# Patient Record
Sex: FEMALE | Race: WHITE | Employment: OTHER | URBAN - METROPOLITAN AREA
[De-identification: names, ages, dates, MRNs, and addresses within clinical notes are randomized per-mention and may not be internally consistent; named-entity substitution may affect disease eponyms.]

---

## 2017-02-07 ENCOUNTER — TELEPHONE (OUTPATIENT)
Dept: INTERNAL MEDICINE CLINIC | Facility: CLINIC | Age: 67
End: 2017-02-07

## 2017-02-08 RX ORDER — GLIMEPIRIDE 2 MG/1
TABLET ORAL
Qty: 180 TABLET | Refills: 0 | Status: SHIPPED | OUTPATIENT
Start: 2017-02-08 | End: 2017-07-25

## 2017-02-08 RX ORDER — DILTIAZEM HYDROCHLORIDE 240 MG/1
TABLET, EXTENDED RELEASE ORAL
Qty: 90 TABLET | Refills: 0 | Status: SHIPPED | OUTPATIENT
Start: 2017-02-08 | End: 2017-05-06

## 2017-02-08 NOTE — TELEPHONE ENCOUNTER
Pt following up on medication MATZIM  MG Oral Tablet 24 Hr and  Dapagliflozin Propanediol (FARXIGA) 10 MG Oral Tab. Pt state that pharmacy stated that medication was declined. .. please advise for pt is completely out

## 2017-02-08 NOTE — TELEPHONE ENCOUNTER
Refilled per protocol      Hypertensive Medications  Protocol Criteria:  · Appointment scheduled in the past 6 months or in the next 3 months  · BMP or CMP in the past 12 months  · Creatinine result < 2  Recent Visits       Provider Department Primary Dx Controlled type 2 diabetes mellitus without complication, without long-term current use of insulin ([facility] [address]    9 months ago Tyler Marcum MD [facility], [facility], [facility] Type 2 diabetes mellitus with complication [facility])        Future Appointmen

## 2017-02-09 RX ORDER — GLIMEPIRIDE 2 MG/1
2 TABLET ORAL 2 TIMES DAILY
Qty: 180 TABLET | Refills: 0 | Status: CANCELLED | OUTPATIENT
Start: 2017-02-09

## 2017-02-09 RX ORDER — DILTIAZEM HYDROCHLORIDE EXTENDED-RELEASE TABLETS 240 MG/1
TABLET, EXTENDED RELEASE ORAL
Qty: 90 TABLET | Refills: 0 | Status: CANCELLED | OUTPATIENT
Start: 2017-02-09

## 2017-02-15 DIAGNOSIS — E11.8 TYPE 2 DIABETES MELLITUS WITH COMPLICATION, UNSPECIFIED LONG TERM INSULIN USE STATUS: ICD-10-CM

## 2017-02-16 NOTE — TELEPHONE ENCOUNTER
From: Elisa Sullivan  To: Manpreet Walters MD  Sent: 2/15/2017 1:23 PM CST  Subject: Medication Renewal Request    Original authorizing provider: MD Elisa Alanis would like a refill of the following medications:  Exenatide ER (BY

## 2017-02-23 ENCOUNTER — LAB ENCOUNTER (OUTPATIENT)
Dept: LAB | Facility: HOSPITAL | Age: 67
End: 2017-02-23
Attending: INTERNAL MEDICINE
Payer: COMMERCIAL

## 2017-02-23 ENCOUNTER — TELEPHONE (OUTPATIENT)
Dept: INTERNAL MEDICINE CLINIC | Facility: CLINIC | Age: 67
End: 2017-02-23

## 2017-02-23 ENCOUNTER — OFFICE VISIT (OUTPATIENT)
Dept: INTERNAL MEDICINE CLINIC | Facility: CLINIC | Age: 67
End: 2017-02-23

## 2017-02-23 VITALS
SYSTOLIC BLOOD PRESSURE: 127 MMHG | HEART RATE: 82 BPM | HEIGHT: 60 IN | DIASTOLIC BLOOD PRESSURE: 77 MMHG | RESPIRATION RATE: 18 BRPM | BODY MASS INDEX: 27.68 KG/M2 | WEIGHT: 141 LBS

## 2017-02-23 DIAGNOSIS — E11.9 CONTROLLED TYPE 2 DIABETES MELLITUS WITHOUT COMPLICATION, WITHOUT LONG-TERM CURRENT USE OF INSULIN (HCC): Primary | ICD-10-CM

## 2017-02-23 DIAGNOSIS — Z00.00 ROUTINE HEALTH MAINTENANCE: ICD-10-CM

## 2017-02-23 DIAGNOSIS — R87.820 CERVICAL LOW RISK HUMAN PAPILLOMAVIRUS (HPV) DNA TEST POSITIVE: ICD-10-CM

## 2017-02-23 DIAGNOSIS — Z00.00 ROUTINE HEALTH MAINTENANCE: Primary | ICD-10-CM

## 2017-02-23 DIAGNOSIS — E11.9 CONTROLLED TYPE 2 DIABETES MELLITUS WITHOUT COMPLICATION, WITHOUT LONG-TERM CURRENT USE OF INSULIN (HCC): ICD-10-CM

## 2017-02-23 DIAGNOSIS — I10 ESSENTIAL HYPERTENSION WITH GOAL BLOOD PRESSURE LESS THAN 130/85: ICD-10-CM

## 2017-02-23 DIAGNOSIS — E53.8 VITAMIN B12 DEFICIENCY: ICD-10-CM

## 2017-02-23 DIAGNOSIS — E03.9 ACQUIRED HYPOTHYROIDISM: ICD-10-CM

## 2017-02-23 LAB
ALBUMIN SERPL BCP-MCNC: 4.5 G/DL (ref 3.5–4.8)
ALBUMIN/GLOB SERPL: 1.6 {RATIO} (ref 1–2)
ALP SERPL-CCNC: 44 U/L (ref 32–100)
ALT SERPL-CCNC: 30 U/L (ref 14–54)
ANION GAP SERPL CALC-SCNC: 9 MMOL/L (ref 0–18)
AST SERPL-CCNC: 32 U/L (ref 15–41)
BASOPHILS # BLD: 0.1 K/UL (ref 0–0.2)
BASOPHILS NFR BLD: 1 %
BILIRUB SERPL-MCNC: 0.5 MG/DL (ref 0.3–1.2)
BUN SERPL-MCNC: 12 MG/DL (ref 8–20)
BUN/CREAT SERPL: 13.2 (ref 10–20)
CALCIUM SERPL-MCNC: 10.1 MG/DL (ref 8.5–10.5)
CHLORIDE SERPL-SCNC: 105 MMOL/L (ref 95–110)
CHOLEST SERPL-MCNC: 161 MG/DL (ref 110–200)
CO2 SERPL-SCNC: 27 MMOL/L (ref 22–32)
CREAT SERPL-MCNC: 0.91 MG/DL (ref 0.5–1.5)
CREAT UR-MCNC: 25.4 MG/DL
EOSINOPHIL # BLD: 0.1 K/UL (ref 0–0.7)
EOSINOPHIL NFR BLD: 2 %
ERYTHROCYTE [DISTWIDTH] IN BLOOD BY AUTOMATED COUNT: 14.7 % (ref 11–15)
GLOBULIN PLAS-MCNC: 2.8 G/DL (ref 2.5–3.7)
GLUCOSE SERPL-MCNC: 70 MG/DL (ref 70–99)
HBA1C MFR BLD: 5.8 % (ref 4–6)
HCT VFR BLD AUTO: 39 % (ref 35–48)
HDLC SERPL-MCNC: 45 MG/DL
HGB BLD-MCNC: 12.9 G/DL (ref 12–16)
LDLC SERPL CALC-MCNC: 93 MG/DL (ref 0–99)
LYMPHOCYTES # BLD: 2.2 K/UL (ref 1–4)
LYMPHOCYTES NFR BLD: 35 %
MCH RBC QN AUTO: 28.5 PG (ref 27–32)
MCHC RBC AUTO-ENTMCNC: 33 G/DL (ref 32–37)
MCV RBC AUTO: 86.3 FL (ref 80–100)
MICROALBUMIN UR-MCNC: 0.3 MG/DL (ref 0–1.8)
MICROALBUMIN/CREAT UR: 11.8 MG/G{CREAT} (ref 0–20)
MONOCYTES # BLD: 0.6 K/UL (ref 0–1)
MONOCYTES NFR BLD: 9 %
NEUTROPHILS # BLD AUTO: 3.4 K/UL (ref 1.8–7.7)
NEUTROPHILS NFR BLD: 54 %
NONHDLC SERPL-MCNC: 116 MG/DL
OSMOLALITY UR CALC.SUM OF ELEC: 290 MOSM/KG (ref 275–295)
PLATELET # BLD AUTO: 328 K/UL (ref 140–400)
PMV BLD AUTO: 9.2 FL (ref 7.4–10.3)
POTASSIUM SERPL-SCNC: 3.9 MMOL/L (ref 3.3–5.1)
PROT SERPL-MCNC: 7.3 G/DL (ref 5.9–8.4)
RBC # BLD AUTO: 4.52 M/UL (ref 3.7–5.4)
SODIUM SERPL-SCNC: 141 MMOL/L (ref 136–144)
TRIGL SERPL-MCNC: 113 MG/DL (ref 1–149)
TSH SERPL-ACNC: 1.44 UIU/ML (ref 0.34–5.6)
WBC # BLD AUTO: 6.4 K/UL (ref 4–11)

## 2017-02-23 PROCEDURE — 99213 OFFICE O/P EST LOW 20 MIN: CPT | Performed by: INTERNAL MEDICINE

## 2017-02-23 PROCEDURE — 36415 COLL VENOUS BLD VENIPUNCTURE: CPT

## 2017-02-23 PROCEDURE — 82570 ASSAY OF URINE CREATININE: CPT

## 2017-02-23 PROCEDURE — 99396 PREV VISIT EST AGE 40-64: CPT | Performed by: INTERNAL MEDICINE

## 2017-02-23 PROCEDURE — 82043 UR ALBUMIN QUANTITATIVE: CPT

## 2017-02-23 PROCEDURE — G0439 PPPS, SUBSEQ VISIT: HCPCS | Performed by: INTERNAL MEDICINE

## 2017-02-23 PROCEDURE — 80053 COMPREHEN METABOLIC PANEL: CPT

## 2017-02-23 PROCEDURE — 83036 HEMOGLOBIN GLYCOSYLATED A1C: CPT

## 2017-02-23 PROCEDURE — 85025 COMPLETE CBC W/AUTO DIFF WBC: CPT

## 2017-02-23 PROCEDURE — 80061 LIPID PANEL: CPT

## 2017-02-23 PROCEDURE — 84443 ASSAY THYROID STIM HORMONE: CPT

## 2017-02-23 NOTE — PROGRESS NOTES
HPI:    Patient ID: Zenobia Marques is a 61year old female. REASON FOR VISIT:    Zenobia Marques is a 61year old female who presents for an [address]       Patient Active Problem List:     Senile cataract     Acquired hypothyroidism Encounters:  02/23/17 : 141 lb (63.957 kg)  11/17/16 : 140 lb (63.504 kg)  08/18/16 : 140 lb 1.6 oz (63.549 kg)  05/07/16 : 142 lb 12.8 oz (64.774 kg)  04/12/16 : 143 lb (64.864 kg)  01/09/16 : 140 lb (63.504 kg)    Body mass index is 27.54 kg/(m^2). WEEKS   Little interest or pleasure in doing things (over the last two weeks)?: Not at all    Feeling down, depressed, or hopeless (over the last two weeks)?: Not at all    PHQ-2 SCORE: 0        PREVENTATIVE SERVICES  INDICATIONS AND SCHEDULE Recommendatio No components found for: PPDINDURAT      Disease Monitoring:    SPECIFIC DISEASE MONITORING Internal Lab or Procedure External Lab or Procedure   Annual Monitoring of Persistent     Medications (ACE/ARB, digoxin, diuretics)    Potassium  Annually POTASSIUM 0   TRANDOLAPRIL 4 MG Oral Tab TAKE 1 TABLET BY MOUTH EVERY DAY Disp: 90 tablet Rfl: 3   Pantoprazole Sodium 40 MG Oral Tab EC Take 1 tablet (40 mg total) by mouth once daily.  Disp: 90 tablet Rfl: 1   Vitamin B-12 1000 MCG Oral Tab Take 2 tablets (2,000 mc Alcohol Use: Yes           1.0 oz/week       2 Glasses of wine per week    Occ:n/a : n/a       REVIEW OF SYSTEMS:   See below   EXAM:   /77 mmHg  Pulse 82  Resp 18  Ht 5' (1.524 m)  Wt 141 lb (63.957 kg)  BMI 27.54 kg/m2   No LMP recorded.  Jeremi Ngo trouble swallowing. Eyes: Negative for pain and redness. Respiratory: Negative for cough, shortness of breath and wheezing. Cardiovascular: Negative for chest pain and palpitations.    Gastrointestinal: Negative for nausea, vomiting, abdominal pain this when the fenofibrate runs out.  Disp: 30 tablet Rfl: 3   LEVOTHYROXINE SODIUM 100 MCG Oral Tab TAKE 1 TABLET BY MOUTH DAILY BEFORE BREAKFAST Disp: 90 tablet Rfl: 3     Allergies:No Known Allergies   PHYSICAL EXAM:   Physical Exam    Constitutional: She Controlled type 2 diabetes mellitus without complication, without long-term current use of insulin ([address]  Well controlled. Form for DMV   She is up to date on ophthalmology and urine tests. - Hemoglobin A1C [E];  Future  - Microalb/Creat Ratio, Random Ur

## 2017-02-24 NOTE — TELEPHONE ENCOUNTER
Your blood sugar, urine test, kidney, liver, electrolytes, thyroid, and blood count tests were all normal.  Your cholesterol panel is very good! Your A1C is excellent! In fact, it is so good that I would like to decrease the glimepiride to once a day.   H

## 2017-03-08 NOTE — TELEPHONE ENCOUNTER
From: Lupis Lieberman  To: Sharifa Mckeon MD  Sent: 3/6/2017 9:20 AM CST  Subject: Medication Renewal Request    Original authorizing provider: MD Lupis Dee would like a refill of the following medications:  Dapagliflozin Pro

## 2017-03-08 NOTE — TELEPHONE ENCOUNTER
Please advise on refill request    Diabetes Medications  Protocol Criteria:  · Appointment scheduled in the past 6 months or the next 3 months  · A1C < 7.5 in the past 6 months  · Creatinine in the past 12 months  · Creatinine result < 1.5   Recent Visits

## 2017-03-09 RX ORDER — PANTOPRAZOLE SODIUM 40 MG/1
40 TABLET, DELAYED RELEASE ORAL
Qty: 90 TABLET | Refills: 0 | Status: SHIPPED | OUTPATIENT
Start: 2017-03-09 | End: 2017-06-07

## 2017-03-09 RX ORDER — METFORMIN HYDROCHLORIDE 500 MG/1
TABLET, EXTENDED RELEASE ORAL
Qty: 360 TABLET | Refills: 0 | Status: SHIPPED | OUTPATIENT
Start: 2017-03-09 | End: 2017-06-07

## 2017-03-10 RX ORDER — METFORMIN HYDROCHLORIDE 500 MG/1
TABLET, EXTENDED RELEASE ORAL
Qty: 360 TABLET | Refills: 0 | OUTPATIENT
Start: 2017-03-10

## 2017-03-10 NOTE — TELEPHONE ENCOUNTER
From: Veronica Pizano  To: Malathi Tran MD  Sent: 3/6/2017 9:04 AM CST  Subject: Medication Renewal Request    Original authorizing provider: MD Veronica Ricks would like a refill of the following medications:  Pantoprazole Sodi

## 2017-03-10 NOTE — TELEPHONE ENCOUNTER
From: Lupis Lieberman  To: Sharifa Mckeon MD  Sent: 3/8/2017 3:03 PM CST  Subject: Medication Renewal Request    Original authorizing provider: MD Lupis Dee would like a refill of the following medications:  MetFORMIN HCl ER

## 2017-03-21 ENCOUNTER — OFFICE VISIT (OUTPATIENT)
Dept: FAMILY MEDICINE CLINIC | Facility: CLINIC | Age: 67
End: 2017-03-21

## 2017-03-21 VITALS
OXYGEN SATURATION: 98 % | WEIGHT: 142 LBS | TEMPERATURE: 98 F | SYSTOLIC BLOOD PRESSURE: 128 MMHG | DIASTOLIC BLOOD PRESSURE: 80 MMHG | RESPIRATION RATE: 12 BRPM | BODY MASS INDEX: 28 KG/M2 | HEART RATE: 76 BPM

## 2017-03-21 DIAGNOSIS — H92.02 OTALGIA OF LEFT EAR: Primary | ICD-10-CM

## 2017-03-21 PROCEDURE — 99203 OFFICE O/P NEW LOW 30 MIN: CPT | Performed by: NURSE PRACTITIONER

## 2017-03-21 NOTE — PATIENT INSTRUCTIONS
May take Coricidin HBP tailored to your symptoms  Equalize ear pressure often as discussed.       Eustachian tube problems  Written by the doctors and editors at [employer]     What is the eustachian tube? — The eustachian tube is a tube that connects the mid Should I see a doctor or nurse? — See your doctor or nurse if your symptoms are severe, get worse, or if they don’t go away after a few days. Will I need tests? — Probably not.  Your doctor or nurse should be able to tell if you have a eustachian tube pr Earache, No Infection (Adult)  Earaches can happen without an infection. This occurs when air and fluid build up behind the eardrum causing a feeling of fullness and discomfort and reduced hearing.  This is called otitis media with effusion (OME) or serous · You may use over-the-counter medicine as directed to control pain, unless another medicine was prescribed. If you have chronic liver or kidney disease or ever had a stomach ulcer or GI bleeding, talk with your doctor before using these medicines.  Aspirin

## 2017-03-21 NOTE — PROGRESS NOTES
CHIEF COMPLAINT:   Patient presents with:  Ear Pain      HPI:   Erin Drummond is a 61year old female who presents to clinic today with complaints of left ear pain. Has had for 1  weeks. Pain is described as intermittent ache.   Patient denies histor ibuprofen (MOTRIN) 800 MG Oral Tab Take 1 tablet by mouth every 6 (six) hours as needed for Pain.  Disp: 40 tablet Rfl: 3      Past Medical History   Diagnosis Date   • Hypothyroid    • Other and unspecified hyperlipidemia    • Type II or unspecified type d LYMPH: No cervical, pre/post auricular lymphadenopathy. ASSESSMENT AND PLAN:   Amol Way is a 61year old female who presents with:    ASSESSMENT:  Otalgia of left ear  (primary encounter diagnosis)    PLAN: Meds as listed below.   Comfort pau ?Illnesses or conditions that make the eustachian tubes swollen or inflamed – These include colds, allergies, ear infections, or sinus infections. The sinuses are hollow areas in the bones of the face.   ?Sudden air pressure changes – Sudden air pressure ch ?Decongestants – These medicines can help with stuffy nose symptoms. These should not be used if patient has underlying blood pressure issues (high blood pressure etc.)   -Sudafed (pseudoephedrine)- according to package instructions. ? Antibiotic medici If your OME doesn't improve after 3 months, surgery may be used to drain the fluid and insert a small tube in the eardrum to allow continued drainage.   Because the middle ear fluid can become infected, it is important to watch for signs of an ear infection © 2623-2379 [address], [address] [place]. All rights reserved. This information is not intended as a substitute for professional medical care. Always follow your healthcare professional's instructions.

## 2017-04-06 ENCOUNTER — OFFICE VISIT (OUTPATIENT)
Dept: INTERNAL MEDICINE CLINIC | Facility: CLINIC | Age: 67
End: 2017-04-06

## 2017-04-06 ENCOUNTER — TELEPHONE (OUTPATIENT)
Dept: INTERNAL MEDICINE CLINIC | Facility: CLINIC | Age: 67
End: 2017-04-06

## 2017-04-06 VITALS
HEIGHT: 60 IN | TEMPERATURE: 98 F | WEIGHT: 142 LBS | DIASTOLIC BLOOD PRESSURE: 64 MMHG | HEART RATE: 83 BPM | SYSTOLIC BLOOD PRESSURE: 136 MMHG | BODY MASS INDEX: 27.88 KG/M2

## 2017-04-06 DIAGNOSIS — H92.02 LEFT EAR PAIN: Primary | ICD-10-CM

## 2017-04-06 PROCEDURE — 99212 OFFICE O/P EST SF 10 MIN: CPT | Performed by: INTERNAL MEDICINE

## 2017-04-06 PROCEDURE — 99213 OFFICE O/P EST LOW 20 MIN: CPT | Performed by: INTERNAL MEDICINE

## 2017-04-06 RX ORDER — FLUTICASONE PROPIONATE 50 MCG
2 SPRAY, SUSPENSION (ML) NASAL DAILY
Qty: 1 INHALER | Refills: 0 | Status: SHIPPED | OUTPATIENT
Start: 2017-04-06 | End: 2017-05-01

## 2017-04-06 NOTE — PROGRESS NOTES
Teresita Mata is a 61year old female. Patient presents with:  Ear Pain: Pt c/o left ear pain on/off for 3 weeks    HPI:   For the past 3 weeks, she has had intermittent left ear pain. Episodes occur daily, but usually only last for a few minutes.   Cornelius Turner Vitamin B-12 1000 MCG Oral Tab Take 2 tablets (2,000 mcg total) by mouth daily. Disp: 30 tablet Rfl: 11   Cholecalciferol (VITAMIN D) 1000 UNITS Oral Cap Take 1,000 mg by mouth daily. Disp:  Rfl:    aspirin 81 MG Oral Tab Take  by mouth.  take 1 tablet (8 then refer to ENT. The patient indicates understanding of these issues and agrees to the plan.     Jordana Reeder MD  4/6/2017  9:33 AM

## 2017-04-06 NOTE — TELEPHONE ENCOUNTER
PATIENT DROPPING OFF MEDICAL REPORT FORM FOR [facility] FOR  TO COMPLETE AND MAILED BACK TO [facility] IS SELF ADDRESSED ENVELOPE. FORM PLACED IN  IN BOX AT [facility]. PLEASE CALL PATIENT WHEN COMPLETED.

## 2017-04-22 ENCOUNTER — OFFICE VISIT (OUTPATIENT)
Dept: OBGYN CLINIC | Facility: CLINIC | Age: 67
End: 2017-04-22

## 2017-04-22 VITALS
BODY MASS INDEX: 27 KG/M2 | HEART RATE: 92 BPM | SYSTOLIC BLOOD PRESSURE: 144 MMHG | WEIGHT: 139.63 LBS | DIASTOLIC BLOOD PRESSURE: 78 MMHG

## 2017-04-22 DIAGNOSIS — Z12.4 SCREENING FOR MALIGNANT NEOPLASM OF CERVIX: ICD-10-CM

## 2017-04-22 DIAGNOSIS — R87.810 CERVICAL HIGH RISK HUMAN PAPILLOMAVIRUS (HPV) DNA TEST POSITIVE: ICD-10-CM

## 2017-04-22 DIAGNOSIS — Z01.419 ENCOUNTER FOR GYNECOLOGICAL EXAMINATION WITHOUT ABNORMAL FINDING: Primary | ICD-10-CM

## 2017-04-22 PROCEDURE — 99397 PER PM REEVAL EST PAT 65+ YR: CPT | Performed by: OBSTETRICS & GYNECOLOGY

## 2017-04-22 NOTE — PROGRESS NOTES
Amol Way is a 74year old female  No LMP recorded. Patient is postmenopausal. Patient presents with:  Gyn Exam: Annual and Mammo order -- last seen in   Abnormal Labs: last pap  (+) HPV -- no f/u pap since then.  Per pt unaware need Caffeine Concern Yes    Comment: Coffee 1 cup daily     Social History Narrative       FAMILY HISTORY:  Family History   Problem Relation Age of Onset   • Cancer Mother      ovarian/cervical   • Diabetes Mother    • Cancer Maternal Grandmother      bone, l tablet (81MG)  by oral route  every day, Disp: , Rfl:   •  ibuprofen (MOTRIN) 800 MG Oral Tab, Take 1 tablet by mouth every 6 (six) hours as needed for Pain., Disp: 40 tablet, Rfl: 3    ALLERGIES:  No Known Allergies      Review of Systems:  Constitutional tenderness  Perineum: normal  Anus: no hemorroids     Assessment & Plan:  Alanis Haider was seen today for gyn exam and abnormal labs.     Diagnoses and all orders for this visit:    Encounter for gynecological examination without abnormal finding    Cervical high

## 2017-05-01 DIAGNOSIS — E11.8 TYPE 2 DIABETES MELLITUS WITH COMPLICATION, UNSPECIFIED LONG TERM INSULIN USE STATUS: ICD-10-CM

## 2017-05-01 RX ORDER — FLUTICASONE PROPIONATE 50 MCG
SPRAY, SUSPENSION (ML) NASAL
Qty: 1 INHALER | Refills: 0 | Status: SHIPPED | OUTPATIENT
Start: 2017-05-01 | End: 2017-06-01

## 2017-05-02 DIAGNOSIS — E11.8 TYPE 2 DIABETES MELLITUS WITH COMPLICATION, UNSPECIFIED LONG TERM INSULIN USE STATUS: ICD-10-CM

## 2017-05-03 ENCOUNTER — TELEPHONE (OUTPATIENT)
Dept: OBGYN CLINIC | Facility: CLINIC | Age: 67
End: 2017-05-03

## 2017-05-03 NOTE — TELEPHONE ENCOUNTER
PT REQUESTING TEST RESULTS DONE 04/22/17 /  PAP TEST / PT ALSO STATE SHE WOULD LIKE TO SPEAK WITH SOMEONE REGARDING THE RESULTS / PLS ADV

## 2017-05-05 ENCOUNTER — TELEPHONE (OUTPATIENT)
Dept: INTERNAL MEDICINE CLINIC | Facility: CLINIC | Age: 67
End: 2017-05-05

## 2017-05-05 DIAGNOSIS — H92.02 LEFT EAR PAIN: Primary | ICD-10-CM

## 2017-05-05 NOTE — TELEPHONE ENCOUNTER
Pt informed of MD message, pt stated understanding. ENT office # provided to pt.      Future Appointments  Date Time Provider Linda Zabala   6/22/2017 8:30 AM Toney Andino MD [facility]

## 2017-05-05 NOTE — TELEPHONE ENCOUNTER
Patient called a new request for refills  Current Outpatient Prescriptions:  Exenatide ER (BYDUREON) 2 MG Subcutaneous Pen-injector Inject 1 Dose into the skin once a week.  Disp: 12 each Rfl: 0   MATZIM  MG Oral Tablet 24 Hr TAKE 1 TABLET BY MOUTH EV

## 2017-05-05 NOTE — TELEPHONE ENCOUNTER
Pt states still having ear pain discomfort inconsistently since appt with Dr. Iveth Barber on 4/6. Pt states Dr. Iveth Barber stated if ear pian didn't go away, he will refer her to a ENT Doctor.   Please advise, please call cell number  after 5pm.

## 2017-05-05 NOTE — TELEPHONE ENCOUNTER
Actions Requested: left ear pain, requesting referral for ENT  Situation/Background   Problem: Per pt Seen on 4/06/17 for left ear pain and pt states its worst. Pain 5/10 intermitting. Denies drainage, fever.    Onset:    Associated Symptoms: ear pressure,

## 2017-05-06 ENCOUNTER — OFFICE VISIT (OUTPATIENT)
Dept: OTOLARYNGOLOGY | Facility: CLINIC | Age: 67
End: 2017-05-06

## 2017-05-06 ENCOUNTER — TELEPHONE (OUTPATIENT)
Dept: OTOLARYNGOLOGY | Facility: CLINIC | Age: 67
End: 2017-05-06

## 2017-05-06 ENCOUNTER — OFFICE VISIT (OUTPATIENT)
Dept: AUDIOLOGY | Facility: CLINIC | Age: 67
End: 2017-05-06

## 2017-05-06 ENCOUNTER — TELEPHONE (OUTPATIENT)
Dept: INTERNAL MEDICINE CLINIC | Facility: CLINIC | Age: 67
End: 2017-05-06

## 2017-05-06 VITALS
TEMPERATURE: 97 F | BODY MASS INDEX: 28 KG/M2 | SYSTOLIC BLOOD PRESSURE: 130 MMHG | DIASTOLIC BLOOD PRESSURE: 70 MMHG | WEIGHT: 141 LBS

## 2017-05-06 DIAGNOSIS — IMO0001 ASYMMETRICAL LEFT SENSORINEURAL HEARING LOSS: Primary | ICD-10-CM

## 2017-05-06 DIAGNOSIS — IMO0001 ASYMMETRICAL HEARING LOSS, LEFT: Primary | ICD-10-CM

## 2017-05-06 DIAGNOSIS — E11.8 TYPE 2 DIABETES MELLITUS WITH COMPLICATION, UNSPECIFIED LONG TERM INSULIN USE STATUS: Primary | ICD-10-CM

## 2017-05-06 DIAGNOSIS — H90.42 SENSORINEURAL HEARING LOSS (SNHL) OF LEFT EAR WITH UNRESTRICTED HEARING OF RIGHT EAR: ICD-10-CM

## 2017-05-06 PROCEDURE — 92557 COMPREHENSIVE HEARING TEST: CPT | Performed by: AUDIOLOGIST

## 2017-05-06 PROCEDURE — 99243 OFF/OP CNSLTJ NEW/EST LOW 30: CPT | Performed by: OTOLARYNGOLOGY

## 2017-05-06 PROCEDURE — 99212 OFFICE O/P EST SF 10 MIN: CPT | Performed by: OTOLARYNGOLOGY

## 2017-05-06 RX ORDER — MELOXICAM 15 MG/1
15 TABLET ORAL DAILY
Qty: 30 TABLET | Refills: 3 | Status: SHIPPED | OUTPATIENT
Start: 2017-05-06 | End: 2017-11-02

## 2017-05-06 NOTE — TELEPHONE ENCOUNTER
Rx request for Exenatide ER (Bydureon) 2mg, PASSED Diabetes Protocol. Rx filled per protocol.       Diabetes Medications  Protocol Criteria:  · Appointment scheduled in the past 6 months or the next 3 months  · A1C < 7.5 in the past 6 months  · Creatinine i

## 2017-05-06 NOTE — TELEPHONE ENCOUNTER
Patient states that she was referred to see dr Jess Serna by dr Chintan Jeffers.   Seen 5/6/17 by dr Jess Serna  - who  states that she will need a referral to have a Auditory Brainstem test.

## 2017-05-06 NOTE — TELEPHONE ENCOUNTER
From: Madhuri Lyons  To: Brittney Luis MD  Sent: 5/1/2017 9:36 AM CDT  Subject: Medication Renewal Request    Original authorizing provider: MD Madhuri Spencer would like a refill of the following medications:  Exenatide ER (BYD

## 2017-05-06 NOTE — TELEPHONE ENCOUNTER
pt called. She was seen this morning with FANNY. She is asking for something for her pain. Please advise.

## 2017-05-06 NOTE — PROGRESS NOTES
Raúl Hearn is a 74year old female. Patient presents with:  Ear Problem: L ear pain x 7 weeks. HPI:   The last 6-8 weeks she's been experiencing a feeling of fullness and occasional pain in her left ear.  She denies any dizziness but has had mild for Pain.  Disp: 40 tablet Rfl: 3      Past Medical History   Diagnosis Date   • Hypothyroid    • Other and unspecified hyperlipidemia    • Type II or unspecified type diabetes mellitus without mention of complication, not stated as uncontrolled    • Unspec Left: Normal.    Ears Normal Inspection - Right: Normal, Left: Normal. Canal - Left: Normal. TM - Right: Normal, Left: Normal.  Asymmetric sensorineural hearing loss left ear     ASSESSMENT AND PLAN:   1.  Asymmetrical hearing loss, left  She is asymmetric

## 2017-05-06 NOTE — PROGRESS NOTES
AUDIOGRAM     Merary Leak was referred for testing by Ximena Perez  [id number]      Otoscopic Inspection:  both ears: no cerumen    Audiometric Test Results: The patient was tested using air and bone audiometry.   The audiometric thre

## 2017-05-07 RX ORDER — DILTIAZEM HYDROCHLORIDE 240 MG/1
TABLET, EXTENDED RELEASE ORAL
Qty: 90 TABLET | Refills: 1 | Status: SHIPPED | OUTPATIENT
Start: 2017-05-07 | End: 2017-08-03

## 2017-05-10 ENCOUNTER — OFFICE VISIT (OUTPATIENT)
Dept: AUDIOLOGY | Facility: CLINIC | Age: 67
End: 2017-05-10

## 2017-05-10 DIAGNOSIS — H90.3 SENSORINEURAL HEARING LOSS, BILATERAL: ICD-10-CM

## 2017-05-10 DIAGNOSIS — IMO0001 ASYMMETRICAL LEFT SENSORINEURAL HEARING LOSS: Primary | ICD-10-CM

## 2017-05-10 PROCEDURE — 92585 AUDITORY EVOKED POTENTIAL: CPT | Performed by: AUDIOLOGIST

## 2017-05-11 RX ORDER — DILTIAZEM HYDROCHLORIDE EXTENDED-RELEASE TABLETS 240 MG/1
TABLET, EXTENDED RELEASE ORAL
Qty: 90 TABLET | Refills: 0 | OUTPATIENT
Start: 2017-05-11

## 2017-05-12 NOTE — TELEPHONE ENCOUNTER
From: Jerson Latham  To: Adelina Willson MD  Sent: 5/2/2017 8:40 AM CDT  Subject: Medication Renewal Request    Original authorizing provider: MD Jerson Gage would like a refill of the following medications:  MATZIM  MG

## 2017-05-14 ENCOUNTER — TELEPHONE (OUTPATIENT)
Dept: OTOLARYNGOLOGY | Facility: CLINIC | Age: 67
End: 2017-05-14

## 2017-05-14 NOTE — TELEPHONE ENCOUNTER
Please inform her that her ABR was normal. I would recommend observation with a repeat audiogram in 6 months

## 2017-05-15 ENCOUNTER — TELEPHONE (OUTPATIENT)
Dept: OTOLARYNGOLOGY | Facility: CLINIC | Age: 67
End: 2017-05-15

## 2017-05-18 NOTE — PROGRESS NOTES
Quick Note:    Send letter: Normal pap & Negative high risk HPV. Continue with annual breast / pelvic exams (I will do pap if warrantied).   ______

## 2017-06-01 RX ORDER — FLUTICASONE PROPIONATE 50 MCG
SPRAY, SUSPENSION (ML) NASAL
Qty: 1 INHALER | Refills: 3 | Status: SHIPPED | OUTPATIENT
Start: 2017-06-01 | End: 2017-09-08

## 2017-06-07 RX ORDER — METFORMIN HYDROCHLORIDE 500 MG/1
TABLET, EXTENDED RELEASE ORAL
Qty: 360 TABLET | Refills: 1 | Status: SHIPPED | OUTPATIENT
Start: 2017-06-07 | End: 2017-11-28

## 2017-06-07 RX ORDER — PANTOPRAZOLE SODIUM 40 MG/1
40 TABLET, DELAYED RELEASE ORAL
Qty: 90 TABLET | Refills: 1 | Status: SHIPPED | OUTPATIENT
Start: 2017-06-07 | End: 2017-11-28

## 2017-06-13 DIAGNOSIS — E78.00 HYPERCHOLESTEROLEMIA: Primary | ICD-10-CM

## 2017-06-16 RX ORDER — ATORVASTATIN CALCIUM 10 MG/1
10 TABLET, FILM COATED ORAL NIGHTLY
Qty: 30 TABLET | Refills: 3 | Status: SHIPPED | OUTPATIENT
Start: 2017-06-16 | End: 2017-10-19

## 2017-06-22 ENCOUNTER — TELEPHONE (OUTPATIENT)
Dept: INTERNAL MEDICINE CLINIC | Facility: CLINIC | Age: 67
End: 2017-06-22

## 2017-06-22 DIAGNOSIS — E11.9 CONTROLLED TYPE 2 DIABETES MELLITUS WITHOUT COMPLICATION, UNSPECIFIED LONG TERM INSULIN USE STATUS: Primary | ICD-10-CM

## 2017-06-22 DIAGNOSIS — E78.00 HIGH CHOLESTEROL: ICD-10-CM

## 2017-06-22 NOTE — TELEPHONE ENCOUNTER
Needs her cholesterol, diabetes and thyroid order   Apt rescheduled due to doctor being out of office. Prefers to have order and blood drawn before apt on 7/31 at 5 P. M.

## 2017-06-25 NOTE — TELEPHONE ENCOUNTER
Call pt: She does not have to fast.  She did the fasting blood tests in February. She just needs an A1C.     Orders Placed This Encounter      Hemoglobin A1C [E]

## 2017-06-26 NOTE — TELEPHONE ENCOUNTER
Spoke with patient and relayed 's message. Patient voiced her understanding of this. Patient asking about checking cholesterol since medication was changed. Advised patient this writer will ask Dr about this.

## 2017-07-18 ENCOUNTER — APPOINTMENT (OUTPATIENT)
Dept: LAB | Age: 67
End: 2017-07-18
Attending: INTERNAL MEDICINE
Payer: COMMERCIAL

## 2017-07-18 DIAGNOSIS — E11.9 CONTROLLED TYPE 2 DIABETES MELLITUS WITHOUT COMPLICATION, UNSPECIFIED LONG TERM INSULIN USE STATUS: ICD-10-CM

## 2017-07-18 DIAGNOSIS — E11.9 CONTROLLED TYPE 2 DIABETES MELLITUS WITHOUT COMPLICATION, WITHOUT LONG-TERM CURRENT USE OF INSULIN (HCC): ICD-10-CM

## 2017-07-18 DIAGNOSIS — E78.00 HIGH CHOLESTEROL: ICD-10-CM

## 2017-07-18 LAB
CHOLEST SERPL-MCNC: 142 MG/DL (ref 110–200)
HBA1C MFR BLD: 6.1 % (ref 4–6)
HDLC SERPL-MCNC: 56 MG/DL
LDLC SERPL CALC-MCNC: 48 MG/DL (ref 0–99)
NONHDLC SERPL-MCNC: 86 MG/DL
TRIGL SERPL-MCNC: 192 MG/DL (ref 1–149)

## 2017-07-18 PROCEDURE — 36415 COLL VENOUS BLD VENIPUNCTURE: CPT

## 2017-07-18 PROCEDURE — 80061 LIPID PANEL: CPT

## 2017-07-18 PROCEDURE — 83036 HEMOGLOBIN GLYCOSYLATED A1C: CPT

## 2017-07-26 RX ORDER — GLIMEPIRIDE 2 MG/1
TABLET ORAL
Qty: 180 TABLET | Refills: 0 | Status: SHIPPED | OUTPATIENT
Start: 2017-07-26 | End: 2017-08-03

## 2017-07-27 RX ORDER — GLIMEPIRIDE 2 MG/1
2 TABLET ORAL 2 TIMES DAILY
Qty: 180 TABLET | Refills: 0
Start: 2017-07-27

## 2017-08-03 ENCOUNTER — OFFICE VISIT (OUTPATIENT)
Dept: INTERNAL MEDICINE CLINIC | Facility: CLINIC | Age: 67
End: 2017-08-03

## 2017-08-03 VITALS
DIASTOLIC BLOOD PRESSURE: 83 MMHG | HEART RATE: 91 BPM | BODY MASS INDEX: 22.42 KG/M2 | WEIGHT: 139.5 LBS | HEIGHT: 66 IN | RESPIRATION RATE: 18 BRPM | SYSTOLIC BLOOD PRESSURE: 146 MMHG

## 2017-08-03 DIAGNOSIS — Z12.11 COLON CANCER SCREENING: ICD-10-CM

## 2017-08-03 DIAGNOSIS — Z12.31 VISIT FOR SCREENING MAMMOGRAM: ICD-10-CM

## 2017-08-03 DIAGNOSIS — E11.9 CONTROLLED TYPE 2 DIABETES MELLITUS WITHOUT COMPLICATION, UNSPECIFIED LONG TERM INSULIN USE STATUS: Primary | ICD-10-CM

## 2017-08-03 DIAGNOSIS — I10 ESSENTIAL HYPERTENSION WITH GOAL BLOOD PRESSURE LESS THAN 130/85: ICD-10-CM

## 2017-08-03 DIAGNOSIS — Z23 NEED FOR VACCINATION: ICD-10-CM

## 2017-08-03 DIAGNOSIS — Z78.0 POST-MENOPAUSAL: ICD-10-CM

## 2017-08-03 PROCEDURE — 99214 OFFICE O/P EST MOD 30 MIN: CPT | Performed by: INTERNAL MEDICINE

## 2017-08-03 PROCEDURE — 99212 OFFICE O/P EST SF 10 MIN: CPT | Performed by: INTERNAL MEDICINE

## 2017-08-03 PROCEDURE — 90471 IMMUNIZATION ADMIN: CPT | Performed by: INTERNAL MEDICINE

## 2017-08-03 PROCEDURE — 90670 PCV13 VACCINE IM: CPT | Performed by: INTERNAL MEDICINE

## 2017-08-03 RX ORDER — DILTIAZEM HYDROCHLORIDE EXTENDED-RELEASE TABLETS 240 MG/1
TABLET, EXTENDED RELEASE ORAL
Qty: 90 TABLET | Refills: 1 | Status: CANCELLED | OUTPATIENT
Start: 2017-08-03

## 2017-08-03 RX ORDER — DILTIAZEM HYDROCHLORIDE EXTENDED-RELEASE TABLETS 360 MG/1
360 TABLET, EXTENDED RELEASE ORAL DAILY
Qty: 90 TABLET | Refills: 1 | Status: SHIPPED | OUTPATIENT
Start: 2017-08-03 | End: 2018-01-25

## 2017-08-03 RX ORDER — GLIMEPIRIDE 2 MG/1
2 TABLET ORAL 2 TIMES DAILY
Qty: 180 TABLET | Refills: 1 | Status: SHIPPED | OUTPATIENT
Start: 2017-08-03 | End: 2018-02-08

## 2017-08-03 NOTE — PATIENT INSTRUCTIONS
Lifestyle Modification:   (AVG. SBP = Average Systolic Blood Pressure)  Lifestyle Modification Recommendations  Modification Recommendation Avg.  SBP Reduction Range   Weight Reduction Maintain normal body weight (body mass index 18.5-24.9 kg/m2) 5-20 mmH

## 2017-08-03 NOTE — PROGRESS NOTES
HPI:    Patient ID: Mary Mendoza is a 74year old female. Pt lost weight on Bydureon. Diabetes   She presents for her follow-up diabetic visit. She has type 2 diabetes mellitus. Her disease course has been stable.  Pertinent negatives for [facility] Tab TAKE 1 TABLET BY MOUTH EVERY DAY Disp: 90 tablet Rfl: 3   Vitamin B-12 1000 MCG Oral Tab Take 2 tablets (2,000 mcg total) by mouth daily. Disp: 30 tablet Rfl: 11   Cholecalciferol (VITAMIN D) 1000 UNITS Oral Cap Take 1,000 mg by mouth daily.  Disp:  Rfl          [id number]

## 2017-08-21 ENCOUNTER — NURSE TRIAGE (OUTPATIENT)
Dept: OTHER | Age: 67
End: 2017-08-21

## 2017-08-21 ENCOUNTER — TELEPHONE (OUTPATIENT)
Dept: OPTOMETRY | Facility: CLINIC | Age: 67
End: 2017-08-21

## 2017-08-21 ENCOUNTER — HOSPITAL ENCOUNTER (OUTPATIENT)
Age: 67
Discharge: HOME | End: 2017-08-21
Attending: EMERGENCY MEDICINE
Payer: COMMERCIAL

## 2017-08-21 ENCOUNTER — TELEPHONE (OUTPATIENT)
Dept: INTERNAL MEDICINE CLINIC | Facility: CLINIC | Age: 67
End: 2017-08-21

## 2017-08-21 VITALS
BODY MASS INDEX: 22.34 KG/M2 | TEMPERATURE: 98 F | WEIGHT: 139 LBS | OXYGEN SATURATION: 97 % | HEART RATE: 83 BPM | SYSTOLIC BLOOD PRESSURE: 163 MMHG | DIASTOLIC BLOOD PRESSURE: 76 MMHG | HEIGHT: 66 IN | RESPIRATION RATE: 16 BRPM

## 2017-08-21 DIAGNOSIS — H53.9 VISUAL DISTURBANCE: Primary | ICD-10-CM

## 2017-08-21 DIAGNOSIS — H53.8 FLASHING LIGHTS: Primary | ICD-10-CM

## 2017-08-21 PROCEDURE — 99202 OFFICE O/P NEW SF 15 MIN: CPT

## 2017-08-21 PROCEDURE — 99212 OFFICE O/P EST SF 10 MIN: CPT

## 2017-08-21 NOTE — ED NOTES
Spoke to Dr. Markie Dove and referral to [facility] opthalmologist obtained. Will call pt back in a room with pt. Message left tcb pt. Pt will go to office to be seen now.

## 2017-08-21 NOTE — ED PROVIDER NOTES
Patient Seen in: [facility] In [address] [place] [address]    History   Patient presents with:   Eye Visual Problem (opthalmic)    Stated Complaint: Lt Eye Irritation    HPI  Patient is here complaining of intermittent flashes of white light to the lef total) by mouth once daily. Meloxicam 15 MG Oral Tab,  Take 1 tablet (15 mg total) by mouth daily.    LEVOTHYROXINE SODIUM 100 MCG Oral Tab,  TAKE 1 TABLET BY MOUTH DAILY BEFORE BREAKFAST   TRANDOLAPRIL 4 MG Oral Tab,  TAKE 1 TABLET BY MOUTH EVERY DAY   C is oriented to person, place, and time. She appears well-developed and well-nourished. Well-appearing   HENT:   Head: Normocephalic and atraumatic.    Nose: Nose normal.   Eyes: Conjunctivae, EOM and lids are normal. Pupils are equal, round, and reactive

## 2017-08-21 NOTE — TELEPHONE ENCOUNTER
Urgent care calling. Pt saw flashing lights and was sent to . They do not do dilated exam.  Pt needs eye doctor.

## 2017-08-21 NOTE — TELEPHONE ENCOUNTER
Action Requested: Summary for Provider     []  Critical Lab, Recommendations Needed  [] Need Additional Advice  []   FYI    []   Need Orders  [] Need Medications Sent to Pharmacy  [x]  Other     SUMMARY: Patient states she has been experiencing intermitten

## 2017-08-22 ENCOUNTER — OFFICE VISIT (OUTPATIENT)
Dept: OPHTHALMOLOGY | Facility: CLINIC | Age: 67
End: 2017-08-22

## 2017-08-22 DIAGNOSIS — H25.13 NUCLEAR SCLEROTIC CATARACT, BILATERAL: ICD-10-CM

## 2017-08-22 DIAGNOSIS — E11.9 CONTROLLED TYPE 2 DIABETES MELLITUS WITHOUT COMPLICATION, WITHOUT LONG-TERM CURRENT USE OF INSULIN (HCC): Primary | ICD-10-CM

## 2017-08-22 PROCEDURE — 99212 OFFICE O/P EST SF 10 MIN: CPT | Performed by: OPHTHALMOLOGY

## 2017-08-22 PROCEDURE — 99243 OFF/OP CNSLTJ NEW/EST LOW 30: CPT | Performed by: OPHTHALMOLOGY

## 2017-08-22 NOTE — PROGRESS NOTES
Federico Antoine is a 74year old female.     HPI:     HPI     Consult    Additional comments: Referred by Avila Hull    Additional comments: Pt has been a diabetic for 10 years  10 years on pills/  0 years on Insulin   Pt checks Never Smoker                                                              Smokeless tobacco: Never Used                      Alcohol use: Yes           1.0 oz/week     Glasses of wine: 2 per week     Comment: occasionally      Medications:    Current Outpa Respiratory, Psychiatric, Allergic/Imm, Heme/Lymph    Last edited by Alvarado Melendez O.T. on 8/22/2017  9:34 AM. (History)          PHYSICAL EXAM:     Base Eye Exam     Visual Acuity (Snellen - Linear)       Right Left    Dist cc 20/30 -2 20/30 +1    Dist no signs of neovascularization noted. No treatment necessary at this time. Patient was instructed to monitor vision for sudden changes and to call if visual changes noted. Discussed ocular and systemic benefits of blood sugar control.     Nuclear sclerot

## 2017-08-22 NOTE — PATIENT INSTRUCTIONS
Controlled type 2 diabetes mellitus without complication, without long-term current use of insulin (HCC)  Diabetes type II: mild background diabetic retinopathy, no signs of neovascularization noted. No treatment necessary at this time.   Patient was instr

## 2017-08-22 NOTE — ASSESSMENT & PLAN NOTE
Diabetes type II: mild background diabetic retinopathy, no signs of neovascularization noted. No treatment necessary at this time. Patient was instructed to monitor vision for sudden changes and to call if visual changes noted.   Discussed ocular and syst

## 2017-09-08 PROCEDURE — 88305 TISSUE EXAM BY PATHOLOGIST: CPT | Performed by: INTERNAL MEDICINE

## 2017-09-12 ENCOUNTER — HOSPITAL ENCOUNTER (OUTPATIENT)
Dept: BONE DENSITY | Facility: HOSPITAL | Age: 67
Discharge: HOME | End: 2017-09-12
Attending: INTERNAL MEDICINE
Payer: COMMERCIAL

## 2017-09-12 ENCOUNTER — HOSPITAL ENCOUNTER (OUTPATIENT)
Dept: MAMMOGRAPHY | Facility: HOSPITAL | Age: 67
Discharge: HOME | End: 2017-09-12
Attending: INTERNAL MEDICINE
Payer: COMMERCIAL

## 2017-09-12 DIAGNOSIS — Z78.0 POST-MENOPAUSAL: ICD-10-CM

## 2017-09-12 DIAGNOSIS — Z12.31 VISIT FOR SCREENING MAMMOGRAM: ICD-10-CM

## 2017-09-12 PROCEDURE — 77067 SCR MAMMO BI INCL CAD: CPT | Performed by: INTERNAL MEDICINE

## 2017-09-12 PROCEDURE — 77080 DXA BONE DENSITY AXIAL: CPT | Performed by: INTERNAL MEDICINE

## 2017-09-21 ENCOUNTER — NURSE TRIAGE (OUTPATIENT)
Dept: OTHER | Age: 67
End: 2017-09-21

## 2017-09-21 NOTE — TELEPHONE ENCOUNTER
Action Requested: Summary for Provider     []  Critical Lab, Recommendations Needed  [] Need Additional Advice  [x]   FYI    []   Need Orders  [] Need Medications Sent to Pharmacy  []  Other     SUMMARY: Pt called states she has had mild gnawing pain in le

## 2017-10-09 ENCOUNTER — TELEPHONE (OUTPATIENT)
Dept: INTERNAL MEDICINE CLINIC | Facility: CLINIC | Age: 67
End: 2017-10-09

## 2017-10-10 RX ORDER — LEVOTHYROXINE SODIUM 0.1 MG/1
TABLET ORAL
Qty: 90 TABLET | Refills: 3 | Status: SHIPPED | OUTPATIENT
Start: 2017-10-10 | End: 2018-09-26

## 2017-10-11 NOTE — TELEPHONE ENCOUNTER
Refilled per protocol.   Hypothyroid Medications  Protocol Criteria:  Appointment scheduled in the past 12 months or the next 3 months  TSH resulted in the past 12 months that is normal  Recent Outpatient Visits            1 month ago Controlled type 2

## 2017-10-17 RX ORDER — TRANDOLAPRIL 4 MG/1
4 TABLET ORAL
Qty: 90 TABLET | Refills: 3 | Status: SHIPPED | OUTPATIENT
Start: 2017-10-17 | End: 2018-02-08

## 2017-10-17 NOTE — TELEPHONE ENCOUNTER
Hypertensive Medications  Protocol Criteria:  · Appointment scheduled in the past 6 months or in the next 3 months  · BMP or CMP in the past 12 months  · Creatinine result < 2  Recent Outpatient Visits            1 month ago Controlled type 2 diabetes bernardo

## 2017-10-17 NOTE — TELEPHONE ENCOUNTER
Signed Prescriptions Disp Refills    Levothyroxine Sodium 100 MCG Oral Tab 90 tablet 3      Sig: TAKE 1 TABLET BY MOUTH DAILY BEFORE BREAKFAST        Authorizing Provider: Rober Gitelman        Ordering User: LEI SAMUELS      [address]

## 2017-10-18 ENCOUNTER — TELEPHONE (OUTPATIENT)
Dept: INTERNAL MEDICINE CLINIC | Facility: CLINIC | Age: 67
End: 2017-10-18

## 2017-10-18 DIAGNOSIS — E78.00 HYPERCHOLESTEROLEMIA: ICD-10-CM

## 2017-10-19 RX ORDER — ATORVASTATIN CALCIUM 10 MG/1
10 TABLET, FILM COATED ORAL NIGHTLY
Qty: 30 TABLET | Refills: 3 | Status: SHIPPED | OUTPATIENT
Start: 2017-10-19 | End: 2017-11-02

## 2017-10-19 NOTE — TELEPHONE ENCOUNTER
Cholesterol Medications  Protocol Criteria:  · Appointment scheduled in the past 12 months or in the next 3 months  · ALT & LDL on file in the past 12 months  · ALT result < 80  · LDL result <130   Recent Outpatient Visits            1 month ago Controlled

## 2017-10-25 ENCOUNTER — TELEPHONE (OUTPATIENT)
Dept: INTERNAL MEDICINE CLINIC | Facility: CLINIC | Age: 67
End: 2017-10-25

## 2017-10-26 NOTE — TELEPHONE ENCOUNTER
Pharmacist asking for refill on Bydureon 2mg Sig: inject one dose once weekly #12. I don't see this medication in med profile. Please advise.

## 2017-11-02 ENCOUNTER — OFFICE VISIT (OUTPATIENT)
Dept: INTERNAL MEDICINE CLINIC | Facility: CLINIC | Age: 67
End: 2017-11-02

## 2017-11-02 ENCOUNTER — APPOINTMENT (OUTPATIENT)
Dept: LAB | Facility: HOSPITAL | Age: 67
End: 2017-11-02
Attending: INTERNAL MEDICINE
Payer: COMMERCIAL

## 2017-11-02 VITALS
SYSTOLIC BLOOD PRESSURE: 135 MMHG | HEART RATE: 83 BPM | WEIGHT: 142.19 LBS | RESPIRATION RATE: 16 BRPM | HEIGHT: 66 IN | BODY MASS INDEX: 22.85 KG/M2 | DIASTOLIC BLOOD PRESSURE: 73 MMHG

## 2017-11-02 DIAGNOSIS — Z00.00 ROUTINE HEALTH MAINTENANCE: ICD-10-CM

## 2017-11-02 DIAGNOSIS — M85.80 OSTEOPENIA, UNSPECIFIED LOCATION: ICD-10-CM

## 2017-11-02 DIAGNOSIS — E11.9 CONTROLLED TYPE 2 DIABETES MELLITUS WITHOUT COMPLICATION, WITHOUT LONG-TERM CURRENT USE OF INSULIN (HCC): ICD-10-CM

## 2017-11-02 DIAGNOSIS — E78.00 HYPERCHOLESTEROLEMIA: ICD-10-CM

## 2017-11-02 DIAGNOSIS — E11.9 CONTROLLED TYPE 2 DIABETES MELLITUS WITHOUT COMPLICATION, WITHOUT LONG-TERM CURRENT USE OF INSULIN (HCC): Primary | ICD-10-CM

## 2017-11-02 PROCEDURE — 99212 OFFICE O/P EST SF 10 MIN: CPT | Performed by: INTERNAL MEDICINE

## 2017-11-02 PROCEDURE — 99214 OFFICE O/P EST MOD 30 MIN: CPT | Performed by: INTERNAL MEDICINE

## 2017-11-02 PROCEDURE — 36415 COLL VENOUS BLD VENIPUNCTURE: CPT

## 2017-11-02 PROCEDURE — 83036 HEMOGLOBIN GLYCOSYLATED A1C: CPT

## 2017-11-02 RX ORDER — MELATONIN
2 2 TIMES DAILY
Qty: 120 TABLET | Refills: 0 | Status: SHIPPED | OUTPATIENT
Start: 2017-11-02 | End: 2020-08-28

## 2017-11-02 RX ORDER — ATORVASTATIN CALCIUM 10 MG/1
10 TABLET, FILM COATED ORAL NIGHTLY
Qty: 90 TABLET | Refills: 2 | Status: SHIPPED | OUTPATIENT
Start: 2017-11-02 | End: 2018-02-08

## 2017-11-02 NOTE — ASSESSMENT & PLAN NOTE
Reviewed dexa result with pt. Advised pt to take calcium with D and to exercise. Recheck dexa in 2 years.

## 2017-11-02 NOTE — PATIENT INSTRUCTIONS
You can go to any of the [facility] walk in clinics and get flu shot. Non-fasting labs today. Do fasting labs in February before your appointment. .  Fast for 12 hours. Water is okay. Walk in.

## 2017-11-02 NOTE — PROGRESS NOTES
HPI:    Patient ID: Aleisha Foster is a 74year old female. No problems with the diabetes. Diabetes   She presents for her follow-up diabetic visit. She has type 2 diabetes mellitus. Her disease course has been stable.  Pertinent negatives for d 100 MCG Oral Tab TAKE 1 TABLET BY MOUTH DAILY BEFORE BREAKFAST Disp: 90 tablet Rfl: 3   glimepiride 2 MG Oral Tab Take 1 tablet (2 mg total) by mouth 2 (two) times daily.  Disp: 180 tablet Rfl: 1   DilTIAZem HCl ER Coated Beads (MATZIM LA) 360 MG Oral Table well-developed and well-nourished. HENT:   Head: Normocephalic and atraumatic. Eyes: Conjunctivae and EOM are normal.   Cardiovascular: Normal rate, regular rhythm and normal heart sounds.     Pulmonary/Chest: Effort normal and breath sounds normal. No

## 2017-11-02 NOTE — ASSESSMENT & PLAN NOTE
Control is good, but she just stopped the Byduron. Will not know how it affects her control until 3 months from now. Check A1C today. Ophthalmology is up to date. Urine negative  Counseled regarding diet and exercise    Recheck all labs in 3 months.

## 2017-12-01 RX ORDER — PANTOPRAZOLE SODIUM 40 MG/1
TABLET, DELAYED RELEASE ORAL
Qty: 90 TABLET | Refills: 0 | Status: SHIPPED | OUTPATIENT
Start: 2017-12-01 | End: 2018-02-26

## 2017-12-01 RX ORDER — METFORMIN HYDROCHLORIDE 500 MG/1
TABLET, EXTENDED RELEASE ORAL
Qty: 360 TABLET | Refills: 0 | Status: SHIPPED | OUTPATIENT
Start: 2017-12-01 | End: 2018-02-26

## 2017-12-01 NOTE — TELEPHONE ENCOUNTER
Diabetes Medications  Protocol Criteria:  · Appointment scheduled in the past 6 months or the next 3 months  · A1C < 7.5 in the past 6 months  · Creatinine in the past 12 months  · Creatinine result < 1.5   Recent Outpatient Visits            4 weeks ago C

## 2017-12-01 NOTE — TELEPHONE ENCOUNTER
Signed Prescriptions Disp Refills    PANTOPRAZOLE SODIUM 40 MG Oral Tab EC 90 tablet 0      Sig: TAKE 1 TABLET BY MOUTH EVERY DAY        Authorizing Provider: Oscar Fisher        Ordering User: Gagandeep Gomez      METFORMIN HCL  MG Oral Tablet 24

## 2017-12-10 RX ORDER — DAPAGLIFLOZIN 10 MG/1
TABLET, FILM COATED ORAL
Qty: 90 TABLET | Refills: 0 | Status: SHIPPED | OUTPATIENT
Start: 2017-12-10 | End: 2018-03-06

## 2017-12-14 ENCOUNTER — NURSE TRIAGE (OUTPATIENT)
Dept: OTHER | Age: 67
End: 2017-12-14

## 2017-12-14 ENCOUNTER — OFFICE VISIT (OUTPATIENT)
Dept: INTERNAL MEDICINE CLINIC | Facility: CLINIC | Age: 67
End: 2017-12-14

## 2017-12-14 VITALS
BODY MASS INDEX: 23.59 KG/M2 | HEART RATE: 82 BPM | RESPIRATION RATE: 16 BRPM | SYSTOLIC BLOOD PRESSURE: 142 MMHG | WEIGHT: 146.81 LBS | DIASTOLIC BLOOD PRESSURE: 78 MMHG | HEIGHT: 66 IN

## 2017-12-14 DIAGNOSIS — I10 ESSENTIAL HYPERTENSION WITH GOAL BLOOD PRESSURE LESS THAN 130/85: Primary | ICD-10-CM

## 2017-12-14 DIAGNOSIS — R60.0 LOWER EXTREMITY EDEMA: ICD-10-CM

## 2017-12-14 PROCEDURE — 99212 OFFICE O/P EST SF 10 MIN: CPT | Performed by: PHYSICIAN ASSISTANT

## 2017-12-14 RX ORDER — HYDROCHLOROTHIAZIDE 12.5 MG/1
12.5 TABLET ORAL DAILY
Qty: 90 TABLET | Refills: 1 | Status: SHIPPED | OUTPATIENT
Start: 2017-12-14 | End: 2018-06-03

## 2017-12-14 NOTE — PROGRESS NOTES
Virgen Lechuga is a 74year old female. Patient presents with:  Leg Swelling      HPI:   Patient presents today complaining of bilateral lower extremity edema. Noticed swelling of the feet and ankles this morning when she woke up.  Throughout the day s Levothyroxine Sodium 100 MCG Oral Tab TAKE 1 TABLET BY MOUTH DAILY BEFORE BREAKFAST Disp: 90 tablet Rfl: 3   glimepiride 2 MG Oral Tab Take 1 tablet (2 mg total) by mouth 2 (two) times daily.  Disp: 180 tablet Rfl: 1   DilTIAZem HCl ER Coated Beads (SERGIOZI hearing loss, ear pain, nasal congestion, and sore throat. SKIN: Denies skin lesions and rashes. RESPIRATORY: Denies shortness of breath, wheezing, and cough. CARDIOVASCULAR: Denies chest pain, palpitations. Lower extremity edema up to mid calves.   GI: secondary to inadequately controlled blood pressure. Very low suspicion for heart failure, liver disease, nephrotic syndrome. HCTZ as above. Advised to elevate the feet above the level of the heart while resting at home.      Other orders  -     hydrochloro

## 2017-12-14 NOTE — TELEPHONE ENCOUNTER
Action Requested: Summary for Provider     []  Critical Lab, Recommendations Needed  [] Need Additional Advice  []   FYI    []   Need Orders  [] Need Medications Sent to Pharmacy  []  Other     SUMMARY: Pt stts woke up with swollen feet and ankles.  Denies

## 2018-01-11 ENCOUNTER — APPOINTMENT (OUTPATIENT)
Dept: LAB | Facility: HOSPITAL | Age: 68
End: 2018-01-11
Attending: PHYSICIAN ASSISTANT
Payer: COMMERCIAL

## 2018-01-11 ENCOUNTER — OFFICE VISIT (OUTPATIENT)
Dept: INTERNAL MEDICINE CLINIC | Facility: CLINIC | Age: 68
End: 2018-01-11

## 2018-01-11 VITALS
DIASTOLIC BLOOD PRESSURE: 80 MMHG | WEIGHT: 146 LBS | HEIGHT: 66 IN | TEMPERATURE: 98 F | RESPIRATION RATE: 18 BRPM | BODY MASS INDEX: 23.46 KG/M2 | HEART RATE: 80 BPM | SYSTOLIC BLOOD PRESSURE: 142 MMHG

## 2018-01-11 DIAGNOSIS — R60.0 LOWER EXTREMITY EDEMA: ICD-10-CM

## 2018-01-11 DIAGNOSIS — I10 ESSENTIAL HYPERTENSION WITH GOAL BLOOD PRESSURE LESS THAN 130/85: Primary | ICD-10-CM

## 2018-01-11 DIAGNOSIS — I10 ESSENTIAL HYPERTENSION WITH GOAL BLOOD PRESSURE LESS THAN 130/85: ICD-10-CM

## 2018-01-11 LAB
ALBUMIN SERPL BCP-MCNC: 4.3 G/DL (ref 3.5–4.8)
ALBUMIN/GLOB SERPL: 1.4 {RATIO} (ref 1–2)
ALP SERPL-CCNC: 60 U/L (ref 32–100)
ALT SERPL-CCNC: 38 U/L (ref 14–54)
ANION GAP SERPL CALC-SCNC: 8 MMOL/L (ref 0–18)
AST SERPL-CCNC: 46 U/L (ref 15–41)
BILIRUB SERPL-MCNC: 0.5 MG/DL (ref 0.3–1.2)
BILIRUB UR QL: NEGATIVE
BUN SERPL-MCNC: 15 MG/DL (ref 8–20)
BUN/CREAT SERPL: 20.3 (ref 10–20)
CALCIUM SERPL-MCNC: 10.1 MG/DL (ref 8.5–10.5)
CHLORIDE SERPL-SCNC: 103 MMOL/L (ref 95–110)
CLARITY UR: CLEAR
CO2 SERPL-SCNC: 28 MMOL/L (ref 22–32)
COLOR UR: YELLOW
CREAT SERPL-MCNC: 0.74 MG/DL (ref 0.5–1.5)
ERYTHROCYTE [DISTWIDTH] IN BLOOD BY AUTOMATED COUNT: 17.6 % (ref 11–15)
GLOBULIN PLAS-MCNC: 3 G/DL (ref 2.5–3.7)
GLUCOSE SERPL-MCNC: 141 MG/DL (ref 70–99)
GLUCOSE UR-MCNC: >=500 MG/DL
HCT VFR BLD AUTO: 35 % (ref 35–48)
HGB BLD-MCNC: 11.1 G/DL (ref 12–16)
HGB UR QL STRIP.AUTO: NEGATIVE
KETONES UR-MCNC: NEGATIVE MG/DL
MCH RBC QN AUTO: 23.2 PG (ref 27–32)
MCHC RBC AUTO-ENTMCNC: 31.6 G/DL (ref 32–37)
MCV RBC AUTO: 73.6 FL (ref 80–100)
NITRITE UR QL STRIP.AUTO: POSITIVE
OSMOLALITY UR CALC.SUM OF ELEC: 291 MOSM/KG (ref 275–295)
PH UR: 7 [PH] (ref 5–8)
PLATELET # BLD AUTO: 426 K/UL (ref 140–400)
PMV BLD AUTO: 9 FL (ref 7.4–10.3)
POTASSIUM SERPL-SCNC: 4 MMOL/L (ref 3.3–5.1)
PROT SERPL-MCNC: 7.3 G/DL (ref 5.9–8.4)
PROT UR-MCNC: NEGATIVE MG/DL
RBC # BLD AUTO: 4.76 M/UL (ref 3.7–5.4)
RBC #/AREA URNS AUTO: 2 /HPF
SODIUM SERPL-SCNC: 139 MMOL/L (ref 136–144)
SP GR UR STRIP: 1.01 (ref 1–1.03)
UROBILINOGEN UR STRIP-ACNC: <2
VIT C UR-MCNC: NEGATIVE MG/DL
WBC # BLD AUTO: 7.5 K/UL (ref 4–11)
WBC #/AREA URNS AUTO: 62 /HPF

## 2018-01-11 PROCEDURE — 99213 OFFICE O/P EST LOW 20 MIN: CPT | Performed by: PHYSICIAN ASSISTANT

## 2018-01-11 PROCEDURE — 85027 COMPLETE CBC AUTOMATED: CPT

## 2018-01-11 PROCEDURE — 81001 URINALYSIS AUTO W/SCOPE: CPT

## 2018-01-11 PROCEDURE — 80053 COMPREHEN METABOLIC PANEL: CPT

## 2018-01-11 PROCEDURE — 36415 COLL VENOUS BLD VENIPUNCTURE: CPT

## 2018-01-11 RX ORDER — MECLIZINE HCL 12.5 MG/1
12.5 TABLET ORAL DAILY PRN
COMMUNITY
End: 2019-03-14

## 2018-01-11 NOTE — PROGRESS NOTES
HPI:    Patient ID: Arian Doshi is a 74year old female. HPI   Patient presents today with ongoing issues of bilateral lower extremity edema. Was seen a few weeks ago for this reason and blood pressure elevated at that time also.   She was placed Prescriptions:  Meclizine HCl 12.5 MG Oral Tab Take 12.5 mg by mouth daily as needed. Disp:  Rfl:    hydrochlorothiazide 12.5 MG Oral Tab Take 1 tablet (12.5 mg total) by mouth daily.  Disp: 90 tablet Rfl: 1   FARXIGA 10 MG Oral Tab TAKE 1 TABLET BY MOUTH D moist.   Eyes: Conjunctivae are normal. Pupils are equal, round, and reactive to light. Neck: Normal range of motion. Neck supple. Cardiovascular: Normal rate, regular rhythm, normal heart sounds and intact distal pulses. Edema present.   Pulmonary/Brie normal.  Advised to elevate the legs above the level of the heart as much as possible. Compression stockings at least to the level of the knee. Contact the office if symptoms worsen or do not improve.         Orders Placed This Encounter      Urinalysis,

## 2018-01-11 NOTE — PATIENT INSTRUCTIONS
STOP anti-inflammatory medications (motrin, Ibuprofen, aleve, naproxen, meloxicam)  Only take tylenol for aches/pain  Continue to elevate the legs at home  Compression stockings - at least knee high  Strict diet to limit sodium intake   Please obtain blood

## 2018-01-12 RX ORDER — NITROFURANTOIN 25; 75 MG/1; MG/1
100 CAPSULE ORAL 2 TIMES DAILY
Qty: 14 CAPSULE | Refills: 0 | Status: SHIPPED | OUTPATIENT
Start: 2018-01-12 | End: 2018-02-08 | Stop reason: ALTCHOICE

## 2018-01-20 ENCOUNTER — IMMUNIZATION (OUTPATIENT)
Dept: FAMILY MEDICINE CLINIC | Facility: CLINIC | Age: 68
End: 2018-01-20

## 2018-01-20 PROCEDURE — 90653 IIV ADJUVANT VACCINE IM: CPT | Performed by: PHYSICIAN ASSISTANT

## 2018-01-20 PROCEDURE — 90471 IMMUNIZATION ADMIN: CPT | Performed by: PHYSICIAN ASSISTANT

## 2018-01-25 DIAGNOSIS — I10 ESSENTIAL HYPERTENSION WITH GOAL BLOOD PRESSURE LESS THAN 130/85: ICD-10-CM

## 2018-01-26 RX ORDER — DILTIAZEM HYDROCHLORIDE 360 MG/1
TABLET, EXTENDED RELEASE ORAL
Qty: 90 TABLET | Refills: 0 | Status: SHIPPED | OUTPATIENT
Start: 2018-01-26 | End: 2018-04-23

## 2018-01-26 NOTE — TELEPHONE ENCOUNTER
Hypertensive Medications  Protocol Criteria:  · Appointment scheduled in the past 6 months or in the next 3 months  · BMP or CMP in the past 12 months  · Creatinine result < 2  Recent Outpatient Visits            2 weeks ago Essential hypertension with Loli Layne

## 2018-02-02 ENCOUNTER — LAB ENCOUNTER (OUTPATIENT)
Dept: LAB | Age: 68
End: 2018-02-02
Attending: INTERNAL MEDICINE
Payer: COMMERCIAL

## 2018-02-02 DIAGNOSIS — Z00.00 ROUTINE HEALTH MAINTENANCE: ICD-10-CM

## 2018-02-02 DIAGNOSIS — E11.9 CONTROLLED TYPE 2 DIABETES MELLITUS WITHOUT COMPLICATION, WITHOUT LONG-TERM CURRENT USE OF INSULIN (HCC): ICD-10-CM

## 2018-02-02 LAB
ALBUMIN SERPL BCP-MCNC: 4.1 G/DL (ref 3.5–4.8)
ALBUMIN/GLOB SERPL: 1.4 {RATIO} (ref 1–2)
ALP SERPL-CCNC: 56 U/L (ref 32–100)
ALT SERPL-CCNC: 29 U/L (ref 14–54)
ANION GAP SERPL CALC-SCNC: 9 MMOL/L (ref 0–18)
AST SERPL-CCNC: 27 U/L (ref 15–41)
BASOPHILS # BLD: 0.1 K/UL (ref 0–0.2)
BASOPHILS NFR BLD: 1 %
BILIRUB SERPL-MCNC: 0.6 MG/DL (ref 0.3–1.2)
BUN SERPL-MCNC: 11 MG/DL (ref 8–20)
BUN/CREAT SERPL: 14.7 (ref 10–20)
CALCIUM SERPL-MCNC: 10.2 MG/DL (ref 8.5–10.5)
CHLORIDE SERPL-SCNC: 103 MMOL/L (ref 95–110)
CHOLEST SERPL-MCNC: 145 MG/DL (ref 110–200)
CO2 SERPL-SCNC: 28 MMOL/L (ref 22–32)
CREAT SERPL-MCNC: 0.75 MG/DL (ref 0.5–1.5)
CREAT UR-MCNC: 74.4 MG/DL
EOSINOPHIL # BLD: 0.2 K/UL (ref 0–0.7)
EOSINOPHIL NFR BLD: 3 %
ERYTHROCYTE [DISTWIDTH] IN BLOOD BY AUTOMATED COUNT: 17.8 % (ref 11–15)
GLOBULIN PLAS-MCNC: 2.9 G/DL (ref 2.5–3.7)
GLUCOSE SERPL-MCNC: 149 MG/DL (ref 70–99)
HBA1C MFR BLD: 7 % (ref 4–6)
HCT VFR BLD AUTO: 33.8 % (ref 35–48)
HDLC SERPL-MCNC: 63 MG/DL
HGB BLD-MCNC: 10.5 G/DL (ref 12–16)
LDLC SERPL CALC-MCNC: 58 MG/DL (ref 0–99)
LYMPHOCYTES # BLD: 1.9 K/UL (ref 1–4)
LYMPHOCYTES NFR BLD: 29 %
MCH RBC QN AUTO: 22.6 PG (ref 27–32)
MCHC RBC AUTO-ENTMCNC: 31 G/DL (ref 32–37)
MCV RBC AUTO: 72.9 FL (ref 80–100)
MICROALBUMIN UR-MCNC: 1.1 MG/DL (ref 0–1.8)
MICROALBUMIN/CREAT UR: 14.8 MG/G{CREAT} (ref 0–20)
MONOCYTES # BLD: 0.7 K/UL (ref 0–1)
MONOCYTES NFR BLD: 11 %
NEUTROPHILS # BLD AUTO: 3.8 K/UL (ref 1.8–7.7)
NEUTROPHILS NFR BLD: 57 %
NONHDLC SERPL-MCNC: 82 MG/DL
OSMOLALITY UR CALC.SUM OF ELEC: 292 MOSM/KG (ref 275–295)
PLATELET # BLD AUTO: 360 K/UL (ref 140–400)
PMV BLD AUTO: 9 FL (ref 7.4–10.3)
POTASSIUM SERPL-SCNC: 3.9 MMOL/L (ref 3.3–5.1)
PROT SERPL-MCNC: 7 G/DL (ref 5.9–8.4)
RBC # BLD AUTO: 4.64 M/UL (ref 3.7–5.4)
SODIUM SERPL-SCNC: 140 MMOL/L (ref 136–144)
TRIGL SERPL-MCNC: 119 MG/DL (ref 1–149)
TSH SERPL-ACNC: 0.79 UIU/ML (ref 0.45–5.33)
WBC # BLD AUTO: 6.7 K/UL (ref 4–11)

## 2018-02-02 PROCEDURE — 83036 HEMOGLOBIN GLYCOSYLATED A1C: CPT

## 2018-02-02 PROCEDURE — 82570 ASSAY OF URINE CREATININE: CPT

## 2018-02-02 PROCEDURE — 80061 LIPID PANEL: CPT

## 2018-02-02 PROCEDURE — 85025 COMPLETE CBC W/AUTO DIFF WBC: CPT

## 2018-02-02 PROCEDURE — 82043 UR ALBUMIN QUANTITATIVE: CPT

## 2018-02-02 PROCEDURE — 80053 COMPREHEN METABOLIC PANEL: CPT

## 2018-02-02 PROCEDURE — 36415 COLL VENOUS BLD VENIPUNCTURE: CPT

## 2018-02-02 PROCEDURE — 84443 ASSAY THYROID STIM HORMONE: CPT

## 2018-02-08 ENCOUNTER — APPOINTMENT (OUTPATIENT)
Dept: LAB | Facility: HOSPITAL | Age: 68
End: 2018-02-08
Attending: INTERNAL MEDICINE
Payer: COMMERCIAL

## 2018-02-08 ENCOUNTER — OFFICE VISIT (OUTPATIENT)
Dept: INTERNAL MEDICINE CLINIC | Facility: CLINIC | Age: 68
End: 2018-02-08

## 2018-02-08 VITALS
BODY MASS INDEX: 23.59 KG/M2 | WEIGHT: 146.81 LBS | HEIGHT: 66 IN | HEART RATE: 83 BPM | SYSTOLIC BLOOD PRESSURE: 132 MMHG | DIASTOLIC BLOOD PRESSURE: 70 MMHG

## 2018-02-08 DIAGNOSIS — D64.9 ANEMIA, UNSPECIFIED TYPE: Primary | ICD-10-CM

## 2018-02-08 DIAGNOSIS — I10 ESSENTIAL HYPERTENSION WITH GOAL BLOOD PRESSURE LESS THAN 130/85: ICD-10-CM

## 2018-02-08 DIAGNOSIS — E11.9 CONTROLLED TYPE 2 DIABETES MELLITUS WITHOUT COMPLICATION, WITHOUT LONG-TERM CURRENT USE OF INSULIN (HCC): ICD-10-CM

## 2018-02-08 DIAGNOSIS — E78.00 HYPERCHOLESTEROLEMIA: ICD-10-CM

## 2018-02-08 DIAGNOSIS — D64.9 ANEMIA, UNSPECIFIED TYPE: ICD-10-CM

## 2018-02-08 LAB
FERRITIN SERPL IA-MCNC: 9 NG/ML (ref 11–307)
FOLATE SERPL-MCNC: 8.8 NG/ML
IRON SATN MFR SERPL: 4 % (ref 15–50)
IRON SERPL-MCNC: 21 MCG/DL (ref 28–170)
TIBC SERPL-MCNC: 519 MCG/DL (ref 228–428)
TRANSFERRIN SERPL-MCNC: 393 MG/DL (ref 192–382)
VIT B12 SERPL-MCNC: >1500 PG/ML (ref 181–914)

## 2018-02-08 PROCEDURE — 99214 OFFICE O/P EST MOD 30 MIN: CPT | Performed by: INTERNAL MEDICINE

## 2018-02-08 PROCEDURE — 36415 COLL VENOUS BLD VENIPUNCTURE: CPT

## 2018-02-08 PROCEDURE — 83540 ASSAY OF IRON: CPT

## 2018-02-08 PROCEDURE — 84466 ASSAY OF TRANSFERRIN: CPT

## 2018-02-08 PROCEDURE — 99212 OFFICE O/P EST SF 10 MIN: CPT | Performed by: INTERNAL MEDICINE

## 2018-02-08 PROCEDURE — 82746 ASSAY OF FOLIC ACID SERUM: CPT

## 2018-02-08 PROCEDURE — 82728 ASSAY OF FERRITIN: CPT

## 2018-02-08 PROCEDURE — 82607 VITAMIN B-12: CPT

## 2018-02-08 RX ORDER — TRANDOLAPRIL 4 MG/1
8 TABLET ORAL
Qty: 180 TABLET | Refills: 1 | Status: SHIPPED | OUTPATIENT
Start: 2018-02-08 | End: 2018-03-01

## 2018-02-08 RX ORDER — GLIMEPIRIDE 4 MG/1
4 TABLET ORAL 2 TIMES DAILY
Qty: 180 TABLET | Refills: 1 | Status: SHIPPED | OUTPATIENT
Start: 2018-02-08 | End: 2018-08-03

## 2018-02-08 RX ORDER — ATORVASTATIN CALCIUM 10 MG/1
10 TABLET, FILM COATED ORAL NIGHTLY
Qty: 90 TABLET | Refills: 2 | Status: SHIPPED | OUTPATIENT
Start: 2018-02-08 | End: 2018-11-06

## 2018-02-08 NOTE — ASSESSMENT & PLAN NOTE
Her diabetes control has worsened since stopping the Byduron and also she has been eating a lot of carbohydrates. We will increase the glimepiride to 4 mg twice daily  She will improve her diet.     She is up-to-date on her eye exam.  Her a urine is negati

## 2018-02-08 NOTE — PROGRESS NOTES
HPI:    Patient ID: Alex Almaraz is a 74year old female. She has a new anemia. No blood loss. She had a recent colonoscopy in September. Patient has been eating more lately and her A1c went up to 7.   She has also been off the byduron and she t negative  No date: TONSILLECTOMY         Current Outpatient Prescriptions:  atorvastatin 10 MG Oral Tab Take 1 tablet (10 mg total) by mouth nightly. Disp: 90 tablet Rfl: 2   Trandolapril 4 MG Oral Tab Take 2 tablets (8 mg total) by mouth once daily.  Disp: bone, leukemia   • Breast Cancer Maternal Grandmother 48   • Breast Cancer Sister 47   • Cataracts Sister    • Heart Surgery Father      carotid artery surgery    • Glaucoma Neg    • Macular degeneration Neg        ./70 (BP Location: Right arm, pressure less than 130/85     Her blood pressure improved with the increased dose of trandolapril  cpm.         Relevant Medications    Trandolapril 4 MG Oral Tab    Hypercholesterolemia     Controlled. Continue present management.          Relevant Medica

## 2018-02-08 NOTE — ASSESSMENT & PLAN NOTE
She has a new onset anemia with no evidence of bleeding. She had a colonoscopy in September 2017 which was negative. We will check iron studies and if they are positive she will do an upper endoscopy.   She may have B12 deficiency, because she has not bee

## 2018-02-27 RX ORDER — PANTOPRAZOLE SODIUM 40 MG/1
TABLET, DELAYED RELEASE ORAL
Qty: 90 TABLET | Refills: 0 | Status: SHIPPED | OUTPATIENT
Start: 2018-02-27 | End: 2018-05-16

## 2018-02-27 RX ORDER — METFORMIN HYDROCHLORIDE 500 MG/1
TABLET, EXTENDED RELEASE ORAL
Qty: 360 TABLET | Refills: 0 | Status: SHIPPED | OUTPATIENT
Start: 2018-02-27 | End: 2018-05-29

## 2018-02-27 NOTE — TELEPHONE ENCOUNTER
Signed Prescriptions Disp Refills    PANTOPRAZOLE SODIUM 40 MG Oral Tab EC 90 tablet 0      Sig: TAKE 1 TABLET BY MOUTH EVERY DAY        Authorizing Provider: Hilario Sullivan        Ordering User: Trenton Maki      METFORMIN HCL  MG Oral Tablet 24 Outpatient Visits            2 weeks ago Anemia, unspecified type    Kylee Nguyễn MD    Office Visit    1 month ago Essential hypertension with goal blood pressure less than 130/85    [facility], [address]

## 2018-03-01 DIAGNOSIS — I10 ESSENTIAL HYPERTENSION WITH GOAL BLOOD PRESSURE LESS THAN 130/85: ICD-10-CM

## 2018-03-01 NOTE — TELEPHONE ENCOUNTER
From: Veronica Pizano  Sent: 3/1/2018 10:02 AM CST  Subject: Medication Renewal Request    Veronica Pizano would like a refill of the following medications:     Trandolapril 4 MG Oral Tab Wade Tellez MD]   Patient Comment: dosage was increased t

## 2018-03-02 RX ORDER — TRANDOLAPRIL 4 MG/1
8 TABLET ORAL
Qty: 180 TABLET | Refills: 1 | Status: SHIPPED | OUTPATIENT
Start: 2018-03-02 | End: 2018-08-31

## 2018-03-02 NOTE — TELEPHONE ENCOUNTER
Hypertensive Medications  Protocol Criteria:  · Appointment scheduled in the past 6 months or in the next 3 months  · BMP or CMP in the past 12 months  · Creatinine result < 2  Recent Outpatient Visits            3 weeks ago Anemia, unspecified type    [facility]

## 2018-03-06 RX ORDER — DAPAGLIFLOZIN 10 MG/1
TABLET, FILM COATED ORAL
Qty: 90 TABLET | Refills: 0 | Status: SHIPPED | OUTPATIENT
Start: 2018-03-06 | End: 2018-06-12

## 2018-03-06 NOTE — TELEPHONE ENCOUNTER
Diabetes Medications  Protocol Criteria:  · Appointment scheduled in the past 6 months or the next 3 months  · A1C < 7.5 in the past 6 months  · Creatinine in the past 12 months  · Creatinine result < 1.5   Recent Outpatient Visits            3 weeks ago A

## 2018-03-19 PROBLEM — D50.0 IRON DEFICIENCY ANEMIA DUE TO CHRONIC BLOOD LOSS: Status: ACTIVE | Noted: 2018-02-08

## 2018-04-04 PROCEDURE — 88305 TISSUE EXAM BY PATHOLOGIST: CPT | Performed by: INTERNAL MEDICINE

## 2018-04-23 DIAGNOSIS — I10 ESSENTIAL HYPERTENSION WITH GOAL BLOOD PRESSURE LESS THAN 130/85: ICD-10-CM

## 2018-04-24 RX ORDER — DILTIAZEM HYDROCHLORIDE 360 MG/1
TABLET, EXTENDED RELEASE ORAL
Qty: 90 TABLET | Refills: 0 | Status: SHIPPED | OUTPATIENT
Start: 2018-04-24 | End: 2018-07-19

## 2018-04-24 NOTE — TELEPHONE ENCOUNTER
Hypertensive Medications  Protocol Criteria:  · Appointment scheduled in the past 6 months or in the next 3 months  · BMP or CMP in the past 12 months  · Creatinine result < 2  Recent Outpatient Visits            1 month ago Iron deficiency anemia due to c

## 2018-04-26 ENCOUNTER — LAB ENCOUNTER (OUTPATIENT)
Dept: LAB | Age: 68
End: 2018-04-26
Attending: INTERNAL MEDICINE
Payer: COMMERCIAL

## 2018-04-26 DIAGNOSIS — D64.9 ANEMIA, UNSPECIFIED TYPE: ICD-10-CM

## 2018-04-26 DIAGNOSIS — E11.9 CONTROLLED TYPE 2 DIABETES MELLITUS WITHOUT COMPLICATION (HCC): ICD-10-CM

## 2018-04-26 DIAGNOSIS — E11.9 CONTROLLED TYPE 2 DIABETES MELLITUS WITHOUT COMPLICATION, WITHOUT LONG-TERM CURRENT USE OF INSULIN (HCC): ICD-10-CM

## 2018-04-26 PROCEDURE — 85025 COMPLETE CBC W/AUTO DIFF WBC: CPT

## 2018-04-26 PROCEDURE — 36415 COLL VENOUS BLD VENIPUNCTURE: CPT

## 2018-04-26 PROCEDURE — 83036 HEMOGLOBIN GLYCOSYLATED A1C: CPT

## 2018-05-03 ENCOUNTER — OFFICE VISIT (OUTPATIENT)
Dept: INTERNAL MEDICINE CLINIC | Facility: CLINIC | Age: 68
End: 2018-05-03

## 2018-05-03 VITALS
DIASTOLIC BLOOD PRESSURE: 69 MMHG | HEIGHT: 66 IN | WEIGHT: 144.63 LBS | TEMPERATURE: 98 F | BODY MASS INDEX: 23.24 KG/M2 | SYSTOLIC BLOOD PRESSURE: 134 MMHG | RESPIRATION RATE: 16 BRPM | HEART RATE: 87 BPM

## 2018-05-03 DIAGNOSIS — IMO0001 UNCONTROLLED TYPE 2 DIABETES MELLITUS WITHOUT COMPLICATION, WITHOUT LONG-TERM CURRENT USE OF INSULIN: ICD-10-CM

## 2018-05-03 DIAGNOSIS — E78.00 HYPERCHOLESTEROLEMIA: ICD-10-CM

## 2018-05-03 DIAGNOSIS — Z00.00 ROUTINE HEALTH MAINTENANCE: Primary | ICD-10-CM

## 2018-05-03 DIAGNOSIS — I10 ESSENTIAL HYPERTENSION WITH GOAL BLOOD PRESSURE LESS THAN 130/85: ICD-10-CM

## 2018-05-03 DIAGNOSIS — E03.9 ACQUIRED HYPOTHYROIDISM: ICD-10-CM

## 2018-05-03 DIAGNOSIS — D50.9 IRON DEFICIENCY ANEMIA, UNSPECIFIED IRON DEFICIENCY ANEMIA TYPE: ICD-10-CM

## 2018-05-03 PROCEDURE — 99397 PER PM REEVAL EST PAT 65+ YR: CPT | Performed by: INTERNAL MEDICINE

## 2018-05-03 PROCEDURE — 99212 OFFICE O/P EST SF 10 MIN: CPT | Performed by: INTERNAL MEDICINE

## 2018-05-03 PROCEDURE — 99213 OFFICE O/P EST LOW 20 MIN: CPT | Performed by: INTERNAL MEDICINE

## 2018-05-03 NOTE — PROGRESS NOTES
REASON FOR VISIT:    Lupis Lieberman is a 79year old female who presents for an [address]. Pt's A1c went up to 7.4. The Byduron was too expensive. She is anemic.   She had a panendoscopy and capsule endoscopy all if which were normal any immunizations at another office such as Influenza, Hepatitis B, Tetanus, or Pneumococcal?: Yes          CAGE:                               Depression Screening (PHQ-2/PHQ-9): Over the LAST 2 WEEKS   Little interest or pleasure in doing things (over th Screening Screen if pregnant or high risk No results found for: RPR    Hepatitis C Screening Screen those at high risk plus screen one time for adults born 1943-1 965 No results found for: HCVAB    Tuberculosis Screen if high risk No components found for: PANTOPRAZOLE SODIUM 40 MG Oral Tab EC TAKE 1 TABLET BY MOUTH EVERY DAY Disp: 90 tablet Rfl: 0   METFORMIN HCL  MG Oral Tablet 24 Hr TAKE 2 TABLETS BY MOUTH TWICE DAILY WITH MEALS Disp: 360 tablet Rfl: 0   atorvastatin 10 MG Oral Tab Take 1 tablet ( ESOPHAGOGASTRODUODENOSCOPY,                COLONOSCOPY, POSSIBLE BIOPSY, POSSIBLE                POLYPECTOMY 99830, 85511;  Surgeon: Nguyen Cunningham MD;  Location: [address] [place] [address]  2009: ENDOMETRIAL BIOPSY - JAR(S): rashes, no suspicious lesions  HEENT: atraumatic, normocephalic, ears and throat are clear  EYES:PERRLA, EOMI, normal optic disk, conjunctiva are clear  NECK: supple, no adenopathy, no bruits  CHEST: no chest tenderness  BREAST: no dominant or suspicious m management. 6. Hypercholesterolemia  Controlled. Continue present management.           SUGGESTED VACCINATIONS - Influenza, Pneumococcal, Zoster, Tetanus     Immunization History   Administered Date(s) Administered   • Flu Vacc 4 HAIR Split Virus 3 YR+ 1

## 2018-05-04 ENCOUNTER — OFFICE VISIT (OUTPATIENT)
Dept: OPTOMETRY | Facility: CLINIC | Age: 68
End: 2018-05-04

## 2018-05-04 DIAGNOSIS — H43.393 VITREOUS FLOATERS, BILATERAL: Primary | ICD-10-CM

## 2018-05-04 PROCEDURE — 92014 COMPRE OPH EXAM EST PT 1/>: CPT | Performed by: OPTOMETRIST

## 2018-05-04 NOTE — PATIENT INSTRUCTIONS
Vitreous floaters, bilateral  I advised patient that there is no change in the status of her floaters. I recommend that she call ASAP if she notes a sudden onset of new floaters or sudden flashes or a veil or curtain effect.  Patient is due for her annual d

## 2018-05-04 NOTE — ASSESSMENT & PLAN NOTE
I advised patient that there is no change in the status of her floaters. I recommend that she call ASAP if she notes a sudden onset of new floaters or sudden flashes or a veil or curtain effect.  Patient is due for her annual diabetic eye exam in 8/2018

## 2018-05-04 NOTE — PROGRESS NOTES
Hans Villegas is a 79year old female. HPI:     HPI     Patient went to her PCP for a routine visit/ annual physical and she told her doctor that she continues to have a  floaters since her last visit with LAURA in 8/22/2017.  She has one floater in t Never Smoker                                                              Smokeless tobacco: Never Used                      Alcohol use: Yes           1.0 oz/week     Glasses of wine: 2 per week     Comment: occasionally      Medications:    Current Outpa PHYSICAL EXAM:     Base Eye Exam     Visual Acuity (Snellen - Linear)       Right Left    Dist cc 20/50 20/25    Dist ph cc 20/30     Near sc 4pt 4pt    Correction:  Glasses          Tonometry (Non-contact air puff, 8:13 AM)       Right Left    Press encounter     Follow up instructions:  Return in about 4 months (around 9/4/2018) for Diabetic Eye exam.    5/4/2018  Scribed by: Patrick Juan

## 2018-05-04 NOTE — PROGRESS NOTES
Ankush Akhtar is a 79year old female. HPI:     HPI     Patient went to her PCP for a routine visit/ annual physical and she told her doctor that she continues to have a  floaters since her last visit with LAURA in 8/22/2017.  She has one floater in t Never Smoker                                                              Smokeless tobacco: Never Used                      Alcohol use: Yes           1.0 oz/week     Glasses of wine: 2 per week     Comment: occasionally      Medications:    Current Outpa PHYSICAL EXAM:     Base Eye Exam     Visual Acuity (Snellen - Linear)       Right Left    Dist cc 20/50 20/25    Dist ph cc 20/30     Near sc 4pt 4pt    Correction:  Glasses          Tonometry (Non-contact air puff, 8:13 AM)       Right Left    Press Follow up instructions:  Return in about 4 months (around 9/4/2018) for Diabetic Eye exam.    5/4/2018  Scribed by: Sami Ralph

## 2018-05-16 RX ORDER — PANTOPRAZOLE SODIUM 40 MG/1
TABLET, DELAYED RELEASE ORAL
Qty: 90 TABLET | Refills: 0 | Status: SHIPPED | OUTPATIENT
Start: 2018-05-16 | End: 2018-08-07

## 2018-05-16 NOTE — TELEPHONE ENCOUNTER
Refill Protocol Appointment Criteria  · Appointment scheduled in the past 12 months or in the next 3 months  Recent Outpatient Visits            1 week ago Vitreous floaters, bilateral    [facility] [address] [facility]

## 2018-05-25 ENCOUNTER — OFFICE VISIT (OUTPATIENT)
Dept: ENDOCRINOLOGY CLINIC | Facility: CLINIC | Age: 68
End: 2018-05-25

## 2018-05-25 VITALS
HEART RATE: 96 BPM | WEIGHT: 145.38 LBS | BODY MASS INDEX: 23.36 KG/M2 | SYSTOLIC BLOOD PRESSURE: 116 MMHG | HEIGHT: 66 IN | DIASTOLIC BLOOD PRESSURE: 65 MMHG

## 2018-05-25 DIAGNOSIS — E11.65 UNCONTROLLED TYPE 2 DIABETES MELLITUS WITH HYPERGLYCEMIA, WITHOUT LONG-TERM CURRENT USE OF INSULIN (HCC): Primary | ICD-10-CM

## 2018-05-25 DIAGNOSIS — E78.5 DYSLIPIDEMIA: ICD-10-CM

## 2018-05-25 PROCEDURE — 99244 OFF/OP CNSLTJ NEW/EST MOD 40: CPT | Performed by: INTERNAL MEDICINE

## 2018-05-25 PROCEDURE — 99212 OFFICE O/P EST SF 10 MIN: CPT | Performed by: INTERNAL MEDICINE

## 2018-05-25 PROCEDURE — 82962 GLUCOSE BLOOD TEST: CPT | Performed by: INTERNAL MEDICINE

## 2018-05-25 PROCEDURE — 36416 COLLJ CAPILLARY BLOOD SPEC: CPT | Performed by: INTERNAL MEDICINE

## 2018-05-25 NOTE — H&P
New Patient Visit - Diabetes    CONSULT - Reason for Visit:  Diabetes management. Requesting Physician: Dr. Jimi Live:  Patient presents with:  Diabetes: Type 2, dx 8 years, checking 2-3 times per day. LDEE April 2018.         HISTORY OF glimepiride 4 MG Oral Tab Take 1 tablet (4 mg total) by mouth 2 (two) times daily. Disp: 180 tablet Rfl: 1   hydrochlorothiazide 12.5 MG Oral Tab Take 1 tablet (12.5 mg total) by mouth daily.  Disp: 90 tablet Rfl: 1   Calcium Citrate-Vitamin D (CALCIUM CI ENDOMETRIAL BIOPSY - JAR(S): 2      Comment: negative  FATTY LIVER: OTHER SURGICAL HISTORY      Comment: 2 BIOPSIES IN THE PAST  No date: TONSILLECTOMY    ALLERGIES:  No Known Allergies    SOCIAL HISTORY:    Social History    Marital status:  polydypsia  Skin: Negative for: rash, blister,      PHYSICAL EXAM:    05/25/18  0840   BP: (!) 166/65   Pulse: 96   Weight: 145 lb 6.4 oz (66 kg)   Height: 5' 6\" (1.676 m)     BMI: Body mass index is 23.47 kg/m².      CONSTITUTIONAL:  awake, alert, coopera Diet: low carbohydrate diet discussed, Exercise: should target a weight loss of 7% and increase exercise to at least 150min a week.  g). Hypoglycemia recognition and management discussed    2. Patient’s BP is normal  today  3.  Dyslipidemia  A) Discussed li

## 2018-05-31 RX ORDER — METFORMIN HYDROCHLORIDE 500 MG/1
TABLET, EXTENDED RELEASE ORAL
Qty: 360 TABLET | Refills: 0 | Status: SHIPPED | OUTPATIENT
Start: 2018-05-31 | End: 2018-08-27

## 2018-05-31 NOTE — TELEPHONE ENCOUNTER
Diabetes Medications  Protocol Criteria:  · Appointment scheduled in the past 6 months or the next 3 months  · A1C < 7.5 in the past 6 months  · Creatinine in the past 12 months  · Creatinine result < 1.5   Recent Outpatient Visits            6 days ago Un

## 2018-06-05 RX ORDER — HYDROCHLOROTHIAZIDE 12.5 MG/1
TABLET ORAL
Qty: 90 TABLET | Refills: 0 | Status: SHIPPED | OUTPATIENT
Start: 2018-06-05 | End: 2018-09-07

## 2018-06-05 NOTE — TELEPHONE ENCOUNTER
Hypertensive Medications  Protocol Criteria:  · Appointment scheduled in the past 6 months or in the next 3 months  · BMP or CMP in the past 12 months  · Creatinine result < 2  Recent Outpatient Visits            1 week ago Uncontrolled type 2 diabetes yary

## 2018-06-12 NOTE — TELEPHONE ENCOUNTER
From: Arian Doshi  Sent: 6/12/2018 10:34 AM CDT  Subject: Medication Renewal Request    Arian Doshi would like a refill of the following medications:     FARXIGA 10 MG Oral Tab Lexi Perez MD]    Preferred pharmacy: [address] [place] [address]

## 2018-06-12 NOTE — TELEPHONE ENCOUNTER
Refilled per protocol.   Diabetes Medications  Protocol Criteria:  · Appointment scheduled in the past 6 months or the next 3 months  · A1C < 7.5 in the past 6 months  · Creatinine in the past 12 months  · Creatinine result < 1.5   Recent Outpatient Visit

## 2018-06-19 RX ORDER — DULAGLUTIDE 0.75 MG/.5ML
0.75 INJECTION, SOLUTION SUBCUTANEOUS WEEKLY
Qty: 2 ML | Refills: 2 | Status: SHIPPED | OUTPATIENT
Start: 2018-06-19 | End: 2018-06-22

## 2018-06-21 ENCOUNTER — TELEPHONE (OUTPATIENT)
Dept: ENDOCRINOLOGY CLINIC | Facility: CLINIC | Age: 68
End: 2018-06-21

## 2018-06-21 NOTE — TELEPHONE ENCOUNTER
Patient has been taking Trulicity for 1 month and states she is feeling \"great\" and sugars are \"much better\". Pt states she has 3 months of refills left and want to make sure Dr Nona Cannon does not want to go up on dose?  Also, pt states voucher/coupon

## 2018-06-22 NOTE — TELEPHONE ENCOUNTER
I am happy that she is feeling good and Bg are doing better.    Recommend increase from 0.75 to 1.5 q week  Thanks  Can provide new coupon

## 2018-06-22 NOTE — TELEPHONE ENCOUNTER
Jeff Dixon. Informed her of Dr. Carmen Duran message below. Was not sure how much you wanted to prescribe so pended 90 day of new dose. Pharmacy was billing her medicare with the coupon.  Instructed her to have the pharmacy bill her commercial BCB

## 2018-06-26 ENCOUNTER — TELEPHONE (OUTPATIENT)
Dept: ENDOCRINOLOGY CLINIC | Facility: CLINIC | Age: 68
End: 2018-06-26

## 2018-07-18 ENCOUNTER — PATIENT MESSAGE (OUTPATIENT)
Dept: INTERNAL MEDICINE CLINIC | Facility: CLINIC | Age: 68
End: 2018-07-18

## 2018-07-18 NOTE — TELEPHONE ENCOUNTER
From: Zenobia Marques  To: Tracie Franklin MD  Sent: 7/18/2018 1:28 PM CDT  Subject: Visit Follow-up Question    Good Afternoon,  I can not recall if any lab work is needed prior to my visit on Aug. 2?   If it is can you also request my A1c at the same t

## 2018-07-18 NOTE — TELEPHONE ENCOUNTER
Please see my chart message. Pt has A1C/CBC ordered. Please advise if any further labs are needed.   Thank you

## 2018-07-19 DIAGNOSIS — I10 ESSENTIAL HYPERTENSION WITH GOAL BLOOD PRESSURE LESS THAN 130/85: ICD-10-CM

## 2018-07-20 RX ORDER — DILTIAZEM HYDROCHLORIDE 360 MG/1
TABLET, EXTENDED RELEASE ORAL
Qty: 90 TABLET | Refills: 0 | Status: SHIPPED | OUTPATIENT
Start: 2018-07-20 | End: 2018-10-23

## 2018-07-21 NOTE — TELEPHONE ENCOUNTER
Hypertensive Medications  Protocol Criteria:  · Appointment scheduled in the past 6 months or in the next 3 months  · BMP or CMP in the past 12 months  · Creatinine result < 2  Recent Outpatient Visits            1 month ago Uncontrolled type 2 diabetes me MATZIM  MG Oral Tablet 24 Hr [Pharmacy Med Name: MATZIM LA 360MG TABLETS] 90 tablet 0     Sig: TAKE 1 TABLET(360 MG) BY MOUTH DAILY           Refill approved per protocol.     LR 4-24-18 # 90

## 2018-07-23 ENCOUNTER — LAB ENCOUNTER (OUTPATIENT)
Dept: LAB | Age: 68
End: 2018-07-23
Attending: INTERNAL MEDICINE
Payer: COMMERCIAL

## 2018-07-23 DIAGNOSIS — IMO0001 UNCONTROLLED TYPE 2 DIABETES MELLITUS WITHOUT COMPLICATION, WITHOUT LONG-TERM CURRENT USE OF INSULIN: ICD-10-CM

## 2018-07-23 LAB
BASOPHILS # BLD: 0.1 K/UL (ref 0–0.2)
BASOPHILS NFR BLD: 1 %
EOSINOPHIL # BLD: 0.3 K/UL (ref 0–0.7)
EOSINOPHIL NFR BLD: 3 %
ERYTHROCYTE [DISTWIDTH] IN BLOOD BY AUTOMATED COUNT: 21.6 % (ref 11–15)
HBA1C MFR BLD: 6.3 % (ref 4–6)
HCT VFR BLD AUTO: 38.1 % (ref 35–48)
HGB BLD-MCNC: 12 G/DL (ref 12–16)
LYMPHOCYTES # BLD: 2.2 K/UL (ref 1–4)
LYMPHOCYTES NFR BLD: 27 %
MCH RBC QN AUTO: 23.8 PG (ref 27–32)
MCHC RBC AUTO-ENTMCNC: 31.4 G/DL (ref 32–37)
MCV RBC AUTO: 75.8 FL (ref 80–100)
MONOCYTES # BLD: 0.6 K/UL (ref 0–1)
MONOCYTES NFR BLD: 8 %
NEUTROPHILS # BLD AUTO: 5.1 K/UL (ref 1.8–7.7)
NEUTROPHILS NFR BLD: 62 %
PLATELET # BLD AUTO: 335 K/UL (ref 140–400)
PMV BLD AUTO: 9.6 FL (ref 7.4–10.3)
RBC # BLD AUTO: 5.03 M/UL (ref 3.7–5.4)
WBC # BLD AUTO: 8.3 K/UL (ref 4–11)

## 2018-07-23 PROCEDURE — 36415 COLL VENOUS BLD VENIPUNCTURE: CPT

## 2018-07-23 PROCEDURE — 85025 COMPLETE CBC W/AUTO DIFF WBC: CPT

## 2018-07-23 PROCEDURE — 83036 HEMOGLOBIN GLYCOSYLATED A1C: CPT

## 2018-08-02 ENCOUNTER — OFFICE VISIT (OUTPATIENT)
Dept: INTERNAL MEDICINE CLINIC | Facility: CLINIC | Age: 68
End: 2018-08-02

## 2018-08-02 VITALS
HEART RATE: 88 BPM | BODY MASS INDEX: 22.63 KG/M2 | DIASTOLIC BLOOD PRESSURE: 67 MMHG | HEIGHT: 66 IN | SYSTOLIC BLOOD PRESSURE: 117 MMHG | WEIGHT: 140.81 LBS

## 2018-08-02 DIAGNOSIS — E11.9 CONTROLLED TYPE 2 DIABETES MELLITUS WITHOUT COMPLICATION, WITHOUT LONG-TERM CURRENT USE OF INSULIN (HCC): Primary | ICD-10-CM

## 2018-08-02 DIAGNOSIS — I10 ESSENTIAL HYPERTENSION WITH GOAL BLOOD PRESSURE LESS THAN 130/85: ICD-10-CM

## 2018-08-02 DIAGNOSIS — Z12.39 SCREENING FOR BREAST CANCER: ICD-10-CM

## 2018-08-02 DIAGNOSIS — D50.0 IRON DEFICIENCY ANEMIA DUE TO CHRONIC BLOOD LOSS: ICD-10-CM

## 2018-08-02 DIAGNOSIS — Z23 NEED FOR VACCINATION: ICD-10-CM

## 2018-08-02 DIAGNOSIS — E53.8 VITAMIN B12 DEFICIENCY: ICD-10-CM

## 2018-08-02 PROCEDURE — 99212 OFFICE O/P EST SF 10 MIN: CPT | Performed by: INTERNAL MEDICINE

## 2018-08-02 PROCEDURE — 99214 OFFICE O/P EST MOD 30 MIN: CPT | Performed by: INTERNAL MEDICINE

## 2018-08-02 PROCEDURE — 90732 PPSV23 VACC 2 YRS+ SUBQ/IM: CPT | Performed by: INTERNAL MEDICINE

## 2018-08-02 PROCEDURE — 90471 IMMUNIZATION ADMIN: CPT | Performed by: INTERNAL MEDICINE

## 2018-08-02 NOTE — ASSESSMENT & PLAN NOTE
The patient's B12 level was a little bit high so I advised her to decrease her B12 supplementation to half.

## 2018-08-02 NOTE — PROGRESS NOTES
HPI:    Patient ID: Tracie Phillip is a 79year old female. Pt is feeling well. She is doing well on the Trulicity. She sees Dr. Valeri Gipson. Her sugars go down in the afternoon. Her anemia is better, but she gets diarrhea with he iron.       Diabet [address]  2009: ENDOMETRIAL BIOPSY - JAR(S): 2      Comment: negative  FATTY LIVER: OTHER SURGICAL HISTORY      Comment: 2 BIOPSIES IN THE PAST  No date: TONSILLECTOMY         Current Outpatient Prescriptions:  MATZIM  MG O Smoking status: Never Smoker                                                              Smokeless tobacco: Never Used                      Alcohol use: Yes           1.0 oz/week     Glasses of wine: 2 per week     Comment: very rarely    Family Histo Diabetes is well-controlled. Her A1c was 6.3 which is much better than prior to starting the Trulicity. She will f/u with Dr. Burgess Metz.             Unprioritized    Vitamin B12 deficiency     The patient's B12 level was a little bit high so I advised her

## 2018-08-02 NOTE — ASSESSMENT & PLAN NOTE
Diabetes is well-controlled. Her A1c was 6.3 which is much better than prior to starting the Trulicity. She will f/u with Dr. Dianelys Powers.

## 2018-08-02 NOTE — ASSESSMENT & PLAN NOTE
Pt had a panendoscopy which was negative. She is taking iron, but it is causing diarrhea. Her Hb came up to 12, so she will decrease the iron to every other day and we will repeat the CBC in 1 month.

## 2018-08-03 DIAGNOSIS — E11.9 CONTROLLED TYPE 2 DIABETES MELLITUS WITHOUT COMPLICATION, WITHOUT LONG-TERM CURRENT USE OF INSULIN (HCC): ICD-10-CM

## 2018-08-04 RX ORDER — GLIMEPIRIDE 4 MG/1
TABLET ORAL
Qty: 180 TABLET | Refills: 0 | Status: SHIPPED | OUTPATIENT
Start: 2018-08-04 | End: 2018-11-06

## 2018-08-04 NOTE — TELEPHONE ENCOUNTER
Refill passed per [facility] protocol.     Diabetes Medications  Protocol Criteria:  · Appointment scheduled in the past 6 months or the next 3 months  · A1C < 7.5 in the past 6 months  · Creatinine in the past 12 months  · Creatinine result < 1.5   Re

## 2018-08-07 RX ORDER — PANTOPRAZOLE SODIUM 40 MG/1
TABLET, DELAYED RELEASE ORAL
Qty: 90 TABLET | Refills: 0 | Status: SHIPPED | OUTPATIENT
Start: 2018-08-07 | End: 2018-11-06

## 2018-08-08 NOTE — TELEPHONE ENCOUNTER
Refill Protocol Appointment Criteria  · Appointment scheduled in the past 12 months or in the next 3 months  Recent Outpatient Visits            5 days ago Controlled type 2 diabetes mellitus without complication, without long-term current use of insulin (

## 2018-08-27 RX ORDER — METFORMIN HYDROCHLORIDE 500 MG/1
TABLET, EXTENDED RELEASE ORAL
Qty: 360 TABLET | Refills: 0 | Status: SHIPPED | OUTPATIENT
Start: 2018-08-27 | End: 2018-11-28

## 2018-08-29 ENCOUNTER — OFFICE VISIT (OUTPATIENT)
Dept: ENDOCRINOLOGY CLINIC | Facility: CLINIC | Age: 68
End: 2018-08-29

## 2018-08-29 VITALS
WEIGHT: 144 LBS | HEIGHT: 66 IN | HEART RATE: 81 BPM | DIASTOLIC BLOOD PRESSURE: 78 MMHG | BODY MASS INDEX: 23.14 KG/M2 | SYSTOLIC BLOOD PRESSURE: 134 MMHG

## 2018-08-29 DIAGNOSIS — E11.8 CONTROLLED DIABETES MELLITUS TYPE 2 WITH COMPLICATIONS, UNSPECIFIED WHETHER LONG TERM INSULIN USE (HCC): Primary | ICD-10-CM

## 2018-08-29 LAB
GLUCOSE BLOOD: 135
TEST STRIP LOT #: NORMAL NUMERIC

## 2018-08-29 PROCEDURE — 99212 OFFICE O/P EST SF 10 MIN: CPT | Performed by: INTERNAL MEDICINE

## 2018-08-29 PROCEDURE — 82962 GLUCOSE BLOOD TEST: CPT | Performed by: INTERNAL MEDICINE

## 2018-08-29 PROCEDURE — 99213 OFFICE O/P EST LOW 20 MIN: CPT | Performed by: INTERNAL MEDICINE

## 2018-08-29 PROCEDURE — 36416 COLLJ CAPILLARY BLOOD SPEC: CPT | Performed by: INTERNAL MEDICINE

## 2018-08-29 NOTE — PROGRESS NOTES
Return Office Visit     CHIEF COMPLAINT:  Patient presents with:  Diabetes: Pt doing well, states she has no concerns at this time. States her blood sugar this morning was 156. Pt has been on Trulicity which has been going well. Needs refill.  Refill pended Ferrous Sulfate (IRON SUPPLEMENT OR) Take 1 each by mouth daily. Disp:  Rfl:    Trandolapril 4 MG Oral Tab Take 2 tablets (8 mg total) by mouth once daily.  Disp: 180 tablet Rfl: 1   atorvastatin 10 MG Oral Tab Take 1 tablet (10 mg total) by mouth nightly Size: adult   Pulse: 81   Weight: 144 lb (65.3 kg)   Height: 5' 6\" (1.676 m)     BMI: Body mass index is 23.24 kg/m².      CONSTITUTIONAL:  awake, alert, cooperative, no apparent distress, and appears stated age  PSYCH: normal affect  EYES:  No proptosis, discussed, Exercise: should target a weight loss of 7% and increase exercise to at least 150min a week.  g). Hypoglycemia recognition and management discussed     2. Patient’s BP is normal  today  3.  Dyslipidemia  A) Discussed lifestyle modifications inclu

## 2018-08-31 DIAGNOSIS — I10 ESSENTIAL HYPERTENSION WITH GOAL BLOOD PRESSURE LESS THAN 130/85: ICD-10-CM

## 2018-08-31 NOTE — TELEPHONE ENCOUNTER
From: Jerson Latham  Sent: 8/31/2018 9:56 AM CDT  Subject: Medication Renewal Request    Jerson Latham would like a refill of the following medications:     Trandolapril 4 MG Oral Tab Teresa Handy MD]    Preferred pharmacy: [facility]

## 2018-09-01 RX ORDER — TRANDOLAPRIL 4 MG/1
8 TABLET ORAL
Qty: 180 TABLET | Refills: 0 | Status: SHIPPED | OUTPATIENT
Start: 2018-09-01 | End: 2019-03-14

## 2018-09-01 NOTE — TELEPHONE ENCOUNTER
Refill passed per [facility] protocol.   Hypertensive Medications  Protocol Criteria:  · Appointment scheduled in the past 6 months or in the next 3 months  · BMP or CMP in the past 12 months  · Creatinine result < 2  Recent Outpatient Visits  02/02/2018   CO2 28 02/02/2018   GLOBULIN 2.9 02/02/2018   AGRATIO 1.5 05/07/2016   ANIONGAP 9 02/02/2018   OSMOCALC 292 02/02/2018

## 2018-09-07 RX ORDER — DAPAGLIFLOZIN 10 MG/1
TABLET, FILM COATED ORAL
Qty: 90 TABLET | Refills: 0 | Status: SHIPPED | OUTPATIENT
Start: 2018-09-07 | End: 2018-12-03

## 2018-09-07 RX ORDER — HYDROCHLOROTHIAZIDE 12.5 MG/1
TABLET ORAL
Qty: 90 TABLET | Refills: 0 | Status: SHIPPED | OUTPATIENT
Start: 2018-09-07 | End: 2018-11-29

## 2018-09-08 NOTE — TELEPHONE ENCOUNTER
Diabetes Medications  Protocol Criteria:  · Appointment scheduled in the past 6 months or the next 3 months  · A1C < 7.5 in the past 6 months  · Creatinine in the past 12 months  · Creatinine result < 1.5   Recent Outpatient Visits            1 week ago Co type 2 with complications, unspecified whether long term insulin use [facility])    Romero Santos MD    Office Visit    1 month ago Controlled type 2 diabetes mellitus without complicati

## 2018-09-17 ENCOUNTER — OFFICE VISIT (OUTPATIENT)
Dept: OBGYN CLINIC | Facility: CLINIC | Age: 68
End: 2018-09-17

## 2018-09-17 VITALS
DIASTOLIC BLOOD PRESSURE: 72 MMHG | WEIGHT: 143 LBS | HEART RATE: 84 BPM | SYSTOLIC BLOOD PRESSURE: 146 MMHG | BODY MASS INDEX: 23.54 KG/M2 | HEIGHT: 65.5 IN

## 2018-09-17 DIAGNOSIS — Z12.31 VISIT FOR SCREENING MAMMOGRAM: ICD-10-CM

## 2018-09-17 DIAGNOSIS — Z01.419 ENCOUNTER FOR GYNECOLOGICAL EXAMINATION WITHOUT ABNORMAL FINDING: Primary | ICD-10-CM

## 2018-09-17 PROCEDURE — 99397 PER PM REEVAL EST PAT 65+ YR: CPT | Performed by: OBSTETRICS & GYNECOLOGY

## 2018-09-21 ENCOUNTER — TELEPHONE (OUTPATIENT)
Dept: ENDOCRINOLOGY CLINIC | Facility: CLINIC | Age: 68
End: 2018-09-21

## 2018-09-21 ENCOUNTER — IMMUNIZATION (OUTPATIENT)
Dept: FAMILY MEDICINE CLINIC | Facility: CLINIC | Age: 68
End: 2018-09-21

## 2018-09-21 PROCEDURE — 90471 IMMUNIZATION ADMIN: CPT | Performed by: NURSE PRACTITIONER

## 2018-09-21 PROCEDURE — 90653 IIV ADJUVANT VACCINE IM: CPT | Performed by: NURSE PRACTITIONER

## 2018-09-24 NOTE — TELEPHONE ENCOUNTER
Fax received from [employer]. There is currently an authorization on file for this medication. Effective 6/24/18-6/24/19. Case number [id number]. Called and notified the pharmacy.

## 2018-09-27 RX ORDER — LEVOTHYROXINE SODIUM 0.1 MG/1
TABLET ORAL
Qty: 90 TABLET | Refills: 0 | Status: SHIPPED | OUTPATIENT
Start: 2018-09-27 | End: 2018-12-20

## 2018-09-28 NOTE — TELEPHONE ENCOUNTER
Refilled per protocol    Hypothyroid Medications  Protocol Criteria:  Appointment scheduled in the past 12 months or the next 3 months  TSH resulted in the past 12 months that is normal  Recent Outpatient Visits            1 week ago Encounter for [employer]

## 2018-10-22 ENCOUNTER — HOSPITAL ENCOUNTER (OUTPATIENT)
Dept: MAMMOGRAPHY | Facility: HOSPITAL | Age: 68
Discharge: HOME | End: 2018-10-22
Attending: INTERNAL MEDICINE
Payer: COMMERCIAL

## 2018-10-22 DIAGNOSIS — Z12.39 SCREENING FOR BREAST CANCER: ICD-10-CM

## 2018-10-22 PROCEDURE — 77063 BREAST TOMOSYNTHESIS BI: CPT | Performed by: INTERNAL MEDICINE

## 2018-10-22 PROCEDURE — 77067 SCR MAMMO BI INCL CAD: CPT | Performed by: INTERNAL MEDICINE

## 2018-10-23 DIAGNOSIS — I10 ESSENTIAL HYPERTENSION WITH GOAL BLOOD PRESSURE LESS THAN 130/85: ICD-10-CM

## 2018-10-23 RX ORDER — DILTIAZEM HYDROCHLORIDE EXTENDED-RELEASE TABLETS 360 MG/1
TABLET, EXTENDED RELEASE ORAL
Qty: 90 TABLET | Refills: 0 | Status: SHIPPED | OUTPATIENT
Start: 2018-10-23 | End: 2019-01-17

## 2018-10-24 NOTE — TELEPHONE ENCOUNTER
Hypertensive Medications  Protocol Criteria:  · Appointment scheduled in the past 6 months or in the next 3 months  · BMP or CMP in the past 12 months  · Creatinine result < 2  Recent Outpatient Visits            1 month ago Encounter for gynecological exa 02/02/2018     Refilled per protocol

## 2018-11-01 ENCOUNTER — LAB ENCOUNTER (OUTPATIENT)
Dept: LAB | Age: 68
End: 2018-11-01
Attending: INTERNAL MEDICINE
Payer: COMMERCIAL

## 2018-11-01 DIAGNOSIS — D50.0 IRON DEFICIENCY ANEMIA DUE TO CHRONIC BLOOD LOSS: ICD-10-CM

## 2018-11-01 DIAGNOSIS — E53.8 VITAMIN B12 DEFICIENCY: ICD-10-CM

## 2018-11-01 PROCEDURE — 36415 COLL VENOUS BLD VENIPUNCTURE: CPT

## 2018-11-01 PROCEDURE — 82607 VITAMIN B-12: CPT

## 2018-11-01 PROCEDURE — 84466 ASSAY OF TRANSFERRIN: CPT

## 2018-11-01 PROCEDURE — 80048 BASIC METABOLIC PNL TOTAL CA: CPT

## 2018-11-01 PROCEDURE — 85025 COMPLETE CBC W/AUTO DIFF WBC: CPT

## 2018-11-01 PROCEDURE — 83540 ASSAY OF IRON: CPT

## 2018-11-01 PROCEDURE — 82728 ASSAY OF FERRITIN: CPT

## 2018-11-06 ENCOUNTER — OFFICE VISIT (OUTPATIENT)
Dept: INTERNAL MEDICINE CLINIC | Facility: CLINIC | Age: 68
End: 2018-11-06

## 2018-11-06 VITALS
BODY MASS INDEX: 23.04 KG/M2 | WEIGHT: 140 LBS | SYSTOLIC BLOOD PRESSURE: 126 MMHG | DIASTOLIC BLOOD PRESSURE: 71 MMHG | HEIGHT: 65.5 IN | HEART RATE: 83 BPM

## 2018-11-06 DIAGNOSIS — E78.00 HYPERCHOLESTEROLEMIA: ICD-10-CM

## 2018-11-06 DIAGNOSIS — D50.0 IRON DEFICIENCY ANEMIA DUE TO CHRONIC BLOOD LOSS: ICD-10-CM

## 2018-11-06 DIAGNOSIS — Z00.00 HEALTHCARE MAINTENANCE: ICD-10-CM

## 2018-11-06 DIAGNOSIS — E11.9 CONTROLLED TYPE 2 DIABETES MELLITUS WITHOUT COMPLICATION, WITHOUT LONG-TERM CURRENT USE OF INSULIN (HCC): Primary | ICD-10-CM

## 2018-11-06 PROCEDURE — 99212 OFFICE O/P EST SF 10 MIN: CPT | Performed by: INTERNAL MEDICINE

## 2018-11-06 PROCEDURE — 99214 OFFICE O/P EST MOD 30 MIN: CPT | Performed by: INTERNAL MEDICINE

## 2018-11-06 RX ORDER — PANTOPRAZOLE SODIUM 40 MG/1
40 TABLET, DELAYED RELEASE ORAL
Qty: 90 TABLET | Refills: 1 | Status: SHIPPED | OUTPATIENT
Start: 2018-11-06 | End: 2019-04-25

## 2018-11-06 RX ORDER — ATORVASTATIN CALCIUM 10 MG/1
10 TABLET, FILM COATED ORAL NIGHTLY
Qty: 90 TABLET | Refills: 2 | Status: SHIPPED | OUTPATIENT
Start: 2018-11-06 | End: 2019-07-16

## 2018-11-06 RX ORDER — GLIMEPIRIDE 4 MG/1
TABLET ORAL
Qty: 180 TABLET | Refills: 1 | Status: SHIPPED | OUTPATIENT
Start: 2018-11-06 | End: 2019-04-25

## 2018-11-06 NOTE — ASSESSMENT & PLAN NOTE
Patient says her diabetes is better controlled on Trulicity. Her last A1c was 6.3. We will check an A1c in February. She will follow-up with the endocrinologist at that time.

## 2018-11-06 NOTE — PROGRESS NOTES
HPI:    Patient ID: Phillip Lynne is a 79year old female. Pt has been taking iron. The anemia has resolved with the iron. She had a panendoscopy which was negative. She doesn't check her blood sugars.   She doesn't like the darkness and cold wea [employer]   • D & C     • EGD  04/2018   • EGD N/A 4/4/2018    Procedure: ESOPHAGOGASTRODUODENOSCOPY, COLONOSCOPY, POSSIBLE BIOPSY, POSSIBLE POLYPECTOMY 10001, 60144;  Surgeon: Annabelle Cade MD;  Location: [address] [place] [address] CITRATE +) 315-200 MG-UNIT Oral Tab Take 2 tablets by mouth 2 (two) times daily. Disp: 120 tablet Rfl: 0   Cholecalciferol (VITAMIN D) 1000 UNITS Oral Cap Take 4,000 mg by mouth daily. Disp:  Rfl:    aspirin 81 MG Oral Tab Take  by mouth.  take 1 tablet ( supplementation. I explained that patients can lose blood in the stool without realizing it. Her iron level is almost normal.  Advised her to decrease the iron to every other day.   When the iron level becomes normal she will stop that and we will check CB

## 2018-11-06 NOTE — ASSESSMENT & PLAN NOTE
Pt had a panendoscopy and it did not reveal a source of blood loss, however her anemia has resolved with iron supplementation. I explained that patients can lose blood in the stool without realizing it.   Her iron level is almost normal.  Advised her to dec

## 2018-11-29 RX ORDER — METFORMIN HYDROCHLORIDE 500 MG/1
TABLET, EXTENDED RELEASE ORAL
Qty: 360 TABLET | Refills: 0 | Status: SHIPPED | OUTPATIENT
Start: 2018-11-29 | End: 2019-02-22

## 2018-11-30 RX ORDER — METFORMIN HYDROCHLORIDE 500 MG/1
TABLET, EXTENDED RELEASE ORAL
Qty: 360 TABLET | Refills: 0 | OUTPATIENT
Start: 2018-11-30

## 2018-11-30 RX ORDER — HYDROCHLOROTHIAZIDE 12.5 MG/1
12.5 TABLET ORAL
Qty: 90 TABLET | Refills: 0 | Status: SHIPPED | OUTPATIENT
Start: 2018-11-30 | End: 2019-02-26

## 2018-12-01 NOTE — TELEPHONE ENCOUNTER
Refilled per protocol    Hypertensive Medications  Protocol Criteria:  · Appointment scheduled in the past 6 months or in the next 3 months  · BMP or CMP in the past 12 months  · Creatinine result < 2  Recent Outpatient Visits            [facility]    GLOBULIN 2.9 02/02/2018    AGRATIO 1.5 05/07/2016    ANIONGAP 9 11/01/2018    OSMOCALC 291 11/01/2018       Metformin - duplicate request

## 2018-12-03 RX ORDER — DAPAGLIFLOZIN 10 MG/1
TABLET, FILM COATED ORAL
Qty: 90 TABLET | Refills: 0 | Status: SHIPPED | OUTPATIENT
Start: 2018-12-03 | End: 2019-02-26

## 2018-12-10 ENCOUNTER — TELEPHONE (OUTPATIENT)
Dept: ENDOCRINOLOGY CLINIC | Facility: CLINIC | Age: 68
End: 2018-12-10

## 2018-12-10 RX ORDER — DULAGLUTIDE 1.5 MG/.5ML
INJECTION, SOLUTION SUBCUTANEOUS
Qty: 6 ML | Refills: 0 | Status: SHIPPED | OUTPATIENT
Start: 2018-12-10 | End: 2019-02-27

## 2018-12-10 NOTE — TELEPHONE ENCOUNTER
Current Outpatient Medications:  TRULICITY 1.5 OU/2.4PT Subcutaneous Solution Pen-injector INJECT 1.5 MG INTO THE SKIN ONCE A WEEK Disp: 6 mL Rfl: 0     [employer] Key:[id number]

## 2018-12-10 NOTE — TELEPHONE ENCOUNTER
Per TE 9/21: \"Fax received from [employer]. There is currently an authorization on file for this medication. Effective 6/24/18-6/24/19. Case number [id number]. Called and notified the pharmacy. \"    Called pharmacy - Trulicity still not going through

## 2018-12-10 NOTE — TELEPHONE ENCOUNTER
LOV 8/29/18. F/U scheduled 2/26/19. Per AV protocol, ok to refill x 3 months through duration of scheduled f/u.

## 2018-12-13 NOTE — TELEPHONE ENCOUNTER
Received notice from [employer] that Trulicity 1.5 mg pen injectors are covered and does not require authorization. Will scan to chart. Pharmacy needs to submit claim as \"2 mL per 28 days\" so it will go through.     Authorization effective 6/24/18

## 2018-12-20 RX ORDER — LEVOTHYROXINE SODIUM 0.1 MG/1
TABLET ORAL
Qty: 90 TABLET | Refills: 0 | Status: SHIPPED | OUTPATIENT
Start: 2018-12-20 | End: 2019-02-05

## 2018-12-21 NOTE — TELEPHONE ENCOUNTER
Refill passed per [facility] [facility] protocol.   Hypothyroid Medications  Protocol Criteria:  Appointment scheduled in the past 12 months or the next 3 months  TSH resulted in the past 12 months that is normal  Recent Outpatient Visits            1 month ago Controlled type 2 diabetes mellitus without complication, without long-term current use of insulin [facility])    [facility] [facility], [address] MD    Office Visit    3 months ago Encounter for gynecological examination without abnormal finding    [facility] [place], [address], MD    Office Visit    3 months ago Controlled diabetes mellitus type 2 with complications, unspecified whether long term insulin use [facility])    [facility] [address] [place] [address], Erick Giraldo MD    Office Visit    4 months ago Controlled type 2 diabetes mellitus without complication, without long-term current use of insulin [facility])    [facility], [address] MD    Office Visit    6 months ago Uncontrolled type 2 diabetes mellitus with hyperglycemia, without long-term current use of insulin [facility])    [facility] [address] [place], [place] [address]Yazmin MD    Office Visit        Future Appointments       Provider Department Appt Notes    In 1 month Gale Ridley MD [facility], [address] [place] 3 month f/u    In 2 months MD [facility] Richard [address] [place] [address], [place] 6 months        Lab Results   Component Value Date    TSH 0.79 02/02/2018

## 2019-01-17 DIAGNOSIS — I10 ESSENTIAL HYPERTENSION WITH GOAL BLOOD PRESSURE LESS THAN 130/85: ICD-10-CM

## 2019-01-30 ENCOUNTER — PATIENT MESSAGE (OUTPATIENT)
Dept: INTERNAL MEDICINE CLINIC | Facility: CLINIC | Age: 69
End: 2019-01-30

## 2019-01-30 NOTE — TELEPHONE ENCOUNTER
From: Alex Almaraz  To: Joe Duvall MD  Sent: 1/30/2019 12:39 PM CST  Subject: Other    Good Afternoon Dr. Love Cheng,     I will be in the office on the 5th of February and am certain you want some lab work done.    Can you check and see if the A1C

## 2019-02-02 ENCOUNTER — LAB ENCOUNTER (OUTPATIENT)
Dept: LAB | Age: 69
End: 2019-02-02
Attending: INTERNAL MEDICINE
Payer: COMMERCIAL

## 2019-02-02 DIAGNOSIS — E11.9 CONTROLLED TYPE 2 DIABETES MELLITUS WITHOUT COMPLICATION, WITHOUT LONG-TERM CURRENT USE OF INSULIN (HCC): ICD-10-CM

## 2019-02-02 DIAGNOSIS — Z00.00 HEALTHCARE MAINTENANCE: ICD-10-CM

## 2019-02-02 LAB
ALBUMIN SERPL BCP-MCNC: 4.4 G/DL (ref 3.5–4.8)
ALBUMIN/GLOB SERPL: 1.4 {RATIO} (ref 1–2)
ALP SERPL-CCNC: 51 U/L (ref 32–100)
ALT SERPL-CCNC: 68 U/L (ref 14–54)
ANION GAP SERPL CALC-SCNC: 12 MMOL/L (ref 0–18)
AST SERPL-CCNC: 56 U/L (ref 15–41)
BASOPHILS # BLD AUTO: 0.07 X10(3) UL (ref 0–0.2)
BASOPHILS NFR BLD AUTO: 1 %
BILIRUB SERPL-MCNC: 0.8 MG/DL (ref 0.3–1.2)
BUN SERPL-MCNC: 11 MG/DL (ref 8–20)
BUN/CREAT SERPL: 13.8 (ref 10–20)
CALCIUM SERPL-MCNC: 10.5 MG/DL (ref 8.5–10.5)
CHLORIDE SERPL-SCNC: 103 MMOL/L (ref 95–110)
CHOLEST SERPL-MCNC: 133 MG/DL (ref 110–200)
CO2 SERPL-SCNC: 27 MMOL/L (ref 22–32)
CREAT SERPL-MCNC: 0.8 MG/DL (ref 0.5–1.5)
CREAT UR-MCNC: 116.7 MG/DL
DEPRECATED RDW RBC AUTO: 49.3 FL (ref 35.1–46.3)
EOSINOPHIL # BLD AUTO: 0.15 X10(3) UL (ref 0–0.7)
EOSINOPHIL NFR BLD AUTO: 2.1 %
ERYTHROCYTE [DISTWIDTH] IN BLOOD BY AUTOMATED COUNT: 15.5 % (ref 11–15)
EST. AVERAGE GLUCOSE BLD GHB EST-MCNC: 143 MG/DL (ref 68–126)
GLOBULIN PLAS-MCNC: 3.1 G/DL (ref 2.5–3.7)
GLUCOSE SERPL-MCNC: 123 MG/DL (ref 70–99)
HBA1C MFR BLD HPLC: 6.6 % (ref ?–5.7)
HCT VFR BLD AUTO: 41 % (ref 35–48)
HDLC SERPL-MCNC: 60 MG/DL
HGB BLD-MCNC: 13.1 G/DL (ref 12–16)
IMM GRANULOCYTES # BLD AUTO: 0.02 X10(3) UL (ref 0–1)
IMM GRANULOCYTES NFR BLD: 0.3 %
LDLC SERPL CALC-MCNC: 49 MG/DL (ref 0–99)
LYMPHOCYTES # BLD AUTO: 2.52 X10(3) UL (ref 1–4)
LYMPHOCYTES NFR BLD AUTO: 35.6 %
MCH RBC QN AUTO: 27.9 PG (ref 26–34)
MCHC RBC AUTO-ENTMCNC: 32 G/DL (ref 31–37)
MCV RBC AUTO: 87.4 FL (ref 80–100)
MICROALBUMIN UR-MCNC: 1.8 MG/DL (ref 0–1.8)
MICROALBUMIN/CREAT UR: 15.4 MG/G{CREAT} (ref 0–20)
MONOCYTES # BLD AUTO: 0.65 X10(3) UL (ref 0.1–1)
MONOCYTES NFR BLD AUTO: 9.2 %
NEUTROPHILS # BLD AUTO: 3.66 X10 (3) UL (ref 1.5–7.7)
NEUTROPHILS # BLD AUTO: 3.66 X10(3) UL (ref 1.5–7.7)
NEUTROPHILS NFR BLD AUTO: 51.8 %
NONHDLC SERPL-MCNC: 73 MG/DL
OSMOLALITY UR CALC.SUM OF ELEC: 295 MOSM/KG (ref 275–295)
PATIENT FASTING: YES
PLATELET # BLD AUTO: 308 10(3)UL (ref 150–450)
POTASSIUM SERPL-SCNC: 4 MMOL/L (ref 3.3–5.1)
PROT SERPL-MCNC: 7.5 G/DL (ref 5.9–8.4)
RBC # BLD AUTO: 4.69 X10(6)UL (ref 3.8–5.3)
SODIUM SERPL-SCNC: 142 MMOL/L (ref 136–144)
T3 SERPL-MCNC: 1.01 NG/ML (ref 0.87–1.78)
T4 FREE SERPL-MCNC: 1.39 NG/DL (ref 0.58–1.64)
TRIGL SERPL-MCNC: 119 MG/DL (ref 1–149)
TSH SERPL-ACNC: 0.39 UIU/ML (ref 0.45–5.33)
WBC # BLD AUTO: 7.1 X10(3) UL (ref 4–11)

## 2019-02-02 PROCEDURE — 36415 COLL VENOUS BLD VENIPUNCTURE: CPT

## 2019-02-02 PROCEDURE — 80061 LIPID PANEL: CPT

## 2019-02-02 PROCEDURE — 83036 HEMOGLOBIN GLYCOSYLATED A1C: CPT

## 2019-02-02 PROCEDURE — 85025 COMPLETE CBC W/AUTO DIFF WBC: CPT

## 2019-02-02 PROCEDURE — 82570 ASSAY OF URINE CREATININE: CPT

## 2019-02-02 PROCEDURE — 82043 UR ALBUMIN QUANTITATIVE: CPT

## 2019-02-02 PROCEDURE — 84443 ASSAY THYROID STIM HORMONE: CPT

## 2019-02-02 PROCEDURE — 84480 ASSAY TRIIODOTHYRONINE (T3): CPT

## 2019-02-02 PROCEDURE — 84439 ASSAY OF FREE THYROXINE: CPT

## 2019-02-02 PROCEDURE — 80053 COMPREHEN METABOLIC PANEL: CPT

## 2019-02-05 ENCOUNTER — OFFICE VISIT (OUTPATIENT)
Dept: INTERNAL MEDICINE CLINIC | Facility: CLINIC | Age: 69
End: 2019-02-05

## 2019-02-05 VITALS
WEIGHT: 144.63 LBS | SYSTOLIC BLOOD PRESSURE: 121 MMHG | HEIGHT: 65.5 IN | BODY MASS INDEX: 23.81 KG/M2 | DIASTOLIC BLOOD PRESSURE: 68 MMHG | HEART RATE: 70 BPM

## 2019-02-05 DIAGNOSIS — R79.89 ABNORMAL LIVER FUNCTION TEST: ICD-10-CM

## 2019-02-05 DIAGNOSIS — I10 ESSENTIAL HYPERTENSION WITH GOAL BLOOD PRESSURE LESS THAN 130/85: ICD-10-CM

## 2019-02-05 DIAGNOSIS — E03.9 ACQUIRED HYPOTHYROIDISM: ICD-10-CM

## 2019-02-05 DIAGNOSIS — E11.9 CONTROLLED TYPE 2 DIABETES MELLITUS WITHOUT COMPLICATION, WITHOUT LONG-TERM CURRENT USE OF INSULIN (HCC): Primary | ICD-10-CM

## 2019-02-05 PROCEDURE — 99212 OFFICE O/P EST SF 10 MIN: CPT | Performed by: INTERNAL MEDICINE

## 2019-02-05 PROCEDURE — 99214 OFFICE O/P EST MOD 30 MIN: CPT | Performed by: INTERNAL MEDICINE

## 2019-02-05 RX ORDER — LEVOTHYROXINE SODIUM 88 UG/1
88 TABLET ORAL
Qty: 90 TABLET | Refills: 1 | Status: SHIPPED | OUTPATIENT
Start: 2019-02-05 | End: 2019-07-29

## 2019-02-05 NOTE — ASSESSMENT & PLAN NOTE
A1C was 6.6.--controlled on current meds. Eye exam is up to date. Pt takes a statin and an ACE. The patient does not have neuropathy, nephropathy or retinopathy. F/u with Dr. Wolfgang Sol.

## 2019-02-05 NOTE — ASSESSMENT & PLAN NOTE
Pt has fatty liver and has been taking Tylenol 6 tabs daily. She does not drink alcohol. Her liver tests increased slightly. Advised to stop tylenol. Will recheck liver tests in 3 months.

## 2019-02-05 NOTE — PROGRESS NOTES
HPI:    Patient ID: Cathy Leblanc is a 79year old female. She gained a few pounds. She had blood tests. Her liver tests are a little high. She has fatty liver.   She has been taking more Tylenol lately (6 tabs daily) because her ear has been bot MG/0.5ML Subcutaneous Solution Pen-injector INJECT 1.5 MG INTO THE SKIN ONCE A WEEK Disp: 6 mL Rfl: 0   FARXIGA 10 MG Oral Tab TAKE 1 TABLET BY MOUTH EVERY DAY Disp: 90 tablet Rfl: 0   hydrochlorothiazide 12.5 MG Oral Tab Take 1 tablet (12.5 mg total) by m 47   • Cataracts Sister    • Heart Surgery Father         carotid artery surgery    • Hypertension Father    • Anemia Sister    • Glaucoma Neg    • Macular degeneration Neg        ./68 (BP Location: Right arm, Patient Position: Sitting, Cuff Size: ad FUNCTION PANEL (7)            The patient expressed understanding and agreed with the plan.     Meds This Visit:  Requested Prescriptions     Signed Prescriptions Disp Refills   • Levothyroxine Sodium 88 MCG Oral Tab 90 tablet 1     Sig: Take 1 tablet (88 m

## 2019-02-22 DIAGNOSIS — I10 ESSENTIAL HYPERTENSION WITH GOAL BLOOD PRESSURE LESS THAN 130/85: ICD-10-CM

## 2019-02-23 NOTE — TELEPHONE ENCOUNTER
Hypertensive Medications  Protocol Criteria:  · Appointment scheduled in the past 6 months or in the next 3 months  · BMP or CMP in the past 12 months  · Creatinine result < 2  Recent Outpatient Visits            2 weeks ago Controlled type 2 diabetes bernardo AGRATIO 1.5 05/07/2016    ANIONGAP 12 02/02/2019    [facility] 295 02/02/2019

## 2019-02-23 NOTE — TELEPHONE ENCOUNTER
Diabetes Medications  Protocol Criteria:  · Appointment scheduled in the past 6 months or the next 3 months  · A1C < 7.5 in the past 6 months  · Creatinine in the past 12 months  · Creatinine result < 1.5   Recent Outpatient Visits            2 weeks ago C

## 2019-02-24 RX ORDER — TRANDOLAPRIL 4 MG/1
TABLET ORAL
Qty: 180 TABLET | Refills: 0 | Status: SHIPPED | OUTPATIENT
Start: 2019-02-24 | End: 2019-05-30

## 2019-02-24 RX ORDER — METFORMIN HYDROCHLORIDE 500 MG/1
TABLET, EXTENDED RELEASE ORAL
Qty: 360 TABLET | Refills: 0 | Status: SHIPPED | OUTPATIENT
Start: 2019-02-24 | End: 2019-05-23

## 2019-02-26 ENCOUNTER — OFFICE VISIT (OUTPATIENT)
Dept: ENDOCRINOLOGY CLINIC | Facility: CLINIC | Age: 69
End: 2019-02-26

## 2019-02-26 VITALS
DIASTOLIC BLOOD PRESSURE: 76 MMHG | BODY MASS INDEX: 24 KG/M2 | SYSTOLIC BLOOD PRESSURE: 128 MMHG | WEIGHT: 145 LBS | HEART RATE: 87 BPM

## 2019-02-26 DIAGNOSIS — E11.9 CONTROLLED TYPE 2 DIABETES MELLITUS WITHOUT COMPLICATION, WITHOUT LONG-TERM CURRENT USE OF INSULIN (HCC): Primary | ICD-10-CM

## 2019-02-26 LAB
GLUCOSE BLOOD: 127
TEST STRIP LOT #: NORMAL NUMERIC

## 2019-02-26 PROCEDURE — 36416 COLLJ CAPILLARY BLOOD SPEC: CPT | Performed by: INTERNAL MEDICINE

## 2019-02-26 PROCEDURE — 99213 OFFICE O/P EST LOW 20 MIN: CPT | Performed by: INTERNAL MEDICINE

## 2019-02-26 PROCEDURE — 82962 GLUCOSE BLOOD TEST: CPT | Performed by: INTERNAL MEDICINE

## 2019-02-26 PROCEDURE — 99212 OFFICE O/P EST SF 10 MIN: CPT | Performed by: INTERNAL MEDICINE

## 2019-02-26 RX ORDER — DAPAGLIFLOZIN 10 MG/1
TABLET, FILM COATED ORAL
Qty: 90 TABLET | Refills: 0 | Status: SHIPPED | OUTPATIENT
Start: 2019-02-26 | End: 2019-05-23

## 2019-02-26 RX ORDER — HYDROCHLOROTHIAZIDE 12.5 MG/1
TABLET ORAL
Qty: 90 TABLET | Refills: 0 | Status: SHIPPED | OUTPATIENT
Start: 2019-02-26 | End: 2019-05-23

## 2019-02-26 NOTE — PROGRESS NOTES
Return Office Visit     CHIEF COMPLAINT:    Type 2 DM  Dyslipidemia     HISTORY OF PRESENT ILLNESS:  Leatha Pompa is a 79year old female who presents for follow up for for Type 2 DM.      DM HISTORY  Diagnosed: about 10 years ago        HISTORY OF D tablet (10 mg total) by mouth nightly. Disp: 90 tablet Rfl: 2   Trandolapril 4 MG Oral Tab Take 2 tablets (8 mg total) by mouth once daily. Disp: 180 tablet Rfl: 0   Ferrous Sulfate (IRON SUPPLEMENT OR) Take 1 each by mouth daily.  Disp:  Rfl:    Calcium Ci alert, cooperative, no apparent distress, and appears stated age  PSYCH: normal affect  EYES:  No proptosis, no ptosis, conjunctiva normal  ENT:  Normocephalic, atraumatic  NECK:  Supple, symmetrical, trachea midline, no adenopathy, thyroid symmetric, not and saturated fat, weight loss, aerobic exercise, and eating a diet rich in fruits and vegetables. B) c/w statin  Discussed the potential side effects of statins including muscle and liver injury.     RTC in 3 months  Call with BG as instructed.       Trent

## 2019-02-28 RX ORDER — DULAGLUTIDE 1.5 MG/.5ML
INJECTION, SOLUTION SUBCUTANEOUS
Qty: 6 ML | Refills: 0 | Status: SHIPPED | OUTPATIENT
Start: 2019-02-28 | End: 2019-05-30

## 2019-03-10 NOTE — PROGRESS NOTES
Cathy Leblanc is a 79year old female  No LMP recorded (lmp unknown). Patient is postmenopausal. Patient presents with:  Gyn Exam: ANNUAL EXAM. No change in hx or FHx. No  bleed  .     OBSTETRICS HISTORY:  OB History    Para Term Prete History  Chief Complaint   Patient presents with    Abdominal Pain     Patient started with fever around suppertime, c/o nausea and refused supper  Had emesisx1  Has been constipated as well per pt guardian at bedside   Fever - 9 weeks to 74 years     7YEAR-OLD MALE WITH A HISTORY OF BRONCHITIS NOW WITH AN EPISODE OF CHEST PAIN AND HEADACHE BOTH OF WHICH ARE NONDESCRIPT 10 RESOLVED  PATIENT IS ON ABLE TO ELABORATE ON EITHER THE CIRCUMSTANCES THE CHARACTER OR INTENSITY OF THE PAIN  HE RATES THAT IT IS GONE NOW  DENIES ANY NAUSEA VOMITING OR DIARRHEA ALTHOUGH THE GRANDMOTHER STATES THAT HE HAS HAD AN OCCASIONAL EPISODE OF VOMITING TODAY  NO REPORTED CHANGE IN BOWEL OR BLADDER HABITS OTHERWISE NO REPORTED SHORTNESS OF BREATH THERE IS NO REPORTED VISUAL DEFICITS  Dave Chapman Prior to Admission Medications   Prescriptions Last Dose Informant Patient Reported? Taking? methylphenidate (RITALIN) 5 mg tablet  Care Giver Yes Yes   Sig: Take 5 mg by mouth daily   oxybutynin (DITROPAN) 5 mg tablet  Family Member Yes No   Sig: Take 5 mg by mouth daily at bedtime      Facility-Administered Medications: None       Past Medical History:   Diagnosis Date    ADHD (attention deficit hyperactivity disorder)        History reviewed  No pertinent surgical history  History reviewed  No pertinent family history  I have reviewed and agree with the history as documented  Social History     Tobacco Use    Smoking status: Passive Smoke Exposure - Never Smoker    Smokeless tobacco: Never Used   Substance Use Topics    Alcohol use: Not on file    Drug use: Not on file        Review of Systems   Constitutional: Negative  HENT: Negative for ear pain, rhinorrhea and sore throat  Eyes: Negative for pain, discharge and redness  Respiratory: Negative for cough and shortness of breath  Cardiovascular: Positive for chest pain  Gastrointestinal: Negative for diarrhea and vomiting  Genitourinary: Negative  Elizabeth  2009: ENDOMETRIAL BIOPSY - JAR(S): 2      Comment:  negative  4/4/2018: ESOPHAGOGASTRODUODENOSCOPY, COLONOSCOPY, POSSIBLE BIOPSY,   POSSIBLE POLYPECTOMY 18708, 93972; N/A      Comment:  Performed by Laurent Avalos MD at [facility] Musculoskeletal: Negative  Skin: Negative  Neurological: Negative for dizziness, tremors, weakness, numbness and headaches  Psychiatric/Behavioral: Negative for behavioral problems  All other systems reviewed and are negative  Physical Exam  Physical Exam   Constitutional: He appears well-developed and well-nourished  He is active  Nontoxic appearing   HENT:   Right Ear: Tympanic membrane normal    Left Ear: Tympanic membrane normal    Nose: Nose normal  No nasal discharge  Mouth/Throat: Mucous membranes are moist  No tonsillar exudate  Oropharynx is clear  Eyes: Pupils are equal, round, and reactive to light  Conjunctivae are normal    Neck: Normal range of motion  Neck supple  Cardiovascular: Normal rate, regular rhythm, S1 normal and S2 normal    Pulmonary/Chest: Effort normal and breath sounds normal  No respiratory distress  He has no wheezes  He has no rhonchi  He has no rales  Abdominal: Soft  Bowel sounds are normal  There is no tenderness  There is no guarding  Musculoskeletal: Normal range of motion  He exhibits no edema or tenderness  Lymphadenopathy:     He has no cervical adenopathy  Neurological: He is alert  He exhibits normal muscle tone  Moving all extremities   Skin: Skin is warm and dry  Nursing note and vitals reviewed        Vital Signs  ED Triage Vitals   Temperature Pulse Respirations Blood Pressure SpO2   03/09/19 2322 03/09/19 2322 03/09/19 2322 03/09/19 2322 03/09/19 2322   99 4 °F (37 4 °C) 96 18 116/73 99 %      Temp src Heart Rate Source Patient Position - Orthostatic VS BP Location FiO2 (%)   03/09/19 2322 03/09/19 2322 03/09/19 2322 03/09/19 2322 --   Temporal Monitor Lying Right arm       Pain Score       03/09/19 2312       5           Vitals:    03/09/19 2322   BP: 116/73   Pulse: 96   Patient Position - Orthostatic VS: Lying       Visual Acuity      ED Medications  Medications - No data to display    Diagnostic Studies  Results Reviewed     None AND COLON   • Diabetes Mother    • Cancer Maternal Grandmother         BREAST CANCER,   • Breast Cancer Maternal Grandmother 48   • Breast Cancer Sister 47   • Cataracts Sister    • Heart Surgery Father         carotid artery surgery    • Hypertension Fath No orders to display              Procedures  Procedures       Phone Contacts  ED Phone Contact    ED Course                               MDM    Disposition  Final diagnoses:   URI (upper respiratory infection)   Pharyngitis   Headache   Chest pain     Time reflects when diagnosis was documented in both MDM as applicable and the Disposition within this note     Time User Action Codes Description Comment    3/9/2019 11:16 PM Jorge Brothers Roles Add [J06 9] URI (upper respiratory infection)     3/9/2019 11:16 PM Lurlene Memos Add [J02 9] Pharyngitis     3/9/2019 11:52 PM Lurlene Memos Add [R51] Headache     3/9/2019 11:52 PM Lurlene Memos Add [R07 9] Chest pain       ED Disposition     ED Disposition Condition Date/Time Comment    Discharge Stable Sat Mar 9, 2019 11:52 PM Avelina Reyes discharge to home/self care  Follow-up Information     Follow up With Specialties Details Why Contact Info    Laura Chan MD Pediatrics Call   [address] [facility]  [phone]            Current Discharge Medication List      CONTINUE these medications which have NOT CHANGED    Details   methylphenidate (RITALIN) 5 mg tablet Take 5 mg by mouth daily      oxybutynin (DITROPAN) 5 mg tablet Take 5 mg by mouth daily at bedtime           No discharge procedures on file      ED Provider  Electronically Signed by           Lana Coe MD  03/09/19 0896       Lana Coe MD  03/09/19 3549 daily.  , Disp: , Rfl:   •  aspirin 81 MG Oral Tab, Take  by mouth.  take 1 tablet (81MG)  by oral route  every day, Disp: , Rfl:     ALLERGIES:  No Known Allergies      Review of Systems:  Constitutional:    denies fatigue, night sweats, hot flashes  Eyes: size, contour, position, mobility, without tenderness  Adnexa:   normal without masses or tenderness  Perineum:   normal  Anus: no hemorroids     Assessment & Plan:  William Castillo was seen today for gyn exam.    Diagnoses and all orders for this visit:    Arie Leiva

## 2019-03-14 ENCOUNTER — OFFICE VISIT (OUTPATIENT)
Dept: OPTOMETRY | Facility: CLINIC | Age: 69
End: 2019-03-14

## 2019-03-14 DIAGNOSIS — H52.13 MYOPIA WITH ASTIGMATISM AND PRESBYOPIA, BILATERAL: ICD-10-CM

## 2019-03-14 DIAGNOSIS — E11.9 CONTROLLED TYPE 2 DIABETES MELLITUS WITHOUT COMPLICATION, WITHOUT LONG-TERM CURRENT USE OF INSULIN (HCC): Primary | ICD-10-CM

## 2019-03-14 DIAGNOSIS — H52.4 MYOPIA WITH ASTIGMATISM AND PRESBYOPIA, BILATERAL: ICD-10-CM

## 2019-03-14 DIAGNOSIS — H25.13 AGE-RELATED NUCLEAR CATARACT OF BOTH EYES: ICD-10-CM

## 2019-03-14 DIAGNOSIS — H43.393 VITREOUS FLOATERS, BILATERAL: ICD-10-CM

## 2019-03-14 DIAGNOSIS — H52.203 MYOPIA WITH ASTIGMATISM AND PRESBYOPIA, BILATERAL: ICD-10-CM

## 2019-03-14 PROCEDURE — 92015 DETERMINE REFRACTIVE STATE: CPT | Performed by: OPTOMETRIST

## 2019-03-14 PROCEDURE — 92014 COMPRE OPH EXAM EST PT 1/>: CPT | Performed by: OPTOMETRIST

## 2019-03-14 RX ORDER — VERAPAMIL HYDROCHLORIDE 200 MG/1
200 CAPSULE, EXTENDED RELEASE ORAL
COMMUNITY
Start: 2008-03-18 | End: 2019-07-16

## 2019-03-14 RX ORDER — OMEGA-3-ACID ETHYL ESTERS 1 G/1
2 CAPSULE, LIQUID FILLED ORAL
Status: ON HOLD | COMMUNITY
Start: 2009-04-09 | End: 2021-02-09

## 2019-03-14 RX ORDER — TRANDOLAPRIL 2 MG/1
2 TABLET ORAL
COMMUNITY
Start: 2009-04-09 | End: 2019-03-14 | Stop reason: DRUGHIGH

## 2019-03-14 NOTE — ASSESSMENT & PLAN NOTE
New glasses RX given to update as needed.  Patient may fill RX to see if her vision is acceptable and she can delay phaco.

## 2019-03-14 NOTE — PATIENT INSTRUCTIONS
Vitreous floaters, bilateral  No treatment is required. Will continue to observe. Myopia with astigmatism and presbyopia, bilateral  New glasses RX given to update as needed.  Patient may fill RX to see if her vision is acceptable and she can delay phaco

## 2019-03-14 NOTE — PROGRESS NOTES
Alex Almaraz is a 79year old female. HPI:     HPI     Diabetic Eye Exam     Diabetes characteristics include controlled with diet and taking oral medications. Duration of 10 years. Number of years diabetic 10. Number of years on pills 10.   Num • Diabetes Mother    • Ovarian Cancer Mother 72   • Cancer Maternal Grandmother         BREAST CANCER,   • Breast Cancer Maternal Grandmother 48   • Breast Cancer Sister 47   • Cataracts Sister    • Heart Surgery Father         carotid artery surgery Disp: 90 tablet Rfl: 2   Ferrous Sulfate (IRON SUPPLEMENT OR) Take 1 each by mouth daily. Disp:  Rfl:    Calcium Citrate-Vitamin D (CALCIUM CITRATE +) 315-200 MG-UNIT Oral Tab Take 2 tablets by mouth 2 (two) times daily.  Disp: 120 tablet Rfl: 0   Cholecalc Central vitreous floater Central vitreous floater          Fundus Exam       Right Left    Disc Normal Normal    C/D Ratio 0.5 0.5    Macula Normal, no BDR Normal,  no BDR    Vessels Normal Normal    Periphery Normal Normal            Refraction     Jeronimo Cisneros encounter.       Meds This Visit:  Requested Prescriptions      No prescriptions requested or ordered in this encounter        Follow up instructions:  Return in about 1 year (around 3/14/2020) for Diabetic Eye exam.    3/14/2019  Scribed by: Heidy Thompson

## 2019-03-14 NOTE — ASSESSMENT & PLAN NOTE
I advised patient that due to her complaints with night vision and glare that she can consider cataract surgery or she can fill the new RX first. Patient would like to think about it and get back to me. I gave her Dr. Marybeth Noland office information.

## 2019-03-15 NOTE — PROGRESS NOTES
Teresita Mata is a 79year old female. HPI:     HPI     Diabetic Eye Exam     Diabetes characteristics include controlled with diet and taking oral medications. Duration of 10 years. Number of years diabetic 10. Number of years on pills 10.   Num • Diabetes Mother    • Ovarian Cancer Mother 72   • Cancer Maternal Grandmother         BREAST CANCER,   • Breast Cancer Maternal Grandmother 48   • Breast Cancer Sister 47   • Cataracts Sister    • Heart Surgery Father         carotid artery surgery Disp: 90 tablet Rfl: 2   Ferrous Sulfate (IRON SUPPLEMENT OR) Take 1 each by mouth daily. Disp:  Rfl:    Calcium Citrate-Vitamin D (CALCIUM CITRATE +) 315-200 MG-UNIT Oral Tab Take 2 tablets by mouth 2 (two) times daily.  Disp: 120 tablet Rfl: 0   Cholecalc vitreous floater          Fundus Exam       Right Left    Disc Normal Normal    C/D Ratio 0.5 0.5    Macula Normal, no BDR Normal,  no BDR    Vessels Normal Normal    Periphery Normal Normal            Refraction     Wearing Rx       Sphere Cylinder Axis A Prescriptions      No prescriptions requested or ordered in this encounter        Follow up instructions:  Return in about 1 year (around 3/14/2020) for Diabetic Eye exam.    3/14/2019  Scribed by: Js Mack

## 2019-03-18 RX ORDER — LEVOTHYROXINE SODIUM 0.1 MG/1
TABLET ORAL
Qty: 90 TABLET | Refills: 0 | OUTPATIENT
Start: 2019-03-18

## 2019-03-21 ENCOUNTER — TELEPHONE (OUTPATIENT)
Dept: OPTOMETRY | Facility: CLINIC | Age: 69
End: 2019-03-21

## 2019-03-22 NOTE — TELEPHONE ENCOUNTER
She wants to proceed with phaco. I will fax her chart notes and face sheet to [address] [place] [address] ophthalmology.

## 2019-03-25 ENCOUNTER — TELEPHONE (OUTPATIENT)
Dept: INTERNAL MEDICINE CLINIC | Facility: CLINIC | Age: 69
End: 2019-03-25

## 2019-03-25 ENCOUNTER — PATIENT MESSAGE (OUTPATIENT)
Dept: INTERNAL MEDICINE CLINIC | Facility: CLINIC | Age: 69
End: 2019-03-25

## 2019-03-25 DIAGNOSIS — H26.9 CATARACT, UNSPECIFIED CATARACT TYPE, UNSPECIFIED LATERALITY: ICD-10-CM

## 2019-03-25 DIAGNOSIS — Z01.00 EYE EXAM NORMAL: Primary | ICD-10-CM

## 2019-03-25 NOTE — TELEPHONE ENCOUNTER
Patient requesting referral Dr. Mary Carmona- she has appt this Wednesday with Dr. Mary Carmona- for cataract. [facility] Please sign off on referral.      Informed her not a guarantee may not have referral approved by Wednesday- patient verbally understood.     Thanks,    Liudmila Piedra

## 2019-03-26 NOTE — TELEPHONE ENCOUNTER
From: Madhuri Lyons  To: Zeus Valdivia MD  Sent: 3/25/2019 8:42 AM CDT  Subject: Referral Request    Good Monday Morning.     surprisingly i rec'd a phone call friday at 5:00 and am able to get into an eye specialist on Wednesday March 27th at 5:30 a

## 2019-03-26 NOTE — TELEPHONE ENCOUNTER
Email response sent to patient.        Dr. Lundy Schilder:   Please see patient email and advise   Please see pended  referral

## 2019-03-26 NOTE — TELEPHONE ENCOUNTER
Dr Villasenor Portal for Dr Lundy Schilder, please. Please see patient's emails and advise.     Please respond to pool: ROBERT [facility] LPN/BRICE      Message -----  Renetta Graft From: Salvador Patel     Sent: 3/25/2019  8:32 PM CDT       To: Vinita Fisher MD  Subject: RE: Referral Re

## 2019-04-03 ENCOUNTER — MED REC SCAN ONLY (OUTPATIENT)
Dept: INTERNAL MEDICINE CLINIC | Facility: CLINIC | Age: 69
End: 2019-04-03

## 2019-04-17 DIAGNOSIS — I10 ESSENTIAL HYPERTENSION WITH GOAL BLOOD PRESSURE LESS THAN 130/85: ICD-10-CM

## 2019-04-18 RX ORDER — DILTIAZEM HYDROCHLORIDE EXTENDED-RELEASE TABLETS 360 MG/1
TABLET, EXTENDED RELEASE ORAL
Qty: 90 TABLET | Refills: 1 | Status: SHIPPED | OUTPATIENT
Start: 2019-04-18 | End: 2019-10-10

## 2019-04-18 NOTE — TELEPHONE ENCOUNTER
Refill passed per [facility] protocol.     Hypertensive Medications  Protocol Criteria:  · Appointment scheduled in the past 6 months or in the next 3 months  · BMP or CMP in the past 12 months  · Creatinine result < 2  Recent Outpatient Visits 02/02/2019    AGRATIO 1.5 05/07/2016    ANIONGAP 12 02/02/2019    [facility] 295 02/02/2019

## 2019-04-25 DIAGNOSIS — E11.9 CONTROLLED TYPE 2 DIABETES MELLITUS WITHOUT COMPLICATION, WITHOUT LONG-TERM CURRENT USE OF INSULIN (HCC): ICD-10-CM

## 2019-04-25 RX ORDER — GLIMEPIRIDE 4 MG/1
TABLET ORAL
Qty: 180 TABLET | Refills: 1 | Status: SHIPPED | OUTPATIENT
Start: 2019-04-25 | End: 2019-10-25

## 2019-04-25 RX ORDER — PANTOPRAZOLE SODIUM 40 MG/1
TABLET, DELAYED RELEASE ORAL
Qty: 90 TABLET | Refills: 1 | Status: SHIPPED | OUTPATIENT
Start: 2019-04-25 | End: 2019-10-25

## 2019-05-01 ENCOUNTER — PATIENT MESSAGE (OUTPATIENT)
Dept: INTERNAL MEDICINE CLINIC | Facility: CLINIC | Age: 69
End: 2019-05-01

## 2019-05-01 NOTE — TELEPHONE ENCOUNTER
From: Ankush Akhtar  To: Dagoberto Hollingsworth MD  Sent: 5/1/2019 4:20 PM CDT  Subject: Other    I have a scheduled appt on 5/9/19. ..do i need some lab work done? ( a true sign of my aging, I used to remember these things).     Thank You,

## 2019-05-03 ENCOUNTER — LAB ENCOUNTER (OUTPATIENT)
Dept: LAB | Age: 69
End: 2019-05-03
Attending: INTERNAL MEDICINE
Payer: COMMERCIAL

## 2019-05-03 DIAGNOSIS — R79.89 ABNORMAL LIVER FUNCTION TEST: ICD-10-CM

## 2019-05-03 DIAGNOSIS — E03.9 ACQUIRED HYPOTHYROIDISM: ICD-10-CM

## 2019-05-03 DIAGNOSIS — E11.9 CONTROLLED TYPE 2 DIABETES MELLITUS WITHOUT COMPLICATION, WITHOUT LONG-TERM CURRENT USE OF INSULIN (HCC): ICD-10-CM

## 2019-05-03 PROCEDURE — 36415 COLL VENOUS BLD VENIPUNCTURE: CPT

## 2019-05-03 PROCEDURE — 83036 HEMOGLOBIN GLYCOSYLATED A1C: CPT

## 2019-05-03 PROCEDURE — 80076 HEPATIC FUNCTION PANEL: CPT

## 2019-05-03 PROCEDURE — 84443 ASSAY THYROID STIM HORMONE: CPT

## 2019-05-09 ENCOUNTER — OFFICE VISIT (OUTPATIENT)
Dept: INTERNAL MEDICINE CLINIC | Facility: CLINIC | Age: 69
End: 2019-05-09

## 2019-05-09 VITALS
HEART RATE: 86 BPM | BODY MASS INDEX: 23.21 KG/M2 | HEIGHT: 65.5 IN | WEIGHT: 141 LBS | SYSTOLIC BLOOD PRESSURE: 120 MMHG | DIASTOLIC BLOOD PRESSURE: 67 MMHG

## 2019-05-09 DIAGNOSIS — F32.A ANXIETY AND DEPRESSION: ICD-10-CM

## 2019-05-09 DIAGNOSIS — F41.9 ANXIETY AND DEPRESSION: ICD-10-CM

## 2019-05-09 DIAGNOSIS — R79.89 ABNORMAL LIVER FUNCTION TEST: Primary | ICD-10-CM

## 2019-05-09 DIAGNOSIS — E11.9 CONTROLLED TYPE 2 DIABETES MELLITUS WITHOUT COMPLICATION, WITHOUT LONG-TERM CURRENT USE OF INSULIN (HCC): ICD-10-CM

## 2019-05-09 PROCEDURE — 99214 OFFICE O/P EST MOD 30 MIN: CPT | Performed by: INTERNAL MEDICINE

## 2019-05-09 PROCEDURE — 99212 OFFICE O/P EST SF 10 MIN: CPT | Performed by: INTERNAL MEDICINE

## 2019-05-09 NOTE — ASSESSMENT & PLAN NOTE
Pt has a lot of work and financial stress. Pt would like to try medication. Counseled regarding possible side effects of SSRI. Advised pt to call if any problems.

## 2019-05-09 NOTE — PROGRESS NOTES
HPI:    Patient ID: Zenobia Marques is a 81year old female. She has been having headaches and has been taking Tylenol. Her father .   She has fatty liver and had a biopsy in the past.  She has been still very stressful at work and she can't affo D & C     • EGD  04/2018   • ENDOMETRIAL BIOPSY - JAR(S): 2  2009    negative   • ESOPHAGOGASTRODUODENOSCOPY, COLONOSCOPY, POSSIBLE BIOPSY, POSSIBLE POLYPECTOMY 02018, 96275 N/A 4/4/2018    Performed by Umair Rodriguez MD at [address] [place] [address]   • Oral Tab Take 2 tablets by mouth 2 (two) times daily. Disp: 120 tablet Rfl: 0   Cholecalciferol (VITAMIN D) 1000 UNITS Oral Cap Take 1,000 mg by mouth daily. Disp:  Rfl:    aspirin 81 MG Oral Tab Take  by mouth.  take 1 tablet (81MG)  by oral route  every medication. Counseled regarding possible side effects of SSRI. Advised pt to call if any problems.            Relevant Medications    Sertraline HCl 50 MG Oral Tab       Unprioritized    Controlled type 2 diabetes mellitus without complication, without lo

## 2019-05-09 NOTE — ASSESSMENT & PLAN NOTE
Controlled. Continue present management. The patient does not have neuropathy, nephropathy or retinopathy.

## 2019-05-09 NOTE — TELEPHONE ENCOUNTER
Patient is requesting a 90 day supply.  Sig is for  81 tablets, upon chart review   see f/u appt on June 6th    Please advise and thank you

## 2019-05-09 NOTE — ASSESSMENT & PLAN NOTE
Discussed risk for progression to cirrhosis with pt. Advised the need for weight loss and avoidance of Tylenol. Pt expressed understanding.

## 2019-05-10 NOTE — TELEPHONE ENCOUNTER
Duplicate request, previously addressed    Noted MD's message below, message sent via [employer]

## 2019-05-23 RX ORDER — DAPAGLIFLOZIN 10 MG/1
TABLET, FILM COATED ORAL
Qty: 90 TABLET | Refills: 1 | Status: SHIPPED | OUTPATIENT
Start: 2019-05-23 | End: 2019-11-14

## 2019-05-23 RX ORDER — HYDROCHLOROTHIAZIDE 12.5 MG/1
TABLET ORAL
Qty: 90 TABLET | Refills: 1 | Status: SHIPPED | OUTPATIENT
Start: 2019-05-23 | End: 2019-11-14

## 2019-05-23 RX ORDER — METFORMIN HYDROCHLORIDE 500 MG/1
TABLET, EXTENDED RELEASE ORAL
Qty: 360 TABLET | Refills: 1 | Status: SHIPPED | OUTPATIENT
Start: 2019-05-23 | End: 2019-11-25

## 2019-05-30 DIAGNOSIS — I10 ESSENTIAL HYPERTENSION WITH GOAL BLOOD PRESSURE LESS THAN 130/85: ICD-10-CM

## 2019-05-30 RX ORDER — TRANDOLAPRIL TABLETS 4 MG/1
TABLET ORAL
Qty: 180 TABLET | Refills: 0 | OUTPATIENT
Start: 2019-05-30

## 2019-05-30 RX ORDER — TRANDOLAPRIL TABLETS 4 MG/1
TABLET ORAL
Qty: 180 TABLET | Refills: 1 | Status: SHIPPED | OUTPATIENT
Start: 2019-05-30 | End: 2019-11-14

## 2019-05-31 ENCOUNTER — HOSPITAL ENCOUNTER (OUTPATIENT)
Dept: ULTRASOUND IMAGING | Facility: HOSPITAL | Age: 69
Discharge: HOME | End: 2019-05-31
Attending: INTERNAL MEDICINE
Payer: COMMERCIAL

## 2019-05-31 DIAGNOSIS — R79.89 ABNORMAL LIVER FUNCTION TEST: ICD-10-CM

## 2019-05-31 PROCEDURE — 76705 ECHO EXAM OF ABDOMEN: CPT | Performed by: INTERNAL MEDICINE

## 2019-07-16 ENCOUNTER — OFFICE VISIT (OUTPATIENT)
Dept: INTERNAL MEDICINE CLINIC | Facility: CLINIC | Age: 69
End: 2019-07-16

## 2019-07-16 VITALS
HEIGHT: 65.5 IN | BODY MASS INDEX: 23.41 KG/M2 | DIASTOLIC BLOOD PRESSURE: 68 MMHG | SYSTOLIC BLOOD PRESSURE: 124 MMHG | HEART RATE: 83 BPM | WEIGHT: 142.19 LBS

## 2019-07-16 DIAGNOSIS — K76.0 FATTY LIVER: ICD-10-CM

## 2019-07-16 DIAGNOSIS — E11.9 CONTROLLED TYPE 2 DIABETES MELLITUS WITHOUT COMPLICATION, WITHOUT LONG-TERM CURRENT USE OF INSULIN (HCC): ICD-10-CM

## 2019-07-16 DIAGNOSIS — R42 DIZZINESS: Primary | ICD-10-CM

## 2019-07-16 DIAGNOSIS — E78.00 HYPERCHOLESTEROLEMIA: ICD-10-CM

## 2019-07-16 PROCEDURE — 99214 OFFICE O/P EST MOD 30 MIN: CPT | Performed by: INTERNAL MEDICINE

## 2019-07-16 RX ORDER — MECLIZINE HYDROCHLORIDE 25 MG/1
25 TABLET ORAL 3 TIMES DAILY PRN
Qty: 40 TABLET | Refills: 2 | Status: SHIPPED | OUTPATIENT
Start: 2019-07-16 | End: 2020-08-11

## 2019-07-16 RX ORDER — ATORVASTATIN CALCIUM 10 MG/1
10 TABLET, FILM COATED ORAL NIGHTLY
Qty: 90 TABLET | Refills: 1 | Status: SHIPPED | OUTPATIENT
Start: 2019-07-16 | End: 2020-01-08

## 2019-07-16 NOTE — ASSESSMENT & PLAN NOTE
I reviewed the patient's recent lab results with her. Her last lipid panel was done in February and was excellent. She has been on this dose of atorvastatin for several years. Continue present management and recheck lipids annually in February.

## 2019-07-16 NOTE — ASSESSMENT & PLAN NOTE
Patient has brief episodes of dizziness which often happen randomly but also have been with regularity when her coworker uses an electric stapler which startles her. I will refer her back to Dr. Merrick Harden.   I advised her that she can take the meclizine as nee

## 2019-07-16 NOTE — PROGRESS NOTES
HPI:    Patient ID: Phillip Lynne is a 81year old female. Pt c/o dizziness. It is almost daily. She also has pain in her ear. She has some hearing loss. She saw Dr. Joshua Raya.   She thought perhaps it was due to her thyroid, but upon review of her Dizziness. Disp: 40 tablet Rfl: 2   Dulaglutide (TRULICITY) 1.5 DZ/7.1NX Subcutaneous Solution Pen-injector Inject 1.5 mL into the skin once a week.  Disp: 6 mL Rfl: 0   Trandolapril 4 MG Oral Tab TAKE 2 TABLETS(8 MG) BY MOUTH EVERY DAY Disp: 180 tablet Rfl • Diabetes Mother    • Ovarian Cancer Mother 72   • Cancer Maternal Grandmother         BREAST CANCER,   • Breast Cancer Maternal Grandmother 48   • Breast Cancer Sister 47   • Cataracts Sister    • Heart Surgery Father         carotid artery surgery complications. Relevant Orders    HEMOGLOBIN A1C    Hypercholesterolemia     I reviewed the patient's recent lab results with her. Her last lipid panel was done in February and was excellent.   She has been on this dose of atorvastatin for several

## 2019-07-17 NOTE — ASSESSMENT & PLAN NOTE
Patient has mildly abnormal liver test and her ultrasound of the liver shows fatty liver. Patient understands the need for weight loss.

## 2019-07-17 NOTE — ASSESSMENT & PLAN NOTE
Her last A1c done in May was very good. We will recheck it again next month. She is up-to-date on her eye exam and does not have any diabetic complications.

## 2019-07-19 ENCOUNTER — OFFICE VISIT (OUTPATIENT)
Dept: AUDIOLOGY | Facility: CLINIC | Age: 69
End: 2019-07-19

## 2019-07-19 ENCOUNTER — OFFICE VISIT (OUTPATIENT)
Dept: OTOLARYNGOLOGY | Facility: CLINIC | Age: 69
End: 2019-07-19

## 2019-07-19 ENCOUNTER — HOSPITAL ENCOUNTER (OUTPATIENT)
Dept: GENERAL RADIOLOGY | Facility: HOSPITAL | Age: 69
Discharge: HOME | End: 2019-07-19
Attending: OTOLARYNGOLOGY
Payer: COMMERCIAL

## 2019-07-19 VITALS
BODY MASS INDEX: 22.82 KG/M2 | WEIGHT: 142 LBS | SYSTOLIC BLOOD PRESSURE: 129 MMHG | DIASTOLIC BLOOD PRESSURE: 75 MMHG | TEMPERATURE: 98 F | HEIGHT: 66 IN

## 2019-07-19 DIAGNOSIS — M54.2 NECK PAIN: ICD-10-CM

## 2019-07-19 DIAGNOSIS — R42 DIZZINESS: Primary | ICD-10-CM

## 2019-07-19 PROCEDURE — 72050 X-RAY EXAM NECK SPINE 4/5VWS: CPT | Performed by: OTOLARYNGOLOGY

## 2019-07-19 PROCEDURE — 92557 COMPREHENSIVE HEARING TEST: CPT | Performed by: AUDIOLOGIST

## 2019-07-19 PROCEDURE — 92567 TYMPANOMETRY: CPT | Performed by: AUDIOLOGIST

## 2019-07-19 PROCEDURE — 99213 OFFICE O/P EST LOW 20 MIN: CPT | Performed by: OTOLARYNGOLOGY

## 2019-07-19 NOTE — PROGRESS NOTES
AUDIOLOGY REPORT      Cathy Leblanc is a 81year old female     Referring Provider: Valdez Gonzalez   YOB: 1950  Medical Record: [id number]      Patient Hearing History:  Patient reported dizziness and question of decreased hearing.   A pre

## 2019-07-19 NOTE — PROGRESS NOTES
Sahil Delong is a 81year old female. Patient presents with:  Dizziness: daily for over 1.5 years   Ear Pain: left ear     HPI:   She is experiencing intermittent pain above her left ear. This seems to come and go and can be very severe at times.   Osman Castillo Meclizine HCl 25 MG Oral Tab Take 1 tablet (25 mg total) by mouth 3 (three) times daily as needed for Dizziness. Disp: 40 tablet Rfl: 2   Choline Fenofibrate (TRILIPIX) 135 MG Oral Capsule Delayed Release Take 1 tablet by mouth.  Disp:  Rfl:       Past Me Normal. Thyroid gland - Normal.   Psychiatric Normal Orientation - Oriented to time, place, person & situation. Appropriate mood and affect. Lymph Detail Normal Submental. Submandibular. Anterior cervical. Posterior cervical. Supraclavicular.    Eyes Norm

## 2019-07-29 DIAGNOSIS — E03.9 ACQUIRED HYPOTHYROIDISM: ICD-10-CM

## 2019-07-29 RX ORDER — LEVOTHYROXINE SODIUM 88 UG/1
TABLET ORAL
Qty: 90 TABLET | Refills: 1 | Status: SHIPPED | OUTPATIENT
Start: 2019-07-29 | End: 2020-01-24

## 2019-07-29 NOTE — TELEPHONE ENCOUNTER
Refill passed per [facility] protocol.   Hypothyroid Medications  Protocol Criteria:  Appointment scheduled in the past 12 months or the next 3 months  TSH resulted in the past 12 months that is normal  Recent Outpatient Visits            1 week ago Kennedi Moise

## 2019-08-22 RX ORDER — DULAGLUTIDE 1.5 MG/.5ML
INJECTION, SOLUTION SUBCUTANEOUS
Qty: 6 ML | Refills: 0 | Status: SHIPPED | OUTPATIENT
Start: 2019-08-22 | End: 2019-11-26

## 2019-08-23 ENCOUNTER — APPOINTMENT (OUTPATIENT)
Dept: LAB | Age: 69
End: 2019-08-23
Attending: INTERNAL MEDICINE
Payer: COMMERCIAL

## 2019-08-23 DIAGNOSIS — E11.9 CONTROLLED TYPE 2 DIABETES MELLITUS WITHOUT COMPLICATION, WITHOUT LONG-TERM CURRENT USE OF INSULIN (HCC): ICD-10-CM

## 2019-08-23 DIAGNOSIS — R79.89 ABNORMAL LIVER FUNCTION TEST: ICD-10-CM

## 2019-08-23 LAB
ALT SERPL-CCNC: 74 U/L (ref 13–56)
AST SERPL-CCNC: 45 U/L (ref 15–37)
EST. AVERAGE GLUCOSE BLD GHB EST-MCNC: 137 MG/DL (ref 68–126)
HBA1C MFR BLD HPLC: 6.4 % (ref ?–5.7)
HBV CORE AB SERPL QL IA: NONREACTIVE
HBV SURFACE AB SER QL: NONREACTIVE
HBV SURFACE AB SERPL IA-ACNC: <3.1 MIU/ML
HBV SURFACE AG SER-ACNC: <0.1 [IU]/L
HBV SURFACE AG SERPL QL IA: NONREACTIVE
HCV AB SERPL QL IA: NONREACTIVE

## 2019-08-23 PROCEDURE — 80500 HEPATITIS B PROFILE: CPT

## 2019-08-23 PROCEDURE — 86803 HEPATITIS C AB TEST: CPT

## 2019-08-23 PROCEDURE — 84450 TRANSFERASE (AST) (SGOT): CPT

## 2019-08-23 PROCEDURE — 87340 HEPATITIS B SURFACE AG IA: CPT

## 2019-08-23 PROCEDURE — 84460 ALANINE AMINO (ALT) (SGPT): CPT

## 2019-08-23 PROCEDURE — 36415 COLL VENOUS BLD VENIPUNCTURE: CPT

## 2019-08-23 PROCEDURE — 83036 HEMOGLOBIN GLYCOSYLATED A1C: CPT

## 2019-08-23 PROCEDURE — 86706 HEP B SURFACE ANTIBODY: CPT

## 2019-08-23 PROCEDURE — 86704 HEP B CORE ANTIBODY TOTAL: CPT

## 2019-08-29 ENCOUNTER — TELEPHONE (OUTPATIENT)
Dept: INTERNAL MEDICINE CLINIC | Facility: CLINIC | Age: 69
End: 2019-08-29

## 2019-08-29 DIAGNOSIS — E11.9 CONTROLLED TYPE 2 DIABETES MELLITUS WITHOUT COMPLICATION, WITHOUT LONG-TERM CURRENT USE OF INSULIN (HCC): Primary | ICD-10-CM

## 2019-08-30 ENCOUNTER — OFFICE VISIT (OUTPATIENT)
Dept: ENDOCRINOLOGY CLINIC | Facility: CLINIC | Age: 69
End: 2019-08-30

## 2019-08-30 VITALS
BODY MASS INDEX: 23 KG/M2 | DIASTOLIC BLOOD PRESSURE: 73 MMHG | SYSTOLIC BLOOD PRESSURE: 131 MMHG | HEART RATE: 76 BPM | WEIGHT: 140.19 LBS

## 2019-08-30 DIAGNOSIS — E11.9 CONTROLLED TYPE 2 DIABETES MELLITUS WITHOUT COMPLICATION, WITHOUT LONG-TERM CURRENT USE OF INSULIN (HCC): Primary | ICD-10-CM

## 2019-08-30 DIAGNOSIS — E78.5 DYSLIPIDEMIA: ICD-10-CM

## 2019-08-30 LAB
GLUCOSE BLOOD: 115
TEST STRIP LOT #: NORMAL NUMERIC

## 2019-08-30 PROCEDURE — 82962 GLUCOSE BLOOD TEST: CPT | Performed by: INTERNAL MEDICINE

## 2019-08-30 PROCEDURE — 36416 COLLJ CAPILLARY BLOOD SPEC: CPT | Performed by: INTERNAL MEDICINE

## 2019-08-30 PROCEDURE — 99213 OFFICE O/P EST LOW 20 MIN: CPT | Performed by: INTERNAL MEDICINE

## 2019-08-30 NOTE — PROGRESS NOTES
Return Office Visit     CHIEF COMPLAINT:    Type 2 DM  Dyslipidemia     HISTORY OF PRESENT ILLNESS:  Phillip Lynne is a 81year old female who presents for follow up for for Type 2 DM.      DM HISTORY  Diagnosed: about 10 years ago        HISTORY OF D 1   DILTIAZEM HCL ER COATED BEADS 360 MG Oral Tablet 24 Hr TAKE 1 TABLET(360 MG) BY MOUTH DAILY Disp: 90 tablet Rfl: 1   Choline Fenofibrate (TRILIPIX) 135 MG Oral Capsule Delayed Release Take 1 tablet by mouth.  Disp:  Rfl:    Omega-3-acid Ethyl Esters (LO distress, and appears stated age  PSYCH: normal affect  EYES:  No proptosis, no ptosis, conjunctiva normal  ENT:  Normocephalic, atraumatic  NECK:  Supple, symmetrical, trachea midline, no adenopathy, thyroid symmetric, not enlarged and no tenderness  HEMA dietary total and saturated fat, weight loss, aerobic exercise, and eating a diet rich in fruits and vegetables.   B) c/w statin  Discussed the potential side effects of statins including muscle and liver injury.     RTC in 5-6 months  Call with BG as instr

## 2019-08-30 NOTE — TELEPHONE ENCOUNTER
I wrote a referral, but if she has new insurance, this referral will not be valid.   We need the new insurance information in order to write the referral.  She will need to have that entered in to the computer prior to the appointment because insurance comp

## 2019-09-03 NOTE — TELEPHONE ENCOUNTER
Left a message in pt VM informing to call the office back with updated insurance in order to continue with Referral authorization

## 2019-09-10 ENCOUNTER — PATIENT MESSAGE (OUTPATIENT)
Dept: INTERNAL MEDICINE CLINIC | Facility: CLINIC | Age: 69
End: 2019-09-10

## 2019-09-10 NOTE — TELEPHONE ENCOUNTER
To: Paul Room      From: Felix Finnegan RN      Created: 9/10/2019 2:41 PM        Toby Taylor, We do have the flu shots in the Offices now.  I added the flu shot to the notes (it's on the schedule for the doctor and staff) for your appointment

## 2019-09-10 NOTE — TELEPHONE ENCOUNTER
From: Gogo No  To: Darren Jordan MD  Sent: 9/10/2019 12:48 PM CDT  Subject: Other    Hello,  i am hoping to include my influenza vaccine with my physical that is scheduled for OCT. 1, is that possible?     thank you

## 2019-10-04 ENCOUNTER — IMMUNIZATION (OUTPATIENT)
Dept: INTERNAL MEDICINE CLINIC | Facility: CLINIC | Age: 69
End: 2019-10-04
Payer: MEDICARE

## 2019-10-04 DIAGNOSIS — Z23 NEED FOR VACCINATION: ICD-10-CM

## 2019-10-04 PROCEDURE — G0008 ADMIN INFLUENZA VIRUS VAC: HCPCS | Performed by: INTERNAL MEDICINE

## 2019-10-04 PROCEDURE — 90662 IIV NO PRSV INCREASED AG IM: CPT | Performed by: INTERNAL MEDICINE

## 2019-10-10 DIAGNOSIS — I10 ESSENTIAL HYPERTENSION WITH GOAL BLOOD PRESSURE LESS THAN 130/85: ICD-10-CM

## 2019-10-12 RX ORDER — DILTIAZEM HYDROCHLORIDE 360 MG/1
TABLET, EXTENDED RELEASE ORAL
Qty: 90 TABLET | Refills: 1 | Status: SHIPPED | OUTPATIENT
Start: 2019-10-12 | End: 2020-08-06 | Stop reason: ALTCHOICE

## 2019-10-12 NOTE — TELEPHONE ENCOUNTER
Dr Dietz=previous order was Diltiazem , pharmacy is requesting generic MATZIM LA. Refill passed per [facility] protocol.     Hypertensive Medications  Protocol Criteria:  · Appointment scheduled in the past 6 months or in the next 3 months  · BMP

## 2019-10-25 DIAGNOSIS — E11.9 CONTROLLED TYPE 2 DIABETES MELLITUS WITHOUT COMPLICATION, WITHOUT LONG-TERM CURRENT USE OF INSULIN (HCC): ICD-10-CM

## 2019-10-28 RX ORDER — PANTOPRAZOLE SODIUM 40 MG/1
TABLET, DELAYED RELEASE ORAL
Qty: 90 TABLET | Refills: 1 | Status: SHIPPED | OUTPATIENT
Start: 2019-10-28 | End: 2020-02-06

## 2019-10-28 RX ORDER — GLIMEPIRIDE 4 MG/1
TABLET ORAL
Qty: 180 TABLET | Refills: 1 | Status: SHIPPED | OUTPATIENT
Start: 2019-10-28 | End: 2020-07-06

## 2019-11-14 ENCOUNTER — OFFICE VISIT (OUTPATIENT)
Dept: INTERNAL MEDICINE CLINIC | Facility: CLINIC | Age: 69
End: 2019-11-14
Payer: MEDICARE

## 2019-11-14 VITALS
WEIGHT: 139.69 LBS | HEART RATE: 83 BPM | SYSTOLIC BLOOD PRESSURE: 137 MMHG | HEIGHT: 69 IN | DIASTOLIC BLOOD PRESSURE: 80 MMHG | BODY MASS INDEX: 20.69 KG/M2

## 2019-11-14 DIAGNOSIS — K76.0 FATTY LIVER: ICD-10-CM

## 2019-11-14 DIAGNOSIS — E11.8 CONTROLLED DIABETES MELLITUS TYPE 2 WITH COMPLICATIONS, UNSPECIFIED WHETHER LONG TERM INSULIN USE (HCC): ICD-10-CM

## 2019-11-14 DIAGNOSIS — E78.00 HYPERCHOLESTEROLEMIA: ICD-10-CM

## 2019-11-14 DIAGNOSIS — H43.393 VITREOUS FLOATERS, BILATERAL: ICD-10-CM

## 2019-11-14 DIAGNOSIS — E11.9 CONTROLLED TYPE 2 DIABETES MELLITUS WITHOUT COMPLICATION, WITHOUT LONG-TERM CURRENT USE OF INSULIN (HCC): ICD-10-CM

## 2019-11-14 DIAGNOSIS — Z12.31 VISIT FOR SCREENING MAMMOGRAM: ICD-10-CM

## 2019-11-14 DIAGNOSIS — E03.9 ACQUIRED HYPOTHYROIDISM: ICD-10-CM

## 2019-11-14 DIAGNOSIS — Z00.00 ENCOUNTER FOR MEDICARE ANNUAL WELLNESS EXAM: Primary | ICD-10-CM

## 2019-11-14 DIAGNOSIS — I10 ESSENTIAL HYPERTENSION WITH GOAL BLOOD PRESSURE LESS THAN 130/85: ICD-10-CM

## 2019-11-14 PROBLEM — K51.40 PSEUDOPOLYPOSIS OF COLON WITHOUT COMPLICATION (HCC): Status: ACTIVE | Noted: 2019-11-14

## 2019-11-14 PROBLEM — R42 DIZZINESS: Status: RESOLVED | Noted: 2019-07-16 | Resolved: 2019-11-14

## 2019-11-14 PROBLEM — H52.13 MYOPIA WITH ASTIGMATISM AND PRESBYOPIA, BILATERAL: Status: RESOLVED | Noted: 2019-03-14 | Resolved: 2019-11-14

## 2019-11-14 PROBLEM — D50.0 IRON DEFICIENCY ANEMIA DUE TO CHRONIC BLOOD LOSS: Status: RESOLVED | Noted: 2018-02-08 | Resolved: 2019-11-14

## 2019-11-14 PROBLEM — F32.A ANXIETY AND DEPRESSION: Status: RESOLVED | Noted: 2019-05-09 | Resolved: 2019-11-14

## 2019-11-14 PROBLEM — F41.9 ANXIETY AND DEPRESSION: Status: RESOLVED | Noted: 2019-05-09 | Resolved: 2019-11-14

## 2019-11-14 PROBLEM — H52.203 MYOPIA WITH ASTIGMATISM AND PRESBYOPIA, BILATERAL: Status: RESOLVED | Noted: 2019-03-14 | Resolved: 2019-11-14

## 2019-11-14 PROBLEM — H52.4 MYOPIA WITH ASTIGMATISM AND PRESBYOPIA, BILATERAL: Status: RESOLVED | Noted: 2019-03-14 | Resolved: 2019-11-14

## 2019-11-14 PROBLEM — K51.40 PSEUDOPOLYPOSIS OF COLON WITHOUT COMPLICATION (HCC): Status: RESOLVED | Noted: 2019-11-14 | Resolved: 2019-11-14

## 2019-11-14 PROBLEM — H25.13 AGE-RELATED NUCLEAR CATARACT OF BOTH EYES: Status: RESOLVED | Noted: 2019-03-14 | Resolved: 2019-11-14

## 2019-11-14 PROCEDURE — 99397 PER PM REEVAL EST PAT 65+ YR: CPT | Performed by: INTERNAL MEDICINE

## 2019-11-14 PROCEDURE — 96160 PT-FOCUSED HLTH RISK ASSMT: CPT | Performed by: INTERNAL MEDICINE

## 2019-11-14 PROCEDURE — G0439 PPPS, SUBSEQ VISIT: HCPCS | Performed by: INTERNAL MEDICINE

## 2019-11-14 RX ORDER — TRANDOLAPRIL 4 MG/1
TABLET ORAL
Qty: 180 TABLET | Refills: 1 | Status: SHIPPED | OUTPATIENT
Start: 2019-11-14 | End: 2020-05-09

## 2019-11-14 RX ORDER — DILTIAZEM HYDROCHLORIDE EXTENDED-RELEASE TABLETS 360 MG/1
TABLET, EXTENDED RELEASE ORAL
Qty: 90 TABLET | Refills: 1 | Status: CANCELLED | OUTPATIENT
Start: 2019-11-14

## 2019-11-14 RX ORDER — HYDROCHLOROTHIAZIDE 12.5 MG/1
12.5 TABLET ORAL
Qty: 90 TABLET | Refills: 1 | Status: SHIPPED | OUTPATIENT
Start: 2019-11-14 | End: 2020-05-09

## 2019-11-14 RX ORDER — DILTIAZEM HYDROCHLORIDE 360 MG/1
360 CAPSULE, EXTENDED RELEASE ORAL DAILY
Qty: 90 CAPSULE | Refills: 1 | Status: SHIPPED | OUTPATIENT
Start: 2019-11-14 | End: 2020-05-09

## 2019-11-14 NOTE — PROGRESS NOTES
HPI:   Arian Doshi is a 81year old female who presents for a MA (Medicare Advantage) Supervisit (Once per calendar year). Pt needs refills on her medications.       Her last annual assessment has been over 1 year: Annual Physical due on 09/17/2 never smoked tobacco.    CAGE Alcohol screening   Shantel Day was screened for Alcohol abuse and had a score of 0 so is at low risk.     Patient Care Team: Patient Care Team:  Jeyson Sanches MD as PCP - General (Internal Medicine)  Richie Carmen MG Oral Tablet 24 Hr, TAKE 1 TABLET(360 MG) BY MOUTH DAILY  TRULICITY 1.5 FG/9.0TY Subcutaneous Solution Pen-injector, INJECT 1.5 MG UNDER THE SKIN EVERY WEEK  LEVOTHYROXINE SODIUM 88 MCG Oral Tab, TAKE 1 TABLET(88 MCG) BY MOUTH BEFORE BREAKFAST  atorvasta and mother; Cataracts in her sister; Diabetes in her mother; Heart Surgery in her father; Hypertension in her father; Ovarian Cancer (age of onset: 72) in her mother. SOCIAL HISTORY:   She  reports that she has never smoked.  She has never used smokeless at the same time:  No   I have trouble understanding things on the TV:  No I have to strain to understand conversations:  No   I have to worry about missing the telephone ring or doorbell:  No I have trouble hearing conversations in a noisy background such normal. No respiratory distress. She has no wheezes. She has no rales. Right breast exhibits no inverted nipple, no mass, no nipple discharge, no skin change and no tenderness.  Left breast exhibits no inverted nipple, no mass, no nipple discharge, no skin diabetes is well controlled. The patient does not have neuropathy, nephropathy or retinopathy. Continue current meds. Encouraged her to improve her diet and to exercise more.          Relevant Medications    Dapagliflozin Propanediol (FARXIGA) 10 MG Oral Value   08/23/2019 6.4 (H)    No flowsheet data found.     Fasting Blood Sugar (FSB)Annually Glucose (mg/dL)   Date Value   02/02/2019 123 (H)     GLUCOSE (P) (mg/dL)   Date Value   08/18/2016 70          Cardiovascular Disease Screening     LDL Annually LD Medium/high risk factors:   End-stage renal disease   Hemophiliacs who received Factor VIII or IX concentrates   Clients of institutions for the mentally retarded   Persons who live in the same house as a HepB virus carrier   Homosexual men   Illicit injec

## 2019-11-14 NOTE — PATIENT INSTRUCTIONS
Colni Marmolejo's SCREENING SCHEDULE   Tests on this list are recommended by your physician but may not be covered, or covered at this frequency, by your insurer. Please check with your insurance carrier before scheduling to verify coverage.    PREVENT family history    Colorectal Cancer Screening  Covered up to Age 76     Colonoscopy Screen   Covered every 10 years- more often if abnormal Colonoscopy due on 04/04/2028 Update Health Maintenance if applicable    Flex Sigmoidoscopy Screen  Covered every 5 or performed in visit on 11/17/16   • FLU VAC NO PRSV 4 HAIR 3 YRS+    Please get every year    Pneumococcal 13 (Prevnar)  Covered Once after 65 Orders placed or performed in visit on 08/03/17   • PNEUMOCOCCAL VACC, 13 HAIR IM    Please get once after your 6 labs 1 week or so before your next appointment. Please schedule that appointment in February. Fast for 12 hours. Water and meds are okay. Walk in.

## 2019-11-15 NOTE — ASSESSMENT & PLAN NOTE
Unremarkable exam.  Labs were reviewed  Pt is up-to-date on colonoscopy. Immunizations were reviewed and are up to date. Hearing assessment was normal.  Pt is at risk for falls.   Pt was given written information for fall prevention in the after visit sum

## 2019-11-15 NOTE — ASSESSMENT & PLAN NOTE
Pt's diabetes is well controlled. The patient does not have neuropathy, nephropathy or retinopathy. Continue current meds. Encouraged her to improve her diet and to exercise more.

## 2019-11-25 RX ORDER — METFORMIN HYDROCHLORIDE 500 MG/1
TABLET, EXTENDED RELEASE ORAL
Qty: 360 TABLET | Refills: 1 | Status: SHIPPED | OUTPATIENT
Start: 2019-11-25 | End: 2020-05-25

## 2019-11-26 NOTE — TELEPHONE ENCOUNTER
Refill passed per [facility] protocol.   Diabetes Medications  Protocol Criteria:  · Appointment scheduled in the past 6 months or the next 3 months  · A1C < 7.5 in the past 6 months  · Creatinine in the past 12 months  · Creatinine result < 1.5   Rece

## 2019-12-23 ENCOUNTER — HOSPITAL ENCOUNTER (OUTPATIENT)
Dept: MAMMOGRAPHY | Facility: HOSPITAL | Age: 69
Discharge: HOME | End: 2019-12-23
Attending: INTERNAL MEDICINE
Payer: MEDICARE

## 2019-12-23 DIAGNOSIS — Z12.31 VISIT FOR SCREENING MAMMOGRAM: ICD-10-CM

## 2019-12-23 PROCEDURE — 77067 SCR MAMMO BI INCL CAD: CPT | Performed by: INTERNAL MEDICINE

## 2019-12-23 PROCEDURE — 77063 BREAST TOMOSYNTHESIS BI: CPT | Performed by: INTERNAL MEDICINE

## 2020-01-08 DIAGNOSIS — E78.00 HYPERCHOLESTEROLEMIA: ICD-10-CM

## 2020-01-08 RX ORDER — ATORVASTATIN CALCIUM 10 MG/1
TABLET, FILM COATED ORAL
Qty: 90 TABLET | Refills: 1 | Status: SHIPPED | OUTPATIENT
Start: 2020-01-08 | End: 2020-07-06

## 2020-01-09 NOTE — TELEPHONE ENCOUNTER
Refill passed per [facility] protocol.   Cholesterol Medications  Protocol Criteria:  · Appointment scheduled in the past 12 months or in the next 3 months  · ALT & LDL on file in the past 12 months  · ALT result < 80  · LDL result <130   Recent Outpat

## 2020-01-15 ENCOUNTER — OFFICE VISIT (OUTPATIENT)
Dept: INTERNAL MEDICINE CLINIC | Facility: CLINIC | Age: 70
End: 2020-01-15
Payer: MEDICARE

## 2020-01-15 VITALS
BODY MASS INDEX: 20.59 KG/M2 | TEMPERATURE: 97 F | WEIGHT: 139 LBS | SYSTOLIC BLOOD PRESSURE: 110 MMHG | DIASTOLIC BLOOD PRESSURE: 64 MMHG | HEIGHT: 69 IN | HEART RATE: 72 BPM

## 2020-01-15 DIAGNOSIS — H65.92 LEFT NON-SUPPURATIVE OTITIS MEDIA: Primary | ICD-10-CM

## 2020-01-15 PROCEDURE — 99213 OFFICE O/P EST LOW 20 MIN: CPT | Performed by: NURSE PRACTITIONER

## 2020-01-15 RX ORDER — AMOXICILLIN AND CLAVULANATE POTASSIUM 875; 125 MG/1; MG/1
1 TABLET, FILM COATED ORAL 2 TIMES DAILY
Qty: 20 TABLET | Refills: 0 | Status: SHIPPED | OUTPATIENT
Start: 2020-01-15 | End: 2020-01-25

## 2020-01-15 RX ORDER — BENZONATATE 100 MG/1
100 CAPSULE ORAL 3 TIMES DAILY PRN
Qty: 20 CAPSULE | Refills: 0 | Status: SHIPPED | OUTPATIENT
Start: 2020-01-15 | End: 2020-01-22

## 2020-01-15 RX ORDER — SACCHAROMYCES BOULARDII 250 MG
250 CAPSULE ORAL 2 TIMES DAILY
Qty: 14 CAPSULE | Refills: 0 | Status: SHIPPED | OUTPATIENT
Start: 2020-01-15 | End: 2020-01-25

## 2020-01-15 NOTE — ASSESSMENT & PLAN NOTE
A/P-81year-old female who has had cold symptoms for the past 2 weeks. She has postnasal drip, fatigue, intermittent cough, and left ear pain. Left ear pain is 3 out of 10 and the pain is intermittent. She describes the pain as a dull ache.   TM meliza r

## 2020-01-15 NOTE — PROGRESS NOTES
HPI:    Patient ID: Teresita Mata is a 81year old female. HPI Cough and cold symptoms. Patient with fatigue, fever, ear pain, and cough. These symptoms she has had for the past 2 weeks. Left ear pain is 3 out of 10. The pain is intermittent. 90 tablet 1   • Dulaglutide (TRULICITY) 1.5 BB/4.0OW Subcutaneous Solution Pen-injector Inject 1.5 mg into the skin once a week.  6 mL 0   • METFORMIN HCL  MG Oral Tablet 24 Hr TAKE 2 TABLETS BY MOUTH TWICE DAILY WITH MEALS 360 tablet 1   • hydrochlor moist.   Post nasal drip and intermittent congested cough noted. Lungs are clear bilaterally. Eyes: Pupils are equal, round, and reactive to light. Cardiovascular: Normal rate, regular rhythm, normal heart sounds and intact distal pulses.   Exam reveal Florastor 250 mg p.o. twice daily x10 days. 3) Benzonate 100 mg p.o. 3 times daily PRN  4) Patient is to push fluids. Relevant Medications    Amoxicillin-Pot Clavulanate 875-125 MG Oral Tab             Return if symptoms worsen or fail to improve.

## 2020-01-20 ENCOUNTER — LAB ENCOUNTER (OUTPATIENT)
Dept: LAB | Age: 70
End: 2020-01-20
Attending: INTERNAL MEDICINE
Payer: MEDICARE

## 2020-01-20 DIAGNOSIS — E11.8 CONTROLLED DIABETES MELLITUS TYPE 2 WITH COMPLICATIONS, UNSPECIFIED WHETHER LONG TERM INSULIN USE (HCC): ICD-10-CM

## 2020-01-20 DIAGNOSIS — E03.9 ACQUIRED HYPOTHYROIDISM: ICD-10-CM

## 2020-01-20 LAB
ALBUMIN SERPL-MCNC: 4 G/DL (ref 3.4–5)
ALBUMIN/GLOB SERPL: 1.1 {RATIO} (ref 1–2)
ALP LIVER SERPL-CCNC: 65 U/L (ref 55–142)
ALT SERPL-CCNC: 71 U/L (ref 13–56)
ANION GAP SERPL CALC-SCNC: 5 MMOL/L (ref 0–18)
AST SERPL-CCNC: 66 U/L (ref 15–37)
BASOPHILS # BLD AUTO: 0.09 X10(3) UL (ref 0–0.2)
BASOPHILS NFR BLD AUTO: 0.9 %
BILIRUB SERPL-MCNC: 0.5 MG/DL (ref 0.1–2)
BUN BLD-MCNC: 17 MG/DL (ref 7–18)
BUN/CREAT SERPL: 15.7 (ref 10–20)
CALCIUM BLD-MCNC: 10.1 MG/DL (ref 8.5–10.1)
CHLORIDE SERPL-SCNC: 105 MMOL/L (ref 98–112)
CHOLEST SMN-MCNC: 150 MG/DL (ref ?–200)
CO2 SERPL-SCNC: 30 MMOL/L (ref 21–32)
CREAT BLD-MCNC: 1.08 MG/DL (ref 0.55–1.02)
CREAT UR-SCNC: 259 MG/DL
DEPRECATED RDW RBC AUTO: 48.1 FL (ref 35.1–46.3)
EOSINOPHIL # BLD AUTO: 0.25 X10(3) UL (ref 0–0.7)
EOSINOPHIL NFR BLD AUTO: 2.4 %
ERYTHROCYTE [DISTWIDTH] IN BLOOD BY AUTOMATED COUNT: 15.2 % (ref 11–15)
EST. AVERAGE GLUCOSE BLD GHB EST-MCNC: 134 MG/DL (ref 68–126)
GLOBULIN PLAS-MCNC: 3.7 G/DL (ref 2.8–4.4)
GLUCOSE BLD-MCNC: 121 MG/DL (ref 70–99)
HBA1C MFR BLD HPLC: 6.3 % (ref ?–5.7)
HCT VFR BLD AUTO: 41.1 % (ref 35–48)
HDLC SERPL-MCNC: 61 MG/DL (ref 40–59)
HGB BLD-MCNC: 12.8 G/DL (ref 12–16)
IMM GRANULOCYTES # BLD AUTO: 0.03 X10(3) UL (ref 0–1)
IMM GRANULOCYTES NFR BLD: 0.3 %
LDLC SERPL CALC-MCNC: 48 MG/DL (ref ?–100)
LYMPHOCYTES # BLD AUTO: 2.81 X10(3) UL (ref 1–4)
LYMPHOCYTES NFR BLD AUTO: 26.6 %
M PROTEIN MFR SERPL ELPH: 7.7 G/DL (ref 6.4–8.2)
MCH RBC QN AUTO: 26.8 PG (ref 26–34)
MCHC RBC AUTO-ENTMCNC: 31.1 G/DL (ref 31–37)
MCV RBC AUTO: 86.2 FL (ref 80–100)
MICROALBUMIN UR-MCNC: 5.7 MG/DL
MICROALBUMIN/CREAT 24H UR-RTO: 22 UG/MG (ref ?–30)
MONOCYTES # BLD AUTO: 0.73 X10(3) UL (ref 0.1–1)
MONOCYTES NFR BLD AUTO: 6.9 %
NEUTROPHILS # BLD AUTO: 6.65 X10 (3) UL (ref 1.5–7.7)
NEUTROPHILS # BLD AUTO: 6.65 X10(3) UL (ref 1.5–7.7)
NEUTROPHILS NFR BLD AUTO: 62.9 %
NONHDLC SERPL-MCNC: 89 MG/DL (ref ?–130)
OSMOLALITY SERPL CALC.SUM OF ELEC: 293 MOSM/KG (ref 275–295)
PATIENT FASTING Y/N/NP: YES
PATIENT FASTING Y/N/NP: YES
PLATELET # BLD AUTO: 349 10(3)UL (ref 150–450)
POTASSIUM SERPL-SCNC: 3.4 MMOL/L (ref 3.5–5.1)
RBC # BLD AUTO: 4.77 X10(6)UL (ref 3.8–5.3)
SODIUM SERPL-SCNC: 140 MMOL/L (ref 136–145)
TRIGL SERPL-MCNC: 204 MG/DL (ref 30–149)
TSI SER-ACNC: 2.09 MIU/ML (ref 0.36–3.74)
VIT B12 SERPL-MCNC: 558 PG/ML (ref 193–986)
VLDLC SERPL CALC-MCNC: 41 MG/DL (ref 0–30)
WBC # BLD AUTO: 10.6 X10(3) UL (ref 4–11)

## 2020-01-20 PROCEDURE — 83036 HEMOGLOBIN GLYCOSYLATED A1C: CPT

## 2020-01-20 PROCEDURE — 84443 ASSAY THYROID STIM HORMONE: CPT

## 2020-01-20 PROCEDURE — 85025 COMPLETE CBC W/AUTO DIFF WBC: CPT

## 2020-01-20 PROCEDURE — 80061 LIPID PANEL: CPT

## 2020-01-20 PROCEDURE — 82607 VITAMIN B-12: CPT

## 2020-01-20 PROCEDURE — 82043 UR ALBUMIN QUANTITATIVE: CPT

## 2020-01-20 PROCEDURE — 36415 COLL VENOUS BLD VENIPUNCTURE: CPT

## 2020-01-20 PROCEDURE — 80053 COMPREHEN METABOLIC PANEL: CPT

## 2020-01-20 PROCEDURE — 82570 ASSAY OF URINE CREATININE: CPT

## 2020-01-23 DIAGNOSIS — E03.9 ACQUIRED HYPOTHYROIDISM: ICD-10-CM

## 2020-01-24 ENCOUNTER — OFFICE VISIT (OUTPATIENT)
Dept: ENDOCRINOLOGY CLINIC | Facility: CLINIC | Age: 70
End: 2020-01-24
Payer: MEDICARE

## 2020-01-24 VITALS
BODY MASS INDEX: 21 KG/M2 | HEART RATE: 82 BPM | SYSTOLIC BLOOD PRESSURE: 119 MMHG | DIASTOLIC BLOOD PRESSURE: 65 MMHG | WEIGHT: 143.38 LBS

## 2020-01-24 DIAGNOSIS — E78.5 DYSLIPIDEMIA: ICD-10-CM

## 2020-01-24 DIAGNOSIS — E11.9 TYPE 2 DIABETES MELLITUS WITHOUT COMPLICATION, WITHOUT LONG-TERM CURRENT USE OF INSULIN (HCC): Primary | ICD-10-CM

## 2020-01-24 LAB
GLUCOSE BLOOD: 146
TEST STRIP LOT #: NORMAL NUMERIC

## 2020-01-24 PROCEDURE — 99213 OFFICE O/P EST LOW 20 MIN: CPT | Performed by: INTERNAL MEDICINE

## 2020-01-24 PROCEDURE — 36416 COLLJ CAPILLARY BLOOD SPEC: CPT | Performed by: INTERNAL MEDICINE

## 2020-01-24 PROCEDURE — 82962 GLUCOSE BLOOD TEST: CPT | Performed by: INTERNAL MEDICINE

## 2020-01-24 RX ORDER — LEVOTHYROXINE SODIUM 88 UG/1
TABLET ORAL
Qty: 90 TABLET | Refills: 1 | Status: SHIPPED | OUTPATIENT
Start: 2020-01-24 | End: 2020-07-11

## 2020-01-24 NOTE — TELEPHONE ENCOUNTER
Refill passed per [facility] protocol.   Hypothyroid Medications  Protocol Criteria:  Appointment scheduled in the past 12 months or the next 3 months  TSH resulted in the past 12 months that is normal  Recent Outpatient Visits            1 week ago Left non-suppurative otitis media    [address] [facility], [address], [place], Merlos Matter, APRN    Office Visit    2 months ago Encounter for [employer] annual wellness exam    [address], Shefali Alvarez MD    Office Visit    4 months ago Controlled type 2 diabetes mellitus without complication, without long-term current use of insulin [facility])    [address] [facility] Endocrinology Yamilet Dunn MD    Office Visit    6 months ago [address] [facility] for [facility] Audiology Rose Prasad    Office Visit    6 months ago [address] [facility] for [place], Akua Hernandez MD    Office Visit        Future Appointments       Provider Department Appt Notes    In 1 week Sunshine Benavides MD [address] [facility], [address] 3 month    In 2 months Jasiel Shelby MD [facility], [address] annual    In 5 months Yamilet Dunn, [address] Endocrinology f/u        Lab Results   Component Value Date    TSH 2.090 01/20/2020 Ambulance

## 2020-01-24 NOTE — PROGRESS NOTES
Return Office Visit     CHIEF COMPLAINT:    Type 2 DM  Dyslipidemia     HISTORY OF PRESENT ILLNESS:  Aleisha Foster is a 81year old female who presents for follow up for Type 2 DM.      DM HISTORY  Diagnosed: about 10 years ago        HISTORY OF Alexey Espana TAKE 1 TABLET(4 MG) BY MOUTH TWICE DAILY 180 tablet 1   • PANTOPRAZOLE SODIUM 40 MG Oral Tab EC TAKE 1 TABLET(40 MG) BY MOUTH EVERY DAY 90 tablet 1   • MATZIM  MG Oral Tablet 24 Hr TAKE 1 TABLET(360 MG) BY MOUTH DAILY 90 tablet 1   • LEVOTHYROXINE SO PHYSICAL EXAM:    01/24/20  0825   BP: 119/65   Pulse: 82   Weight: 143 lb 6.4 oz (65 kg)     BMI: Body mass index is 21.18 kg/m².      CONSTITUTIONAL:  awake, alert, cooperative, no apparent distress, and appears stated age  PSYCH: normal affect  EYE explained in great detail, to get log and glucometer on next visit. f). Life style changes discussed  g). Hypoglycemia recognition and management discussed     2. Patient’s BP is normal  today  3.  Dyslipidemia  A) Discussed lifestyle modifications includi

## 2020-02-06 ENCOUNTER — OFFICE VISIT (OUTPATIENT)
Dept: INTERNAL MEDICINE CLINIC | Facility: CLINIC | Age: 70
End: 2020-02-06
Payer: MEDICARE

## 2020-02-06 VITALS
DIASTOLIC BLOOD PRESSURE: 73 MMHG | SYSTOLIC BLOOD PRESSURE: 128 MMHG | BODY MASS INDEX: 21.31 KG/M2 | RESPIRATION RATE: 20 BRPM | TEMPERATURE: 98 F | WEIGHT: 143.88 LBS | HEART RATE: 74 BPM | HEIGHT: 69 IN

## 2020-02-06 DIAGNOSIS — I10 ESSENTIAL HYPERTENSION: Primary | ICD-10-CM

## 2020-02-06 DIAGNOSIS — E78.00 HYPERCHOLESTEROLEMIA: ICD-10-CM

## 2020-02-06 DIAGNOSIS — K21.9 GASTROESOPHAGEAL REFLUX DISEASE, ESOPHAGITIS PRESENCE NOT SPECIFIED: ICD-10-CM

## 2020-02-06 PROBLEM — H65.92 LEFT NON-SUPPURATIVE OTITIS MEDIA: Status: RESOLVED | Noted: 2020-01-15 | Resolved: 2020-02-06

## 2020-02-06 PROBLEM — N18.30 CKD (CHRONIC KIDNEY DISEASE) STAGE 3, GFR 30-59 ML/MIN (HCC): Chronic | Status: ACTIVE | Noted: 2020-02-06

## 2020-02-06 PROCEDURE — 99214 OFFICE O/P EST MOD 30 MIN: CPT | Performed by: INTERNAL MEDICINE

## 2020-02-06 RX ORDER — PANTOPRAZOLE SODIUM 20 MG/1
20 TABLET, DELAYED RELEASE ORAL
Qty: 90 TABLET | Refills: 1 | Status: SHIPPED | OUTPATIENT
Start: 2020-02-06 | End: 2020-05-04

## 2020-02-06 NOTE — ASSESSMENT & PLAN NOTE
Reviewed recent labs.   Her lipid control has improved with atorvastatin.  CPM. TRANSFER - IN REPORT:    Verbal report received from [facility] RN(name) on Heladio Hdez  being received from ED(unit) for routine progression of care      Report consisted of patients Situation, Background, Assessment and   Recommendations(SBAR). Information from the following report(s) SBAR, ED Summary, [place] and Recent Results was reviewed with the receiving nurse. Opportunity for questions and clarification was provided. Assessment completed upon patients arrival to unit and care assumed.      America Chatman RN

## 2020-02-06 NOTE — PROGRESS NOTES
HPI:    Patient ID: Alex Almaraz is a 81year old female. Her diabetes has been good. Her potassium was slightly low. She is eating oranges. Her cholesterol is better on atorvastatin. She takes pantoprazole 40 mg daily for heartburn.     Hyperl WITH MEALS 360 tablet 1   • hydrochlorothiazide 12.5 MG Oral Tab Take 1 tablet (12.5 mg total) by mouth once daily.  90 tablet 1   • Trandolapril 4 MG Oral Tab TAKE 2 TABLETS(8 MG) BY MOUTH EVERY  tablet 1   • dilTIAZem HCl ER Beads 360 MG Oral Capsu 5' 9\" (1.753 m)   Wt 143 lb 14.4 oz (65.3 kg)   LMP  (LMP Unknown)   Breastfeeding No   BMI 21.25 kg/m²   PHYSICAL EXAM:   Physical Exam   Nursing note and vitals reviewed. Constitutional: She is oriented to person, place, and time.  She appears well-dev

## 2020-02-06 NOTE — ASSESSMENT & PLAN NOTE
Patient has been taking 40 mg of pantoprazole. She is willing to try to reduce it to 20 mg. She will call if she has heartburn on the lower dose.   Follow-up in  6 months for her Medicare physical.

## 2020-02-21 ENCOUNTER — PATIENT MESSAGE (OUTPATIENT)
Dept: ENDOCRINOLOGY CLINIC | Facility: CLINIC | Age: 70
End: 2020-02-21

## 2020-02-21 RX ORDER — FLUCONAZOLE 150 MG/1
150 TABLET ORAL ONCE
Qty: 1 TABLET | Refills: 0 | Status: SHIPPED | OUTPATIENT
Start: 2020-02-21 | End: 2020-02-21

## 2020-02-21 NOTE — TELEPHONE ENCOUNTER
Stop jardiance  One time dose of fluconazole 150 mg   If no improvement call next week  If worsening call right away  Check BG and send log in one week  Thanks

## 2020-02-21 NOTE — TELEPHONE ENCOUNTER
From: Yue Records  To: Rayo Villalobos MD  Sent: 2/21/2020 3:15 PM CST  Subject: Prescription Question    Good Afternoon,  I have been on jardiance since 1/24/20 and have been experiencing yeast infections.  I have tried the over the counter remedie

## 2020-03-02 DIAGNOSIS — I10 ESSENTIAL HYPERTENSION WITH GOAL BLOOD PRESSURE LESS THAN 130/85: ICD-10-CM

## 2020-03-04 NOTE — TELEPHONE ENCOUNTER
Patient sent my chart message  Replied but she wants to be called on [phone] with reply  Please call with ollowing message    Hello,     Thanks for the update. I reviewed your sugars, they look okay, some are on the higher side though.    Please confir

## 2020-03-05 NOTE — TELEPHONE ENCOUNTER
Brandon COHN (in absence of Dr. Ramiro Ojeda)    Per patient her [place] Dural was switched to Jardiance due to cost, however, Katie Hooper gave her yeast infection so it was stopped. Please see encounter 03/02/20 in regards to her readings.     Current regimen:   - metf

## 2020-03-05 NOTE — TELEPHONE ENCOUNTER
Hello,     Please inform patient that I am sorry to hear that Michelle Landaverde has had a yeast infection.  In these type of situations we typically ask our patients to notify us right away without stopping the medicine (Jardiance) and we treat the yeast infection whi

## 2020-03-10 ENCOUNTER — TELEPHONE (OUTPATIENT)
Dept: ENDOCRINOLOGY CLINIC | Facility: CLINIC | Age: 70
End: 2020-03-10

## 2020-03-10 NOTE — TELEPHONE ENCOUNTER
Patient calling for update: yeast infection resolved.   Blood sugars for patient :  States sometimes she doesn't check in afternoon: bs are before meals    3/3  Am 190    Pm; 133  3/4   Am 181  3/5   165       3/6  155    Pm 150  3/7  170  3/8  145  3

## 2020-03-10 NOTE — TELEPHONE ENCOUNTER
Thank you for the clarification. Since patient's blood sugars are slightly above goal but not very high, I would not recommend adding another medicine at this time.    Lets try to work on diet and exercise a little bit more and have patient come in in 4 w

## 2020-03-11 RX ORDER — FLUCONAZOLE 150 MG/1
150 TABLET ORAL ONCE
Qty: 1 TABLET | Refills: 0 | OUTPATIENT
Start: 2020-03-11 | End: 2020-03-11

## 2020-03-11 NOTE — TELEPHONE ENCOUNTER
Called patient and gave her message by Missy Flower , patient agrees with plan and made kajal with [facility] for April 9 at 130pm

## 2020-03-18 NOTE — TELEPHONE ENCOUNTER
Detail Level: Detailed Please call the patient:     - Yeast infection resolved completely? No symptoms?     - Is she still taking the jardiance? - How are her BG?      Thanks

## 2020-04-02 ENCOUNTER — TELEPHONE (OUTPATIENT)
Dept: OPTOMETRY | Facility: CLINIC | Age: 70
End: 2020-04-02

## 2020-04-06 ENCOUNTER — PATIENT MESSAGE (OUTPATIENT)
Dept: ENDOCRINOLOGY CLINIC | Facility: CLINIC | Age: 70
End: 2020-04-06

## 2020-04-06 NOTE — TELEPHONE ENCOUNTER
From: Amol Way  To:  Nahun Flynn MD  Sent: 4/6/2020 11:29 AM CDT  Subject: Non-Urgent Medical Question    Good Morning  I have an apt with your nurse on April 9 for a A1C test as I have not been taking the jardiance for about a month now due

## 2020-04-07 NOTE — TELEPHONE ENCOUNTER
Patient replied in a separate message stating ok for Telehealth visit and provided phone number [phone]. Dr. Ramiro Ojeda, please advise - 4/9 is a Thursday, move her to 4/10 if possible or Wednesday 4/8?

## 2020-04-10 ENCOUNTER — VIRTUAL PHONE E/M (OUTPATIENT)
Dept: ENDOCRINOLOGY CLINIC | Facility: CLINIC | Age: 70
End: 2020-04-10
Payer: MEDICARE

## 2020-04-10 DIAGNOSIS — E78.5 DYSLIPIDEMIA: ICD-10-CM

## 2020-04-10 DIAGNOSIS — E11.69 TYPE 2 DIABETES MELLITUS WITH OTHER SPECIFIED COMPLICATION, UNSPECIFIED WHETHER LONG TERM INSULIN USE (HCC): Primary | ICD-10-CM

## 2020-04-10 PROCEDURE — 99441 PHONE E/M BY PHYS 5-10 MIN: CPT | Performed by: INTERNAL MEDICINE

## 2020-04-10 NOTE — PROGRESS NOTES
Virtual Telephone Check-In    Amol Way verbally consents to Virtual/Telephone Check-In visit on 04/10/20. Patient understands and accepts financial responsibility for any deductible, co-insurance and/or co-pays associated with this service. distress  Neurological/ Psychiatric:  Alert and oriented to person, place and time. Normal affect  Pulmonary:  Speaking in full sentences         PLAN:     1. Type 2 DM: Controlled. Plan:  Discussed the pathogenesis, natural course of diabetes.  Regulo Brito

## 2020-05-04 DIAGNOSIS — K21.9 GASTROESOPHAGEAL REFLUX DISEASE, ESOPHAGITIS PRESENCE NOT SPECIFIED: ICD-10-CM

## 2020-05-04 RX ORDER — PANTOPRAZOLE SODIUM 20 MG/1
TABLET, DELAYED RELEASE ORAL
Qty: 90 TABLET | Refills: 1 | Status: SHIPPED | OUTPATIENT
Start: 2020-05-04 | End: 2020-09-03

## 2020-05-04 RX ORDER — DULAGLUTIDE 1.5 MG/.5ML
INJECTION, SOLUTION SUBCUTANEOUS
Qty: 6 ML | Refills: 1 | Status: SHIPPED | OUTPATIENT
Start: 2020-05-04 | End: 2020-12-23

## 2020-05-09 DIAGNOSIS — I10 ESSENTIAL HYPERTENSION WITH GOAL BLOOD PRESSURE LESS THAN 130/85: ICD-10-CM

## 2020-05-09 RX ORDER — DILTIAZEM HYDROCHLORIDE 360 MG/1
CAPSULE, EXTENDED RELEASE ORAL
Qty: 90 CAPSULE | Refills: 1 | Status: SHIPPED | OUTPATIENT
Start: 2020-05-09 | End: 2020-11-07

## 2020-05-09 RX ORDER — TRANDOLAPRIL 4 MG/1
TABLET ORAL
Qty: 180 TABLET | Refills: 1 | Status: SHIPPED | OUTPATIENT
Start: 2020-05-09 | End: 2020-11-07

## 2020-05-09 RX ORDER — HYDROCHLOROTHIAZIDE 12.5 MG/1
TABLET ORAL
Qty: 90 TABLET | Refills: 1 | Status: SHIPPED | OUTPATIENT
Start: 2020-05-09 | End: 2020-11-07

## 2020-05-25 RX ORDER — METFORMIN HYDROCHLORIDE 500 MG/1
TABLET, EXTENDED RELEASE ORAL
Qty: 360 TABLET | Refills: 1 | Status: SHIPPED | OUTPATIENT
Start: 2020-05-25 | End: 2020-11-23

## 2020-06-26 ENCOUNTER — OFFICE VISIT (OUTPATIENT)
Dept: ENDOCRINOLOGY CLINIC | Facility: CLINIC | Age: 70
End: 2020-06-26
Payer: MEDICARE

## 2020-06-26 VITALS
HEIGHT: 69 IN | DIASTOLIC BLOOD PRESSURE: 70 MMHG | WEIGHT: 141.81 LBS | HEART RATE: 71 BPM | BODY MASS INDEX: 21 KG/M2 | SYSTOLIC BLOOD PRESSURE: 129 MMHG

## 2020-06-26 DIAGNOSIS — E11.69 TYPE 2 DIABETES MELLITUS WITH OTHER SPECIFIED COMPLICATION, UNSPECIFIED WHETHER LONG TERM INSULIN USE (HCC): Primary | ICD-10-CM

## 2020-06-26 PROCEDURE — 82962 GLUCOSE BLOOD TEST: CPT | Performed by: INTERNAL MEDICINE

## 2020-06-26 PROCEDURE — 36416 COLLJ CAPILLARY BLOOD SPEC: CPT | Performed by: INTERNAL MEDICINE

## 2020-06-26 PROCEDURE — 99213 OFFICE O/P EST LOW 20 MIN: CPT | Performed by: INTERNAL MEDICINE

## 2020-06-26 PROCEDURE — 83036 HEMOGLOBIN GLYCOSYLATED A1C: CPT | Performed by: INTERNAL MEDICINE

## 2020-06-26 NOTE — PROGRESS NOTES
Return Office Visit     CHIEF COMPLAINT:    Type 2 DM  Dyslipidemia     HISTORY OF PRESENT ILLNESS:  Mati Mccloud is a 78year old female who presents for follow up for Type 2 DM.      DM HISTORY  Diagnosed: about 10 years ago        HISTORY OF Darius Cummings 24 Hr TAKE 1 TABLET(360 MG) BY MOUTH DAILY 90 tablet 1   • Meclizine HCl 25 MG Oral Tab Take 1 tablet (25 mg total) by mouth 3 (three) times daily as needed for Dizziness. 40 tablet 2   • Omega-3-acid Ethyl Esters (LOVAZA) 1 g Oral Cap Take 2 g by mouth. No proptosis, no ptosis, conjunctiva normal  ENT:  Normocephalic, atraumatic  NECK:  Supple, symmetrical, trachea midline, no adenopathy, thyroid symmetric, not enlarged and no tenderness  LUNGS: clear to auscultation bilaterally, no crackles or wheezing effects of statins including muscle and liver injury.     RTC in 5-6 months  Call with BG as instructed.       Orders Placed This Encounter      POC Glycohemoglobin [[id number]]      POC Finger stick glucose Sadie Epps MD

## 2020-07-03 ENCOUNTER — OFFICE VISIT (OUTPATIENT)
Dept: OBGYN CLINIC | Facility: CLINIC | Age: 70
End: 2020-07-03
Payer: MEDICARE

## 2020-07-03 VITALS
HEART RATE: 79 BPM | SYSTOLIC BLOOD PRESSURE: 129 MMHG | WEIGHT: 143 LBS | DIASTOLIC BLOOD PRESSURE: 84 MMHG | BODY MASS INDEX: 21 KG/M2

## 2020-07-03 DIAGNOSIS — Z78.0 MENOPAUSE: ICD-10-CM

## 2020-07-03 DIAGNOSIS — Z01.419 ENCOUNTER FOR GYNECOLOGICAL EXAMINATION WITHOUT ABNORMAL FINDING: Primary | ICD-10-CM

## 2020-07-03 DIAGNOSIS — Z12.4 SCREENING FOR MALIGNANT NEOPLASM OF CERVIX: ICD-10-CM

## 2020-07-03 DIAGNOSIS — Z12.31 VISIT FOR SCREENING MAMMOGRAM: ICD-10-CM

## 2020-07-03 PROCEDURE — G0101 CA SCREEN;PELVIC/BREAST EXAM: HCPCS | Performed by: OBSTETRICS & GYNECOLOGY

## 2020-07-05 DIAGNOSIS — E11.9 CONTROLLED TYPE 2 DIABETES MELLITUS WITHOUT COMPLICATION, WITHOUT LONG-TERM CURRENT USE OF INSULIN (HCC): ICD-10-CM

## 2020-07-05 DIAGNOSIS — E78.00 HYPERCHOLESTEROLEMIA: ICD-10-CM

## 2020-07-06 LAB — HPV I/H RISK 1 DNA SPEC QL NAA+PROBE: NEGATIVE

## 2020-07-06 RX ORDER — GLIMEPIRIDE 4 MG/1
TABLET ORAL
Qty: 180 TABLET | Refills: 0 | Status: SHIPPED | OUTPATIENT
Start: 2020-07-06 | End: 2020-10-02

## 2020-07-06 RX ORDER — ATORVASTATIN CALCIUM 10 MG/1
TABLET, FILM COATED ORAL
Qty: 90 TABLET | Refills: 0 | Status: SHIPPED | OUTPATIENT
Start: 2020-07-06 | End: 2020-10-02

## 2020-07-06 NOTE — PROGRESS NOTES
Phillip Lynne is a 78year old female  No LMP recorded (lmp unknown). Patient is postmenopausal. Patient presents with:  Gyn Exam: Annual. No  bleed.   .    OBSTETRICS HISTORY:  OB History    Para Term  AB Living   2 1 1   1 1 SOCIAL HISTORY:  Social History    Socioeconomic History      Marital status:       Spouse name: Not on file      Number of children: Not on file      Years of education: Not on file      Highest education level: Not on file    Occupational Hist Self-Exams: Not Asked    Social History Narrative      Not on file      FAMILY HISTORY:  Family History   Problem Relation Age of Onset   • Cancer Mother         OVARIAN AND COLON   • Diabetes Mother    • Ovarian Cancer Mother 72   • Cancer Maternal Grandm Oral Cap, Take 1,000 mg by mouth daily. , Disp: , Rfl:   •  aspirin 81 MG Oral Tab, Take  by mouth.  take 1 tablet (81MG)  by oral route  every day, Disp: , Rfl:   •  MATZIM  MG Oral Tablet 24 Hr, TAKE 1 TABLET(360 MG) BY MOUTH DAILY, Disp: 90 tablet without lesions and prolapse  Bladder:    no fullness, masses or tenderness  Vagina:    normal appearance without lesions, no abnormal discharge  Cervix:    normal without tenderness on motion  Uterus:    normal in size, contour, position, mobility, withou

## 2020-07-11 DIAGNOSIS — E03.9 ACQUIRED HYPOTHYROIDISM: ICD-10-CM

## 2020-07-11 RX ORDER — LEVOTHYROXINE SODIUM 88 UG/1
TABLET ORAL
Qty: 90 TABLET | Refills: 0 | Status: SHIPPED | OUTPATIENT
Start: 2020-07-11 | End: 2020-10-07

## 2020-07-13 ENCOUNTER — TELEPHONE (OUTPATIENT)
Dept: INTERNAL MEDICINE CLINIC | Facility: CLINIC | Age: 70
End: 2020-07-13

## 2020-07-13 NOTE — PROGRESS NOTES
It was good seeing you in my office. Your recent pap was completely normal with negative HPV testing. No more paps needed. Have a good day.

## 2020-07-14 NOTE — TELEPHONE ENCOUNTER
1st call - left message to patient voice mail.
Call Center please address the message below.    Thank you      PCP is       ----- Message -----   From: Federico Antoine   Sent: 7/10/2020   7:25 PM CDT   To: Rory Clement Customer Response Pool   Subject: Annual physical.                          
Pt calling back. Confirmed with pt last Px was in November 2019.   Pt transferred to [facility] to change OV to f/u and will reschedule [address] [place] [address]
no

## 2020-07-16 NOTE — TELEPHONE ENCOUNTER
Diabetes Medications:Refilled per protocol    Protocol Criteria:  · Appointment scheduled in the past 6 months or the next 3 months  · A1C < 7.5 in the past 6 months  · Creatinine in the past 12 months  · Creatinine result < 1.5   Recent Outpatient Visits n/a

## 2020-08-03 ENCOUNTER — APPOINTMENT (OUTPATIENT)
Dept: LAB | Age: 70
End: 2020-08-03
Attending: INTERNAL MEDICINE
Payer: MEDICARE

## 2020-08-03 DIAGNOSIS — E11.69 TYPE 2 DIABETES MELLITUS WITH OTHER SPECIFIED COMPLICATION, UNSPECIFIED WHETHER LONG TERM INSULIN USE (HCC): ICD-10-CM

## 2020-08-03 DIAGNOSIS — E78.5 DYSLIPIDEMIA: ICD-10-CM

## 2020-08-03 LAB
ANION GAP SERPL CALC-SCNC: 7 MMOL/L (ref 0–18)
BUN BLD-MCNC: 13 MG/DL (ref 7–18)
BUN/CREAT SERPL: 14 (ref 10–20)
CALCIUM BLD-MCNC: 10.2 MG/DL (ref 8.5–10.1)
CHLORIDE SERPL-SCNC: 104 MMOL/L (ref 98–112)
CO2 SERPL-SCNC: 29 MMOL/L (ref 21–32)
CREAT BLD-MCNC: 0.93 MG/DL (ref 0.55–1.02)
GLUCOSE BLD-MCNC: 145 MG/DL (ref 70–99)
OSMOLALITY SERPL CALC.SUM OF ELEC: 293 MOSM/KG (ref 275–295)
PATIENT FASTING Y/N/NP: YES
POTASSIUM SERPL-SCNC: 3.9 MMOL/L (ref 3.5–5.1)
SODIUM SERPL-SCNC: 140 MMOL/L (ref 136–145)

## 2020-08-03 PROCEDURE — 80048 BASIC METABOLIC PNL TOTAL CA: CPT

## 2020-08-03 PROCEDURE — 36415 COLL VENOUS BLD VENIPUNCTURE: CPT

## 2020-08-05 ENCOUNTER — TELEPHONE (OUTPATIENT)
Dept: ENDOCRINOLOGY CLINIC | Facility: CLINIC | Age: 70
End: 2020-08-05

## 2020-08-05 DIAGNOSIS — E11.69 TYPE 2 DIABETES MELLITUS WITH OTHER SPECIFIED COMPLICATION, UNSPECIFIED WHETHER LONG TERM INSULIN USE (HCC): Primary | ICD-10-CM

## 2020-08-05 DIAGNOSIS — E03.9 ACQUIRED HYPOTHYROIDISM: ICD-10-CM

## 2020-08-05 NOTE — TELEPHONE ENCOUNTER
Noted slightly high calcium on labs  Prior labs showed high calcium a few years ago, but recent ones were normal/ high normal  Suggest repeating labs in 2 months  CMP  PTH  25 OH vit D    Also, if she is on any calcium supplements, please stop  Please revi

## 2020-08-06 ENCOUNTER — OFFICE VISIT (OUTPATIENT)
Dept: INTERNAL MEDICINE CLINIC | Facility: CLINIC | Age: 70
End: 2020-08-06
Payer: MEDICARE

## 2020-08-06 VITALS
DIASTOLIC BLOOD PRESSURE: 68 MMHG | BODY MASS INDEX: 20.88 KG/M2 | HEIGHT: 69 IN | HEART RATE: 85 BPM | WEIGHT: 141 LBS | RESPIRATION RATE: 16 BRPM | SYSTOLIC BLOOD PRESSURE: 135 MMHG

## 2020-08-06 DIAGNOSIS — N18.30 CKD (CHRONIC KIDNEY DISEASE) STAGE 3, GFR 30-59 ML/MIN (HCC): ICD-10-CM

## 2020-08-06 DIAGNOSIS — E78.00 HYPERCHOLESTEROLEMIA: ICD-10-CM

## 2020-08-06 DIAGNOSIS — K21.9 GASTROESOPHAGEAL REFLUX DISEASE, ESOPHAGITIS PRESENCE NOT SPECIFIED: Primary | ICD-10-CM

## 2020-08-06 DIAGNOSIS — E03.9 ACQUIRED HYPOTHYROIDISM: ICD-10-CM

## 2020-08-06 DIAGNOSIS — I10 ESSENTIAL HYPERTENSION: ICD-10-CM

## 2020-08-06 PROCEDURE — 3008F BODY MASS INDEX DOCD: CPT | Performed by: INTERNAL MEDICINE

## 2020-08-06 PROCEDURE — 3075F SYST BP GE 130 - 139MM HG: CPT | Performed by: INTERNAL MEDICINE

## 2020-08-06 PROCEDURE — 3078F DIAST BP <80 MM HG: CPT | Performed by: INTERNAL MEDICINE

## 2020-08-06 PROCEDURE — 99214 OFFICE O/P EST MOD 30 MIN: CPT | Performed by: INTERNAL MEDICINE

## 2020-08-06 NOTE — TELEPHONE ENCOUNTER
rn called patient with dr Seema Fields recommendation to do dexa scan . Patient was going to wait until December to get done but will make kajal soon  Central Scheduling at [phone] number provided.

## 2020-08-06 NOTE — PROGRESS NOTES
HPI:    Patient ID: Kaye Soto is a 78year old female. Her ear still bothers her sometimes. It comes and goes. She takes tylenol.   Pt was supposed to have a physical, but she [facility] to [facility] and was told it had to be 365 days since her last Medications   Medication Sig Dispense Refill   • LEVOTHYROXINE SODIUM 88 MCG Oral Tab TAKE 1 TABLET BY MOUTH EVERY MORNING BEFORE BREAKFAST 90 tablet 0   • GLIMEPIRIDE 4 MG Oral Tab TAKE 1 TABLET(4 MG) BY MOUTH TWICE DAILY 180 tablet 0   • ATORVASTATIN 10 Maternal Grandmother         BREAST CANCER,   • Breast Cancer Maternal Grandmother 48   • Breast Cancer Sister 47   • Cataracts Sister    • Heart Surgery Father         carotid artery surgery    • Hypertension Father    • Anemia Sister    • Glaucoma Neg will continue to monitor. The patient expressed understanding and agreed with the plan.     Meds This Visit:  Requested Prescriptions      No prescriptions requested or ordered in this encounter

## 2020-08-06 NOTE — TELEPHONE ENCOUNTER
rn called patient with message by dr Christiano Pierre patient verbalized understanding of instructions and states she was taking calcium citrate 40 mg  with Vit D 200 u. Daily. By her pcp for bone density. States she will stop it.   And inform pcp today as she has

## 2020-08-06 NOTE — TELEPHONE ENCOUNTER
Noted  One more thing, her last DXA was in 2017, a new one has been ordered by Dr. Newton Wang.    She should please complete that since the one in 2017 showed osteopenia  Thanks

## 2020-08-10 DIAGNOSIS — R42 DIZZINESS: ICD-10-CM

## 2020-08-11 RX ORDER — MECLIZINE HYDROCHLORIDE 25 MG/1
TABLET ORAL
Qty: 40 TABLET | Refills: 2 | Status: SHIPPED | OUTPATIENT
Start: 2020-08-11

## 2020-08-20 ENCOUNTER — HOSPITAL ENCOUNTER (OUTPATIENT)
Dept: BONE DENSITY | Facility: HOSPITAL | Age: 70
Discharge: HOME | End: 2020-08-20
Attending: OBSTETRICS & GYNECOLOGY
Payer: MEDICARE

## 2020-08-20 DIAGNOSIS — Z78.0 MENOPAUSE: ICD-10-CM

## 2020-08-20 PROCEDURE — 77080 DXA BONE DENSITY AXIAL: CPT | Performed by: OBSTETRICS & GYNECOLOGY

## 2020-08-28 ENCOUNTER — OFFICE VISIT (OUTPATIENT)
Dept: OPTOMETRY | Facility: CLINIC | Age: 70
End: 2020-08-28
Payer: MEDICARE

## 2020-08-28 DIAGNOSIS — H52.203 MYOPIA WITH ASTIGMATISM AND PRESBYOPIA, BILATERAL: ICD-10-CM

## 2020-08-28 DIAGNOSIS — H25.13 AGE-RELATED NUCLEAR CATARACT OF BOTH EYES: ICD-10-CM

## 2020-08-28 DIAGNOSIS — H52.13 MYOPIA WITH ASTIGMATISM AND PRESBYOPIA, BILATERAL: ICD-10-CM

## 2020-08-28 DIAGNOSIS — H52.4 MYOPIA WITH ASTIGMATISM AND PRESBYOPIA, BILATERAL: ICD-10-CM

## 2020-08-28 DIAGNOSIS — E11.9 CONTROLLED TYPE 2 DIABETES MELLITUS WITHOUT COMPLICATION, WITHOUT LONG-TERM CURRENT USE OF INSULIN (HCC): Primary | ICD-10-CM

## 2020-08-28 PROCEDURE — 92015 DETERMINE REFRACTIVE STATE: CPT | Performed by: OPTOMETRIST

## 2020-08-28 PROCEDURE — 92014 COMPRE OPH EXAM EST PT 1/>: CPT | Performed by: OPTOMETRIST

## 2020-08-28 NOTE — PROGRESS NOTES
Shiva Fuentes is a 78year old female. HPI:     HPI     Diabetic Eye Exam     Diabetes characteristics include Type 2, controlled with diet and taking oral medications. Duration of 11 years. Number of years diabetic 6.   Number of years on pills LIVER) (2 BIOPSIES IN THE PAST).     Family History   Problem Relation Age of Onset   • Cancer Mother         OVARIAN AND COLON   • Diabetes Mother    • Ovarian Cancer Mother 72   • Cancer Maternal Grandmother         BREAST CANCER,   • Breast Cancer Matern mouth.     • Cholecalciferol (VITAMIN D) 1000 UNITS Oral Cap Take 1,000 mg by mouth daily. • aspirin 81 MG Oral Tab Take  by mouth.  take 1 tablet (81MG)  by oral route  every day         Allergies:  No Known Allergies    ROS:     ROS     Negative for +2.00    Left -4.00 -1.00 180 +2.00    Type:  Progressive bifocal          Manifest Refraction       Sphere Cylinder Axis Dist VA Add    Right -3.00 -1.00 170 20/30- +2.50    Left -3.00 -1.00 180 20/25- +2.50          Final Rx       Sphere Cylinder Axis Ad

## 2020-09-03 DIAGNOSIS — K21.9 GASTROESOPHAGEAL REFLUX DISEASE, ESOPHAGITIS PRESENCE NOT SPECIFIED: ICD-10-CM

## 2020-09-03 RX ORDER — PANTOPRAZOLE SODIUM 20 MG/1
TABLET, DELAYED RELEASE ORAL
Qty: 90 TABLET | Refills: 1 | Status: SHIPPED | OUTPATIENT
Start: 2020-09-03 | End: 2021-05-20

## 2020-09-17 ENCOUNTER — MED REC SCAN ONLY (OUTPATIENT)
Dept: INTERNAL MEDICINE CLINIC | Facility: CLINIC | Age: 70
End: 2020-09-17

## 2020-10-01 DIAGNOSIS — E11.9 CONTROLLED TYPE 2 DIABETES MELLITUS WITHOUT COMPLICATION, WITHOUT LONG-TERM CURRENT USE OF INSULIN (HCC): ICD-10-CM

## 2020-10-01 DIAGNOSIS — E78.00 HYPERCHOLESTEROLEMIA: ICD-10-CM

## 2020-10-02 RX ORDER — GLIMEPIRIDE 4 MG/1
TABLET ORAL
Qty: 180 TABLET | Refills: 1 | Status: ON HOLD | OUTPATIENT
Start: 2020-10-02 | End: 2021-02-18

## 2020-10-02 RX ORDER — ATORVASTATIN CALCIUM 10 MG/1
10 TABLET, FILM COATED ORAL NIGHTLY
Qty: 90 TABLET | Refills: 1 | Status: SHIPPED | OUTPATIENT
Start: 2020-10-02 | End: 2021-03-31

## 2020-10-06 DIAGNOSIS — E03.9 ACQUIRED HYPOTHYROIDISM: ICD-10-CM

## 2020-10-07 RX ORDER — LEVOTHYROXINE SODIUM 88 UG/1
88 TABLET ORAL
Qty: 90 TABLET | Refills: 1 | Status: SHIPPED | OUTPATIENT
Start: 2020-10-07 | End: 2021-04-08

## 2020-11-07 DIAGNOSIS — I10 ESSENTIAL HYPERTENSION WITH GOAL BLOOD PRESSURE LESS THAN 130/85: ICD-10-CM

## 2020-11-07 RX ORDER — DILTIAZEM HYDROCHLORIDE 360 MG/1
CAPSULE, EXTENDED RELEASE ORAL
Qty: 90 CAPSULE | Refills: 0 | Status: SHIPPED | OUTPATIENT
Start: 2020-11-07 | End: 2021-02-01

## 2020-11-07 RX ORDER — TRANDOLAPRIL TABLETS 4 MG/1
TABLET ORAL
Qty: 180 TABLET | Refills: 0 | Status: SHIPPED | OUTPATIENT
Start: 2020-11-07 | End: 2021-02-25

## 2020-11-07 RX ORDER — HYDROCHLOROTHIAZIDE 12.5 MG/1
TABLET ORAL
Qty: 90 TABLET | Refills: 0 | Status: ON HOLD | OUTPATIENT
Start: 2020-11-07 | End: 2021-02-18

## 2020-11-11 ENCOUNTER — LAB ENCOUNTER (OUTPATIENT)
Dept: LAB | Age: 70
End: 2020-11-11
Attending: INTERNAL MEDICINE
Payer: MEDICARE

## 2020-11-11 DIAGNOSIS — E11.9 TYPE 2 DIABETES MELLITUS WITHOUT COMPLICATION, WITHOUT LONG-TERM CURRENT USE OF INSULIN (HCC): ICD-10-CM

## 2020-11-11 DIAGNOSIS — E03.9 ACQUIRED HYPOTHYROIDISM: ICD-10-CM

## 2020-11-11 DIAGNOSIS — E11.69 TYPE 2 DIABETES MELLITUS WITH OTHER SPECIFIED COMPLICATION, UNSPECIFIED WHETHER LONG TERM INSULIN USE (HCC): ICD-10-CM

## 2020-11-11 DIAGNOSIS — I10 ESSENTIAL HYPERTENSION: ICD-10-CM

## 2020-11-11 DIAGNOSIS — E78.00 HYPERCHOLESTEROLEMIA: ICD-10-CM

## 2020-11-11 PROCEDURE — 80053 COMPREHEN METABOLIC PANEL: CPT

## 2020-11-11 PROCEDURE — 84443 ASSAY THYROID STIM HORMONE: CPT

## 2020-11-11 PROCEDURE — 80061 LIPID PANEL: CPT

## 2020-11-11 PROCEDURE — 36415 COLL VENOUS BLD VENIPUNCTURE: CPT

## 2020-11-11 PROCEDURE — 83970 ASSAY OF PARATHORMONE: CPT

## 2020-11-11 PROCEDURE — 85025 COMPLETE CBC W/AUTO DIFF WBC: CPT

## 2020-11-11 PROCEDURE — 82306 VITAMIN D 25 HYDROXY: CPT

## 2020-11-13 ENCOUNTER — TELEPHONE (OUTPATIENT)
Dept: INTERNAL MEDICINE CLINIC | Facility: CLINIC | Age: 70
End: 2020-11-13

## 2020-11-13 DIAGNOSIS — E11.9 CONTROLLED TYPE 2 DIABETES MELLITUS WITHOUT COMPLICATION, WITHOUT LONG-TERM CURRENT USE OF INSULIN (HCC): Primary | ICD-10-CM

## 2020-11-13 NOTE — TELEPHONE ENCOUNTER
Patient states she has already had her labs completed and now she has to reschedule her physical appointment to 2/2021, will she need to repeat these labs, please advise

## 2020-11-13 NOTE — TELEPHONE ENCOUNTER
Her urine was done last January. She can do it in February 2021 when she comes for her physical.  I will put the order in now.

## 2020-11-13 NOTE — TELEPHONE ENCOUNTER
Left message for patient to call office back, please transfer call to ext [phone] when she calls office back.

## 2020-11-13 NOTE — TELEPHONE ENCOUNTER
Patient called back, she was advised that it will not be necessary to repeat the labs at her physical.  She asked about testing her urine, due to the fact that she is diabetic.   RN advised that patient could have the urine tested at the office during her p

## 2020-11-23 RX ORDER — METFORMIN HYDROCHLORIDE 500 MG/1
TABLET, EXTENDED RELEASE ORAL
Qty: 360 TABLET | Refills: 1 | Status: SHIPPED | OUTPATIENT
Start: 2020-11-23 | End: 2021-05-24

## 2020-12-18 ENCOUNTER — OFFICE VISIT (OUTPATIENT)
Dept: ENDOCRINOLOGY CLINIC | Facility: CLINIC | Age: 70
End: 2020-12-18
Payer: MEDICARE

## 2020-12-18 VITALS — BODY MASS INDEX: 20.59 KG/M2 | WEIGHT: 139 LBS | HEIGHT: 69 IN

## 2020-12-18 DIAGNOSIS — E11.9 TYPE 2 DIABETES MELLITUS WITHOUT COMPLICATION, UNSPECIFIED WHETHER LONG TERM INSULIN USE (HCC): Primary | ICD-10-CM

## 2020-12-18 DIAGNOSIS — E78.5 DYSLIPIDEMIA: ICD-10-CM

## 2020-12-18 PROCEDURE — 3008F BODY MASS INDEX DOCD: CPT | Performed by: INTERNAL MEDICINE

## 2020-12-18 PROCEDURE — 83036 HEMOGLOBIN GLYCOSYLATED A1C: CPT | Performed by: INTERNAL MEDICINE

## 2020-12-18 PROCEDURE — 82962 GLUCOSE BLOOD TEST: CPT | Performed by: INTERNAL MEDICINE

## 2020-12-18 PROCEDURE — 99213 OFFICE O/P EST LOW 20 MIN: CPT | Performed by: INTERNAL MEDICINE

## 2020-12-18 PROCEDURE — 36416 COLLJ CAPILLARY BLOOD SPEC: CPT | Performed by: INTERNAL MEDICINE

## 2020-12-18 NOTE — PROGRESS NOTES
Return Office Visit     CHIEF COMPLAINT:    Type 2 DM  Dyslipidemia     HISTORY OF PRESENT ILLNESS:  Shantel Day is a 78year old female who presents for follow up for Type 2 DM.      DM HISTORY  Diagnosed: about 10 years ago        HISTORY OF Donnal Arrant (HAIR/SKIN/NAILS) Oral Tab Take by mouth. • TRULICITY 1.5 NG/3.6SO Subcutaneous Solution Pen-injector INJECT 1.5 MG INTO THE SKIN ONCE A WEEK 6 mL 1   • Omega-3-acid Ethyl Esters (LOVAZA) 1 g Oral Cap Take 2 g by mouth.      • Cholecalciferol (VITAMIN D wheezing    Skin:  normal moisture and skin texture, no visible lesions  Hair and nails: normal scalp hair  Hematologic:  no excessive bruising  Neuro: motor grossly intact, moving all extremities without difficulty  Psychiatric:  oriented to time, self, a verbalized a complete  understanding of all of the above and did not have any further questions.

## 2020-12-22 NOTE — TELEPHONE ENCOUNTER
•  TRULICITY 1.5 CP/7.7KM Subcutaneous Solution Pen-injector, INJECT 1.5 MG INTO THE SKIN ONCE A WEEK, Disp: 6 mL, Rfl: 1

## 2020-12-23 RX ORDER — DULAGLUTIDE 1.5 MG/.5ML
1.5 INJECTION, SOLUTION SUBCUTANEOUS WEEKLY
Qty: 6 ML | Refills: 0 | Status: SHIPPED | OUTPATIENT
Start: 2020-12-23 | End: 2021-05-11

## 2020-12-26 ENCOUNTER — HOSPITAL ENCOUNTER (OUTPATIENT)
Dept: MAMMOGRAPHY | Facility: HOSPITAL | Age: 70
Discharge: HOME | End: 2020-12-26
Attending: OBSTETRICS & GYNECOLOGY
Payer: MEDICARE

## 2020-12-26 DIAGNOSIS — Z12.31 VISIT FOR SCREENING MAMMOGRAM: ICD-10-CM

## 2020-12-26 PROCEDURE — 77063 BREAST TOMOSYNTHESIS BI: CPT | Performed by: OBSTETRICS & GYNECOLOGY

## 2020-12-26 PROCEDURE — 77067 SCR MAMMO BI INCL CAD: CPT | Performed by: OBSTETRICS & GYNECOLOGY

## 2021-01-21 ENCOUNTER — TELEPHONE (OUTPATIENT)
Dept: ENDOCRINOLOGY CLINIC | Facility: CLINIC | Age: 71
End: 2021-01-21

## 2021-01-21 NOTE — TELEPHONE ENCOUNTER
Pt. states that the Trulicity 4.2BK med is not on insur appoved formulary list. Pt. States that a prior Roland Grenadian is needed, or  can send a new Rx for another med that is on the approved formulary list.

## 2021-01-21 NOTE — TELEPHONE ENCOUNTER
Medication PA Requested: Trulicity 1.5 mg                                                          CoverMyMeds Used: No  Key:  Sig:  Inject 1.5 mg weekly  DX Code: E11.9                                     CPT code (if applicable):   Case Number/Pending Ref#:

## 2021-01-25 NOTE — TELEPHONE ENCOUNTER
Per plan on PA response, Trulicity is covered. Contacted [facility] and patient picked up prescription on 1/8/21.

## 2021-02-01 DIAGNOSIS — I10 ESSENTIAL HYPERTENSION WITH GOAL BLOOD PRESSURE LESS THAN 130/85: ICD-10-CM

## 2021-02-01 RX ORDER — DILTIAZEM HYDROCHLORIDE 360 MG/1
360 CAPSULE, EXTENDED RELEASE ORAL DAILY
Qty: 90 CAPSULE | Refills: 0 | Status: SHIPPED | OUTPATIENT
Start: 2021-02-01 | End: 2021-05-20

## 2021-02-01 NOTE — TELEPHONE ENCOUNTER
•  DILTIAZEM HCL ER BEADS 360 MG Oral Capsule SR 24 Hr, TAKE 1 CAPSULE(360 MG) BY MOUTH DAILY, Disp: 90 capsule, Rfl: 0

## 2021-02-03 ENCOUNTER — TELEPHONE (OUTPATIENT)
Dept: INTERNAL MEDICINE CLINIC | Facility: CLINIC | Age: 71
End: 2021-02-03

## 2021-02-03 DIAGNOSIS — Z20.822 CONTACT WITH AND (SUSPECTED) EXPOSURE TO COVID-19: Primary | ICD-10-CM

## 2021-02-03 NOTE — TELEPHONE ENCOUNTER
I'm sorry to hear that. I wrote the order.   She should call central scheduling [phone] to schedule the test.

## 2021-02-04 ENCOUNTER — LAB ENCOUNTER (OUTPATIENT)
Dept: LAB | Age: 71
End: 2021-02-04
Attending: INTERNAL MEDICINE
Payer: MEDICARE

## 2021-02-04 DIAGNOSIS — Z20.822 CONTACT WITH AND (SUSPECTED) EXPOSURE TO COVID-19: ICD-10-CM

## 2021-02-04 NOTE — TELEPHONE ENCOUNTER
Spoke with pt and MD message below given. Pt verb understanding    Pt states central scheduling has already contacted her and she is scheduled for covid testing tomorrow.

## 2021-02-05 LAB — SARS-COV-2 RNA RESP QL NAA+PROBE: DETECTED

## 2021-02-05 NOTE — TELEPHONE ENCOUNTER
I recommend the video visit because there are no over-the-counter medications for COVID, but there is a one-time IV dose for which she is eligible.

## 2021-02-05 NOTE — TELEPHONE ENCOUNTER
pt called us back and was informed of your message below. Pt stated that she is feeling a little better today and does not feel a video visit is needed  Pt will just like to know what medication she should be on to help with the covid-19.  Any vi

## 2021-02-05 NOTE — TELEPHONE ENCOUNTER
Informed patient of advice per Dr. Tristen Aguero. Patient states her insurance would not cover the infusion, and she does not believe she needs it. States \"I did 2 loads of laundry and climbed up two flights of stairs each time. \"

## 2021-02-09 ENCOUNTER — TELEMEDICINE (OUTPATIENT)
Dept: INTERNAL MEDICINE CLINIC | Facility: CLINIC | Age: 71
End: 2021-02-09
Payer: MEDICARE

## 2021-02-09 ENCOUNTER — HOSPITAL ENCOUNTER (INPATIENT)
Facility: HOSPITAL | Age: 71
LOS: 9 days | Discharge: HOME | DRG: 177 | End: 2021-02-18
Attending: EMERGENCY MEDICINE | Admitting: HOSPITALIST
Payer: MEDICARE

## 2021-02-09 ENCOUNTER — APPOINTMENT (OUTPATIENT)
Dept: GENERAL RADIOLOGY | Facility: HOSPITAL | Age: 71
DRG: 177 | End: 2021-02-09
Attending: EMERGENCY MEDICINE
Payer: MEDICARE

## 2021-02-09 ENCOUNTER — TELEPHONE (OUTPATIENT)
Dept: CASE MANAGEMENT | Age: 71
End: 2021-02-09

## 2021-02-09 DIAGNOSIS — E11.9 CONTROLLED TYPE 2 DIABETES MELLITUS WITHOUT COMPLICATION, WITHOUT LONG-TERM CURRENT USE OF INSULIN (HCC): ICD-10-CM

## 2021-02-09 DIAGNOSIS — Z23 NEED FOR VACCINATION: ICD-10-CM

## 2021-02-09 DIAGNOSIS — J12.82 PNEUMONIA DUE TO COVID-19 VIRUS: Primary | ICD-10-CM

## 2021-02-09 DIAGNOSIS — J96.01 ACUTE RESPIRATORY FAILURE WITH HYPOXIA (HCC): ICD-10-CM

## 2021-02-09 DIAGNOSIS — U07.1 COVID-19 VIRUS INFECTION: Primary | ICD-10-CM

## 2021-02-09 DIAGNOSIS — U07.1 PNEUMONIA DUE TO COVID-19 VIRUS: Primary | ICD-10-CM

## 2021-02-09 PROBLEM — R73.9 HYPERGLYCEMIA: Status: ACTIVE | Noted: 2021-02-09

## 2021-02-09 PROBLEM — E87.6 HYPOKALEMIA: Status: ACTIVE | Noted: 2021-02-09

## 2021-02-09 PROBLEM — D64.9 ANEMIA: Status: ACTIVE | Noted: 2021-02-09

## 2021-02-09 PROBLEM — R79.89 AZOTEMIA: Status: ACTIVE | Noted: 2021-02-09

## 2021-02-09 LAB
ALBUMIN SERPL-MCNC: 3.4 G/DL (ref 3.4–5)
ALBUMIN/GLOB SERPL: 0.7 {RATIO} (ref 1–2)
ALP LIVER SERPL-CCNC: 90 U/L
ALT SERPL-CCNC: 40 U/L
ANION GAP SERPL CALC-SCNC: 9 MMOL/L (ref 0–18)
ANTIBODY SCREEN: NEGATIVE
AST SERPL-CCNC: 46 U/L (ref 15–37)
BACTERIA UR QL AUTO: NEGATIVE /HPF
BASOPHILS # BLD AUTO: 0.01 X10(3) UL (ref 0–0.2)
BASOPHILS NFR BLD AUTO: 0.1 %
BILIRUB SERPL-MCNC: 0.3 MG/DL (ref 0.1–2)
BILIRUB UR QL: NEGATIVE
BUN BLD-MCNC: 30 MG/DL (ref 7–18)
BUN/CREAT SERPL: 21.6 (ref 10–20)
CALCIUM BLD-MCNC: 9.6 MG/DL (ref 8.5–10.1)
CHLORIDE SERPL-SCNC: 103 MMOL/L (ref 98–112)
CK SERPL-CCNC: 115 U/L
CLARITY UR: CLEAR
CO2 SERPL-SCNC: 25 MMOL/L (ref 21–32)
COLOR UR: YELLOW
CREAT BLD-MCNC: 1.39 MG/DL
CREAT BLD-MCNC: 1.39 MG/DL
CRP SERPL-MCNC: 15 MG/DL (ref ?–0.3)
D DIMER PPP FEU-MCNC: 0.76 UG/ML FEU (ref ?–0.68)
DEPRECATED HBV CORE AB SER IA-ACNC: 256.6 NG/ML
DEPRECATED RDW RBC AUTO: 49.4 FL (ref 35.1–46.3)
EOSINOPHIL # BLD AUTO: 0 X10(3) UL (ref 0–0.7)
EOSINOPHIL NFR BLD AUTO: 0 %
ERYTHROCYTE [DISTWIDTH] IN BLOOD BY AUTOMATED COUNT: 16.1 % (ref 11–15)
GLOBULIN PLAS-MCNC: 4.6 G/DL (ref 2.8–4.4)
GLUCOSE BLD-MCNC: 110 MG/DL (ref 70–99)
GLUCOSE BLDC GLUCOMTR-MCNC: 100 MG/DL (ref 70–99)
GLUCOSE BLDC GLUCOMTR-MCNC: 231 MG/DL (ref 70–99)
GLUCOSE UR-MCNC: NEGATIVE MG/DL
HCT VFR BLD AUTO: 35.5 %
HGB BLD-MCNC: 11.5 G/DL
HYALINE CASTS #/AREA URNS AUTO: 1 /LPF
IMM GRANULOCYTES # BLD AUTO: 0.05 X10(3) UL (ref 0–1)
IMM GRANULOCYTES NFR BLD: 0.6 %
KETONES UR-MCNC: NEGATIVE MG/DL
LACTATE SERPL-SCNC: 1.4 MMOL/L (ref 0.4–2)
LACTATE SERPL-SCNC: 2.4 MMOL/L (ref 0.4–2)
LDH SERPL L TO P-CCNC: 409 U/L
LEUKOCYTE ESTERASE UR QL STRIP.AUTO: NEGATIVE
LYMPHOCYTES # BLD AUTO: 0.93 X10(3) UL (ref 1–4)
LYMPHOCYTES NFR BLD AUTO: 11.6 %
M PROTEIN MFR SERPL ELPH: 8 G/DL (ref 6.4–8.2)
MCH RBC QN AUTO: 27.2 PG (ref 26–34)
MCHC RBC AUTO-ENTMCNC: 32.4 G/DL (ref 31–37)
MCV RBC AUTO: 83.9 FL
MONOCYTES # BLD AUTO: 0.33 X10(3) UL (ref 0.1–1)
MONOCYTES NFR BLD AUTO: 4.1 %
NEUTROPHILS # BLD AUTO: 6.71 X10 (3) UL (ref 1.5–7.7)
NEUTROPHILS # BLD AUTO: 6.71 X10(3) UL (ref 1.5–7.7)
NEUTROPHILS NFR BLD AUTO: 83.6 %
NITRITE UR QL STRIP.AUTO: NEGATIVE
NT-PROBNP SERPL-MCNC: 441 PG/ML (ref ?–125)
OSMOLALITY SERPL CALC.SUM OF ELEC: 291 MOSM/KG (ref 275–295)
PH UR: 5 [PH] (ref 5–8)
PLATELET # BLD AUTO: 282 10(3)UL (ref 150–450)
POTASSIUM SERPL-SCNC: 3.2 MMOL/L (ref 3.5–5.1)
PROCALCITONIN SERPL-MCNC: 0.54 NG/ML (ref ?–0.16)
PROT UR-MCNC: NEGATIVE MG/DL
RBC # BLD AUTO: 4.23 X10(6)UL
RBC #/AREA URNS AUTO: 2 /HPF
RH BLOOD TYPE: POSITIVE
SODIUM SERPL-SCNC: 137 MMOL/L (ref 136–145)
SP GR UR STRIP: 1.02 (ref 1–1.03)
TROPONIN I SERPL-MCNC: <0.045 NG/ML (ref ?–0.04)
UROBILINOGEN UR STRIP-ACNC: <2
WBC # BLD AUTO: 8 X10(3) UL (ref 4–11)
WBC #/AREA URNS AUTO: 1 /HPF

## 2021-02-09 PROCEDURE — XW13325 TRANSFUSION OF CONVALESCENT PLASMA (NONAUTOLOGOUS) INTO PERIPHERAL VEIN, PERCUTANEOUS APPROACH, NEW TECHNOLOGY GROUP 5: ICD-10-PCS | Performed by: HOSPITALIST

## 2021-02-09 PROCEDURE — 71045 X-RAY EXAM CHEST 1 VIEW: CPT | Performed by: EMERGENCY MEDICINE

## 2021-02-09 PROCEDURE — XW033E5 INTRODUCTION OF REMDESIVIR ANTI-INFECTIVE INTO PERIPHERAL VEIN, PERCUTANEOUS APPROACH, NEW TECHNOLOGY GROUP 5: ICD-10-PCS | Performed by: HOSPITALIST

## 2021-02-09 PROCEDURE — 99223 1ST HOSP IP/OBS HIGH 75: CPT | Performed by: HOSPITALIST

## 2021-02-09 PROCEDURE — 99214 OFFICE O/P EST MOD 30 MIN: CPT | Performed by: INTERNAL MEDICINE

## 2021-02-09 PROCEDURE — 99223 1ST HOSP IP/OBS HIGH 75: CPT | Performed by: INTERNAL MEDICINE

## 2021-02-09 RX ORDER — LEVOTHYROXINE SODIUM 88 UG/1
88 TABLET ORAL
Status: DISCONTINUED | OUTPATIENT
Start: 2021-02-10 | End: 2021-02-18

## 2021-02-09 RX ORDER — GLIMEPIRIDE 4 MG/1
4 TABLET ORAL
Status: DISCONTINUED | OUTPATIENT
Start: 2021-02-10 | End: 2021-02-18

## 2021-02-09 RX ORDER — MAGNESIUM OXIDE 400 MG (241.3 MG MAGNESIUM) TABLET
1 TABLET NIGHTLY
Status: DISCONTINUED | OUTPATIENT
Start: 2021-02-09 | End: 2021-02-18

## 2021-02-09 RX ORDER — DEXTROSE MONOHYDRATE 25 G/50ML
50 INJECTION, SOLUTION INTRAVENOUS
Status: DISCONTINUED | OUTPATIENT
Start: 2021-02-09 | End: 2021-02-18

## 2021-02-09 RX ORDER — ASPIRIN 81 MG/1
81 TABLET ORAL DAILY
Status: DISCONTINUED | OUTPATIENT
Start: 2021-02-10 | End: 2021-02-18

## 2021-02-09 RX ORDER — DEXAMETHASONE SODIUM PHOSPHATE 4 MG/ML
6 VIAL (ML) INJECTION DAILY
Status: DISCONTINUED | OUTPATIENT
Start: 2021-02-10 | End: 2021-02-10

## 2021-02-09 RX ORDER — METOCLOPRAMIDE HYDROCHLORIDE 5 MG/ML
5 INJECTION INTRAMUSCULAR; INTRAVENOUS EVERY 8 HOURS PRN
Status: DISCONTINUED | OUTPATIENT
Start: 2021-02-09 | End: 2021-02-18

## 2021-02-09 RX ORDER — MECLIZINE HYDROCHLORIDE 25 MG/1
25 TABLET ORAL 3 TIMES DAILY PRN
Status: DISCONTINUED | OUTPATIENT
Start: 2021-02-09 | End: 2021-02-18

## 2021-02-09 RX ORDER — ENOXAPARIN SODIUM 100 MG/ML
40 INJECTION SUBCUTANEOUS DAILY
Status: DISCONTINUED | OUTPATIENT
Start: 2021-02-09 | End: 2021-02-18

## 2021-02-09 RX ORDER — FAMOTIDINE 20 MG/1
20 TABLET ORAL DAILY
Status: DISCONTINUED | OUTPATIENT
Start: 2021-02-09 | End: 2021-02-09

## 2021-02-09 RX ORDER — ACETAMINOPHEN 325 MG/1
650 TABLET ORAL EVERY 6 HOURS PRN
Status: DISCONTINUED | OUTPATIENT
Start: 2021-02-09 | End: 2021-02-18

## 2021-02-09 RX ORDER — DEXAMETHASONE SODIUM PHOSPHATE 10 MG/ML
6 INJECTION, SOLUTION INTRAMUSCULAR; INTRAVENOUS ONCE
Status: COMPLETED | OUTPATIENT
Start: 2021-02-09 | End: 2021-02-09

## 2021-02-09 RX ORDER — ATORVASTATIN CALCIUM 10 MG/1
10 TABLET, FILM COATED ORAL NIGHTLY
Status: DISCONTINUED | OUTPATIENT
Start: 2021-02-09 | End: 2021-02-18

## 2021-02-09 RX ORDER — SODIUM CHLORIDE 9 MG/ML
INJECTION, SOLUTION INTRAVENOUS ONCE
Status: COMPLETED | OUTPATIENT
Start: 2021-02-09 | End: 2021-02-09

## 2021-02-09 RX ORDER — GUAIFENESIN 100 MG/5ML
200 SOLUTION ORAL EVERY 4 HOURS PRN
Status: DISCONTINUED | OUTPATIENT
Start: 2021-02-09 | End: 2021-02-10

## 2021-02-09 RX ORDER — DEXAMETHASONE SODIUM PHOSPHATE 4 MG/ML
6 VIAL (ML) INJECTION DAILY
Status: DISCONTINUED | OUTPATIENT
Start: 2021-02-09 | End: 2021-02-09

## 2021-02-09 RX ORDER — PANTOPRAZOLE SODIUM 20 MG/1
20 TABLET, DELAYED RELEASE ORAL
Status: DISCONTINUED | OUTPATIENT
Start: 2021-02-10 | End: 2021-02-18

## 2021-02-09 RX ORDER — ONDANSETRON 2 MG/ML
4 INJECTION INTRAMUSCULAR; INTRAVENOUS EVERY 6 HOURS PRN
Status: DISCONTINUED | OUTPATIENT
Start: 2021-02-09 | End: 2021-02-18

## 2021-02-09 RX ORDER — LISINOPRIL 40 MG/1
40 TABLET ORAL DAILY
Refills: 0 | Status: DISCONTINUED | OUTPATIENT
Start: 2021-02-10 | End: 2021-02-18

## 2021-02-09 RX ORDER — POTASSIUM CHLORIDE 20 MEQ/1
40 TABLET, EXTENDED RELEASE ORAL EVERY 4 HOURS
Status: COMPLETED | OUTPATIENT
Start: 2021-02-09 | End: 2021-02-10

## 2021-02-09 RX ORDER — DILTIAZEM HYDROCHLORIDE 120 MG/1
360 CAPSULE, EXTENDED RELEASE ORAL DAILY
Status: DISCONTINUED | OUTPATIENT
Start: 2021-02-09 | End: 2021-02-18

## 2021-02-09 NOTE — TELEPHONE ENCOUNTER
Patient stated that today she is feeling worse than she was yesterday. Coughing and feels short of breath. Able to get out full sentences with no audible shortness of breath. No chest pain. Denies being in distress. No other symptoms.   Wanted to know if do

## 2021-02-09 NOTE — ED INITIAL ASSESSMENT (HPI)
Pt sent to ED by PCP for BAM infusion. Pt tested +for COVID-19 2-4-2021. Pt states symptoms started on 1-. Pt c/o increased SOB that started yesterday. Pt speaking in small short sentences. Pt placed on 5L nasal cannula in triage.    Pt dillan

## 2021-02-09 NOTE — ED NOTES
Orders for admission, patient is aware of plan and ready to go upstairs. Any questions, please call ED RN Todd Pinzon  at extension [phone]. covid positive. On 5L nc. Will cont. To monitor.

## 2021-02-09 NOTE — TELEPHONE ENCOUNTER
Please schedule pt for a video visit today at the end of my schedule. Please reschedule pts Medicare physical (this is on Thursday.) Pt will still be under quarantine.

## 2021-02-09 NOTE — ASSESSMENT & PLAN NOTE
Pt's A1C was good in December, however, her diabetes and HTN put her at high risk for severe disease.

## 2021-02-09 NOTE — CONSULTS
[facility] HOSP - [facility]    Report of Consultation    Veronica Pizano Patient Status:  Emergency    1950 MRN [id number]   Location [address] Attending Adriana Kumar MD   Hosp Day # 0 PCP MD FLACA Ricks Procedure Laterality Date   •      • COLONOSCOPY     • COLONOSCOPY  ,    • COLONOSCOPY, POSSIBLE BIOPSY, POSSIBLE POLYPECTOMY 34239 N/A 2017    Performed by Tran Noland MD at [address] [place] [address] breastfeeding.     Acutely ill appearing pt   HEENT : at , nc , anicteric sclera   Neck : no stridor or JVD   Chest : good air exchange to both lungs               bi-basilar rales with no wheezes or rhonchi     Heart : S1 s2 RRR no g/m   abd : soft and tosha remdesivir  Order CCP  Decadron 6 mg daily  Lovenox 40 mg subcu daily  Hold on Actemra and baricitinib for now    Gentle hydration  Pepcid and melatonin  Supportive care    Discussed with patient the importance of therapeutic proning  We will follow-up kenny

## 2021-02-09 NOTE — TELEPHONE ENCOUNTER
Outpatient antibody infusion order received from PCP. Called patient to attempt to schedule. No answer, voicemail left for patient to call back. Contact information provided.

## 2021-02-09 NOTE — PROGRESS NOTES
Video Progress Note  Telehealth Verbal Consent   I conducted a telehealth visit with Robert Wellington today, 02/09/21, which was completed using two-way, real-time interactive audio and video communication.  This has been done in good june to provide con 2002   • COLONOSCOPY  9/17, 4/18   • COLONOSCOPY, POSSIBLE BIOPSY, POSSIBLE POLYPECTOMY 77962 N/A 9/8/2017    Performed by Lanie Veloz MD at [address] [place] [address]   • D & C     • EGD  04/2018   • ENDOMETRIAL BIOPSY - JAR(S): 2  2009    negative differently: 4 mg.  TAKE 1 TABLET(4 MG) BY MOUTH DAILY ) 180 tablet 1   • PANTOPRAZOLE SODIUM 20 MG Oral Tab EC TAKE 1 TABLET BY MOUTH EVERY MORNING BEFORE BREAKFAST 90 tablet 1   • MECLIZINE HCL 25 MG Oral Tab TAKE 1 TABLET BY MOUTH THREE TIMES DAILY AS NE distress. Neurological: She is alert and oriented to person, place, and time. ASSESSMENT/PLAN:     Problem List Items Addressed This Visit        High    COVID-19 virus infection - Primary     Pt's COVID symptoms are worsening.   Her fever is service: 26 min

## 2021-02-09 NOTE — ED PROVIDER NOTES
Patient Seen in: [facility]      History   Patient presents with:  Covid    Stated Complaint: PCP sent for [facility]    HPI/Subjective:   HPI    Patient presents the emergency department complaining of shortness of breath.   She has a history of Covid drinks      Types: 2 Glasses of wine per week      Comment: very rarely    Drug use: No             Review of Systems    Positive for stated complaint: PCP sent for [facility]  Other systems are as noted in HPI. Constitutional and vital signs reviewed.       All Acid 2.4 (*)     All other components within normal limits   PRO BETA NATRIURETIC PEPTIDE - Abnormal; Notable for the following components:    Pro-Beta Natriuretic Peptide 441 (*)     All other components within normal limits   COMP METABOLIC PANEL (14) - Abnormality         Status                     ---------                               -----------         ------                     CBC W/ DIFFERENTIAL[[id number]]          Abnormal            Final result                 Please view results for th with hypoxia [facility])    Disposition:  Admit  2/9/2021  4:23 pm    Follow-up:  No follow-up provider specified.   We recommend that you schedule follow up care with a primary care provider within the next three months to obtain basic health screening including

## 2021-02-09 NOTE — ASSESSMENT & PLAN NOTE
Pt's COVID symptoms are worsening. Her fever is high and she is mildly SOB. I had recommended the monoclonal antibody initially, but pt declined. She now would like it.   I explained that she could go the urgent care at [facility], but pt does not think sh

## 2021-02-09 NOTE — TELEPHONE ENCOUNTER
Spoke with patient (name and  verified), informed of message below. States she has a temp of 101 at this time. Doesn't know how to do a video visit. Admits she has Gemmyo kajal on her cell phone.     Patient clarified that she understands how to ope

## 2021-02-09 NOTE — H&P
[facility]    PATIENT'S NAME: [facility]   ATTENDING PHYSICIAN: Ellyn Franklin.  Leydi Brooks MD   PATIENT ACCOUNT#:   [id number]    LOCATION:  [place] [address]  MEDICAL RECORD #:   [id number]       YOB: 1950  ADMISSION DATE:       0 from her twin sister who is currently hospitalized. Patient said she had on and off fever and nonproductive cough and, since yesterday, she became progressively short of breath on minimal physical activity. Other 12-point review of systems is negative.

## 2021-02-10 LAB
ALBUMIN SERPL-MCNC: 2.8 G/DL (ref 3.4–5)
ALBUMIN/GLOB SERPL: 0.7 {RATIO} (ref 1–2)
ALP LIVER SERPL-CCNC: 74 U/L
ALT SERPL-CCNC: 31 U/L
ANION GAP SERPL CALC-SCNC: 6 MMOL/L (ref 0–18)
AST SERPL-CCNC: 31 U/L (ref 15–37)
BASOPHILS # BLD AUTO: 0.01 X10(3) UL (ref 0–0.2)
BASOPHILS NFR BLD AUTO: 0.2 %
BILIRUB SERPL-MCNC: 0.4 MG/DL (ref 0.1–2)
BUN BLD-MCNC: 27 MG/DL (ref 7–18)
BUN/CREAT SERPL: 26.5 (ref 10–20)
CALCIUM BLD-MCNC: 9.2 MG/DL (ref 8.5–10.1)
CHLORIDE SERPL-SCNC: 108 MMOL/L (ref 98–112)
CO2 SERPL-SCNC: 24 MMOL/L (ref 21–32)
CREAT BLD-MCNC: 1.02 MG/DL
CRP SERPL-MCNC: 15 MG/DL (ref ?–0.3)
D DIMER PPP FEU-MCNC: 0.62 UG/ML FEU (ref ?–0.68)
DEPRECATED HBV CORE AB SER IA-ACNC: 254 NG/ML
DEPRECATED RDW RBC AUTO: 49.4 FL (ref 35.1–46.3)
EOSINOPHIL # BLD AUTO: 0 X10(3) UL (ref 0–0.7)
EOSINOPHIL NFR BLD AUTO: 0 %
ERYTHROCYTE [DISTWIDTH] IN BLOOD BY AUTOMATED COUNT: 15.9 % (ref 11–15)
GLOBULIN PLAS-MCNC: 4.3 G/DL (ref 2.8–4.4)
GLUCOSE BLD-MCNC: 152 MG/DL (ref 70–99)
GLUCOSE BLDC GLUCOMTR-MCNC: 155 MG/DL (ref 70–99)
GLUCOSE BLDC GLUCOMTR-MCNC: 235 MG/DL (ref 70–99)
GLUCOSE BLDC GLUCOMTR-MCNC: 265 MG/DL (ref 70–99)
GLUCOSE BLDC GLUCOMTR-MCNC: 271 MG/DL (ref 70–99)
HCT VFR BLD AUTO: 30.2 %
HGB BLD-MCNC: 9.8 G/DL
IMM GRANULOCYTES # BLD AUTO: 0.06 X10(3) UL (ref 0–1)
IMM GRANULOCYTES NFR BLD: 1 %
LDH SERPL L TO P-CCNC: 352 U/L
LYMPHOCYTES # BLD AUTO: 1.1 X10(3) UL (ref 1–4)
LYMPHOCYTES NFR BLD AUTO: 17.8 %
M PROTEIN MFR SERPL ELPH: 7.1 G/DL (ref 6.4–8.2)
MCH RBC QN AUTO: 27.5 PG (ref 26–34)
MCHC RBC AUTO-ENTMCNC: 32.5 G/DL (ref 31–37)
MCV RBC AUTO: 84.8 FL
MONOCYTES # BLD AUTO: 0.48 X10(3) UL (ref 0.1–1)
MONOCYTES NFR BLD AUTO: 7.8 %
NEUTROPHILS # BLD AUTO: 4.53 X10 (3) UL (ref 1.5–7.7)
NEUTROPHILS # BLD AUTO: 4.53 X10(3) UL (ref 1.5–7.7)
NEUTROPHILS NFR BLD AUTO: 73.2 %
OSMOLALITY SERPL CALC.SUM OF ELEC: 294 MOSM/KG (ref 275–295)
PLATELET # BLD AUTO: 263 10(3)UL (ref 150–450)
POTASSIUM SERPL-SCNC: 4.5 MMOL/L (ref 3.5–5.1)
POTASSIUM SERPL-SCNC: 4.6 MMOL/L (ref 3.5–5.1)
RBC # BLD AUTO: 3.56 X10(6)UL
SODIUM SERPL-SCNC: 138 MMOL/L (ref 136–145)
WBC # BLD AUTO: 6.2 X10(3) UL (ref 4–11)

## 2021-02-10 PROCEDURE — 99233 SBSQ HOSP IP/OBS HIGH 50: CPT | Performed by: INTERNAL MEDICINE

## 2021-02-10 PROCEDURE — 99233 SBSQ HOSP IP/OBS HIGH 50: CPT | Performed by: HOSPITALIST

## 2021-02-10 RX ORDER — BENZONATATE 100 MG/1
100 CAPSULE ORAL 3 TIMES DAILY PRN
Status: DISCONTINUED | OUTPATIENT
Start: 2021-02-10 | End: 2021-02-18

## 2021-02-10 RX ORDER — DEXAMETHASONE 6 MG/1
6 TABLET ORAL
Status: DISCONTINUED | OUTPATIENT
Start: 2021-02-11 | End: 2021-02-17

## 2021-02-10 RX ORDER — CODEINE PHOSPHATE AND GUAIFENESIN 10; 100 MG/5ML; MG/5ML
5 SOLUTION ORAL EVERY 4 HOURS PRN
Status: DISCONTINUED | OUTPATIENT
Start: 2021-02-10 | End: 2021-02-18

## 2021-02-10 NOTE — PROGRESS NOTES
[facility] Pharmacy Note: Route Optimization for Dexamethasone (Decadron)    Patient is currently on Dexamethasone (Decadron) 6 mg IV every 24 hours.    The patient meets the criteria to convert to the oral equivalent as established by the IV to Oral conversion pr

## 2021-02-10 NOTE — CONSULTS
[facility] ID CONSULT NOTE    Elvera Room Patient Status:  Inpatient    1950 MRN [id number]   Location [facility] Attending Khari Hale MD   Hosp Day # 1 PCP Santiago Chou MD       Reason for [facility] OVARIAN AND COLON   • Diabetes Mother    • Ovarian Cancer Mother 72   • Cancer Maternal Grandmother         BREAST CANCER,   • Breast Cancer Maternal Grandmother 48   • Breast Cancer Sister 47   • Cataracts Sister    • Heart Surgery Father         car •  atorvastatin (LIPITOR) tab 10 mg, 10 mg, Oral, Nightly  •  dilTIAZem (DILACOR XR) 24 hr cap 360 mg, 360 mg, Oral, Daily  •  glimepiride (AMARYL) tab 4 mg, 4 mg, Oral, Daily with breakfast  •  Levothyroxine Sodium tab 88 mcg, 88 mcg, Oral, QAM AC  •  Mec 78year old female with a history of diabetes mellitus, hypothyroidism, who presented to [address] [place] [address] ED on  2/9 with worsening shortness of breath. Was exposed to her twin sister with COVID with symptoms starting on 1/30.  at home also sick with COVID-19.  O

## 2021-02-10 NOTE — PLAN OF CARE
Problem: Patient Centered Care  Goal: Patient preferences are identified and integrated in the patient's plan of care  Description: Interventions:  - What would you like us to know as we care for you?  Lives with   - Provide timely, complete, and a Provide Smoking Cessation handout, if applicable  - Encourage broncho-pulmonary hygiene including cough, deep breathe, Incentive Spirometry  - Assess the need for suctioning and perform as needed  - Assess and instruct to report SOB or any respiratory diff

## 2021-02-10 NOTE — PROGRESS NOTES
[facility]    Progress Note    Phillip Lynne Patient Status:  Inpatient    1950 MRN [id number]   Location [facility] Attending Aneudy Banks MD   Hosp Day # 1 PCP Thomas Andrade MD        Subjective:     Constitution 6.2 02/10/2021    HGB 9.8 (L) 02/10/2021    HCT 30.2 (L) 02/10/2021    .0 02/10/2021    CREATSERUM 1.02 02/10/2021    BUN 27 (H) 02/10/2021     02/10/2021    K 4.6 02/10/2021    K 4.5 02/10/2021     02/10/2021    CO2 24.0 02/10/2021    G

## 2021-02-10 NOTE — PLAN OF CARE
Problem: Patient Centered Care  Goal: Patient preferences are identified and integrated in the patient's plan of care  Description: Interventions:  - What would you like us to know as we care for you?  *  - Provide timely, complete, and accurate informati suctioning and perform as needed  - Assess and instruct to report SOB or any respiratory difficulty  - Respiratory Therapy support as indicated  - Manage/alleviate anxiety  - Monitor for signs/symptoms of CO2 retention  Outcome: Progressing   Patient a/ox4

## 2021-02-10 NOTE — PLAN OF CARE
Covid +: 21  Symptom Onset: 21  Tmax: afebrile  O2: 4L @ 92%, wean as tolerated    Monitor Labs,   LDH: 352  Ferritin: 254  CRP: 15.00  DDimer: 0.62    Antibiotics: on hold  Blood Thinner: lovenox 40mg daily  Decadron: started

## 2021-02-10 NOTE — PLAN OF CARE
Problem: Patient Centered Care  Goal: Patient preferences are identified and integrated in the patient's plan of care  Description: Interventions:  - What would you like us to know as we care for you?  Lives with   - Provide timely, complete, and a respiratory status  - Assess for changes in mentation and behavior  - Position to facilitate oxygenation and minimize respiratory effort  - Oxygen supplementation based on oxygen saturation or ABGs  - Provide Smoking Cessation handout, if applicable  - Enc

## 2021-02-10 NOTE — PAYOR COMM NOTE
Appropriate for inpatient status per guidelines for SOB due to COVID pneumonia with hypoxia.      --------------  ADMISSION REVIEW     Pay[place]orro [place] MA O  Subscriber #:  [id number]  Authorization Number: [id number]         ED Provider Notes        Patient HISTORY  FATTY LIVER    2 BIOPSIES IN THE PAST   • TONSILLECTOMY           Review of Systems    Positive for stated complaint: PCP sent for [facility]  Other systems are as noted in HPI. Constitutional and vital signs reviewed.       All other systems reviewed an components within normal limits   PRO BETA NATRIURETIC PEPTIDE - Abnormal; Notable for the following components:    Pro-Beta Natriuretic Peptide 441 (*)     All other components within normal limits   COMP METABOLIC PANEL (14) - Abnormal; Notable for the f cardiomegaly. Tortuous aorta. 2. Bilateral multifocal pneumonitis versus atypical viral pneumonia worse on the right.   3. Follow-up to interval resolution    Dictated by (CST): Angy Tobar MD on 2/09/2021 at 3:37 PM     Finalized by (CST): Chidi Ross systems is negative. PHYSICAL EXAMINATION:    GENERAL:  Alert. Oriented to time, place, and person. Moderate distress. Tachypneic. VITAL SIGNS:  Temperature 98.1, pulse 96, respiratory rate 20, blood pressure 150/69, pulse ox 84% on room air. hydration  Pepcid and melatonin              2/10 INF DIS    78year old female with a history of diabetes mellitus, hypothyroidism, who presented to [address] [place] [address] ED on  2/9 with worsening shortness of breath.  Was exposed to her twin sister with COVID with symptoms (36.7 °C) 72 18 101/50 92 % — Nasal cannula 5 L/min       02/09/21 1449 98.1 °F (36.7 °C) 96 20 150/69 84 %Abnormal  147 lb None (Room air)           MEDICATIONS ADMINISTERED IN LAST 1 DAY:  aspirin EC tab 81 mg     Date Action Dose Route User    2/10/2021 Insulin Aspart Pen (NOVOLOG) 100 UNIT/ML flexpen 1-11 Units     Date Action Dose Route User    2/10/2021 1245 Given 7 Units Subcutaneous (Left Lower Abdomen) Swapna Krause RN    2/10/2021 0849 Given 1 Units Subcutaneous (Left Lower Abdomen) Tanvir Han,

## 2021-02-10 NOTE — PROGRESS NOTES
[place] [facility] - [facility]  Hospitalist Progress Note     Madhuri Lyons Patient Status:  Inpatient      78year old CSN [id number]   Location 513/513-A Attending Yany Ding,  [facility] Day # 1 PCP Briana Nuno MD     ASSESSMENT/PLAN    A 137 138   K 3.2* 4.6  4.5    108   CO2 25.0 24.0   ALKPHO 90 74   AST 46* 31   ALT 40 31   BILT 0.3 0.4   TP 8.0 7.1     No results for input(s): PT, INR, PTT in the last 168 hours.     • [START ON 2/11/2021] dexamethasone  6 mg Oral Daily with breakf

## 2021-02-11 LAB
ALBUMIN SERPL-MCNC: 2.7 G/DL (ref 3.4–5)
ALBUMIN/GLOB SERPL: 0.6 {RATIO} (ref 1–2)
ALP LIVER SERPL-CCNC: 71 U/L
ALT SERPL-CCNC: 28 U/L
ANION GAP SERPL CALC-SCNC: 4 MMOL/L (ref 0–18)
AST SERPL-CCNC: 28 U/L (ref 15–37)
BILIRUB SERPL-MCNC: 0.3 MG/DL (ref 0.1–2)
BLOOD TYPE BARCODE: 6200
BUN BLD-MCNC: 34 MG/DL (ref 7–18)
BUN/CREAT SERPL: 33.7 (ref 10–20)
CALCIUM BLD-MCNC: 9.6 MG/DL (ref 8.5–10.1)
CHLORIDE SERPL-SCNC: 110 MMOL/L (ref 98–112)
CO2 SERPL-SCNC: 26 MMOL/L (ref 21–32)
CREAT BLD-MCNC: 1.01 MG/DL
CRP SERPL-MCNC: 7.35 MG/DL (ref ?–0.3)
D DIMER PPP FEU-MCNC: 0.51 UG/ML FEU (ref ?–0.68)
DEPRECATED HBV CORE AB SER IA-ACNC: 338.5 NG/ML
GLOBULIN PLAS-MCNC: 4.2 G/DL (ref 2.8–4.4)
GLUCOSE BLD-MCNC: 176 MG/DL (ref 70–99)
GLUCOSE BLDC GLUCOMTR-MCNC: 172 MG/DL (ref 70–99)
GLUCOSE BLDC GLUCOMTR-MCNC: 247 MG/DL (ref 70–99)
GLUCOSE BLDC GLUCOMTR-MCNC: 247 MG/DL (ref 70–99)
GLUCOSE BLDC GLUCOMTR-MCNC: 270 MG/DL (ref 70–99)
LDH SERPL L TO P-CCNC: 345 U/L
M PROTEIN MFR SERPL ELPH: 6.9 G/DL (ref 6.4–8.2)
OSMOLALITY SERPL CALC.SUM OF ELEC: 302 MOSM/KG (ref 275–295)
POTASSIUM SERPL-SCNC: 4.6 MMOL/L (ref 3.5–5.1)
SODIUM SERPL-SCNC: 140 MMOL/L (ref 136–145)

## 2021-02-11 PROCEDURE — 99233 SBSQ HOSP IP/OBS HIGH 50: CPT | Performed by: HOSPITALIST

## 2021-02-11 PROCEDURE — 99232 SBSQ HOSP IP/OBS MODERATE 35: CPT | Performed by: PHYSICIAN ASSISTANT

## 2021-02-11 NOTE — PROGRESS NOTES
INFECTIOUS DISEASE PROGRESS NOTE  [facility] ID TELEMEDICINE PROGRESS NOTE    Merary Leak Patient Status:  Inpatient    1950 MRN [id number]   Location [facility] Attending Mar Dangelo MD   [facility] Day # 2 78year old female with a history of diabetes mellitus, hypothyroidism, who presented to [address] [place] [address] ED on  2/9 with worsening shortness of breath. Was exposed to her twin sister with COVID with symptoms starting on 1/30.  at home also sick with COVID-19.  O

## 2021-02-11 NOTE — PROGRESS NOTES
[place] [facility] - [facility]  Hospitalist Progress Note     Gogo Burangella Patient Status:  Inpatient      78year old CSN [id number]   Location 513/513-A Attending Radha Powers,  [facility] Day # 2 PCP Marta Bennett MD     ASSESSMENT/PLAN    A 138 140   K 3.2* 4.6  4.5 4.6    108 110   CO2 25.0 24.0 26.0   ALKPHO 90 74 71   AST 46* 31 28   ALT 40 31 28   BILT 0.3 0.4 0.3   TP 8.0 7.1 6.9     No results for input(s): PT, INR, PTT in the last 168 hours.     • dexamethasone  6 mg Oral Daily wi

## 2021-02-11 NOTE — PROGRESS NOTES
[facility]    Progress Note    Lupis Lieberman Patient Status:  Inpatient    1950 MRN [id number]   Location [facility] Attending Ant Hoyos MD   Hosp Day # 2 PCP Corinne Mckeon MD        Subjective:   Seen and exami  02/11/2021    CO2 26.0 02/11/2021     (H) 02/11/2021    CA 9.6 02/11/2021    ALB 2.7 (L) 02/11/2021    ALKPHO 71 02/11/2021    BILT 0.3 02/11/2021    TP 6.9 02/11/2021    AST 28 02/11/2021    ALT 28 02/11/2021    T4F 1.39 02/02/2019    TSH 0.

## 2021-02-11 NOTE — PROGRESS NOTES
Pt covid+ 2/4. Symptoms started 1/30  Currently on 5L NC SpO2 95%, afebrile. Inflammatory markers trending down. S/P CCP 2/9  Actemra On hold  RDV (started 2/9) day 3  Continue Lovenox daily and decadron PO. ID and Pulmonology following.    Trend inf

## 2021-02-12 LAB
ALBUMIN SERPL-MCNC: 2.8 G/DL (ref 3.4–5)
ALBUMIN/GLOB SERPL: 0.7 {RATIO} (ref 1–2)
ALP LIVER SERPL-CCNC: 69 U/L
ALT SERPL-CCNC: 28 U/L
ANION GAP SERPL CALC-SCNC: 5 MMOL/L (ref 0–18)
AST SERPL-CCNC: 26 U/L (ref 15–37)
BILIRUB SERPL-MCNC: 0.4 MG/DL (ref 0.1–2)
BUN BLD-MCNC: 32 MG/DL (ref 7–18)
BUN/CREAT SERPL: 36.4 (ref 10–20)
CALCIUM BLD-MCNC: 9.6 MG/DL (ref 8.5–10.1)
CHLORIDE SERPL-SCNC: 109 MMOL/L (ref 98–112)
CO2 SERPL-SCNC: 27 MMOL/L (ref 21–32)
CREAT BLD-MCNC: 0.88 MG/DL
CRP SERPL-MCNC: 3.02 MG/DL (ref ?–0.3)
D DIMER PPP FEU-MCNC: 0.53 UG/ML FEU (ref ?–0.68)
DEPRECATED HBV CORE AB SER IA-ACNC: 310.3 NG/ML
GLOBULIN PLAS-MCNC: 4.2 G/DL (ref 2.8–4.4)
GLUCOSE BLD-MCNC: 197 MG/DL (ref 70–99)
GLUCOSE BLDC GLUCOMTR-MCNC: 183 MG/DL (ref 70–99)
GLUCOSE BLDC GLUCOMTR-MCNC: 184 MG/DL (ref 70–99)
GLUCOSE BLDC GLUCOMTR-MCNC: 322 MG/DL (ref 70–99)
GLUCOSE BLDC GLUCOMTR-MCNC: 340 MG/DL (ref 70–99)
LDH SERPL L TO P-CCNC: 349 U/L
M PROTEIN MFR SERPL ELPH: 7 G/DL (ref 6.4–8.2)
OSMOLALITY SERPL CALC.SUM OF ELEC: 304 MOSM/KG (ref 275–295)
POTASSIUM SERPL-SCNC: 4.5 MMOL/L (ref 3.5–5.1)
SODIUM SERPL-SCNC: 141 MMOL/L (ref 136–145)

## 2021-02-12 PROCEDURE — 99232 SBSQ HOSP IP/OBS MODERATE 35: CPT | Performed by: INTERNAL MEDICINE

## 2021-02-12 PROCEDURE — 99232 SBSQ HOSP IP/OBS MODERATE 35: CPT | Performed by: HOSPITALIST

## 2021-02-12 NOTE — PLAN OF CARE
Alert oriented x 4, noticed that pt desaturates <80% with ambulation and  dry cough. increased oxygen up to  6 l NC.prn Robitussin given ,BS covered with scheduled insulin. ambulating in room. encouraged with incentive moris. c.dff order DC as per protocol . of CO2 retention  Outcome: Not Progressing     Problem: Diabetes/Glucose Control  Goal: Glucose maintained within prescribed range  Description: INTERVENTIONS:  - Monitor Blood Glucose as ordered  - Assess for signs and symptoms of hyperglycemia and hypogl

## 2021-02-12 NOTE — PROGRESS NOTES
Pt covid+ 2/4. Symptoms started 1/30  Currently on 4L NC SpO2 94%, afebrile. Inflammatory markers trending down. S/P CCP 2/9  Actemra On hold  RDV (started 2/9) day 4  Continue Lovenox daily and decadron PO. ID and Pulmonology following.    Trend inf

## 2021-02-12 NOTE — PROGRESS NOTES
[place] [facility] - [facility]  Hospitalist Progress Note     Phillip Maged Patient Status:  Inpatient      78year old CSN [id number]   Location 513/513-A Attending Aneudy Banks,  [facility] Day # 3 PCP Thomas Andrade MD     ASSESSMENT/PLAN    A 140 141   K 4.6  4.5 4.6 4.5    110 109   CO2 24.0 26.0 27.0   ALKPHO 74 71 69   AST 31 28 26   ALT 31 28 28   BILT 0.4 0.3 0.4   TP 7.1 6.9 7.0     No results for input(s): PT, INR, PTT in the last 168 hours.     • dexamethasone  6 mg Oral Daily with

## 2021-02-12 NOTE — PROGRESS NOTES
[facility]    Progress Note    Kaileykael Mondragonrico Patient Status:  Inpatient    1950 MRN [id number]   Location [facility] [facility] Attending Giovanni Miller MD   Hosp Day # 3 PCP Temitope Prince MD        Subjective:     Constitution .0 02/10/2021    CREATSERUM 0.88 02/12/2021    BUN 32 (H) 02/12/2021     02/12/2021    K 4.5 02/12/2021     02/12/2021    CO2 27.0 02/12/2021     (H) 02/12/2021    CA 9.6 02/12/2021    ALB 2.8 (L) 02/12/2021    ALKPHO 69 02/12/202

## 2021-02-13 ENCOUNTER — APPOINTMENT (OUTPATIENT)
Dept: GENERAL RADIOLOGY | Facility: HOSPITAL | Age: 71
DRG: 177 | End: 2021-02-13
Attending: HOSPITALIST
Payer: MEDICARE

## 2021-02-13 LAB
ALBUMIN SERPL-MCNC: 2.8 G/DL (ref 3.4–5)
ALBUMIN/GLOB SERPL: 0.7 {RATIO} (ref 1–2)
ALP LIVER SERPL-CCNC: 68 U/L
ALT SERPL-CCNC: 29 U/L
ANION GAP SERPL CALC-SCNC: 5 MMOL/L (ref 0–18)
AST SERPL-CCNC: 25 U/L (ref 15–37)
BILIRUB SERPL-MCNC: 0.4 MG/DL (ref 0.1–2)
BUN BLD-MCNC: 30 MG/DL (ref 7–18)
BUN/CREAT SERPL: 29.7 (ref 10–20)
CALCIUM BLD-MCNC: 9.6 MG/DL (ref 8.5–10.1)
CHLORIDE SERPL-SCNC: 107 MMOL/L (ref 98–112)
CO2 SERPL-SCNC: 28 MMOL/L (ref 21–32)
CREAT BLD-MCNC: 1.01 MG/DL
D DIMER PPP FEU-MCNC: 0.77 UG/ML FEU (ref ?–0.68)
GLOBULIN PLAS-MCNC: 4 G/DL (ref 2.8–4.4)
GLUCOSE BLD-MCNC: 226 MG/DL (ref 70–99)
GLUCOSE BLDC GLUCOMTR-MCNC: 225 MG/DL (ref 70–99)
GLUCOSE BLDC GLUCOMTR-MCNC: 264 MG/DL (ref 70–99)
GLUCOSE BLDC GLUCOMTR-MCNC: 309 MG/DL (ref 70–99)
GLUCOSE BLDC GLUCOMTR-MCNC: 360 MG/DL (ref 70–99)
M PROTEIN MFR SERPL ELPH: 6.8 G/DL (ref 6.4–8.2)
OSMOLALITY SERPL CALC.SUM OF ELEC: 303 MOSM/KG (ref 275–295)
POTASSIUM SERPL-SCNC: 4.1 MMOL/L (ref 3.5–5.1)
SODIUM SERPL-SCNC: 140 MMOL/L (ref 136–145)

## 2021-02-13 PROCEDURE — XW033H5 INTRODUCTION OF TOCILIZUMAB INTO PERIPHERAL VEIN, PERCUTANEOUS APPROACH, NEW TECHNOLOGY GROUP 5: ICD-10-PCS | Performed by: INTERNAL MEDICINE

## 2021-02-13 PROCEDURE — 99233 SBSQ HOSP IP/OBS HIGH 50: CPT | Performed by: HOSPITALIST

## 2021-02-13 PROCEDURE — 71045 X-RAY EXAM CHEST 1 VIEW: CPT | Performed by: HOSPITALIST

## 2021-02-13 PROCEDURE — 99232 SBSQ HOSP IP/OBS MODERATE 35: CPT | Performed by: INTERNAL MEDICINE

## 2021-02-13 NOTE — PLAN OF CARE
Alert oriented x 4,pt does desaturates low 70s with ambulation . increased o2 up to 8l.now  at 6l oxygen ,dry cough prn robitussin given [id number] covered with scheduled insulin. placed call light within reach.   Problem: Patient Centered Care  Goal: Patient pre oxygenation  Description: INTERVENTIONS:  - Assess for changes in respiratory status  - Assess for changes in mentation and behavior  - Position to facilitate oxygenation and minimize respiratory effort  - Oxygen supplementation based on oxygen saturation

## 2021-02-13 NOTE — PROGRESS NOTES
INFECTIOUS DISEASE PROGRESS NOTE  [facility] ID TELEMEDICINE PROGRESS NOTE    Zenobia Marques Patient Status:  Inpatient    1950 MRN [id number]   Location [address] [place] [address] Attending Lonnie Lopez MD   [facility] Day # 4 78year old female with a history of diabetes mellitus, hypothyroidism, who presented to [address] [place] [address] ED on  2/9 with worsening shortness of breath. Was exposed to her twin sister with COVID with symptoms starting on 1/30.  at home also sick with COVID-19.  O

## 2021-02-13 NOTE — PROGRESS NOTES
[place] [facility] - [facility]  Hospitalist Progress Note     Les Sung Patient Status:  Inpatient      78year old [facility] [id number]   Location 513/513-A Attending Bharathi Chambers,  [facility] Day # 4 PCP Bonita Benito MD     ASSESSMENT/PLAN    A GFRNAA 57* 68 57*   CA 9.6 9.6 9.6   ALB 2.7* 2.8* 2.8*    141 140   K 4.6 4.5 4.1    109 107   CO2 26.0 27.0 28.0   ALKPHO 71 69 68   AST 28 26 25   ALT 28 28 29   BILT 0.3 0.4 0.4   TP 6.9 7.0 6.8     No results for input(s): PT, INR, PTT i

## 2021-02-13 NOTE — PROGRESS NOTES
[facility]     Progress Note        [place] Jorge Patient Status:  Inpatient    1950 MRN [id number]   Location [address] [facility] Attending Kendrick Prather MD   Hosp Day # 4 PCP Vinita Fisher MD       Subjective:   Patient seen mL IVPB, 100 mg, Intravenous, Q24H    •  melatonin tab TABS 1 mg, 1 mg, Oral, Nightly    •  aspirin EC tab 81 mg, 81 mg, Oral, Daily    •  atorvastatin (LIPITOR) tab 10 mg, 10 mg, Oral, Nightly    •  dilTIAZem (DILACOR XR) 24 hr cap 360 mg, 360 mg, Oral, D

## 2021-02-14 LAB
ALBUMIN SERPL-MCNC: 2.7 G/DL (ref 3.4–5)
ALBUMIN/GLOB SERPL: 0.7 {RATIO} (ref 1–2)
ALP LIVER SERPL-CCNC: 69 U/L
ALT SERPL-CCNC: 30 U/L
ANION GAP SERPL CALC-SCNC: 2 MMOL/L (ref 0–18)
AST SERPL-CCNC: 24 U/L (ref 15–37)
BILIRUB SERPL-MCNC: 0.4 MG/DL (ref 0.1–2)
BUN BLD-MCNC: 28 MG/DL (ref 7–18)
BUN/CREAT SERPL: 30.8 (ref 10–20)
CALCIUM BLD-MCNC: 9.5 MG/DL (ref 8.5–10.1)
CHLORIDE SERPL-SCNC: 107 MMOL/L (ref 98–112)
CO2 SERPL-SCNC: 30 MMOL/L (ref 21–32)
CREAT BLD-MCNC: 0.91 MG/DL
GLOBULIN PLAS-MCNC: 3.9 G/DL (ref 2.8–4.4)
GLUCOSE BLD-MCNC: 287 MG/DL (ref 70–99)
GLUCOSE BLDC GLUCOMTR-MCNC: 243 MG/DL (ref 70–99)
GLUCOSE BLDC GLUCOMTR-MCNC: 305 MG/DL (ref 70–99)
GLUCOSE BLDC GLUCOMTR-MCNC: 332 MG/DL (ref 70–99)
GLUCOSE BLDC GLUCOMTR-MCNC: 401 MG/DL (ref 70–99)
M PROTEIN MFR SERPL ELPH: 6.6 G/DL (ref 6.4–8.2)
OSMOLALITY SERPL CALC.SUM OF ELEC: 304 MOSM/KG (ref 275–295)
POTASSIUM SERPL-SCNC: 4.5 MMOL/L (ref 3.5–5.1)
SODIUM SERPL-SCNC: 139 MMOL/L (ref 136–145)

## 2021-02-14 PROCEDURE — 99232 SBSQ HOSP IP/OBS MODERATE 35: CPT | Performed by: INTERNAL MEDICINE

## 2021-02-14 PROCEDURE — 99233 SBSQ HOSP IP/OBS HIGH 50: CPT | Performed by: HOSPITALIST

## 2021-02-14 RX ORDER — ZOLPIDEM TARTRATE 5 MG/1
5 TABLET ORAL NIGHTLY PRN
Status: DISCONTINUED | OUTPATIENT
Start: 2021-02-14 | End: 2021-02-18

## 2021-02-14 NOTE — PLAN OF CARE
Problem: RESPIRATORY - ADULT  Goal: Achieves optimal ventilation and oxygenation  Description: INTERVENTIONS:  - Assess for changes in respiratory status  - Assess for changes in mentation and behavior  - Position to facilitate oxygenation and minimize r effects  - Notify MD/LIP if interventions unsuccessful or patient reports new pain  - Anticipate increased pain with activity and pre-medicate as appropriate  Outcome: Progressing     Problem: Diabetes/Glucose Control  Goal: Glucose maintained within presc

## 2021-02-14 NOTE — PROGRESS NOTES
[facility]     Progress Note        Ankush Akhtar Patient Status:  Inpatient    1950 MRN [id number]   Location [address] [facility] Attending Thelma Joseph MD   Hosp Day # 5 PCP Ofelia Hawk MD       Subjective:   Patient seen Oral, Daily    •  atorvastatin (LIPITOR) tab 10 mg, 10 mg, Oral, Nightly    •  dilTIAZem (DILACOR XR) 24 hr cap 360 mg, 360 mg, Oral, Daily    •  glimepiride (AMARYL) tab 4 mg, 4 mg, Oral, Daily with breakfast    •  Levothyroxine Sodium tab 88 mcg, 88 mcg, 4[address] Evaristo Beal

## 2021-02-14 NOTE — PROGRESS NOTES
INFECTIOUS DISEASE PROGRESS NOTE  [facility] TELEMEDICINE PROGRESS NOTE    Lupis Dominguezbush Patient Status:  Inpatient    1950 MRN [id number]   Location [address] [place] [address] Attending Ant Hoyos MD   [facility] Day # 5 78year old female with a history of diabetes mellitus, hypothyroidism, who presented to [facility] ED on  2/9 with worsening shortness of breath. Was exposed to her twin sister with COVID with symptoms starting on 1/30.  at home also sick with COVID-19.  O

## 2021-02-14 NOTE — PROGRESS NOTES
[place] [facility] - [facility]  Hospitalist Progress Note     Merary Leak Patient Status:  Inpatient      78year old CSN [id number]   Location 513/513-A Attending Mar Dangelo, [facility] Se Day # 5 PCP Julio Cesar Mccain MD     ASSESSMENT/PLAN    A 226* 287*   BUN 32* 30* 28*   CREATSERUM 0.88 1.01 0.91   GFRAA 78 66 75   GFRNAA 68 57* 65   CA 9.6 9.6 9.5   ALB 2.8* 2.8* 2.7*    140 139   K 4.5 4.1 4.5    107 107   CO2 27.0 28.0 30.0   ALKPHO 69 68 69   AST 26 25 24   ALT 28 29 30   BILT

## 2021-02-15 LAB
GLUCOSE BLDC GLUCOMTR-MCNC: 233 MG/DL (ref 70–99)
GLUCOSE BLDC GLUCOMTR-MCNC: 284 MG/DL (ref 70–99)
GLUCOSE BLDC GLUCOMTR-MCNC: 317 MG/DL (ref 70–99)
GLUCOSE BLDC GLUCOMTR-MCNC: 349 MG/DL (ref 70–99)

## 2021-02-15 PROCEDURE — 99232 SBSQ HOSP IP/OBS MODERATE 35: CPT | Performed by: INTERNAL MEDICINE

## 2021-02-15 PROCEDURE — 99233 SBSQ HOSP IP/OBS HIGH 50: CPT | Performed by: HOSPITALIST

## 2021-02-15 RX ORDER — ECHINACEA PURPUREA EXTRACT 125 MG
1 TABLET ORAL
Status: DISCONTINUED | OUTPATIENT
Start: 2021-02-15 | End: 2021-02-18

## 2021-02-15 NOTE — PROGRESS NOTES
Pt covid+ 2/4. Symptoms started 1/30  Currently on 6L NC SpO2 90%, afebrile. De-sat with minimal movement. Pt encouraged to prone as tolerated.      S/P CCP 2/9  S/P Actemra 2/13  S/P 5 day course of RDV (2/9 - 2/13)  Continue Lovenox daily and decadron P

## 2021-02-15 NOTE — PLAN OF CARE
Problem: RESPIRATORY - ADULT  Goal: Achieves optimal ventilation and oxygenation  Description: INTERVENTIONS:  - Assess for changes in respiratory status  - Assess for changes in mentation and behavior  - Position to facilitate oxygenation and minimize r effects  - Notify MD/LIP if interventions unsuccessful or patient reports new pain  - Anticipate increased pain with activity and pre-medicate as appropriate  Outcome: Progressing

## 2021-02-15 NOTE — PROGRESS NOTES
[facility]    Progress Note    Virgen  Patient Status:  Inpatient    1950 MRN [id number]   Location [address] [facility] Attending Renetta Estrada MD   Hosp Day # 6 PCP Tatyana Roebrson MD        Subjective:     Constitution 287 (H) 02/14/2021    CA 9.5 02/14/2021    ALB 2.7 (L) 02/14/2021    ALKPHO 69 02/14/2021    BILT 0.4 02/14/2021    TP 6.6 02/14/2021    AST 24 02/14/2021    ALT 30 02/14/2021    T4F 1.39 02/02/2019    TSH 0.732 11/11/2020    DDIMER 0.77 (H) 02/13/2021

## 2021-02-15 NOTE — PROGRESS NOTES
[place] [facility] - [facility]  Hospitalist Progress Note     [facility]:  [facility]      78year old CSN [id number]   Location 513/513-A Attending Khari Hale,  [facility] Se Day # 6 PCP Santiago Chou MD     ASSESSMENT/PLAN    A * 226* 287*   BUN 32* 30* 28*   CREATSERUM 0.88 1.01 0.91   GFRAA 78 66 75   GFRNAA 68 57* 65   CA 9.6 9.6 9.5   ALB 2.8* 2.8* 2.7*    140 139   K 4.5 4.1 4.5    107 107   CO2 27.0 28.0 30.0   ALKPHO 69 68 69   AST 26 25 24   ALT 28 29

## 2021-02-15 NOTE — PROGRESS NOTES
INFECTIOUS DISEASE PROGRESS NOTE  [facility] - [facility] ID TELEMEDICINE PROGRESS NOTE    Teresita Mata Patient Status:  Inpatient    1950 MRN [id number]   Location [address] [place] [address] Attending Kishore Waller MD   [facility] Day # 6 78year old female with a history of diabetes mellitus, hypothyroidism, who presented to [facility] ED on  2/9 with worsening shortness of breath. Was exposed to her twin sister with COVID with symptoms starting on 1/30.  at home also sick with COVID-19.  O

## 2021-02-16 LAB
BASOPHILS # BLD: 0 X10(3) UL (ref 0–0.2)
BASOPHILS NFR BLD: 0 %
DEPRECATED RDW RBC AUTO: 47 FL (ref 35.1–46.3)
EOSINOPHIL # BLD: 0 X10(3) UL (ref 0–0.7)
EOSINOPHIL NFR BLD: 0 %
ERYTHROCYTE [DISTWIDTH] IN BLOOD BY AUTOMATED COUNT: 15.6 % (ref 11–15)
GLUCOSE BLDC GLUCOMTR-MCNC: 263 MG/DL (ref 70–99)
GLUCOSE BLDC GLUCOMTR-MCNC: 274 MG/DL (ref 70–99)
GLUCOSE BLDC GLUCOMTR-MCNC: 330 MG/DL (ref 70–99)
GLUCOSE BLDC GLUCOMTR-MCNC: 381 MG/DL (ref 70–99)
HCT VFR BLD AUTO: 33.9 %
HGB BLD-MCNC: 11.2 G/DL
LYMPHOCYTES NFR BLD: 0.78 X10(3) UL (ref 1–4)
LYMPHOCYTES NFR BLD: 9 %
MCH RBC QN AUTO: 27.3 PG (ref 26–34)
MCHC RBC AUTO-ENTMCNC: 33 G/DL (ref 31–37)
MCV RBC AUTO: 82.7 FL
METAMYELOCYTES # BLD: 0.09 X10(3) UL
METAMYELOCYTES NFR BLD: 1 %
MONOCYTES # BLD: 0.7 X10(3) UL (ref 0.1–1)
MONOCYTES NFR BLD: 8 %
NEUTROPHILS # BLD AUTO: 6.53 X10 (3) UL (ref 1.5–7.7)
NEUTROPHILS NFR BLD: 82 %
NEUTS HYPERSEG # BLD: 7.13 X10(3) UL (ref 1.5–7.7)
PLATELET # BLD AUTO: 474 10(3)UL (ref 150–450)
PLATELET MORPHOLOGY: NORMAL
RBC # BLD AUTO: 4.1 X10(6)UL
TOTAL CELLS COUNTED: 100
WBC # BLD AUTO: 8.7 X10(3) UL (ref 4–11)

## 2021-02-16 PROCEDURE — 99232 SBSQ HOSP IP/OBS MODERATE 35: CPT | Performed by: INTERNAL MEDICINE

## 2021-02-16 PROCEDURE — 99232 SBSQ HOSP IP/OBS MODERATE 35: CPT | Performed by: HOSPITALIST

## 2021-02-16 NOTE — PROGRESS NOTES
[facility]    Progress Note    Hans Villegas Patient Status:  Inpatient    1950 MRN [id number]   Location [address] [facility] Attending Rodolfo Webber MD   Hosp Day # 7 PCP Alexa Handley MD        Subjective:     Constitution HCT 33.9 (L) 02/16/2021    .0 (H) 02/16/2021    CREATSERUM 0.91 02/14/2021    BUN 28 (H) 02/14/2021     02/14/2021    K 4.5 02/14/2021     02/14/2021    CO2 30.0 02/14/2021     (H) 02/14/2021    CA 9.5 02/14/2021    ALB 2.7 (L) 02

## 2021-02-16 NOTE — PROGRESS NOTES
[place] [facility] - [facility]  Hospitalist Progress Note     Salvador Maddenam Patient Status:  Inpatient      78year old [facility] [id number]   Location 513/513-A Attending Kendrick Prather,  [facility] Se Day # 7 PCP Vinita Fisher MD     ASSESSMENT/PLAN    A 474.0*     Recent Labs   Lab 02/12/21  0456 02/13/21  0458 02/14/21  0531   * 226* 287*   BUN 32* 30* 28*   CREATSERUM 0.88 1.01 0.91   GFRAA 78 66 75   GFRNAA 68 57* 65   CA 9.6 9.6 9.5   ALB 2.8* 2.8* 2.7*    140 139   K 4.5 4.1 4.5

## 2021-02-16 NOTE — PLAN OF CARE
Problem: Patient Centered Care  Goal: Patient preferences are identified and integrated in the patient's plan of care  Description: Interventions:  - What would you like us to know as we care for you?  Lives with   - Provide timely, complete, and a ABGs  - Provide Smoking Cessation handout, if applicable  - Encourage broncho-pulmonary hygiene including cough, deep breathe, Incentive Spirometry  - Assess the need for suctioning and perform as needed  - Assess and instruct to report SOB or any respirat elevated glucometers. Able to verbalize needs, call light in reach, continue to monitor.

## 2021-02-16 NOTE — PROGRESS NOTES
Pt covid+ 2/4. Symptoms started 1/30  Currently on 5L NC SpO2 91%, afebrile. De-sat with minimal movement. Pt encouraged to prone as tolerated.      S/P CCP 2/9  S/P Actemra 2/13  S/P 5 day course of RDV (2/9 - 2/13)  Continue Lovenox daily and decadron P

## 2021-02-17 LAB
GLUCOSE BLDC GLUCOMTR-MCNC: 231 MG/DL (ref 70–99)
GLUCOSE BLDC GLUCOMTR-MCNC: 234 MG/DL (ref 70–99)
GLUCOSE BLDC GLUCOMTR-MCNC: 291 MG/DL (ref 70–99)
GLUCOSE BLDC GLUCOMTR-MCNC: 429 MG/DL (ref 70–99)

## 2021-02-17 PROCEDURE — 99233 SBSQ HOSP IP/OBS HIGH 50: CPT | Performed by: HOSPITALIST

## 2021-02-17 PROCEDURE — 99232 SBSQ HOSP IP/OBS MODERATE 35: CPT | Performed by: INTERNAL MEDICINE

## 2021-02-17 RX ORDER — DEXAMETHASONE 4 MG/1
4 TABLET ORAL
Status: DISCONTINUED | OUTPATIENT
Start: 2021-02-18 | End: 2021-02-18

## 2021-02-17 NOTE — PROGRESS NOTES
INFECTIOUS DISEASE PROGRESS NOTE  [facility] ID TELEMEDICINE PROGRESS NOTE    formerly [facility] Room Patient Status:  Inpatient    1950 MRN [id number]   Location [address] [place] [address] Attending Khari Hale MD   [facility] Day # 8 Acute respiratory failure with hypoxia (HCC)      ASSESSMENT:    Antibiotics: OFF     78year old female with a history of diabetes mellitus, hypothyroidism, who presented to [facility] ED on  2/9 with worsening shortness of breath.  Was exposed to her twin sis

## 2021-02-17 NOTE — PROGRESS NOTES
[facility] HOSP - [facility]    Progress Note    Alex Sung Patient Status:  Inpatient    1950 MRN [id number]   Location [facility]5W Attending Gt Fairbanks MD   Hosp Day # 8 PCP Bonita Benito MD        Subjective:     Yousif Bliss (L) 02/16/2021    HCT 33.9 (L) 02/16/2021    .0 (H) 02/16/2021    CREATSERUM 0.91 02/14/2021    BUN 28 (H) 02/14/2021     02/14/2021    K 4.5 02/14/2021     02/14/2021    CO2 30.0 02/14/2021     (H) 02/14/2021    CA 9.5 02/14/2021

## 2021-02-17 NOTE — CM/SW NOTE
Care Progression Note:  Active Acute Medical Issue:   77 y/o female with COVID-19 pneumonia, currently down 3.5LHFNC, from 5-6L HFNC yesterday, slowly improving    Other Contributing Medical Factors/Dx. :   DM 2, HTN, hypothyroid, HL, fatty liver    Length

## 2021-02-17 NOTE — PROGRESS NOTES
Pt covid+ 2/4. Symptoms started 1/30  Currently on 3.5L NC SpO2 96%, afebrile. Pt continues to de-sat with activity. S/P CCP 2/9  S/P Actemra 2/13  S/P 5 day course of RDV (2/9 - 2/13)  Continue Lovenox daily and decadron PO. Pulmonology following.

## 2021-02-17 NOTE — CM/SW NOTE
PER nursing rounds, dc planning with home O2. CM spoke w/Kylee/HME/liaison of home O2.    RN to to O2 walk test.    O2 order pending walk test results. Possible discharge home today.     CM/SW to remain available for support and/or discharge planning

## 2021-02-17 NOTE — DIABETES ED
[facility] HOSP - [facility]    Diabetes Education  Note    Aleisha Kristin Patient Status:  Inpatient   1950 MRN [id number]  Location [facility]5W Attending Pattie Feng MD  Hosp Day # 8 PCP Yasemin Tran MD      Labs:    [facility]

## 2021-02-17 NOTE — PLAN OF CARE
No acute changes. 3.5 L nasal cannula. Safety precautions maintained. Robitussin PRN given. Patient independent with ADL's.    Problem: Patient Centered Care  Goal: Patient preferences are identified and integrated in the patient's plan of care  Description Position to facilitate oxygenation and minimize respiratory effort  - Oxygen supplementation based on oxygen saturation or ABGs  - Provide Smoking Cessation handout, if applicable  - Encourage broncho-pulmonary hygiene including cough, deep breathe, Incent

## 2021-02-18 ENCOUNTER — PATIENT MESSAGE (OUTPATIENT)
Dept: ENDOCRINOLOGY CLINIC | Facility: CLINIC | Age: 71
End: 2021-02-18

## 2021-02-18 VITALS
WEIGHT: 147 LBS | BODY MASS INDEX: 23.63 KG/M2 | DIASTOLIC BLOOD PRESSURE: 55 MMHG | TEMPERATURE: 98 F | OXYGEN SATURATION: 97 % | RESPIRATION RATE: 18 BRPM | HEIGHT: 66 IN | HEART RATE: 82 BPM | SYSTOLIC BLOOD PRESSURE: 124 MMHG

## 2021-02-18 LAB
ALBUMIN SERPL-MCNC: 3 G/DL (ref 3.4–5)
ANION GAP SERPL CALC-SCNC: 4 MMOL/L (ref 0–18)
BASOPHILS # BLD AUTO: 0.02 X10(3) UL (ref 0–0.2)
BASOPHILS NFR BLD AUTO: 0.2 %
BUN BLD-MCNC: 37 MG/DL (ref 7–18)
BUN/CREAT SERPL: 40.2 (ref 10–20)
CALCIUM BLD-MCNC: 10 MG/DL (ref 8.5–10.1)
CHLORIDE SERPL-SCNC: 102 MMOL/L (ref 98–112)
CO2 SERPL-SCNC: 31 MMOL/L (ref 21–32)
CREAT BLD-MCNC: 0.92 MG/DL
CRP SERPL-MCNC: <0.29 MG/DL (ref ?–0.3)
D DIMER PPP FEU-MCNC: 0.48 UG/ML FEU (ref ?–0.68)
DEPRECATED RDW RBC AUTO: 47.7 FL (ref 35.1–46.3)
EOSINOPHIL # BLD AUTO: 0 X10(3) UL (ref 0–0.7)
EOSINOPHIL NFR BLD AUTO: 0 %
ERYTHROCYTE [DISTWIDTH] IN BLOOD BY AUTOMATED COUNT: 15.9 % (ref 11–15)
GLUCOSE BLD-MCNC: 278 MG/DL (ref 70–99)
GLUCOSE BLDC GLUCOMTR-MCNC: 209 MG/DL (ref 70–99)
GLUCOSE BLDC GLUCOMTR-MCNC: 213 MG/DL (ref 70–99)
GLUCOSE BLDC GLUCOMTR-MCNC: 335 MG/DL (ref 70–99)
HAV IGM SER QL: 2.3 MG/DL (ref 1.6–2.6)
HCT VFR BLD AUTO: 34.9 %
HGB BLD-MCNC: 11.5 G/DL
IMM GRANULOCYTES # BLD AUTO: 0.19 X10(3) UL (ref 0–1)
IMM GRANULOCYTES NFR BLD: 1.7 %
LYMPHOCYTES # BLD AUTO: 0.91 X10(3) UL (ref 1–4)
LYMPHOCYTES NFR BLD AUTO: 8.1 %
MCH RBC QN AUTO: 27.3 PG (ref 26–34)
MCHC RBC AUTO-ENTMCNC: 33 G/DL (ref 31–37)
MCV RBC AUTO: 82.7 FL
MONOCYTES # BLD AUTO: 0.99 X10(3) UL (ref 0.1–1)
MONOCYTES NFR BLD AUTO: 8.8 %
NEUTROPHILS # BLD AUTO: 9.18 X10 (3) UL (ref 1.5–7.7)
NEUTROPHILS # BLD AUTO: 9.18 X10(3) UL (ref 1.5–7.7)
NEUTROPHILS NFR BLD AUTO: 81.2 %
OSMOLALITY SERPL CALC.SUM OF ELEC: 303 MOSM/KG (ref 275–295)
PHOSPHATE SERPL-MCNC: 3.8 MG/DL (ref 2.5–4.9)
PLATELET # BLD AUTO: 439 10(3)UL (ref 150–450)
POTASSIUM SERPL-SCNC: 4.8 MMOL/L (ref 3.5–5.1)
RBC # BLD AUTO: 4.22 X10(6)UL
SODIUM SERPL-SCNC: 137 MMOL/L (ref 136–145)
WBC # BLD AUTO: 11.3 X10(3) UL (ref 4–11)

## 2021-02-18 PROCEDURE — 99232 SBSQ HOSP IP/OBS MODERATE 35: CPT | Performed by: INTERNAL MEDICINE

## 2021-02-18 PROCEDURE — 99239 HOSP IP/OBS DSCHRG MGMT >30: CPT | Performed by: HOSPITALIST

## 2021-02-18 RX ORDER — BENZONATATE 100 MG/1
100 CAPSULE ORAL 3 TIMES DAILY PRN
Qty: 30 CAPSULE | Refills: 0 | Status: SHIPPED | OUTPATIENT
Start: 2021-02-18 | End: 2021-03-04

## 2021-02-18 RX ORDER — GLIMEPIRIDE 4 MG/1
4 TABLET ORAL
Status: SHIPPED | COMMUNITY
Start: 2021-02-18 | End: 2021-03-25

## 2021-02-18 RX ORDER — DEXAMETHASONE 2 MG/1
4 TABLET ORAL
Qty: 4 TABLET | Refills: 0 | Status: SHIPPED | OUTPATIENT
Start: 2021-02-18 | End: 2021-02-18

## 2021-02-18 RX ORDER — CODEINE PHOSPHATE AND GUAIFENESIN 10; 100 MG/5ML; MG/5ML
5 SOLUTION ORAL EVERY 4 HOURS PRN
Qty: 180 ML | Refills: 0 | Status: SHIPPED | OUTPATIENT
Start: 2021-02-18 | End: 2021-03-04

## 2021-02-18 NOTE — PROGRESS NOTES
[facility]    Progress Note      Assessment and Plan:     1. Coronavirus pneumonitis–doing well clinically. Recommendations: Okay to home with short Decadron taper and follow-up as needed.     2.  DVT prophylaxis–Lovenox while in hospita

## 2021-02-18 NOTE — PLAN OF CARE
Problem: Patient Centered Care  Goal: Patient preferences are identified and integrated in the patient's plan of care  Description: Interventions:  - What would you like us to know as we care for you?  Lives with   - Provide timely, complete, and a Smoking Cessation handout, if applicable  - Encourage broncho-pulmonary hygiene including cough, deep breathe, Incentive Spirometry  - Assess the need for suctioning and perform as needed  - Assess and instruct to report SOB or any respiratory difficulty

## 2021-02-18 NOTE — PLAN OF CARE
Exercise Walk test: Patient laying in bed 97% on 2L per Nasal cannula. Sitting up on 2L per Nasal Cannula patient saturating at 88%. Patient ambulating on 2L per Nasal cannula was at 87%. On room air patient desaturated while ambulating to 84%.  While sitti

## 2021-02-18 NOTE — CM/SW NOTE
SYED was notified the pt. Will be discharging home today 2/18. Walk test for O2 complete, MD order entered for O2 has been entered and cosigned. Anh is aware of discharge today and will bring a portable O2 tank to the pt's nurse prior to discharge.

## 2021-02-18 NOTE — PLAN OF CARE
MD aware of 429 blood sugar. Max sliding scale given per Dr. Mitzy Nielsen orders. 3 L nasal cannula. Safety precautions maintained.    Problem: Patient Centered Care  Goal: Patient preferences are identified and integrated in the patient's plan of care  Danni Siegel Position to facilitate oxygenation and minimize respiratory effort  - Oxygen supplementation based on oxygen saturation or ABGs  - Provide Smoking Cessation handout, if applicable  - Encourage broncho-pulmonary hygiene including cough, deep breathe, Incent

## 2021-02-18 NOTE — DIETARY NOTE
Nutrition Re-screen    Pt seen by RD d/t LOS. Pt not at nutrition risk. PO intake >75%. Pt reports very good appetite. Likes Glucerna but not vanilla. No n/v reported per pt/chart review. Skin intact. Will follow up per protocol and monitor as needed.

## 2021-02-18 NOTE — PROGRESS NOTES
[place] [facility] - [facility]  Hospitalist Progress Note     Esperanzaifeanyi Goinsh Patient Status:  Inpatient      78year old CSN [id number]   Location 513/513-A Attending Ant Hoyos,  [facility] Se Day # 8 PCP Corinne Mckeon MD     ASSESSMENT/PLAN    A MCV 82.7   MCH 27.3   MCHC 33.0   RDW 15.6*   NEPRELIM 6.53   WBC 8.7   .0*     Recent Labs   Lab 02/12/21  0456 02/13/21  0458 02/14/21  0531   * 226* 287*   BUN 32* 30* 28*   CREATSERUM 0.88 1.01 0.91   GFRAA 78 66 75   GFRNAA 68 57* 65

## 2021-02-18 NOTE — DISCHARGE SUMMARY
[facility] HOSPITALIST  DISCHARGE SUMMARY     Zenobia Marques Patient Status:  Inpatient    1950 MRN [id number]   Location [facility]5W Attending Gabriele Smith MD   Hosp Day # 9 PCP Marco Simmons MD     Date of Admission: 2021  D many hospitalized  -Status post convalescent plasma  -Decadron  -Lovenox  -Remdesivir  -Supplemental oxygen, titrate down as tolerated     Acute hypoxemic respiratory failure.  -See above     Other problems  Type 2 diabetes--> diabetic teaching, will need 341-360 mg/dL Call physician if blood glucose is greater than 360 mg/dL for 2 days in a row   Quantity: 2 pen  Refills: 1        CHANGE how you take these medications      Instructions Prescription details   glimepiride 4 MG Tabs  Commonly known as: AMARYL Refills: 0        STOP taking these medications    hydrochlorothiazide 12.5 MG Tabs  Commonly known as: HYDRODIURIL              Where to Get Your Medications      These medications were sent to [place] [id number] [address]

## 2021-02-19 ENCOUNTER — PATIENT OUTREACH (OUTPATIENT)
Dept: CASE MANAGEMENT | Age: 71
End: 2021-02-19

## 2021-02-19 ENCOUNTER — TELEPHONE (OUTPATIENT)
Dept: INTERNAL MEDICINE CLINIC | Facility: CLINIC | Age: 71
End: 2021-02-19

## 2021-02-19 DIAGNOSIS — Z02.9 ENCOUNTERS FOR UNSPECIFIED ADMINISTRATIVE PURPOSE: ICD-10-CM

## 2021-02-19 PROCEDURE — 1111F DSCHRG MED/CURRENT MED MERGE: CPT

## 2021-02-19 NOTE — TELEPHONE ENCOUNTER
Pt has an appointment with Julita Cornejo on 2/25 for a phone visit. She needs a TCM. Please change it to an in-person TCM with me for that same day. A phone visit will not be adequate since I will need to listen to her lungs.

## 2021-02-19 NOTE — PROGRESS NOTES
Initial Post Discharge Follow Up   Discharge Date: 2/18/21  Contact Date: 2/19/2021    Consent Verification:  Assessment Completed With: Patient  HIPAA Verified?   Yes    Discharge Dx:    COVID-19 pneumonia     General:   • How have you been since your d every morning before breakfast. TAKE 1 TABLET(4 MG) BY MOUTH DAILY     • Insulin Aspart Pen 100 UNIT/ML Subcutaneous Solution Pen-injector Inject 1-11 Units into the skin TID CC and HS.  CORRECTION FACTOR - HIGH DOSE DO NOT HOLD OR ALTER INSULIN DOSE WITHOU DIZZINESS 40 tablet 2   • Multiple Vitamins-Minerals (HAIR/SKIN/NAILS) Oral Tab Take by mouth. • Cholecalciferol (VITAMIN D) 1000 UNITS Oral Cap Take 1,000 mg by mouth daily. • aspirin 81 MG Oral Tab Take  by mouth.  take 1 tablet (81MG)  by oral ([facility])    You have been scheduled for a Virtual Telephone visit with your provider. Please be available at your phone so that your physician can contact you, and be prepared with any questions or concerns.   You may be bill hospital?  excellent    Interventions by NCM:  All d/c instructions reviewed with pt. Reviewed when to call MD vs when to go to ER/call 911.   The patient denies any cough, sore throat, fever (over 100.4), nausea, vomiting, diarrhea, loss of taste/smell, o

## 2021-02-19 NOTE — TELEPHONE ENCOUNTER
From: Sahil Delong  To:  Willi Benites MD  Sent: 2/18/2021 6:14 AM CST  Subject: Non-Urgent Medical Question    Good morning Dr Alfred Jhaveri I have been meaning to touch base with you the last few weeks but have been sitting at [facility] since

## 2021-02-19 NOTE — PAYOR COMM NOTE
--------------  DISCHARGE REVIEW    Corazon Masseyr MA [facility]  Subscriber #:  [id number]  Authorization Number: [id number]    Admit date: 2/9/21  Admit time:  7916  Discharge Date: 2/18/2021  6:57 PM     Admitting Physician: Mamie Campoverde MD  Attending Oscari 0.54.  Blood cultures were obtained. Chemistry and other inflammatory markers are still pending. Started on dexamethasone, remdesivir. Blood obtained for type and screen. She will be admitted to the hospital for further management.         Brief Synopsi UNIT/ML Sopn  Commonly known as: NOVOLOG      Inject 1-11 Units into the skin TID CC and HS.  CORRECTION FACTOR - HIGH DOSE DO NOT HOLD OR ALTER INSULIN DOSE WITHOUT A PHYSICIAN ORDER Give 1 unit for blood glucose 140-160 mg/dL Give 2 units for blood glucos DIZZINESS   Quantity: 40 tablet  Refills: 2     metFORMIN HCl  MG Tb24  Commonly known as: GLUCOPHAGE-XR      TAKE 2 TABLETS BY MOUTH TWICE DAILY WITH MEALS   Quantity: 360 tablet  Refills: 1     Pantoprazole Sodium 20 MG Tbec  Commonly known as: PRO extremities. Extremities: No edema.   -----------------------------------------------------------------------------------------------  PATIENT DISCHARGE INSTRUCTIONS: See electronic chart      Hospital Discharge Diagnoses: Covid pneumonia    Lace+ Score: 7

## 2021-02-19 NOTE — PROGRESS NOTES
Home Monitoring Condition Update    Covid19+ test date: 2/4/2021       Consent Verification:  Assessment Completed With: Patient  HIPAA Verified?   Yes    COVID-19 HOME MONITORING 2/19/2021   Temperature (No Data)   SPO2 96   Pulse 76   Pulse taken from P have tested positive for COVID-19.       The patient was also directed to continue to isolate away from other household members when possible and stay completely isolated from the general public 3 days after symptoms resolve or 10 days total (whichever is l

## 2021-02-19 NOTE — TELEPHONE ENCOUNTER
Please reschedule the pt with me for an in-person TCM. I do not think a phone visit will be adequate.

## 2021-02-22 ENCOUNTER — VIRTUAL PHONE E/M (OUTPATIENT)
Dept: CARDIOLOGY CLINIC | Facility: HOSPITAL | Age: 71
End: 2021-02-22
Attending: NURSE PRACTITIONER
Payer: MEDICARE

## 2021-02-22 DIAGNOSIS — I10 ESSENTIAL HYPERTENSION: ICD-10-CM

## 2021-02-22 DIAGNOSIS — U07.1 PNEUMONIA DUE TO COVID-19 VIRUS: Primary | ICD-10-CM

## 2021-02-22 DIAGNOSIS — J12.82 PNEUMONIA DUE TO COVID-19 VIRUS: Primary | ICD-10-CM

## 2021-02-22 PROCEDURE — 99443 PR TELEPHONE EVAL AND MGNT EST PATIENT 21-30 MIN MEDICAL DISCUSSION: CPT | Performed by: NURSE PRACTITIONER

## 2021-02-22 NOTE — PATIENT INSTRUCTIONS
Use your incentive spirometer 4 sets of 10 daily     Have influenza vaccine if appropriate     Stay hydrated drinking 64-96 oz per day     Wash your hands frequently and cover your nose/mouth with coughing or sneezing.      Call with increased cough or fa

## 2021-02-22 NOTE — PROGRESS NOTES
Telephone visit. Patient consents to phone visit. Hospital follow up phone call in lieu of [facility] visit due to Aimee Aguayo.      Hospitalized 2/9-2/18: admitted for increasing shortness of breath and hypoxia, pulse ox was 84% on room air, started on high-flow rashaun 19  -Denies f/c/n/v/d; dry cough is worse  -Completed decadron on 2/21  -Currently on 3L/NC O2 with O2 Sats between 88 and 95%  -IS 4 times daily 10 times each;  Able to pull 1200 ml  -Keep well hydrated 64-96 oz per day  - Discussed COVID vaccination  - F/

## 2021-02-23 ENCOUNTER — PATIENT OUTREACH (OUTPATIENT)
Dept: CASE MANAGEMENT | Age: 71
End: 2021-02-23

## 2021-02-23 NOTE — PROGRESS NOTES
Home Monitoring Condition Update    Covid19+ test date: 2/4/2021      Consent Verification:  Assessment Completed With: Patient  HIPAA Verified?   Yes    COVID-19 HOME MONITORING 2/23/2021   Temperature (No Data)   Reading From Mouth   SPO2 98   Pulse - Employee Health department or Manager when they have tested positive for COVID-19.       The patient was also directed to continue to isolate away from other household members when possible and stay completely isolated from the general public 3 days after s

## 2021-02-24 ENCOUNTER — PATIENT OUTREACH (OUTPATIENT)
Dept: CASE MANAGEMENT | Age: 71
End: 2021-02-24

## 2021-02-24 NOTE — PROGRESS NOTES
Home Monitoring Condition Update    Covid19+ test date: 2/4/2021      Consent Verification:  Assessment Completed With: Patient  HIPAA Verified?   Yes    COVID-19 HOME MONITORING 2/24/2021   Temperature (No Data)   Reading From -   SPO2 94   Pulse -   Pul to inform their Employee Health department or Manager when they have tested positive for COVID-19.       The patient was also directed to continue to isolate away from other household members when possible and stay completely isolated from the general publi

## 2021-02-25 ENCOUNTER — TELEPHONE (OUTPATIENT)
Dept: CASE MANAGEMENT | Age: 71
End: 2021-02-25

## 2021-02-25 ENCOUNTER — OFFICE VISIT (OUTPATIENT)
Dept: INTERNAL MEDICINE CLINIC | Facility: CLINIC | Age: 71
End: 2021-02-25
Payer: MEDICARE

## 2021-02-25 VITALS
WEIGHT: 136.81 LBS | HEART RATE: 82 BPM | DIASTOLIC BLOOD PRESSURE: 60 MMHG | HEIGHT: 66 IN | BODY MASS INDEX: 21.99 KG/M2 | SYSTOLIC BLOOD PRESSURE: 106 MMHG

## 2021-02-25 DIAGNOSIS — D64.9 ANEMIA, UNSPECIFIED TYPE: ICD-10-CM

## 2021-02-25 DIAGNOSIS — E11.9 CONTROLLED TYPE 2 DIABETES MELLITUS WITHOUT COMPLICATION, WITHOUT LONG-TERM CURRENT USE OF INSULIN (HCC): ICD-10-CM

## 2021-02-25 DIAGNOSIS — J12.82 PNEUMONIA DUE TO COVID-19 VIRUS: Primary | ICD-10-CM

## 2021-02-25 DIAGNOSIS — I10 ESSENTIAL HYPERTENSION WITH GOAL BLOOD PRESSURE LESS THAN 130/85: ICD-10-CM

## 2021-02-25 DIAGNOSIS — J96.01 ACUTE RESPIRATORY FAILURE WITH HYPOXIA (HCC): ICD-10-CM

## 2021-02-25 DIAGNOSIS — U07.1 PNEUMONIA DUE TO COVID-19 VIRUS: Primary | ICD-10-CM

## 2021-02-25 DIAGNOSIS — I10 ESSENTIAL HYPERTENSION: ICD-10-CM

## 2021-02-25 PROBLEM — R73.9 HYPERGLYCEMIA: Status: RESOLVED | Noted: 2021-02-09 | Resolved: 2021-02-25

## 2021-02-25 PROBLEM — E87.6 HYPOKALEMIA: Status: RESOLVED | Noted: 2021-02-09 | Resolved: 2021-02-25

## 2021-02-25 PROBLEM — R79.89 AZOTEMIA: Status: RESOLVED | Noted: 2021-02-09 | Resolved: 2021-02-25

## 2021-02-25 PROCEDURE — 99496 TRANSJ CARE MGMT HIGH F2F 7D: CPT | Performed by: INTERNAL MEDICINE

## 2021-02-25 PROCEDURE — 3074F SYST BP LT 130 MM HG: CPT | Performed by: INTERNAL MEDICINE

## 2021-02-25 PROCEDURE — 3078F DIAST BP <80 MM HG: CPT | Performed by: INTERNAL MEDICINE

## 2021-02-25 PROCEDURE — 3008F BODY MASS INDEX DOCD: CPT | Performed by: INTERNAL MEDICINE

## 2021-02-25 RX ORDER — TRANDOLAPRIL 4 MG/1
8 TABLET ORAL DAILY
Qty: 180 TABLET | Refills: 1 | Status: SHIPPED | OUTPATIENT
Start: 2021-02-25 | End: 2021-05-20

## 2021-02-25 NOTE — ASSESSMENT & PLAN NOTE
Patient's hydrochlorothiazide was stopped while she was in the hospital and she does not told to resume it when she was discharged. Since her blood pressure is low, we will not resume the hydrochlorothiazide at this point. Follow-up in 3 months.

## 2021-02-25 NOTE — ASSESSMENT & PLAN NOTE
The patient's respiratory failure has resolved but she remains somewhat hypoxic. She is continuing on the home oxygen and will follow-up with pulmonary next week to determine when she can stop this.

## 2021-02-25 NOTE — TELEPHONE ENCOUNTER
Please continue home monitoring every other day for 1 more week. Pt is no longer in isolation, however she is on continuous oxygen.

## 2021-02-25 NOTE — ASSESSMENT & PLAN NOTE
Patient had a slight anemia on admission which was likely further aggravated by hospital blood draws. We will recheck this in a few months.

## 2021-02-25 NOTE — PROGRESS NOTES
HPI:    Cathy Leblanc is a 78year old female here today for hospital follow up.    She was discharged from Inpatient hospital[facility]  to Home   Admission Date: 2/9/21   Discharge Date: 2/18/21  Hospital Discharge Diagnoses (since 1/26/2021 MG) BY MOUTH DAILY    •  Insulin Aspart Pen 100 UNIT/ML Subcutaneous Solution Pen-injector, Inject 1-11 Units into the skin TID CC and HS.  CORRECTION FACTOR - HIGH DOSE DO NOT HOLD OR ALTER INSULIN DOSE WITHOUT A PHYSICIAN ORDER Give 1 unit for blood gluco Oral Cap, Take 1,000 mg by mouth daily. •  aspirin 81 MG Oral Tab, Take  by mouth. take 1 tablet (81MG)  by oral route  every day    No current facility-administered medications on file prior to visit.          HISTORY: reconciled and review with cristiane (lmp unknown). Patient is postmenopausal. Estimated body mass index is 22.08 kg/m² as calculated from the following:    Height as of this encounter: 5' 6\" (1.676 m). Weight as of this encounter: 136 lb 12.8 oz (62.1 kg).    /60 (BP Location: Left 6.4 in December. Her blood sugar is likely a little higher on the steroids but this is temporary. She is up-to-date on her eye exam.  Continue current medications.          Essential hypertension     Patient's hydrochlorothiazide was stopped while she was

## 2021-02-25 NOTE — ASSESSMENT & PLAN NOTE
Patient's diabetes has been controlled. Last A1c was 6.4 in December. Her blood sugar is likely a little higher on the steroids but this is temporary. She is up-to-date on her eye exam.  Continue current medications.

## 2021-02-25 NOTE — ASSESSMENT & PLAN NOTE
Reviewed hospital notes, labs, and imaging reports. Patient symptoms are improving although she has developed some new Covid symptoms including diarrhea and loss of taste and smell. She will call if she does not continue to improve.   She will continue th

## 2021-02-25 NOTE — PATIENT INSTRUCTIONS
You should get your COVID vaccine after May 25th. Unfortunately, you cannot schedule a COVID vaccine without receiving a MyChart \"ticket\" that is generated by the computer. Doctors do not have control over this.   Since you meet the age criteria you s

## 2021-03-01 ENCOUNTER — PATIENT OUTREACH (OUTPATIENT)
Dept: CASE MANAGEMENT | Age: 71
End: 2021-03-01

## 2021-03-01 NOTE — PROGRESS NOTES
Home Monitoring Condition Update    Covid19+ test date: 2/4/2021      Consent Verification:  Assessment Completed With: Patient  HIPAA Verified?   Yes    COVID-19 HOME MONITORING 3/1/2021   Temperature (No Data)   Reading From -   SPO2 96   Pulse -   Puls advised to inform their Employee Health department or Manager when they have tested positive for COVID-19.       The patient was also directed to continue to isolate away from other household members when possible and stay completely isolated from the gener

## 2021-03-02 ENCOUNTER — OFFICE VISIT (OUTPATIENT)
Dept: CARDIOLOGY CLINIC | Facility: HOSPITAL | Age: 71
End: 2021-03-02
Attending: NURSE PRACTITIONER
Payer: MEDICARE

## 2021-03-02 ENCOUNTER — TELEPHONE (OUTPATIENT)
Dept: INTERNAL MEDICINE CLINIC | Facility: CLINIC | Age: 71
End: 2021-03-02

## 2021-03-02 VITALS
WEIGHT: 138 LBS | HEART RATE: 93 BPM | DIASTOLIC BLOOD PRESSURE: 76 MMHG | SYSTOLIC BLOOD PRESSURE: 139 MMHG | OXYGEN SATURATION: 96 % | BODY MASS INDEX: 22 KG/M2

## 2021-03-02 DIAGNOSIS — U07.1 PNEUMONIA DUE TO COVID-19 VIRUS: Primary | ICD-10-CM

## 2021-03-02 DIAGNOSIS — J12.82 PNEUMONIA DUE TO COVID-19 VIRUS: Primary | ICD-10-CM

## 2021-03-02 DIAGNOSIS — I10 ESSENTIAL HYPERTENSION: ICD-10-CM

## 2021-03-02 PROCEDURE — 99204 OFFICE O/P NEW MOD 45 MIN: CPT | Performed by: NURSE PRACTITIONER

## 2021-03-02 PROCEDURE — 94618 PULMONARY STRESS TESTING: CPT | Performed by: NURSE PRACTITIONER

## 2021-03-02 PROCEDURE — 99202 OFFICE O/P NEW SF 15 MIN: CPT | Performed by: NURSE PRACTITIONER

## 2021-03-02 NOTE — PROGRESS NOTES
[facility] [address] Patient Status:  No patient class for patient encounter    1950 MRN [id number]   Location [facility] Laura MD Ashley         Teresita Mata is a 78year old female who pr vomiting  Hematologic/lymphatic: negative  Musculoskeletal: negative for muscle weakness and myalgias      Objective:  Lab Results   Component Value Date/Time    WBC 11.3 (H) 02/18/2021 04:36 AM    HGB 11.5 (L) 02/18/2021 04:36 AM    HCT 34.9 (L) 02/18/202 Rest  Resting SpO2 (minimum): 94  Resting HR (max): 89  Resting Oxygen Device: none     Number of laps made: 7  Distance Ambulated (ft): 525        Recovery Results  Minutes required to return to resting level: 1  Recovery SpO2: 92  Recovery HR: 102  Recov nausea, vomiting or diarrhea     Follow up with the specialty care clinic on 3/18 at 11 am    Follow up with Dr. James Rosa in May.            I spent 60 minutes with this patient providing counseling, coordination of care and education related specifically to

## 2021-03-02 NOTE — TELEPHONE ENCOUNTER
Pt dropped off Office of [place] of [address] [place] [address] Department for Dr. Candis Romero to fill out.     Please mail out when complete

## 2021-03-02 NOTE — PATIENT INSTRUCTIONS
Reduce your oxygen to 2L. You may reduce it to 1L if your oxygen levels remain above 90%. Monitor your blood pressure at 9 am and 9 pm daily. Bring the record to our next appointment.      Use your incentive spirometer 4 sets of 10 daily or more    Have

## 2021-03-03 ENCOUNTER — PATIENT OUTREACH (OUTPATIENT)
Dept: CASE MANAGEMENT | Age: 71
End: 2021-03-03

## 2021-03-03 NOTE — PROGRESS NOTES
Home Monitoring Condition Update    Covid19+ test date: 2/4/2021      Consent Verification:  Assessment Completed With: Patient  HIPAA Verified?   Yes    COVID-19 HOME MONITORING 3/3/2021   Temperature (No Data)   Reading From -   SPO2 98   Pulse -   Puls to inform their Employee Health department or Manager when they have tested positive for COVID-19.       The patient was also directed to continue to isolate away from other household members when possible and stay completely isolated from the general publi

## 2021-03-04 PROBLEM — J96.01 ACUTE RESPIRATORY FAILURE WITH HYPOXIA (HCC): Status: RESOLVED | Noted: 2021-02-09 | Resolved: 2021-03-04

## 2021-03-04 NOTE — TELEPHONE ENCOUNTER
Left patient a message stating that forms needs to be signed and completed.  If patient calls please transfer to [phone]

## 2021-03-05 ENCOUNTER — MED REC SCAN ONLY (OUTPATIENT)
Dept: INTERNAL MEDICINE CLINIC | Facility: CLINIC | Age: 71
End: 2021-03-05

## 2021-03-05 ENCOUNTER — PATIENT OUTREACH (OUTPATIENT)
Dept: CASE MANAGEMENT | Age: 71
End: 2021-03-05

## 2021-03-05 ENCOUNTER — TELEPHONE (OUTPATIENT)
Dept: CASE MANAGEMENT | Age: 71
End: 2021-03-05

## 2021-03-05 NOTE — TELEPHONE ENCOUNTER
Called patient for home monitoring Monday, Wednesday, and Friday this week. Patient reports feeling well. Patient does still report a mild cough. Patient is only using 2L of O2 with activity. O2 level today was 98%.     Please advise if patient is to co

## 2021-03-05 NOTE — PROGRESS NOTES
Home Monitoring Condition Update    Covid19+ test date: 2/4/2021      Consent Verification:  Assessment Completed With: Patient  HIPAA Verified?   Yes    COVID-19 HOME MONITORING 3/5/2021   Temperature (No Data)   Reading From -   SPO2 98   Pulse -   Puls received the monoclonal AB treatment in the ED, are you experiencing any of the following: fever, weakness, difficulty breathing, hives, joint pain, rashes, itching, tongue or lip swelling or wheezing?  No  If yes, we will contact your doctor/on call provid

## 2021-03-05 NOTE — TELEPHONE ENCOUNTER
Placed forms in the mail per MD note,    If pt calls back please inform they have been mailed to her home

## 2021-03-09 ENCOUNTER — PATIENT MESSAGE (OUTPATIENT)
Dept: ENDOCRINOLOGY CLINIC | Facility: CLINIC | Age: 71
End: 2021-03-09

## 2021-03-10 NOTE — TELEPHONE ENCOUNTER
From: Ankush Akhtar  To: Michelle Winkler MD  Sent: 3/9/2021 10:45 PM CST  Subject: Non-Urgent Medical Question    oTby LEI.i am not all that computer savvy   Hoping you get my 5 winston of readings that follow.

## 2021-03-11 ENCOUNTER — PATIENT MESSAGE (OUTPATIENT)
Dept: ENDOCRINOLOGY CLINIC | Facility: CLINIC | Age: 71
End: 2021-03-11

## 2021-03-11 NOTE — TELEPHONE ENCOUNTER
Readings are attached now. I just emailed patient to get readings for MD to review not for me to review.  Per patient's previous message she is asking if she needs to continue with Novolog sliding scale that she was discharged from hospital with:    See neri

## 2021-03-11 NOTE — TELEPHONE ENCOUNTER
From: Amol Way  To:  Nahun Flynn MD  Sent: 3/11/2021 1:19 PM CST  Subject: Non-Urgent Medical Question    Amaryl 1 x daily only  Metformin 2 tablets twice daily  Trulicity once every week

## 2021-03-14 ENCOUNTER — TELEPHONE (OUTPATIENT)
Dept: INTERNAL MEDICINE CLINIC | Facility: CLINIC | Age: 71
End: 2021-03-14

## 2021-03-14 ENCOUNTER — PATIENT MESSAGE (OUTPATIENT)
Dept: INTERNAL MEDICINE CLINIC | Facility: CLINIC | Age: 71
End: 2021-03-14

## 2021-03-15 ENCOUNTER — OFFICE VISIT (OUTPATIENT)
Dept: INTERNAL MEDICINE CLINIC | Facility: CLINIC | Age: 71
End: 2021-03-15
Payer: MEDICARE

## 2021-03-15 VITALS
HEART RATE: 88 BPM | BODY MASS INDEX: 21.97 KG/M2 | WEIGHT: 136.69 LBS | DIASTOLIC BLOOD PRESSURE: 70 MMHG | HEIGHT: 66 IN | SYSTOLIC BLOOD PRESSURE: 136 MMHG

## 2021-03-15 DIAGNOSIS — J98.01 BRONCHOSPASM: Primary | ICD-10-CM

## 2021-03-15 DIAGNOSIS — E11.9 CONTROLLED TYPE 2 DIABETES MELLITUS WITHOUT COMPLICATION, WITHOUT LONG-TERM CURRENT USE OF INSULIN (HCC): ICD-10-CM

## 2021-03-15 DIAGNOSIS — Z86.16 HISTORY OF COVID-19: ICD-10-CM

## 2021-03-15 DIAGNOSIS — I10 ESSENTIAL HYPERTENSION: ICD-10-CM

## 2021-03-15 PROBLEM — R05.3 PERSISTENT COUGH: Status: ACTIVE | Noted: 2021-03-15

## 2021-03-15 LAB
CARTRIDGE LOT#: 767 NUMERIC
HEMOGLOBIN A1C: 6.9 % (ref 4.3–5.6)

## 2021-03-15 PROCEDURE — 99214 OFFICE O/P EST MOD 30 MIN: CPT | Performed by: INTERNAL MEDICINE

## 2021-03-15 PROCEDURE — 3075F SYST BP GE 130 - 139MM HG: CPT | Performed by: INTERNAL MEDICINE

## 2021-03-15 PROCEDURE — 3078F DIAST BP <80 MM HG: CPT | Performed by: INTERNAL MEDICINE

## 2021-03-15 PROCEDURE — 83036 HEMOGLOBIN GLYCOSYLATED A1C: CPT | Performed by: INTERNAL MEDICINE

## 2021-03-15 PROCEDURE — 36416 COLLJ CAPILLARY BLOOD SPEC: CPT | Performed by: INTERNAL MEDICINE

## 2021-03-15 PROCEDURE — 3008F BODY MASS INDEX DOCD: CPT | Performed by: INTERNAL MEDICINE

## 2021-03-15 RX ORDER — ALBUTEROL SULFATE 90 UG/1
2 AEROSOL, METERED RESPIRATORY (INHALATION) EVERY 4 HOURS PRN
Qty: 1 INHALER | Refills: 1 | Status: SHIPPED | OUTPATIENT
Start: 2021-03-15 | End: 2021-05-20 | Stop reason: ALTCHOICE

## 2021-03-15 RX ORDER — BENZONATATE 200 MG/1
200 CAPSULE ORAL 3 TIMES DAILY PRN
Qty: 90 CAPSULE | Refills: 1 | Status: SHIPPED | OUTPATIENT
Start: 2021-03-15 | End: 2021-05-20 | Stop reason: ALTCHOICE

## 2021-03-15 NOTE — TELEPHONE ENCOUNTER
Suzy Dewitt MD 54 minutes ago (8:17 PM)     I have a dry cough since I was diagnosed with Covid and hospitalized. While hospitalized Dr. Martina Boxer prescribed belle cough drops upon my release, Benzonatate 100 mg.  I asked if

## 2021-03-15 NOTE — TELEPHONE ENCOUNTER
Patient calling back  in regards to MyChart message below  ( see note below )     States dry cough with occasional  white phlegm  , check 02 sat at home 93-96 % on room air , no SOB  No chest pressure noted   Patient coughing frequently during this jamin

## 2021-03-15 NOTE — ASSESSMENT & PLAN NOTE
The patient has a persistent dry cough ever since she had Covid 6 weeks ago. We will try increasing her Tessalon to 200 mg 3 times daily as needed and adding an albuterol inhaler. Follow-up in 1 month.

## 2021-03-15 NOTE — TELEPHONE ENCOUNTER
Spoke with patient, (  verified ) informed of 's   Message below  below      Future Appointments   Date Time Provider Linda Zabala   3/15/2021  5:40 PM Manpreet Walters MD [facility] [place]   3/18/2021 11:00 AM Tye Tucker NP [address] [place] [address]

## 2021-03-15 NOTE — PROGRESS NOTES
Yes but I think I will sign off now, because I am still at work. Is my late night HPI:    Patient ID: Mati Mccloud is a 78year old female. HPI:  She had COVID 2/5/21 and has a persistent cough that keeps her awake at night. It is not improving. daily. 180 tablet 1   • glimepiride 4 MG Oral Tab Take 1 tablet (4 mg total) by mouth every morning before breakfast. TAKE 1 TABLET(4 MG) BY MOUTH DAILY     • Insulin Aspart Pen 100 UNIT/ML Subcutaneous Solution Pen-injector Inject 1-11 Units into the skin oral route  every day         Allergies:No Known Allergies     Social History    Tobacco Use      Smoking status: Never Smoker      Smokeless tobacco: Never Used    Vaping Use      Vaping Use: Never used    Alcohol use: Not Currently      Alcohol/week: 1.7 inhaler. Follow-up in 1 month.          Relevant Medications    benzonatate 200 MG Oral Cap    Albuterol Sulfate HFA (VENTOLIN HFA) 108 (90 Base) MCG/ACT Inhalation Aero Soln       Unprioritized    Controlled type 2 diabetes mellitus without complication,

## 2021-03-15 NOTE — TELEPHONE ENCOUNTER
From: Federico Antoine  To: April Trevino MD  Sent: 3/14/2021 8:17 PM CDT  Subject: Other    I have a dry cough since I was diagnosed with Covid and hospitalized.  While hospitalized Dr. Annette Angel prescribed belle cough drops upon my release, Miley Valentin

## 2021-03-16 NOTE — ASSESSMENT & PLAN NOTE
The patient diuretic was stopped when she was in the hospital because her blood pressure was low. Her blood pressure today is still normal off the diuretic. We will resume it if her blood pressure is not controlled with diltiazem and trandolapril.

## 2021-03-16 NOTE — ASSESSMENT & PLAN NOTE
Patient's diabetes is controlled. Her A1c today was 6.9 despite her recent hospitalization. Advised patient that she can stop the sliding scale insulin.

## 2021-03-16 NOTE — ASSESSMENT & PLAN NOTE
Patient was diagnosed with COVID-19 on February 5, 2020 as subsequently hospitalized. She is using the oxygen intermittently. She will follow up with the pulmonary clinic.

## 2021-03-18 ENCOUNTER — OFFICE VISIT (OUTPATIENT)
Dept: CARDIOLOGY CLINIC | Facility: HOSPITAL | Age: 71
End: 2021-03-18
Attending: NURSE PRACTITIONER
Payer: MEDICARE

## 2021-03-18 VITALS
OXYGEN SATURATION: 94 % | HEART RATE: 88 BPM | SYSTOLIC BLOOD PRESSURE: 142 MMHG | DIASTOLIC BLOOD PRESSURE: 75 MMHG | BODY MASS INDEX: 22 KG/M2 | WEIGHT: 137 LBS

## 2021-03-18 DIAGNOSIS — I10 ESSENTIAL HYPERTENSION: ICD-10-CM

## 2021-03-18 DIAGNOSIS — J12.82 PNEUMONIA DUE TO COVID-19 VIRUS: Primary | ICD-10-CM

## 2021-03-18 DIAGNOSIS — U07.1 PNEUMONIA DUE TO COVID-19 VIRUS: Primary | ICD-10-CM

## 2021-03-18 PROCEDURE — 94618 PULMONARY STRESS TESTING: CPT | Performed by: NURSE PRACTITIONER

## 2021-03-18 PROCEDURE — 99212 OFFICE O/P EST SF 10 MIN: CPT | Performed by: NURSE PRACTITIONER

## 2021-03-18 PROCEDURE — 99214 OFFICE O/P EST MOD 30 MIN: CPT | Performed by: NURSE PRACTITIONER

## 2021-03-18 NOTE — PATIENT INSTRUCTIONS
Discontinue oxygen use. Bring the records of your blood pressure readings to Dr. Penny Lam.      Continue the use of your incentive spirometer 4 sets of 10 daily or more    Have COVID vaccine if appropriate     Stay hydrated drinking 64-96 oz per day

## 2021-03-18 NOTE — PROGRESS NOTES
[facility] Patient Status:  No patient class for patient encounter    1950 MRN [id number]   Location [facility] [place], [place]         Lupis Lieberman is a 78year old female who pr weakness and myalgias      Objective:  Lab Results   Component Value Date/Time    WBC 11.3 (H) 02/18/2021 04:36 AM    HGB 11.5 (L) 02/18/2021 04:36 AM    HCT 34.9 (L) 02/18/2021 04:36 AM    .0 02/18/2021 04:36 AM    CREATSERUM 0.92 02/18/2021 04:36 laps made: 7  Distance Ambulated (ft): 525        Recovery Results  Minutes required to return to resting level: 1  Recovery SpO2: 92  Recovery HR: 102  Recovery oxygen flow (liters per minute):  2     Repeat walk: none     Exertion Scale: mild  Angina Scal failure.       Seen with LALIT Rodriguez student  VIVIANA CHING, 03/18/21, 3:30 PM

## 2021-03-25 DIAGNOSIS — E11.9 CONTROLLED TYPE 2 DIABETES MELLITUS WITHOUT COMPLICATION, WITHOUT LONG-TERM CURRENT USE OF INSULIN (HCC): ICD-10-CM

## 2021-03-25 RX ORDER — GLIMEPIRIDE 4 MG/1
4 TABLET ORAL 2 TIMES DAILY
Qty: 180 TABLET | Refills: 1 | Status: SHIPPED | OUTPATIENT
Start: 2021-03-25 | End: 2021-10-13

## 2021-03-25 NOTE — TELEPHONE ENCOUNTER
Current Outpatient Medications   Medication Sig Dispense Refill   • glimepiride 4 MG Oral Tab Take 1 tablet (4 mg total) by mouth every morning before breakfast. TAKE 1 TABLET(4 MG) BY MOUTH DAILY

## 2021-03-31 DIAGNOSIS — E78.00 HYPERCHOLESTEROLEMIA: ICD-10-CM

## 2021-03-31 RX ORDER — ATORVASTATIN CALCIUM 10 MG/1
10 TABLET, FILM COATED ORAL NIGHTLY
Qty: 90 TABLET | Refills: 0 | Status: SHIPPED | OUTPATIENT
Start: 2021-03-31 | End: 2021-07-09

## 2021-04-01 ENCOUNTER — PATIENT MESSAGE (OUTPATIENT)
Dept: ENDOCRINOLOGY CLINIC | Facility: CLINIC | Age: 71
End: 2021-04-01

## 2021-04-01 DIAGNOSIS — N39.0 URINARY TRACT INFECTION WITHOUT HEMATURIA, SITE UNSPECIFIED: Primary | ICD-10-CM

## 2021-04-02 NOTE — TELEPHONE ENCOUNTER
From: Shantel Day  To:  Lyle Ingram MD  Sent: 4/1/2021 11:14 PM CDT  Subject: Other    Hello Dr. Lupe Alas   My primary care physician, Dr. Juarez Headings done is on vacation so I am hoping you can call in a script for me as I am 99.9% positive I am gettin

## 2021-04-02 NOTE — TELEPHONE ENCOUNTER
I have called and spoke to patient about options for a PCP I have made an appointment with Mariela Simmons for July 19 @ 2:15. Noted  Please give a FU call / my chart message Monday to see how she is doing     FYI Dr. Hattie Harper

## 2021-04-02 NOTE — TELEPHONE ENCOUNTER
I am so sorry to hear that  Will have to get a UA with reflex to UCx first  Please request patient to get it done  Ordered  Urgent care if she has fevers, chills, back.  Abdominal pain  Hydrate well  Thanks

## 2021-04-02 NOTE — TELEPHONE ENCOUNTER
Called pt and advised her to get UA done, pt says she is feeling better and does not know if she wants to get UA done. Told her if she changes her mind, the order is in.  Advised her to go to urgent care if she is experiencing fever, chills, back or abdomin

## 2021-04-04 ENCOUNTER — PATIENT MESSAGE (OUTPATIENT)
Dept: ENDOCRINOLOGY CLINIC | Facility: CLINIC | Age: 71
End: 2021-04-04

## 2021-04-04 ENCOUNTER — LAB ENCOUNTER (OUTPATIENT)
Dept: LAB | Facility: HOSPITAL | Age: 71
End: 2021-04-04
Attending: INTERNAL MEDICINE
Payer: MEDICARE

## 2021-04-04 ENCOUNTER — TELEPHONE (OUTPATIENT)
Dept: ENDOCRINOLOGY CLINIC | Facility: CLINIC | Age: 71
End: 2021-04-04

## 2021-04-04 DIAGNOSIS — N39.0 URINARY TRACT INFECTION WITHOUT HEMATURIA, SITE UNSPECIFIED: ICD-10-CM

## 2021-04-04 PROCEDURE — 87088 URINE BACTERIA CULTURE: CPT

## 2021-04-04 PROCEDURE — 81001 URINALYSIS AUTO W/SCOPE: CPT

## 2021-04-04 PROCEDURE — 87186 SC STD MICRODIL/AGAR DIL: CPT

## 2021-04-04 PROCEDURE — 87086 URINE CULTURE/COLONY COUNT: CPT

## 2021-04-04 RX ORDER — CIPROFLOXACIN 250 MG/1
250 TABLET, FILM COATED ORAL 2 TIMES DAILY
Qty: 6 TABLET | Refills: 0 | Status: SHIPPED | OUTPATIENT
Start: 2021-04-04 | End: 2021-05-20 | Stop reason: ALTCHOICE

## 2021-04-04 NOTE — TELEPHONE ENCOUNTER
Tried calling patient about abnormal UA results  No answer  YOUSIF left  Also sent my chart message  Please give her a FU call 4/5/2021  Thanks

## 2021-04-05 NOTE — TELEPHONE ENCOUNTER
Dr. Abel Romero)  Spoke to patient to regarding message below. Patient confirmed seeing [facility] message and stated she has started cipro and is already feeling better. RN advised patient to watch BG while on antibiotic -patient stated understanding.   Aaron

## 2021-04-08 DIAGNOSIS — E03.9 ACQUIRED HYPOTHYROIDISM: ICD-10-CM

## 2021-04-08 RX ORDER — LEVOTHYROXINE SODIUM 88 UG/1
88 TABLET ORAL
Qty: 90 TABLET | Refills: 1 | Status: SHIPPED | OUTPATIENT
Start: 2021-04-08 | End: 2021-05-10

## 2021-04-08 NOTE — TELEPHONE ENCOUNTER
Current Outpatient Medications   Medication Sig Dispense Refill   • Levothyroxine Sodium 88 MCG Oral Tab Take 1 tablet (88 mcg total) by mouth every morning before breakfast. 90 tablet 1

## 2021-04-13 ENCOUNTER — TELEPHONE (OUTPATIENT)
Dept: INTERNAL MEDICINE CLINIC | Facility: CLINIC | Age: 71
End: 2021-04-13

## 2021-04-13 NOTE — TELEPHONE ENCOUNTER
Called pt and informed that a copy has been printed and placed on the    Per patient verbalized understanding

## 2021-04-13 NOTE — TELEPHONE ENCOUNTER
Patient states Dr. Tommy Goltz has filled out a Medical Report for the  's License renewal. Patient would like to know if the office has a copy of form. Patient has misplaced the form that was given to her a month ago. Please advise.

## 2021-04-16 NOTE — TELEPHONE ENCOUNTER
Patient states she have the form it is right the medication list is for another patient       Please call her [phone]

## 2021-04-16 NOTE — TELEPHONE ENCOUNTER
Called pt and was informed that the medication list was shredded and that she would like to  the correct Med list today.     Printed form and verified that it was the correct patient and placed in

## 2021-05-10 DIAGNOSIS — E03.9 ACQUIRED HYPOTHYROIDISM: ICD-10-CM

## 2021-05-11 RX ORDER — DULAGLUTIDE 1.5 MG/.5ML
INJECTION, SOLUTION SUBCUTANEOUS
Qty: 6 ML | Refills: 0 | Status: SHIPPED | OUTPATIENT
Start: 2021-05-11 | End: 2021-09-14 | Stop reason: ALTCHOICE

## 2021-05-11 RX ORDER — LEVOTHYROXINE SODIUM 88 UG/1
88 TABLET ORAL
Qty: 90 TABLET | Refills: 1 | Status: SHIPPED | OUTPATIENT
Start: 2021-05-11 | End: 2022-01-06

## 2021-05-11 RX ORDER — DULAGLUTIDE 1.5 MG/.5ML
1.5 INJECTION, SOLUTION SUBCUTANEOUS WEEKLY
Qty: 6 ML | Refills: 0 | OUTPATIENT
Start: 2021-05-11

## 2021-05-15 ENCOUNTER — TELEPHONE (OUTPATIENT)
Dept: INTERNAL MEDICINE CLINIC | Facility: CLINIC | Age: 71
End: 2021-05-15

## 2021-05-15 DIAGNOSIS — E11.9 CONTROLLED TYPE 2 DIABETES MELLITUS WITHOUT COMPLICATION, WITHOUT LONG-TERM CURRENT USE OF INSULIN (HCC): ICD-10-CM

## 2021-05-15 DIAGNOSIS — D64.9 ANEMIA, UNSPECIFIED TYPE: Primary | ICD-10-CM

## 2021-05-15 NOTE — TELEPHONE ENCOUNTER
Patient is scheduled for a Medicare Advantage Physical for 05/20/2021 with Dr. Hattie Harper. Patient wants to know if Dr. Hattie Harper needs patient to complete lab work prior to visit.      Patient states she also tested positive for covid back in February and wants

## 2021-05-17 ENCOUNTER — LAB ENCOUNTER (OUTPATIENT)
Dept: LAB | Age: 71
End: 2021-05-17
Attending: INTERNAL MEDICINE
Payer: MEDICARE

## 2021-05-17 DIAGNOSIS — E11.9 CONTROLLED TYPE 2 DIABETES MELLITUS WITHOUT COMPLICATION, WITHOUT LONG-TERM CURRENT USE OF INSULIN (HCC): ICD-10-CM

## 2021-05-17 DIAGNOSIS — D64.9 ANEMIA, UNSPECIFIED TYPE: ICD-10-CM

## 2021-05-17 PROCEDURE — 36415 COLL VENOUS BLD VENIPUNCTURE: CPT

## 2021-05-17 PROCEDURE — 82728 ASSAY OF FERRITIN: CPT

## 2021-05-17 PROCEDURE — 83540 ASSAY OF IRON: CPT

## 2021-05-17 PROCEDURE — 84466 ASSAY OF TRANSFERRIN: CPT

## 2021-05-17 PROCEDURE — 82746 ASSAY OF FOLIC ACID SERUM: CPT

## 2021-05-17 PROCEDURE — 85025 COMPLETE CBC W/AUTO DIFF WBC: CPT

## 2021-05-17 PROCEDURE — 82607 VITAMIN B-12: CPT

## 2021-05-17 PROCEDURE — 83036 HEMOGLOBIN GLYCOSYLATED A1C: CPT

## 2021-05-17 NOTE — TELEPHONE ENCOUNTER
Orders written for non-fasting blood tests. She doesn't need the fasting labs at this time.     Regarding the antibody test:  Antibody testing is not currently recommended to assess for immunity to SARS-CoV-2 following COVID-19 vaccination because the clin

## 2021-05-20 ENCOUNTER — OFFICE VISIT (OUTPATIENT)
Dept: INTERNAL MEDICINE CLINIC | Facility: CLINIC | Age: 71
End: 2021-05-20
Payer: MEDICARE

## 2021-05-20 VITALS
DIASTOLIC BLOOD PRESSURE: 70 MMHG | BODY MASS INDEX: 21.72 KG/M2 | WEIGHT: 135.13 LBS | SYSTOLIC BLOOD PRESSURE: 122 MMHG | HEART RATE: 90 BPM | HEIGHT: 66 IN

## 2021-05-20 DIAGNOSIS — M85.80 OSTEOPENIA, UNSPECIFIED LOCATION: ICD-10-CM

## 2021-05-20 DIAGNOSIS — D49.2 SKIN GROWTH: ICD-10-CM

## 2021-05-20 DIAGNOSIS — K76.0 FATTY LIVER: ICD-10-CM

## 2021-05-20 DIAGNOSIS — E11.9 CONTROLLED TYPE 2 DIABETES MELLITUS WITHOUT COMPLICATION, WITHOUT LONG-TERM CURRENT USE OF INSULIN (HCC): ICD-10-CM

## 2021-05-20 DIAGNOSIS — D64.9 ANEMIA, UNSPECIFIED TYPE: ICD-10-CM

## 2021-05-20 DIAGNOSIS — K21.9 GASTROESOPHAGEAL REFLUX DISEASE: ICD-10-CM

## 2021-05-20 DIAGNOSIS — Z00.00 ENCOUNTER FOR MEDICARE ANNUAL WELLNESS EXAM: Primary | ICD-10-CM

## 2021-05-20 DIAGNOSIS — E78.00 HYPERCHOLESTEROLEMIA: ICD-10-CM

## 2021-05-20 DIAGNOSIS — I10 ESSENTIAL HYPERTENSION WITH GOAL BLOOD PRESSURE LESS THAN 130/85: ICD-10-CM

## 2021-05-20 DIAGNOSIS — H43.393 VITREOUS FLOATERS, BILATERAL: ICD-10-CM

## 2021-05-20 DIAGNOSIS — Z86.16 HISTORY OF COVID-19: ICD-10-CM

## 2021-05-20 DIAGNOSIS — E03.9 ACQUIRED HYPOTHYROIDISM: ICD-10-CM

## 2021-05-20 PROBLEM — H25.13 AGE-RELATED NUCLEAR CATARACT OF BOTH EYES: Status: RESOLVED | Noted: 2019-03-14 | Resolved: 2021-05-20

## 2021-05-20 PROBLEM — H52.203 MYOPIA WITH ASTIGMATISM AND PRESBYOPIA, BILATERAL: Status: RESOLVED | Noted: 2019-03-14 | Resolved: 2021-05-20

## 2021-05-20 PROBLEM — U07.1 PNEUMONIA DUE TO COVID-19 VIRUS: Status: RESOLVED | Noted: 2021-02-09 | Resolved: 2021-05-20

## 2021-05-20 PROBLEM — H25.13 NUCLEAR SCLEROTIC CATARACT, BILATERAL: Status: RESOLVED | Noted: 2017-08-22 | Resolved: 2021-05-20

## 2021-05-20 PROBLEM — N18.30 CKD (CHRONIC KIDNEY DISEASE) STAGE 3, GFR 30-59 ML/MIN (HCC): Chronic | Status: RESOLVED | Noted: 2020-02-06 | Resolved: 2021-05-20

## 2021-05-20 PROBLEM — H52.13 MYOPIA WITH ASTIGMATISM AND PRESBYOPIA, BILATERAL: Status: RESOLVED | Noted: 2019-03-14 | Resolved: 2021-05-20

## 2021-05-20 PROBLEM — J12.82 PNEUMONIA DUE TO COVID-19 VIRUS: Status: RESOLVED | Noted: 2021-02-09 | Resolved: 2021-05-20

## 2021-05-20 PROBLEM — H52.4 MYOPIA WITH ASTIGMATISM AND PRESBYOPIA, BILATERAL: Status: RESOLVED | Noted: 2019-03-14 | Resolved: 2021-05-20

## 2021-05-20 PROBLEM — J98.01 BRONCHOSPASM: Status: RESOLVED | Noted: 2021-03-15 | Resolved: 2021-05-20

## 2021-05-20 PROCEDURE — 3074F SYST BP LT 130 MM HG: CPT | Performed by: INTERNAL MEDICINE

## 2021-05-20 PROCEDURE — 3008F BODY MASS INDEX DOCD: CPT | Performed by: INTERNAL MEDICINE

## 2021-05-20 PROCEDURE — 99397 PER PM REEVAL EST PAT 65+ YR: CPT | Performed by: INTERNAL MEDICINE

## 2021-05-20 PROCEDURE — 3078F DIAST BP <80 MM HG: CPT | Performed by: INTERNAL MEDICINE

## 2021-05-20 PROCEDURE — G0439 PPPS, SUBSEQ VISIT: HCPCS | Performed by: INTERNAL MEDICINE

## 2021-05-20 PROCEDURE — 96160 PT-FOCUSED HLTH RISK ASSMT: CPT | Performed by: INTERNAL MEDICINE

## 2021-05-20 RX ORDER — TRANDOLAPRIL 4 MG/1
8 TABLET ORAL DAILY
Qty: 180 TABLET | Refills: 1 | Status: SHIPPED | OUTPATIENT
Start: 2021-05-20 | End: 2021-11-03

## 2021-05-20 RX ORDER — DILTIAZEM HYDROCHLORIDE 360 MG/1
360 CAPSULE, EXTENDED RELEASE ORAL DAILY
Qty: 90 CAPSULE | Refills: 1 | Status: SHIPPED | OUTPATIENT
Start: 2021-05-20 | End: 2021-11-03

## 2021-05-20 RX ORDER — PANTOPRAZOLE SODIUM 20 MG/1
20 TABLET, DELAYED RELEASE ORAL
Qty: 90 TABLET | Refills: 1 | Status: SHIPPED | OUTPATIENT
Start: 2021-05-20 | End: 2021-11-03

## 2021-05-20 RX ORDER — ANTIARTHRITIC COMBINATION NO.2 900 MG
TABLET ORAL
COMMUNITY

## 2021-05-20 NOTE — ASSESSMENT & PLAN NOTE
Patient has a mild anemia that began when she was in the hospital for Covid. She had a panendoscopy a few years ago. Advised patient to take iron for 2 months and then recheck a CBC.   Follow-up afterwards

## 2021-05-20 NOTE — ASSESSMENT & PLAN NOTE
Pt was diagnosed 2/12/2021. No residual symptoms. The cough stopped. Advised patient to go ahead and get the COVID-19 vaccine as soon as possible.

## 2021-05-20 NOTE — ASSESSMENT & PLAN NOTE
Patient has mild osteopenia of the left femoral neck which has been stable. Continue present management.

## 2021-05-20 NOTE — PATIENT INSTRUCTIONS
Donnie  Zarsadias's SCREENING SCHEDULE   Tests on this list are recommended by your physician but may not be covered, or covered at this frequency, by your insurer. Please check with your insurance carrier before scheduling to verify coverage.    PREVENT Covered up to Age 76     Colonoscopy Screen   Covered every 10 years- more often if abnormal Colonoscopy due on 04/04/2028 Update Health Maintenance if applicable    Flex Sigmoidoscopy Screen  Covered every 5 years No results found for this or any previous PRSV 4 HAIR 3 YRS+    Please get every year    Pneumococcal 13 (Prevnar)  Covered Once after 65 Orders placed or performed in visit on 08/03/17   • PNEUMOCOCCAL VACC, 13 HAIR IM    Please get once after your 65th birthday    Pneumococcal 23 (Pneumovax)  Cove (Shingrix). Take Slow Fe 1 daily for 2 months and then do a blood test in August.  You do not need to fast.  If it causes constipation, take Miralax with 4 oz water or juice. Your Vitamin B12 level was a tiny bit low.   (optimal level is above 400) I

## 2021-05-20 NOTE — ASSESSMENT & PLAN NOTE
Unremarkable exam.  Labs were reviewed  Pt is up-to-date on colonoscopy. Immunizations were reviewed. Pt is at risk for falls. Pt was given written information for fall prevention in the after visit summary  Hearing assessment was negative.

## 2021-05-20 NOTE — PROGRESS NOTES
HPI:   Federico Antoine is a 73year old female who presents for a MA (Medicare Advantage) Supervisit (Once per calendar year). HPI: Patient would like refills on her medications. She has a large growth on her back which bothers her.   She had labs do patient and Family/surrogate (if present), and forms available to patient in AVS       She has never smoked tobacco.    CAGE screening score of 0 on 5/20/2021, showing low risk of alcohol abuse.         Patient Care Team: Patient Care Team:  Carolyn Torres, tablet (88 mcg total) by mouth every morning before breakfast.  TRULICITY 1.5 LK/3.3FK Subcutaneous Solution Pen-injector, ADMINISTER 1.5 MG UNDER THE SKIN 1 TIME A WEEK  atorvastatin 10 MG Oral Tab, Take 1 tablet (10 mg total) by mouth nightly.   Ritchie Fairbanks reports that she has never smoked. She has never used smokeless tobacco. She reports previous alcohol use of about 1.7 standard drinks of alcohol per week. She reports that she does not use drugs.      REVIEW OF SYSTEMS:   Review of Systems   Constitutional No I have trouble hearing conversations in a noisy background such as a crowded room or restaurant: No   I get confused about where sounds come from: No I misunderstand some words in a sentence and need to ask people to repeat themselves: No   I especially sounds. No wheezing or rales. Chest:      Breasts:         Right: No inverted nipple, mass or nipple discharge. Left: No inverted nipple, mass or nipple discharge.    Abdominal:      General: Bowel sounds are normal.      Palpations: Abdomen is so female who presents for a Medicare Assessment. PLAN SUMMARY:     Problem List Items Addressed This Visit        High    Encounter for Medicare annual wellness exam - Primary     Unremarkable exam.  Labs were reviewed  Pt is up-to-date on colonoscopy. of COVID-19     Pt was diagnosed 2/12/2021. No residual symptoms. The cough stopped. Advised patient to go ahead and get the COVID-19 vaccine as soon as possible.          Skin growth     Patient has a large pedunculated skin tag on her back which is viky Fecal Occult Blood Annually No results found for: FOB No flowsheet data found.     Glaucoma Screening      Ophthalmology Visit Annually: Diabetics, FHx Glaucoma, AA>50, > 65 Data entered on: 8/28/2020   Last Dilated Eye Exam 8/28/2020       Wilmer Wynne External Lab or Procedure      Annual Monitoring of Persistent     Medications (ACE/ARB, digoxin diuretics, anticonvulsants.)    Potassium  Annually Potassium (mmol/L)   Date Value   02/18/2021 4.8     POTASSIUM (P) (mmol/L)   Date Value   08/18/2016 3.7

## 2021-05-20 NOTE — ASSESSMENT & PLAN NOTE
Patient's A1c was 6.1. The patient does not have neuropathy, nephropathy or retinopathy. Continue present management. Follow-up with endocrinology.

## 2021-05-20 NOTE — ASSESSMENT & PLAN NOTE
Patient has a large pedunculated skin tag on her back which is painful. I will refer her to dermatology.

## 2021-05-24 RX ORDER — METFORMIN HYDROCHLORIDE 500 MG/1
TABLET, EXTENDED RELEASE ORAL
Qty: 360 TABLET | Refills: 1 | Status: SHIPPED | OUTPATIENT
Start: 2021-05-24 | End: 2021-12-04

## 2021-06-08 ENCOUNTER — TELEPHONE (OUTPATIENT)
Dept: ENDOCRINOLOGY CLINIC | Facility: CLINIC | Age: 71
End: 2021-06-08

## 2021-06-08 DIAGNOSIS — E11.9 TYPE 2 DIABETES MELLITUS WITHOUT COMPLICATION, UNSPECIFIED WHETHER LONG TERM INSULIN USE (HCC): Primary | ICD-10-CM

## 2021-06-08 NOTE — TELEPHONE ENCOUNTER
Pharmacy refill request for:    True Metrix Blood Glucose Meter    [employer] True Metrix Test Strips    True Plus 33G Lancets    True Metrtix Level 1 Control Solution     MESSAGE:  To avoid delays in processing this order, please provide strength, directions,

## 2021-06-09 RX ORDER — CALCIUM CITRATE/VITAMIN D3 200MG-6.25
TABLET ORAL
Qty: 200 STRIP | Refills: 0 | Status: SHIPPED | OUTPATIENT
Start: 2021-06-09

## 2021-06-09 RX ORDER — GLUCOSAM/CHON-MSM1/C/MANG/BOSW 500-416.6
TABLET ORAL
Qty: 200 EACH | Refills: 0 | Status: SHIPPED | OUTPATIENT
Start: 2021-06-09

## 2021-06-09 RX ORDER — BLOOD-GLUCOSE CONTROL, LOW
1 EACH MISCELLANEOUS AS NEEDED
Qty: 1 EACH | Refills: 0 | Status: SHIPPED | OUTPATIENT
Start: 2021-06-09

## 2021-06-30 ENCOUNTER — OFFICE VISIT (OUTPATIENT)
Dept: DERMATOLOGY CLINIC | Facility: CLINIC | Age: 71
End: 2021-06-30
Payer: MEDICARE

## 2021-06-30 VITALS — WEIGHT: 135 LBS | BODY MASS INDEX: 21.69 KG/M2 | HEIGHT: 66 IN

## 2021-06-30 DIAGNOSIS — D22.9 MULTIPLE NEVI: ICD-10-CM

## 2021-06-30 DIAGNOSIS — D23.30 BENIGN NEOPLASM OF SKIN OF FACE: ICD-10-CM

## 2021-06-30 DIAGNOSIS — L82.1 SEBORRHEIC KERATOSES: Primary | ICD-10-CM

## 2021-06-30 DIAGNOSIS — L81.4 LENTIGO: ICD-10-CM

## 2021-06-30 DIAGNOSIS — D23.5 BENIGN NEOPLASM OF SKIN OF TRUNK, EXCEPT SCROTUM: ICD-10-CM

## 2021-06-30 DIAGNOSIS — D23.60 BENIGN NEOPLASM OF SKIN OF UPPER LIMB, INCLUDING SHOULDER, UNSPECIFIED LATERALITY: ICD-10-CM

## 2021-06-30 DIAGNOSIS — L91.8 INFLAMED ACROCHORDON: ICD-10-CM

## 2021-06-30 PROCEDURE — 11200 RMVL SKIN TAGS UP TO&INC 15: CPT | Performed by: DERMATOLOGY

## 2021-06-30 PROCEDURE — 3008F BODY MASS INDEX DOCD: CPT | Performed by: DERMATOLOGY

## 2021-06-30 PROCEDURE — 99202 OFFICE O/P NEW SF 15 MIN: CPT | Performed by: DERMATOLOGY

## 2021-07-05 NOTE — PROGRESS NOTES
Federico Antoine is a 73year old female.   HPI:     CC:  Patient presents with:  Consult: patient concerned with a lesion or skin tag on the left mid back ,denies itch ,bleed or pain ,x several years ,,denies personal HX of SC ,Maternal Grandmother had S • Anemia Sister    • Glaucoma Neg    • Macular degeneration Neg       Social History    Tobacco Use      Smoking status: Never Smoker      Smokeless tobacco: Never Used    Vaping Use      Vaping Use: Never used    Alcohol use: Not Currently      Alcohol/ directed to check blood glucose 2 times/day 200 strip 0   • TRUEplus Lancets 33G Does not apply Misc Use as directed to check blood glucose 2 times/day 200 each 0   • MECLIZINE HCL 25 MG Oral Tab TAKE 1 TABLET BY MOUTH THREE TIMES DAILY AS NEEDED FOR [facility] Vaping Use: Never used    Substance and Sexual Activity      Alcohol use: Not Currently        Alcohol/week: 1.7 standard drinks        Types: 2 Glasses of wine per week        Comment: very rarely      Drug use: No      Sexual activity: Not on file    O Sister 47   • Cataracts Sister    • Heart Surgery Father         carotid artery surgery    • Hypertension Father    • Anemia Sister    • Glaucoma Neg    • Macular degeneration Neg         06/30/21  1017   Weight: 135 lb   Height: 66\"       HPI:  Patient p prescriptions requested or ordered in this encounter         Seborrheic keratoses  (primary encounter diagnosis)  Multiple nevi  Benign neoplasm of skin of face  Benign neoplasm of skin of trunk, except scrotum  Benign neoplasm of skin of upper limb, inclu

## 2021-07-09 DIAGNOSIS — E78.00 HYPERCHOLESTEROLEMIA: ICD-10-CM

## 2021-07-09 RX ORDER — ATORVASTATIN CALCIUM 10 MG/1
10 TABLET, FILM COATED ORAL NIGHTLY
Qty: 90 TABLET | Refills: 1 | Status: SHIPPED | OUTPATIENT
Start: 2021-07-09 | End: 2022-01-06

## 2021-08-02 ENCOUNTER — TELEPHONE (OUTPATIENT)
Dept: INTERNAL MEDICINE CLINIC | Facility: CLINIC | Age: 71
End: 2021-08-02

## 2021-08-02 DIAGNOSIS — E11.9 CONTROLLED TYPE 2 DIABETES MELLITUS WITHOUT COMPLICATION, WITHOUT LONG-TERM CURRENT USE OF INSULIN (HCC): Primary | ICD-10-CM

## 2021-08-02 NOTE — TELEPHONE ENCOUNTER
Pt is scheduled to see Dr. Isaac Bnaks tomorrow and didn't realized she needed a new referral. Patient requesting referral as soon as possible. Please advise.

## 2021-08-03 ENCOUNTER — OFFICE VISIT (OUTPATIENT)
Dept: ENDOCRINOLOGY CLINIC | Facility: CLINIC | Age: 71
End: 2021-08-03
Payer: MEDICARE

## 2021-08-03 VITALS
HEART RATE: 99 BPM | BODY MASS INDEX: 22 KG/M2 | WEIGHT: 139 LBS | DIASTOLIC BLOOD PRESSURE: 70 MMHG | SYSTOLIC BLOOD PRESSURE: 129 MMHG

## 2021-08-03 DIAGNOSIS — E11.9 TYPE 2 DIABETES MELLITUS WITHOUT COMPLICATION, UNSPECIFIED WHETHER LONG TERM INSULIN USE (HCC): Primary | ICD-10-CM

## 2021-08-03 LAB
CARTRIDGE LOT#: ABNORMAL NUMERIC
GLUCOSE BLOOD: 76
HEMOGLOBIN A1C: 6.5 % (ref 4.3–5.6)
TEST STRIP LOT #: NORMAL NUMERIC

## 2021-08-03 PROCEDURE — 83036 HEMOGLOBIN GLYCOSYLATED A1C: CPT | Performed by: INTERNAL MEDICINE

## 2021-08-03 PROCEDURE — 36416 COLLJ CAPILLARY BLOOD SPEC: CPT | Performed by: INTERNAL MEDICINE

## 2021-08-03 PROCEDURE — 99213 OFFICE O/P EST LOW 20 MIN: CPT | Performed by: INTERNAL MEDICINE

## 2021-08-03 PROCEDURE — 3044F HG A1C LEVEL LT 7.0%: CPT | Performed by: INTERNAL MEDICINE

## 2021-08-03 PROCEDURE — 3078F DIAST BP <80 MM HG: CPT | Performed by: INTERNAL MEDICINE

## 2021-08-03 PROCEDURE — 82947 ASSAY GLUCOSE BLOOD QUANT: CPT | Performed by: INTERNAL MEDICINE

## 2021-08-03 PROCEDURE — 3074F SYST BP LT 130 MM HG: CPT | Performed by: INTERNAL MEDICINE

## 2021-08-03 NOTE — PROGRESS NOTES
Return Office Visit     CHIEF COMPLAINT:    Type 2 DM  Dyslipidemia     HISTORY OF PRESENT ILLNESS:  Ankush Akhtar is a 73year old female who presents for follow up for Type 2 DM.      DM HISTORY  Diagnosed: about 10 years ago        HISTORY OF Garon Bearclem 360 MG Oral Capsule SR 24 Hr Take 1 capsule (360 mg total) by mouth daily.  90 capsule 1   • Levothyroxine Sodium 88 MCG Oral Tab Take 1 tablet (88 mcg total) by mouth every morning before breakfast. 90 tablet 1   • TRULICITY 1.5 IG/5.6KM Subcutaneous Solut mass index is 22.44 kg/m². General Appearance:  alert, well developed, in no acute distress  Nutritional:  no extreme weight gain or loss  Head: Atraumatic  Eyes:  normal conjunctivae, sclera. , normal sclera and normal pupils  Throat/Neck: normal s Dyslipidemia  A) Discussed lifestyle modifications including reductions in dietary total and saturated fat, weight loss, aerobic exercise, and eating a diet rich in fruits and vegetables.   B) c/w statin  Discussed the potential side effects of statins incl

## 2021-08-04 NOTE — TELEPHONE ENCOUNTER
From   Ashley Brown To   Vinicio Chavez and Delivered   8/3/2021 10:13 AM   Last Read in MyChart   8/3/2021 10:15 AM by Salvador Patel

## 2021-08-06 ENCOUNTER — PATIENT MESSAGE (OUTPATIENT)
Dept: ENDOCRINOLOGY CLINIC | Facility: CLINIC | Age: 71
End: 2021-08-06

## 2021-08-06 NOTE — TELEPHONE ENCOUNTER
Please call patient  She is not able to use the one jace hfree coupon for bydureon ? If not , how much trulicity does she has left?    For now, plan was:   MTF 1000 mg BID  Amaryl  : decrease from 4 mg BID to 4 mg daily  C/w trulicity 1.5 mg q week  She c

## 2021-08-10 RX ORDER — EXENATIDE 2 MG/.85ML
2 INJECTION, SUSPENSION, EXTENDED RELEASE SUBCUTANEOUS WEEKLY
Qty: 3.4 ML | Refills: 0 | Status: SHIPPED | OUTPATIENT
Start: 2021-08-10 | End: 2021-10-13

## 2021-08-10 NOTE — TELEPHONE ENCOUNTER
Please call her pharmcy the one month bydureon free coupon is for all insurances and should work  Please let me know

## 2021-08-10 NOTE — TELEPHONE ENCOUNTER
Dr. Yoshi Vuong    Patient stated Bydureon nor Trulicity coupons work for her. She has one pen left of Trulicity. Patient stated she is trying to contact other places for a medication discount but is waiting to hear back.      In the meantime I told her to fo

## 2021-08-10 NOTE — TELEPHONE ENCOUNTER
Called pharmacist Zhou Roper at patient's pharmacy. Explained to pharmacist that free voucher for Juliann Jackson will work regardless of insurance. Pharmacist advised for patient to call her to speak to pharmacist directly.  Called patient and left voicemail to noti

## 2021-08-12 ENCOUNTER — PATIENT MESSAGE (OUTPATIENT)
Dept: ENDOCRINOLOGY CLINIC | Facility: CLINIC | Age: 71
End: 2021-08-12

## 2021-08-13 NOTE — TELEPHONE ENCOUNTER
From: Zenobia Marques  To: Danilo Fraga MD  Sent: 8/12/2021 4:07 PM CDT  Subject: Non-Urgent Medical Question    After talking with the pharmacist at [facility] and later with Nakul Franks regarding the bydurian I am NOT eligible.  Nakul Franks tried processing

## 2021-09-14 ENCOUNTER — OFFICE VISIT (OUTPATIENT)
Dept: OPTOMETRY | Facility: CLINIC | Age: 71
End: 2021-09-14
Payer: MEDICARE

## 2021-09-14 DIAGNOSIS — H52.13 MYOPIA WITH ASTIGMATISM AND PRESBYOPIA, BILATERAL: ICD-10-CM

## 2021-09-14 DIAGNOSIS — H52.4 MYOPIA WITH ASTIGMATISM AND PRESBYOPIA, BILATERAL: ICD-10-CM

## 2021-09-14 DIAGNOSIS — E11.9 CONTROLLED TYPE 2 DIABETES MELLITUS WITHOUT COMPLICATION, WITHOUT LONG-TERM CURRENT USE OF INSULIN (HCC): Primary | ICD-10-CM

## 2021-09-14 DIAGNOSIS — H52.203 MYOPIA WITH ASTIGMATISM AND PRESBYOPIA, BILATERAL: ICD-10-CM

## 2021-09-14 DIAGNOSIS — H25.13 AGE-RELATED NUCLEAR CATARACT OF BOTH EYES: ICD-10-CM

## 2021-09-14 PROCEDURE — 92014 COMPRE OPH EXAM EST PT 1/>: CPT | Performed by: OPTOMETRIST

## 2021-09-14 PROCEDURE — 92015 DETERMINE REFRACTIVE STATE: CPT | Performed by: OPTOMETRIST

## 2021-10-13 DIAGNOSIS — E11.9 CONTROLLED TYPE 2 DIABETES MELLITUS WITHOUT COMPLICATION, WITHOUT LONG-TERM CURRENT USE OF INSULIN (HCC): ICD-10-CM

## 2021-10-13 RX ORDER — EXENATIDE 2 MG/.85ML
INJECTION, SUSPENSION, EXTENDED RELEASE SUBCUTANEOUS
Qty: 3.4 ML | Refills: 2 | Status: SHIPPED | OUTPATIENT
Start: 2021-10-13

## 2021-10-13 RX ORDER — GLIMEPIRIDE 4 MG/1
4 TABLET ORAL
Qty: 90 TABLET | Refills: 0 | Status: SHIPPED | OUTPATIENT
Start: 2021-10-13

## 2021-10-13 NOTE — TELEPHONE ENCOUNTER
Left a voicemail that since pt is seen and managed for her diabetes by Dr Ronn Araujo that all diabetic meds should be refilled by her office. I instructed her I will forward the refill request to their office to be addressed.

## 2021-11-03 DIAGNOSIS — K21.9 GASTROESOPHAGEAL REFLUX DISEASE: ICD-10-CM

## 2021-11-03 DIAGNOSIS — I10 ESSENTIAL HYPERTENSION WITH GOAL BLOOD PRESSURE LESS THAN 130/85: ICD-10-CM

## 2021-11-03 RX ORDER — DILTIAZEM HYDROCHLORIDE 360 MG/1
CAPSULE, EXTENDED RELEASE ORAL
Qty: 90 CAPSULE | Refills: 1 | Status: SHIPPED | OUTPATIENT
Start: 2021-11-03 | End: 2022-05-03

## 2021-11-03 RX ORDER — PANTOPRAZOLE SODIUM 20 MG/1
TABLET, DELAYED RELEASE ORAL
Qty: 90 TABLET | Refills: 1 | Status: SHIPPED | OUTPATIENT
Start: 2021-11-03 | End: 2022-05-03

## 2021-11-03 RX ORDER — TRANDOLAPRIL TABLETS 4 MG/1
TABLET ORAL
Qty: 180 TABLET | Refills: 1 | Status: SHIPPED | OUTPATIENT
Start: 2021-11-03 | End: 2022-05-09

## 2021-11-03 NOTE — TELEPHONE ENCOUNTER
Refill passed per [employer] [facility] protocol.   Requested Prescriptions   Pending Prescriptions Disp Refills    PANTOPRAZOLE 20 MG Oral Tab EC [Pharmacy Med Name: PANTOPRAZOLE 20MG TABLETS] 90 tablet 1     Sig: TAKE 1 TABLET(20 MG) BY MOUTH BEFORE BREAKFAST        Gastrointestional Medication Protocol Passed - 11/3/2021 10:37 AM        Passed - Appointment in past 12 or next 3 months           TRANDOLAPRIL 4 MG Oral Tab [Pharmacy Med Name: Ernie Troy 180 tablet 1     Sig: TAKE 2 TABLETS(8 MG) BY MOUTH DAILY        Hypertensive Medications Protocol Passed - 11/3/2021 10:37 AM        Passed - CMP or BMP in past 12 months        Passed - Appointment in past 6 or next 3 months        Passed - GFR Non- > 50     Lab Results   Component Value Date    GFRNAA 64 02/18/2021                    DILTIAZEM 360 MG Oral Capsule SR 24 Hr [Pharmacy Med Name: DILTIAZEM ER 360MG CAPSULES (24 HR)] 90 capsule 1     Sig: TAKE 1 CAPSULE(360 MG) BY MOUTH DAILY        Hypertensive Medications Protocol Passed - 11/3/2021 10:37 AM        Passed - CMP or BMP in past 12 months        Passed - Appointment in past 6 or next 3 months        Passed - GFR Non- > 50     Lab Results   Component Value Date    GFRNAA 64 02/18/2021                        Recent Outpatient Visits              1 month ago Controlled type 2 diabetes mellitus without complication, without long-term current use of insulin [facility])    [facility] [place], [place], [address] Visit    3 months ago Type 2 diabetes mellitus without complication, unspecified whether long term insulin use [facility])    [place] [employer] [facility] Endocrinology Judy Hernandez MD    Office Visit    4 months ago Seborrheic keratoses    [facility] Dermatology Shashi Lopez MD    Office Visit    5 months ago Encounter for [employer] annual wellness exam    [place] MD    Office Visit    7 months ago Pneumonia due to COVID-19 virus    [facility], APRN    Office Visit

## 2021-12-04 RX ORDER — METFORMIN HYDROCHLORIDE 500 MG/1
1000 TABLET, EXTENDED RELEASE ORAL 2 TIMES DAILY WITH MEALS
Qty: 360 TABLET | Refills: 0 | Status: SHIPPED | OUTPATIENT
Start: 2021-12-04

## 2021-12-27 ENCOUNTER — HOSPITAL ENCOUNTER (OUTPATIENT)
Dept: MAMMOGRAPHY | Age: 71
Discharge: HOME | End: 2021-12-27
Attending: INTERNAL MEDICINE
Payer: MEDICARE

## 2021-12-27 DIAGNOSIS — Z12.31 ENCOUNTER FOR SCREENING MAMMOGRAM FOR MALIGNANT NEOPLASM OF BREAST: ICD-10-CM

## 2021-12-27 PROCEDURE — 77067 SCR MAMMO BI INCL CAD: CPT | Performed by: INTERNAL MEDICINE

## 2021-12-27 PROCEDURE — 77063 BREAST TOMOSYNTHESIS BI: CPT | Performed by: INTERNAL MEDICINE

## 2021-12-28 ENCOUNTER — LAB ENCOUNTER (OUTPATIENT)
Dept: LAB | Age: 71
End: 2021-12-28
Attending: INTERNAL MEDICINE
Payer: MEDICARE

## 2021-12-28 DIAGNOSIS — E11.9 TYPE 2 DIABETES MELLITUS WITHOUT COMPLICATION, UNSPECIFIED WHETHER LONG TERM INSULIN USE (HCC): ICD-10-CM

## 2021-12-28 DIAGNOSIS — D64.9 ANEMIA, UNSPECIFIED TYPE: ICD-10-CM

## 2021-12-28 LAB
ALBUMIN SERPL-MCNC: 4.4 G/DL (ref 3.4–5)
ALBUMIN/GLOB SERPL: 1.3 {RATIO} (ref 1–2)
ALP LIVER SERPL-CCNC: 77 U/L
ALT SERPL-CCNC: 67 U/L
ANION GAP SERPL CALC-SCNC: 5 MMOL/L (ref 0–18)
AST SERPL-CCNC: 55 U/L (ref 15–37)
BASOPHILS # BLD AUTO: 0.06 X10(3) UL (ref 0–0.2)
BASOPHILS NFR BLD AUTO: 1 %
BILIRUB SERPL-MCNC: 0.5 MG/DL (ref 0.1–2)
BUN BLD-MCNC: 11 MG/DL (ref 7–18)
BUN/CREAT SERPL: 13.4 (ref 10–20)
CALCIUM BLD-MCNC: 10.5 MG/DL (ref 8.5–10.1)
CHLORIDE SERPL-SCNC: 106 MMOL/L (ref 98–112)
CHOLEST SERPL-MCNC: 141 MG/DL (ref ?–200)
CO2 SERPL-SCNC: 29 MMOL/L (ref 21–32)
CREAT BLD-MCNC: 0.82 MG/DL
CREAT UR-SCNC: 188 MG/DL
DEPRECATED HBV CORE AB SER IA-ACNC: 10.5 NG/ML
DEPRECATED RDW RBC AUTO: 50 FL (ref 35.1–46.3)
EOSINOPHIL # BLD AUTO: 0.12 X10(3) UL (ref 0–0.7)
EOSINOPHIL NFR BLD AUTO: 1.9 %
ERYTHROCYTE [DISTWIDTH] IN BLOOD BY AUTOMATED COUNT: 16 % (ref 11–15)
FASTING PATIENT LIPID ANSWER: YES
FASTING STATUS PATIENT QL REPORTED: YES
GLOBULIN PLAS-MCNC: 3.4 G/DL (ref 2.8–4.4)
GLUCOSE BLD-MCNC: 89 MG/DL (ref 70–99)
HCT VFR BLD AUTO: 36.6 %
HDLC SERPL-MCNC: 63 MG/DL (ref 40–59)
HGB BLD-MCNC: 11.3 G/DL
IMM GRANULOCYTES # BLD AUTO: 0.02 X10(3) UL (ref 0–1)
IMM GRANULOCYTES NFR BLD: 0.3 %
IRON SATURATION: 8 %
IRON SERPL-MCNC: 52 UG/DL
LDLC SERPL CALC-MCNC: 53 MG/DL (ref ?–100)
LYMPHOCYTES # BLD AUTO: 2.18 X10(3) UL (ref 1–4)
LYMPHOCYTES NFR BLD AUTO: 35.3 %
MCH RBC QN AUTO: 26.2 PG (ref 26–34)
MCHC RBC AUTO-ENTMCNC: 30.9 G/DL (ref 31–37)
MCV RBC AUTO: 84.7 FL
MICROALBUMIN UR-MCNC: 5.96 MG/DL
MICROALBUMIN/CREAT 24H UR-RTO: 31.7 UG/MG (ref ?–30)
MONOCYTES # BLD AUTO: 0.49 X10(3) UL (ref 0.1–1)
MONOCYTES NFR BLD AUTO: 7.9 %
NEUTROPHILS # BLD AUTO: 3.31 X10 (3) UL (ref 1.5–7.7)
NEUTROPHILS # BLD AUTO: 3.31 X10(3) UL (ref 1.5–7.7)
NEUTROPHILS NFR BLD AUTO: 53.6 %
NONHDLC SERPL-MCNC: 78 MG/DL (ref ?–130)
OSMOLALITY SERPL CALC.SUM OF ELEC: 289 MOSM/KG (ref 275–295)
PLATELET # BLD AUTO: 305 10(3)UL (ref 150–450)
POTASSIUM SERPL-SCNC: 3.8 MMOL/L (ref 3.5–5.1)
PROT SERPL-MCNC: 7.8 G/DL (ref 6.4–8.2)
RBC # BLD AUTO: 4.32 X10(6)UL
SODIUM SERPL-SCNC: 140 MMOL/L (ref 136–145)
TOTAL IRON BINDING CAPACITY: 633 UG/DL (ref 240–450)
TRANSFERRIN SERPL-MCNC: 425 MG/DL (ref 200–360)
TRIGL SERPL-MCNC: 148 MG/DL (ref 30–149)
VLDLC SERPL CALC-MCNC: 21 MG/DL (ref 0–30)
WBC # BLD AUTO: 6.2 X10(3) UL (ref 4–11)

## 2021-12-28 PROCEDURE — 80053 COMPREHEN METABOLIC PANEL: CPT

## 2021-12-28 PROCEDURE — 36415 COLL VENOUS BLD VENIPUNCTURE: CPT

## 2021-12-28 PROCEDURE — 82043 UR ALBUMIN QUANTITATIVE: CPT

## 2021-12-28 PROCEDURE — 80061 LIPID PANEL: CPT

## 2021-12-28 PROCEDURE — 82728 ASSAY OF FERRITIN: CPT

## 2021-12-28 PROCEDURE — 82570 ASSAY OF URINE CREATININE: CPT

## 2021-12-28 PROCEDURE — 83540 ASSAY OF IRON: CPT

## 2021-12-28 PROCEDURE — 84466 ASSAY OF TRANSFERRIN: CPT

## 2021-12-28 PROCEDURE — 85025 COMPLETE CBC W/AUTO DIFF WBC: CPT

## 2022-01-05 DIAGNOSIS — E03.9 ACQUIRED HYPOTHYROIDISM: ICD-10-CM

## 2022-01-05 DIAGNOSIS — E78.00 HYPERCHOLESTEROLEMIA: ICD-10-CM

## 2022-01-06 RX ORDER — LEVOTHYROXINE SODIUM 88 UG/1
TABLET ORAL
Qty: 90 TABLET | Refills: 0 | Status: SHIPPED | OUTPATIENT
Start: 2022-01-06

## 2022-01-06 RX ORDER — ATORVASTATIN CALCIUM 10 MG/1
TABLET, FILM COATED ORAL
Qty: 90 TABLET | Refills: 1 | Status: SHIPPED | OUTPATIENT
Start: 2022-01-06

## 2022-02-11 ENCOUNTER — LAB ENCOUNTER (OUTPATIENT)
Dept: LAB | Facility: HOSPITAL | Age: 72
End: 2022-02-11
Attending: INTERNAL MEDICINE
Payer: MEDICARE

## 2022-02-11 ENCOUNTER — OFFICE VISIT (OUTPATIENT)
Dept: ENDOCRINOLOGY CLINIC | Facility: CLINIC | Age: 72
End: 2022-02-11
Payer: MEDICARE

## 2022-02-11 VITALS
SYSTOLIC BLOOD PRESSURE: 142 MMHG | HEIGHT: 66 IN | DIASTOLIC BLOOD PRESSURE: 80 MMHG | BODY MASS INDEX: 22.5 KG/M2 | HEART RATE: 83 BPM | WEIGHT: 140 LBS

## 2022-02-11 DIAGNOSIS — R79.89 HIGH SERUM CALCIUM: ICD-10-CM

## 2022-02-11 DIAGNOSIS — E11.9 CONTROLLED TYPE 2 DIABETES MELLITUS WITHOUT COMPLICATION, WITHOUT LONG-TERM CURRENT USE OF INSULIN (HCC): Primary | ICD-10-CM

## 2022-02-11 DIAGNOSIS — E78.5 DYSLIPIDEMIA: ICD-10-CM

## 2022-02-11 DIAGNOSIS — D64.9 ANEMIA, UNSPECIFIED TYPE: ICD-10-CM

## 2022-02-11 DIAGNOSIS — E55.9 VITAMIN D DEFICIENCY: ICD-10-CM

## 2022-02-11 DIAGNOSIS — E03.9 ACQUIRED HYPOTHYROIDISM: ICD-10-CM

## 2022-02-11 DIAGNOSIS — R79.89 ABNORMAL LIVER FUNCTION TEST: ICD-10-CM

## 2022-02-11 LAB
CALCIUM BLD-MCNC: 9.8 MG/DL (ref 8.5–10.1)
CARTRIDGE LOT#: ABNORMAL NUMERIC
GLUCOSE BLOOD: 141
HEMOGLOBIN A1C: 6.3 % (ref 4.3–5.6)
PTH-INTACT SERPL-MCNC: 36.3 PG/ML (ref 18.5–88)
T4 FREE SERPL-MCNC: 1.1 NG/DL (ref 0.8–1.7)
TEST STRIP LOT #: NORMAL NUMERIC
TSI SER-ACNC: 4.24 MIU/ML (ref 0.36–3.74)
VIT D+METAB SERPL-MCNC: 38.9 NG/ML (ref 30–100)

## 2022-02-11 PROCEDURE — 84466 ASSAY OF TRANSFERRIN: CPT

## 2022-02-11 PROCEDURE — 83036 HEMOGLOBIN GLYCOSYLATED A1C: CPT | Performed by: INTERNAL MEDICINE

## 2022-02-11 PROCEDURE — 3008F BODY MASS INDEX DOCD: CPT | Performed by: INTERNAL MEDICINE

## 2022-02-11 PROCEDURE — 82728 ASSAY OF FERRITIN: CPT

## 2022-02-11 PROCEDURE — 82310 ASSAY OF CALCIUM: CPT

## 2022-02-11 PROCEDURE — 80076 HEPATIC FUNCTION PANEL: CPT

## 2022-02-11 PROCEDURE — 3079F DIAST BP 80-89 MM HG: CPT | Performed by: INTERNAL MEDICINE

## 2022-02-11 PROCEDURE — 84439 ASSAY OF FREE THYROXINE: CPT

## 2022-02-11 PROCEDURE — 3077F SYST BP >= 140 MM HG: CPT | Performed by: INTERNAL MEDICINE

## 2022-02-11 PROCEDURE — 99214 OFFICE O/P EST MOD 30 MIN: CPT | Performed by: INTERNAL MEDICINE

## 2022-02-11 PROCEDURE — 36416 COLLJ CAPILLARY BLOOD SPEC: CPT | Performed by: INTERNAL MEDICINE

## 2022-02-11 PROCEDURE — 3044F HG A1C LEVEL LT 7.0%: CPT | Performed by: INTERNAL MEDICINE

## 2022-02-11 PROCEDURE — 36415 COLL VENOUS BLD VENIPUNCTURE: CPT

## 2022-02-11 PROCEDURE — 82947 ASSAY GLUCOSE BLOOD QUANT: CPT | Performed by: INTERNAL MEDICINE

## 2022-02-11 PROCEDURE — 83970 ASSAY OF PARATHORMONE: CPT

## 2022-02-11 PROCEDURE — 84443 ASSAY THYROID STIM HORMONE: CPT

## 2022-02-11 PROCEDURE — 82306 VITAMIN D 25 HYDROXY: CPT

## 2022-02-11 PROCEDURE — 83540 ASSAY OF IRON: CPT

## 2022-02-13 LAB
ALBUMIN SERPL-MCNC: 4.8 G/DL (ref 3.4–5)
ALP LIVER SERPL-CCNC: 78 U/L
ALT SERPL-CCNC: 66 U/L
AST SERPL-CCNC: 52 U/L (ref 15–37)
BILIRUB DIRECT SERPL-MCNC: 0.1 MG/DL (ref 0–0.2)
BILIRUB SERPL-MCNC: 0.4 MG/DL (ref 0.1–2)
DEPRECATED HBV CORE AB SER IA-ACNC: 11.9 NG/ML
IRON SATN MFR SERPL: 5 %
IRON SERPL-MCNC: 35 UG/DL
PROT SERPL-MCNC: 7.8 G/DL (ref 6.4–8.2)
TIBC SERPL-MCNC: 648 UG/DL (ref 240–450)
TRANSFERRIN SERPL-MCNC: 435 MG/DL (ref 200–360)

## 2022-02-16 RX ORDER — DULAGLUTIDE 1.5 MG/.5ML
1.5 INJECTION, SOLUTION SUBCUTANEOUS WEEKLY
Qty: 6 ML | Refills: 0 | Status: SHIPPED | OUTPATIENT
Start: 2022-02-16 | End: 2022-04-29

## 2022-02-22 ENCOUNTER — OFFICE VISIT (OUTPATIENT)
Dept: INTERNAL MEDICINE CLINIC | Facility: CLINIC | Age: 72
End: 2022-02-22
Payer: MEDICARE

## 2022-02-22 VITALS
HEART RATE: 71 BPM | HEIGHT: 66 IN | RESPIRATION RATE: 16 BRPM | BODY MASS INDEX: 22.52 KG/M2 | DIASTOLIC BLOOD PRESSURE: 65 MMHG | WEIGHT: 140.13 LBS | SYSTOLIC BLOOD PRESSURE: 122 MMHG

## 2022-02-22 DIAGNOSIS — E03.9 ACQUIRED HYPOTHYROIDISM: ICD-10-CM

## 2022-02-22 DIAGNOSIS — D64.9 ANEMIA, UNSPECIFIED TYPE: ICD-10-CM

## 2022-02-22 DIAGNOSIS — E11.36 TYPE 2 DIABETES MELLITUS WITH DIABETIC CATARACT, WITHOUT LONG-TERM CURRENT USE OF INSULIN (HCC): Primary | ICD-10-CM

## 2022-02-22 DIAGNOSIS — R79.89 ELEVATED LIVER FUNCTION TESTS: ICD-10-CM

## 2022-02-22 PROBLEM — D49.2 SKIN GROWTH: Status: RESOLVED | Noted: 2021-05-20 | Resolved: 2022-02-22

## 2022-02-22 PROCEDURE — 3008F BODY MASS INDEX DOCD: CPT | Performed by: INTERNAL MEDICINE

## 2022-02-22 PROCEDURE — 3074F SYST BP LT 130 MM HG: CPT | Performed by: INTERNAL MEDICINE

## 2022-02-22 PROCEDURE — 99214 OFFICE O/P EST MOD 30 MIN: CPT | Performed by: INTERNAL MEDICINE

## 2022-02-22 PROCEDURE — 3078F DIAST BP <80 MM HG: CPT | Performed by: INTERNAL MEDICINE

## 2022-02-22 RX ORDER — LEVOTHYROXINE SODIUM 88 UG/1
TABLET ORAL
Qty: 110 TABLET | Refills: 0 | Status: SHIPPED | OUTPATIENT
Start: 2022-02-22

## 2022-02-22 NOTE — ASSESSMENT & PLAN NOTE
Patient's TSH is 4.2. She has alternated between the 88 mcg dose and the 100 mcg dose of levothyroxine throughout the years. We will increase her 88 mcg dose to 1-1/2 tablets on Sundays and 1 tablet the rest the days of the week.

## 2022-02-22 NOTE — ASSESSMENT & PLAN NOTE
Patient has a chronic iron deficiency anemia with a negative panendoscopy. She is intolerant of iron. I advised patient to try to take it occasionally. Recheck CBC in 3 months.

## 2022-02-22 NOTE — ASSESSMENT & PLAN NOTE
Patient's liver tests are mildly elevated. This is likely due to fatty liver.   We will continue to monitor and the liver test.

## 2022-02-22 NOTE — PATIENT INSTRUCTIONS
Please schedule your next appointment in May for a Medicare physical.    Please do your blood tests 2-5 days prior to your appointment with me.   You do not need to fast.     Please do not take vitamins or supplements for 3 days prior to the blood test.

## 2022-03-12 RX ORDER — METFORMIN HYDROCHLORIDE 500 MG/1
1000 TABLET, EXTENDED RELEASE ORAL 2 TIMES DAILY WITH MEALS
Qty: 360 TABLET | Refills: 1 | Status: SHIPPED | OUTPATIENT
Start: 2022-03-12 | End: 2022-09-08

## 2022-03-12 NOTE — TELEPHONE ENCOUNTER
Refill passed per [facility] protocol.     Requested Prescriptions   Pending Prescriptions Disp Refills    METFORMIN  MG Oral Tablet 24 Hr [Pharmacy Med Name: METFORMIN ER 500MG 24HR TABS] 360 tablet 0     Sig: TAKE 2 TABLETS(1000 MG) BY MOUTH TWICE DAILY WITH MEALS        Diabetes Medication Protocol Passed - 3/12/2022  9:08 AM        Passed - Last A1C < 7.5 and within past 6 months     Lab Results   Component Value Date    A1C 6.3 (A) 02/11/2022               Passed - Appointment in past 6 or next 3 months        Passed - GFR Non- > 50     Lab Results   Component Value Date    GFRNAA 73 12/28/2021                 Passed - GFR in the past 12 months              Future Appointments         Provider Department Appt Notes    In 3 months Jessica Sue MD [facility], [address], [employer] physical            Recent Outpatient Visits              2 weeks ago Type 2 diabetes mellitus with diabetic cataract, without long-term current use of insulin [facility])    [facility], [address], My Daley MD    Office Visit    4 weeks ago Controlled type 2 diabetes mellitus without complication, without long-term current use of insulin [facility])    [facility] Endocrinology Dillon Fields MD    Office Visit    5 months ago Controlled type 2 diabetes mellitus without complication, without long-term current use of insulin [facility])    [facility] for [address] Visit    7 months ago Type 2 diabetes mellitus without complication, unspecified whether long term insulin use [facility])    [facility] Endocrinology Dillon Fields MD    Office Visit    8 months ago Seborrheic keratoses    [facility] [place] [facility] Mechele Goltz, MD    Office Visit

## 2022-04-26 DIAGNOSIS — E11.9 CONTROLLED TYPE 2 DIABETES MELLITUS WITHOUT COMPLICATION, WITHOUT LONG-TERM CURRENT USE OF INSULIN (HCC): ICD-10-CM

## 2022-04-26 RX ORDER — GLIMEPIRIDE 2 MG/1
3 TABLET ORAL
Qty: 135 TABLET | Refills: 0 | Status: SHIPPED | OUTPATIENT
Start: 2022-04-26 | End: 2022-05-02

## 2022-04-29 RX ORDER — DULAGLUTIDE 1.5 MG/.5ML
INJECTION, SOLUTION SUBCUTANEOUS
Qty: 6 ML | Refills: 0 | Status: SHIPPED | OUTPATIENT
Start: 2022-04-29

## 2022-05-02 RX ORDER — GLIMEPIRIDE 2 MG/1
TABLET ORAL
Qty: 135 TABLET | Refills: 0 | Status: SHIPPED | OUTPATIENT
Start: 2022-05-02

## 2022-05-03 DIAGNOSIS — I10 ESSENTIAL HYPERTENSION WITH GOAL BLOOD PRESSURE LESS THAN 130/85: ICD-10-CM

## 2022-05-03 DIAGNOSIS — K21.9 GASTROESOPHAGEAL REFLUX DISEASE: ICD-10-CM

## 2022-05-03 RX ORDER — PANTOPRAZOLE SODIUM 20 MG/1
TABLET, DELAYED RELEASE ORAL
Qty: 90 TABLET | Refills: 1 | Status: SHIPPED | OUTPATIENT
Start: 2022-05-03 | End: 2022-10-30

## 2022-05-03 RX ORDER — DILTIAZEM HYDROCHLORIDE 360 MG/1
CAPSULE, EXTENDED RELEASE ORAL
Qty: 90 CAPSULE | Refills: 1 | Status: SHIPPED | OUTPATIENT
Start: 2022-05-03 | End: 2022-10-30

## 2022-05-03 NOTE — TELEPHONE ENCOUNTER
Refill passed per [address] Shahbaz Guzmán protocol.     Requested Prescriptions   Pending Prescriptions Disp Refills    PANTOPRAZOLE 20 MG Oral Tab EC [Pharmacy Med Name: PANTOPRAZOLE 20MG TABLETS] 90 tablet 1     Sig: TAKE 1 TABLET(20 MG) BY MOUTH BEFORE BREAKFAST        Gastrointestional Medication Protocol Passed - 5/3/2022 10:45 AM        Passed - Appointment in past 12 or next 3 months           DILTIAZEM  MG Oral Capsule SR 24 Hr [Pharmacy Med Name: DILTIAZEM ER 360MG CAPSULES (24 HR)] 90 capsule 1     Sig: TAKE 1 CAPSULE(360 MG) BY MOUTH DAILY        Hypertensive Medications Protocol Passed - 5/3/2022 10:45 AM        Passed - CMP or BMP in past 12 months        Passed - Appointment in past 6 or next 3 months        Passed - GFR Non- > 50     Lab Results   Component Value Date    GFRNAA 73 12/28/2021                       Recent Outpatient Visits              2 months ago Type 2 diabetes mellitus with diabetic cataract, without long-term current use of insulin [facility])    [facility], [address], Audrey Pavon MD    Office Visit    2 months ago Controlled type 2 diabetes mellitus without complication, without long-term current use of insulin [facility])    [address] [facility]zm[facility] Endocrinology Elvina Nissen, MD    Office Visit    7 months ago Controlled type 2 diabetes mellitus without complication, without long-term current use of insulin [facility])    [facility] for [address] Visit    9 months ago Type 2 diabetes mellitus without complication, unspecified whether long term insulin use [facility])    [address] Shahbaz Guzmán Endocrinology Elvina Nissen, MD    Office Visit    10 months ago Seborrheic keratoses    [facility] Dermatology Shyam Hsieh MD    Office Visit            Future Appointments         Provider Department Appt Notes    In 1 month Cecil Hendrix MD [facility], [address], [address] [place] [address]    In 2 months Trista Parker, Gabriela Mancusor, [facility] Endocrinology Diabetes/ informed of policies

## 2022-05-09 RX ORDER — TRANDOLAPRIL 4 MG/1
8 TABLET ORAL DAILY
Qty: 180 TABLET | Refills: 1 | Status: SHIPPED | OUTPATIENT
Start: 2022-05-09

## 2022-05-09 NOTE — TELEPHONE ENCOUNTER
Refill passed per Bunny Lindsay protocol.   Requested Prescriptions   Pending Prescriptions Disp Refills    TRANDOLAPRIL 4 MG Oral Tab [Pharmacy Med Name: Melchor Mandril TABLETS] 180 tablet 1     Sig: TAKE 2 TABLETS(8 MG) BY MOUTH DAILY        Hypertensive Medications Protocol Passed - 5/8/2022  2:55 PM        Passed - CMP or BMP in past 12 months        Passed - Appointment in past 6 or next 3 months        Passed - GFR Non- > 50     Lab Results   Component Value Date    GFRNAA 73 12/28/2021                     Recent Outpatient Visits              2 months ago Type 2 diabetes mellitus with diabetic cataract, without long-term current use of insulin [facility])    Diandra Temple Williemae Quarry, MD    Office Visit    2 months ago Controlled type 2 diabetes mellitus without complication, without long-term current use of insulin [facility])    [facility] Endocrinology Parisa Lentz MD    Office Visit    7 months ago Controlled type 2 diabetes mellitus without complication, without long-term current use of insulin [facility])    [facility] [address] Visit    9 months ago Type 2 diabetes mellitus without complication, unspecified whether long term insulin use [facility])    [facility] Endocrinology Parisa Lentz MD    Office Visit    10 months ago Seborrheic keratoses    [facility] Dermatology Rosa Isela Altamirano MD    Office Visit          Future Appointments         Provider Department Appt Notes    In 1 month MD Bunny Kaminski Gridley, [facility]    In 2 months Parisa Lentz, [address] [facility] Endocrinology Diabetes/ informed of policies

## 2022-05-27 ENCOUNTER — PATIENT MESSAGE (OUTPATIENT)
Dept: ENDOCRINOLOGY CLINIC | Facility: CLINIC | Age: 72
End: 2022-05-27

## 2022-05-31 NOTE — TELEPHONE ENCOUNTER
From: Chidi Plaza  To: Manpreet Preciado MD  Sent: 5/27/2022 10:19 AM CDT  Subject: Other    After speaking with Shana white a.m. regarding my trulicity and [employer] decision to not lower the tier level to make the medication more affordable I have decided to just go ahead and refill the script at this time. Thank you and your staff for assisting me with my situation.

## 2022-06-11 ENCOUNTER — LAB ENCOUNTER (OUTPATIENT)
Dept: LAB | Facility: HOSPITAL | Age: 72
End: 2022-06-11
Attending: INTERNAL MEDICINE
Payer: MEDICARE

## 2022-06-11 DIAGNOSIS — R79.89 ELEVATED LIVER FUNCTION TESTS: ICD-10-CM

## 2022-06-11 DIAGNOSIS — E03.9 HYPOTHYROIDISM, UNSPECIFIED TYPE: ICD-10-CM

## 2022-06-11 DIAGNOSIS — E03.9 ACQUIRED HYPOTHYROIDISM: ICD-10-CM

## 2022-06-11 DIAGNOSIS — E11.36 TYPE 2 DIABETES MELLITUS WITH DIABETIC CATARACT, WITHOUT LONG-TERM CURRENT USE OF INSULIN (HCC): ICD-10-CM

## 2022-06-11 LAB
ALT SERPL-CCNC: 53 U/L
AST SERPL-CCNC: 38 U/L (ref 15–37)
BASOPHILS # BLD AUTO: 0.05 X10(3) UL (ref 0–0.2)
BASOPHILS NFR BLD AUTO: 0.9 %
DEPRECATED HBV CORE AB SER IA-ACNC: 9 NG/ML
DEPRECATED RDW RBC AUTO: 49.7 FL (ref 35.1–46.3)
EOSINOPHIL # BLD AUTO: 0.14 X10(3) UL (ref 0–0.7)
EOSINOPHIL NFR BLD AUTO: 2.4 %
ERYTHROCYTE [DISTWIDTH] IN BLOOD BY AUTOMATED COUNT: 16.9 % (ref 11–15)
HCT VFR BLD AUTO: 34.4 %
HGB BLD-MCNC: 10.6 G/DL
IMM GRANULOCYTES # BLD AUTO: 0.03 X10(3) UL (ref 0–1)
IMM GRANULOCYTES NFR BLD: 0.5 %
IRON SATN MFR SERPL: 6 %
IRON SERPL-MCNC: 35 UG/DL
LYMPHOCYTES # BLD AUTO: 1.74 X10(3) UL (ref 1–4)
LYMPHOCYTES NFR BLD AUTO: 29.9 %
MCH RBC QN AUTO: 24.9 PG (ref 26–34)
MCHC RBC AUTO-ENTMCNC: 30.8 G/DL (ref 31–37)
MCV RBC AUTO: 80.9 FL
MONOCYTES # BLD AUTO: 0.5 X10(3) UL (ref 0.1–1)
MONOCYTES NFR BLD AUTO: 8.6 %
NEUTROPHILS # BLD AUTO: 3.35 X10 (3) UL (ref 1.5–7.7)
NEUTROPHILS # BLD AUTO: 3.35 X10(3) UL (ref 1.5–7.7)
NEUTROPHILS NFR BLD AUTO: 57.7 %
PLATELET # BLD AUTO: 287 10(3)UL (ref 150–450)
RBC # BLD AUTO: 4.25 X10(6)UL
T4 FREE SERPL-MCNC: 1.3 NG/DL (ref 0.8–1.7)
TIBC SERPL-MCNC: 569 UG/DL (ref 240–450)
TRANSFERRIN SERPL-MCNC: 382 MG/DL (ref 200–360)
TSI SER-ACNC: 2.26 MIU/ML (ref 0.36–3.74)
VIT B12 SERPL-MCNC: 331 PG/ML (ref 193–986)
WBC # BLD AUTO: 5.8 X10(3) UL (ref 4–11)

## 2022-06-11 PROCEDURE — 36415 COLL VENOUS BLD VENIPUNCTURE: CPT

## 2022-06-11 PROCEDURE — 82607 VITAMIN B-12: CPT

## 2022-06-11 PROCEDURE — 84439 ASSAY OF FREE THYROXINE: CPT

## 2022-06-11 PROCEDURE — 84443 ASSAY THYROID STIM HORMONE: CPT

## 2022-06-11 PROCEDURE — 83540 ASSAY OF IRON: CPT

## 2022-06-11 PROCEDURE — 82728 ASSAY OF FERRITIN: CPT

## 2022-06-11 PROCEDURE — 85025 COMPLETE CBC W/AUTO DIFF WBC: CPT

## 2022-06-11 PROCEDURE — 84450 TRANSFERASE (AST) (SGOT): CPT

## 2022-06-11 PROCEDURE — 84466 ASSAY OF TRANSFERRIN: CPT

## 2022-06-11 PROCEDURE — 84460 ALANINE AMINO (ALT) (SGPT): CPT

## 2022-06-16 ENCOUNTER — OFFICE VISIT (OUTPATIENT)
Dept: INTERNAL MEDICINE CLINIC | Facility: CLINIC | Age: 72
End: 2022-06-16
Payer: MEDICARE

## 2022-06-16 VITALS
SYSTOLIC BLOOD PRESSURE: 116 MMHG | HEART RATE: 85 BPM | WEIGHT: 142.5 LBS | DIASTOLIC BLOOD PRESSURE: 63 MMHG | BODY MASS INDEX: 22.9 KG/M2 | HEIGHT: 66 IN

## 2022-06-16 DIAGNOSIS — M85.80 OSTEOPENIA, UNSPECIFIED LOCATION: ICD-10-CM

## 2022-06-16 DIAGNOSIS — Z00.00 ENCOUNTER FOR MEDICARE ANNUAL WELLNESS EXAM: Primary | ICD-10-CM

## 2022-06-16 DIAGNOSIS — D64.9 ANEMIA, UNSPECIFIED TYPE: ICD-10-CM

## 2022-06-16 DIAGNOSIS — E11.36 TYPE 2 DIABETES MELLITUS WITH DIABETIC CATARACT, WITHOUT LONG-TERM CURRENT USE OF INSULIN (HCC): ICD-10-CM

## 2022-06-16 DIAGNOSIS — E78.00 HYPERCHOLESTEROLEMIA: ICD-10-CM

## 2022-06-16 DIAGNOSIS — K76.0 FATTY LIVER: ICD-10-CM

## 2022-06-16 DIAGNOSIS — K21.9 GASTROESOPHAGEAL REFLUX DISEASE, UNSPECIFIED WHETHER ESOPHAGITIS PRESENT: ICD-10-CM

## 2022-06-16 DIAGNOSIS — I10 ESSENTIAL HYPERTENSION WITH GOAL BLOOD PRESSURE LESS THAN 130/85: ICD-10-CM

## 2022-06-16 DIAGNOSIS — H43.393 VITREOUS FLOATERS, BILATERAL: ICD-10-CM

## 2022-06-16 DIAGNOSIS — H25.13 AGE-RELATED NUCLEAR CATARACT OF BOTH EYES: ICD-10-CM

## 2022-06-16 DIAGNOSIS — E03.9 ACQUIRED HYPOTHYROIDISM: ICD-10-CM

## 2022-06-16 PROBLEM — H52.203 MYOPIA WITH ASTIGMATISM AND PRESBYOPIA, BILATERAL: Status: RESOLVED | Noted: 2019-03-14 | Resolved: 2022-06-16

## 2022-06-16 PROBLEM — Z86.16 HISTORY OF COVID-19: Status: RESOLVED | Noted: 2021-03-15 | Resolved: 2022-06-16

## 2022-06-16 PROBLEM — H52.4 MYOPIA WITH ASTIGMATISM AND PRESBYOPIA, BILATERAL: Status: RESOLVED | Noted: 2019-03-14 | Resolved: 2022-06-16

## 2022-06-16 PROBLEM — R79.89 ELEVATED LIVER FUNCTION TESTS: Status: RESOLVED | Noted: 2021-02-09 | Resolved: 2022-06-16

## 2022-06-16 PROBLEM — H52.13 MYOPIA WITH ASTIGMATISM AND PRESBYOPIA, BILATERAL: Status: RESOLVED | Noted: 2019-03-14 | Resolved: 2022-06-16

## 2022-06-16 PROCEDURE — 1126F AMNT PAIN NOTED NONE PRSNT: CPT | Performed by: INTERNAL MEDICINE

## 2022-06-16 PROCEDURE — 3078F DIAST BP <80 MM HG: CPT | Performed by: INTERNAL MEDICINE

## 2022-06-16 PROCEDURE — 96160 PT-FOCUSED HLTH RISK ASSMT: CPT | Performed by: INTERNAL MEDICINE

## 2022-06-16 PROCEDURE — 3074F SYST BP LT 130 MM HG: CPT | Performed by: INTERNAL MEDICINE

## 2022-06-16 PROCEDURE — 3008F BODY MASS INDEX DOCD: CPT | Performed by: INTERNAL MEDICINE

## 2022-06-16 PROCEDURE — G0439 PPPS, SUBSEQ VISIT: HCPCS | Performed by: INTERNAL MEDICINE

## 2022-06-16 PROCEDURE — 99397 PER PM REEVAL EST PAT 65+ YR: CPT | Performed by: INTERNAL MEDICINE

## 2022-06-16 RX ORDER — ATORVASTATIN CALCIUM 10 MG/1
10 TABLET, FILM COATED ORAL NIGHTLY
Qty: 90 TABLET | Refills: 1 | Status: SHIPPED | OUTPATIENT
Start: 2022-06-16

## 2022-06-16 RX ORDER — MAGNESIUM OXIDE 400 MG (241.3 MG MAGNESIUM) TABLET
TABLET
COMMUNITY
Start: 2021-10-29

## 2022-06-16 NOTE — ASSESSMENT & PLAN NOTE
Patient has mild, stable iron deficient anemia. We will continue to monitor this. Patient has not been tolerating the iron, but I advised her to continue to try.

## 2022-07-08 ENCOUNTER — OFFICE VISIT (OUTPATIENT)
Dept: INTERNAL MEDICINE CLINIC | Facility: CLINIC | Age: 72
End: 2022-07-08
Payer: MEDICARE

## 2022-07-08 ENCOUNTER — HOSPITAL ENCOUNTER (OUTPATIENT)
Dept: ULTRASOUND IMAGING | Age: 72
Discharge: HOME | End: 2022-07-08
Attending: NURSE PRACTITIONER
Payer: MEDICARE

## 2022-07-08 VITALS
HEART RATE: 87 BPM | BODY MASS INDEX: 22.66 KG/M2 | HEIGHT: 66 IN | WEIGHT: 141 LBS | SYSTOLIC BLOOD PRESSURE: 129 MMHG | DIASTOLIC BLOOD PRESSURE: 65 MMHG | RESPIRATION RATE: 16 BRPM

## 2022-07-08 DIAGNOSIS — M62.830 LUMBAR PARASPINAL MUSCLE SPASM: Primary | ICD-10-CM

## 2022-07-08 DIAGNOSIS — M79.89 PAIN AND SWELLING OF LEFT LOWER EXTREMITY: ICD-10-CM

## 2022-07-08 DIAGNOSIS — M79.605 PAIN AND SWELLING OF LEFT LOWER EXTREMITY: ICD-10-CM

## 2022-07-08 PROCEDURE — 99213 OFFICE O/P EST LOW 20 MIN: CPT | Performed by: NURSE PRACTITIONER

## 2022-07-08 PROCEDURE — 1125F AMNT PAIN NOTED PAIN PRSNT: CPT | Performed by: NURSE PRACTITIONER

## 2022-07-08 PROCEDURE — 3078F DIAST BP <80 MM HG: CPT | Performed by: NURSE PRACTITIONER

## 2022-07-08 PROCEDURE — 3074F SYST BP LT 130 MM HG: CPT | Performed by: NURSE PRACTITIONER

## 2022-07-08 PROCEDURE — 3008F BODY MASS INDEX DOCD: CPT | Performed by: NURSE PRACTITIONER

## 2022-07-08 PROCEDURE — 93971 EXTREMITY STUDY: CPT | Performed by: NURSE PRACTITIONER

## 2022-07-08 RX ORDER — TIZANIDINE 4 MG/1
4 TABLET ORAL EVERY 8 HOURS PRN
Qty: 21 TABLET | Refills: 0 | Status: SHIPPED | OUTPATIENT
Start: 2022-07-08

## 2022-07-12 ENCOUNTER — OFFICE VISIT (OUTPATIENT)
Dept: ENDOCRINOLOGY CLINIC | Facility: CLINIC | Age: 72
End: 2022-07-12
Payer: MEDICARE

## 2022-07-12 ENCOUNTER — OFFICE VISIT (OUTPATIENT)
Dept: INTERNAL MEDICINE CLINIC | Facility: CLINIC | Age: 72
End: 2022-07-12
Payer: MEDICARE

## 2022-07-12 VITALS
BODY MASS INDEX: 22.92 KG/M2 | DIASTOLIC BLOOD PRESSURE: 64 MMHG | HEART RATE: 78 BPM | HEIGHT: 66 IN | RESPIRATION RATE: 18 BRPM | WEIGHT: 142.63 LBS | SYSTOLIC BLOOD PRESSURE: 122 MMHG

## 2022-07-12 VITALS
BODY MASS INDEX: 23 KG/M2 | HEART RATE: 77 BPM | WEIGHT: 141.38 LBS | DIASTOLIC BLOOD PRESSURE: 53 MMHG | SYSTOLIC BLOOD PRESSURE: 91 MMHG

## 2022-07-12 DIAGNOSIS — E11.9 CONTROLLED TYPE 2 DIABETES MELLITUS WITHOUT COMPLICATION, WITHOUT LONG-TERM CURRENT USE OF INSULIN (HCC): ICD-10-CM

## 2022-07-12 DIAGNOSIS — E11.69 TYPE 2 DIABETES MELLITUS WITH OTHER SPECIFIED COMPLICATION, UNSPECIFIED WHETHER LONG TERM INSULIN USE (HCC): ICD-10-CM

## 2022-07-12 DIAGNOSIS — M54.32 SCIATICA OF LEFT SIDE: Primary | ICD-10-CM

## 2022-07-12 DIAGNOSIS — E11.9 TYPE 2 DIABETES MELLITUS WITHOUT COMPLICATION, WITHOUT LONG-TERM CURRENT USE OF INSULIN (HCC): ICD-10-CM

## 2022-07-12 DIAGNOSIS — E55.9 VITAMIN D DEFICIENCY: ICD-10-CM

## 2022-07-12 DIAGNOSIS — E78.5 DYSLIPIDEMIA: Primary | ICD-10-CM

## 2022-07-12 DIAGNOSIS — D64.9 ANEMIA, UNSPECIFIED TYPE: ICD-10-CM

## 2022-07-12 PROBLEM — E11.36 TYPE 2 DIABETES MELLITUS WITH DIABETIC CATARACT, WITHOUT LONG-TERM CURRENT USE OF INSULIN (HCC): Status: RESOLVED | Noted: 2022-02-22 | Resolved: 2022-07-12

## 2022-07-12 PROBLEM — E11.22 TYPE 2 DIABETES MELLITUS WITH DIABETIC CHRONIC KIDNEY DISEASE (HCC): Status: ACTIVE | Noted: 2022-07-12

## 2022-07-12 PROBLEM — E11.22 TYPE 2 DIABETES MELLITUS WITH DIABETIC CHRONIC KIDNEY DISEASE (HCC): Status: RESOLVED | Noted: 2022-07-12 | Resolved: 2022-07-12

## 2022-07-12 LAB
CARTRIDGE LOT#: NORMAL NUMERIC
GLUCOSE BLOOD: 134
TEST STRIP EXPIRATION DATE: NORMAL DATE
TEST STRIP LOT #: NORMAL NUMERIC

## 2022-07-12 PROCEDURE — 99214 OFFICE O/P EST MOD 30 MIN: CPT | Performed by: INTERNAL MEDICINE

## 2022-07-12 PROCEDURE — 1125F AMNT PAIN NOTED PAIN PRSNT: CPT | Performed by: INTERNAL MEDICINE

## 2022-07-12 PROCEDURE — 83036 HEMOGLOBIN GLYCOSYLATED A1C: CPT | Performed by: INTERNAL MEDICINE

## 2022-07-12 PROCEDURE — 3044F HG A1C LEVEL LT 7.0%: CPT | Performed by: INTERNAL MEDICINE

## 2022-07-12 PROCEDURE — 82947 ASSAY GLUCOSE BLOOD QUANT: CPT | Performed by: INTERNAL MEDICINE

## 2022-07-12 PROCEDURE — 3078F DIAST BP <80 MM HG: CPT | Performed by: INTERNAL MEDICINE

## 2022-07-12 PROCEDURE — 3008F BODY MASS INDEX DOCD: CPT | Performed by: INTERNAL MEDICINE

## 2022-07-12 PROCEDURE — 3074F SYST BP LT 130 MM HG: CPT | Performed by: INTERNAL MEDICINE

## 2022-07-12 RX ORDER — GLIMEPIRIDE 2 MG/1
TABLET ORAL
Qty: 146 TABLET | Refills: 0 | Status: SHIPPED | OUTPATIENT
Start: 2022-07-12 | End: 2022-10-10

## 2022-07-12 RX ORDER — METHYLPREDNISOLONE 4 MG/1
TABLET ORAL
Qty: 1 EACH | Refills: 0 | Status: SHIPPED | OUTPATIENT
Start: 2022-07-12

## 2022-07-12 NOTE — ASSESSMENT & PLAN NOTE
Patient has difficulties with taking iron. It bothers her stomach. Advised her to eat iron rich foods.

## 2022-07-12 NOTE — ASSESSMENT & PLAN NOTE
Patient has had no improvement with Tylenol and tizanidine. We will start a Medrol Dosepak. Advised patient that she will need to watch her sugars while on steroids. After steroids she will change to Aleve 1 twice a day. Refer for physical therapy. Follow-up in 2 months if pain does not improve.

## 2022-07-13 ENCOUNTER — TELEPHONE (OUTPATIENT)
Dept: ENDOCRINOLOGY CLINIC | Facility: CLINIC | Age: 72
End: 2022-07-13

## 2022-07-13 NOTE — TELEPHONE ENCOUNTER
Dr. Yonatan Islas    Please see patient blood sugars    Trulicity 1.5 mg  Metformin 1,000 mg BID  Glimepiride 4 mg with breakfast and 2 mg with dinner

## 2022-07-14 NOTE — TELEPHONE ENCOUNTER
Patient was starting on Medrol dose pack this week according to last OV note and was instructed to call with sugars. Glimepiride was increased at visit in anticipation of her being on steroids by Dr. Dany Su. Sugars are at goal at this time. Continue with the current medications at this time. Please ask her to continue to monitor sugars today before meals and tomorrow morning and then call us again with readings tomorrow to make sure remain normal going into the weekend. Thanks!

## 2022-07-14 NOTE — TELEPHONE ENCOUNTER
Dr. Dany Su, [facility])  Spoke to patient to relay message below - patient stated understanding and will continue current doses  BG readings - 7/14/22:  Fasting 187  2-hours after breakfast 198    Patient stated understanding to call tomorrow with BG readings  Thanks

## 2022-07-15 NOTE — TELEPHONE ENCOUNTER
Patient is calling with blood sugar levels    7-15-22     7:15a - 145 before breakfast  11:30a - 222 after breakfast   3:50p - 177 after lunch

## 2022-07-18 NOTE — TELEPHONE ENCOUNTER
Can please go back to original diabetes regimen including MTF and trulicity and decrease amaryl to 2 mg BF and 1 mg dinner starting today  Thanks

## 2022-07-18 NOTE — TELEPHONE ENCOUNTER
Dr. Socorro Blackburn,  See message below  Spoke to patient today - she states she has one dose of Medrol left and will complete today  Fasting BG today (7/18/22) was 87  Patient states over the weekend BG ranged from 120 - 266 (she did not have log with her)  Patient confirmed DM meds:  Trulicity 3.1OB weekly  MTF 1000mg BID  Glimepiride 4 mg with breakfast and 2 mg with dinner    Please advise -thanks

## 2022-07-28 ENCOUNTER — PATIENT MESSAGE (OUTPATIENT)
Dept: INTERNAL MEDICINE CLINIC | Facility: CLINIC | Age: 72
End: 2022-07-28

## 2022-07-28 DIAGNOSIS — E03.9 ACQUIRED HYPOTHYROIDISM: ICD-10-CM

## 2022-07-28 RX ORDER — LEVOTHYROXINE SODIUM 88 UG/1
TABLET ORAL
Qty: 110 TABLET | Refills: 0 | Status: SHIPPED | OUTPATIENT
Start: 2022-07-28

## 2022-07-28 RX ORDER — TIZANIDINE 4 MG/1
4 TABLET ORAL EVERY 8 HOURS PRN
Qty: 21 TABLET | Refills: 0 | Status: SHIPPED | OUTPATIENT
Start: 2022-07-28

## 2022-07-29 NOTE — TELEPHONE ENCOUNTER
From: Shagufta Marmolejo  To: Natalia Sadler MD  Sent: 7/28/2022 6:41 AM CDT  Subject: Other    Good day, hoping you can refill the Tizanidinr, 4mg tablets that I was given for my sciatica. I have set up physical therapy but does not start until August 17th. ALSO  [facility] seems to be confused regarding my dose of Levothyroxine 0.088mg the script reads to take 1.5 tablet on Sunday and then 1 all the other days of the week. They filled it last month for 13 pills only. I am going out of town on Sunday for 14 days and need this refilled. Thank you very much. I will contact [facility] as well but they seem to have some confusion on there part.

## 2022-08-12 ENCOUNTER — TELEPHONE (OUTPATIENT)
Dept: PHYSICAL THERAPY | Facility: HOSPITAL | Age: 72
End: 2022-08-12

## 2022-08-17 ENCOUNTER — OFFICE VISIT (OUTPATIENT)
Dept: PHYSICAL THERAPY | Age: 72
End: 2022-08-17
Attending: INTERNAL MEDICINE
Payer: MEDICARE

## 2022-08-17 DIAGNOSIS — M54.32 SCIATICA OF LEFT SIDE: ICD-10-CM

## 2022-08-17 PROCEDURE — 97110 THERAPEUTIC EXERCISES: CPT

## 2022-08-17 PROCEDURE — 97161 PT EVAL LOW COMPLEX 20 MIN: CPT

## 2022-08-19 ENCOUNTER — OFFICE VISIT (OUTPATIENT)
Dept: PHYSICAL THERAPY | Age: 72
End: 2022-08-19
Attending: INTERNAL MEDICINE
Payer: MEDICARE

## 2022-08-19 DIAGNOSIS — M54.32 SCIATICA OF LEFT SIDE: ICD-10-CM

## 2022-08-19 PROCEDURE — 97140 MANUAL THERAPY 1/> REGIONS: CPT

## 2022-08-19 PROCEDURE — 97110 THERAPEUTIC EXERCISES: CPT

## 2022-08-19 NOTE — PROGRESS NOTES
Diagnosis: Sciatica of left side (M54.32)     Next MD visit: none scheduled  Fall Risk: standard         Precautions: n/a          Medication Changes since last visit?: No    Subjective: Pt reports 3-4/10 lateral lower leg pain today with aleve. She reports compliance with her home exercise program. She states she started to have some neck pain, however, after performing some of the exercises. Objective:     Date: 8/19/2022  Visit #: 2/6 Larisa ALDRICH (exp. 10/17/2022)   HEP   - updated HEP - see pt instructions section   Therapeutic Exercise   - standing lumbar extension 10x  - repeat standing lumbar extension 10x, 10x  - YENNY 3 minutes  - prone lying x 2 minutes  - prone knee bends with ball 10x  - prone R/L hip extension 2 x 10 ea  - sidelying clams with GTB above knees 2 x 10 ea  - supine abdominal hip iso push with hands 1 x 10 3-5 sec holds  - dead bug 1 x 6 reps each  - shuttle machine B knee ext/flx 6 bands 2 x 10  - shuttle machine R/L knee ext/flx 4 bands 1 x 10 ea  - standing B gastroc stretch on slant board level 4 2 x 30 sec holds   Manual Therapy   - lumbar spine mobilizations in prone x 10 minutes grade 2   Therapeutic Activity   -   Modalities                Assessment: Patient able to abolish leg symptoms with interventions this session. Trial of standing lumbar extension today and pt with no reports of eliciting leg symptoms. Updated HEP to include.       Plan: continue PT    Charges: Ex 2 Man 1   Total Timed Treatment: 45 min  Total Treatment Time: 45 min

## 2022-08-22 ENCOUNTER — OFFICE VISIT (OUTPATIENT)
Dept: PHYSICAL THERAPY | Age: 72
End: 2022-08-22
Attending: INTERNAL MEDICINE
Payer: MEDICARE

## 2022-08-22 DIAGNOSIS — M54.32 SCIATICA OF LEFT SIDE: ICD-10-CM

## 2022-08-22 PROCEDURE — 97110 THERAPEUTIC EXERCISES: CPT

## 2022-08-22 PROCEDURE — 97140 MANUAL THERAPY 1/> REGIONS: CPT

## 2022-08-22 NOTE — PROGRESS NOTES
Diagnosis: Sciatica of left side (M54.32)     Next MD visit: none scheduled  Fall Risk: standard         Precautions: n/a          Medication Changes since last visit?: No    Subjective: Patient reports 6/10 left lower lateral leg pain. She reports she noticed a decrease in the intensity of her leg pain and was wondering why her legs were acting up this morning. Objective:     Date: 8/22/2022  Visit #: 3/6 Larisa [facility] (exp. 10/17/2022) Date: 8/19/2022  Visit #: 2/6 Larisa [facility] (exp. 10/17/2022)   HEP    - updated HEP - see pt instructions section   Therapeutic Exercise   - standing lumbar extension 2 x 10  - YENNY x 2 minutes, x 3 minutes  - prone knee bends with ball 2 x 10  - prone R/L hip extension 2 x 10 ea  - sidelying clams with GTB above knees 2 x 10 ea  - supine abdominal hip iso push with hands 1 x 10 3-5 sec holds  - dead bug 2 x 5 reps each  - shuttle machine B knee ext/flx 6 bands 2 x 10  - seated c-extension with towel 10x - standing lumbar extension 10x  - repeat standing lumbar extension 10x, 10x  - YENNY 3 minutes  - prone lying x 2 minutes  - prone knee bends with ball 10x  - prone R/L hip extension 2 x 10 ea  - sidelying clams with GTB above knees 2 x 10 ea  - supine abdominal hip iso push with hands 1 x 10 3-5 sec holds  - dead bug 1 x 6 reps each  - shuttle machine B knee ext/flx 6 bands 2 x 10  - shuttle machine R/L knee ext/flx 4 bands 1 x 10 ea  - standing B gastroc stretch on slant board level 4 2 x 30 sec holds   Manual Therapy   - lumbar spine mobilizations in prone x 10 minutes grade 2-3 - lumbar spine mobilizations in prone x 10 minutes grade 2   Therapeutic Activity    -   Modalities                  Assessment: Continued with current interventions. Progressed reps of therapeutic exercises this session.        Plan: continue PT    Charges: Ex 3 Man 1   Total Timed Treatment: 48 min  Total Treatment Time: 48 min

## 2022-08-24 ENCOUNTER — OFFICE VISIT (OUTPATIENT)
Dept: PHYSICAL THERAPY | Age: 72
End: 2022-08-24
Attending: INTERNAL MEDICINE
Payer: MEDICARE

## 2022-08-24 DIAGNOSIS — M54.32 SCIATICA OF LEFT SIDE: ICD-10-CM

## 2022-08-24 DIAGNOSIS — E11.9 TYPE 2 DIABETES MELLITUS WITHOUT COMPLICATION, UNSPECIFIED WHETHER LONG TERM INSULIN USE (HCC): ICD-10-CM

## 2022-08-24 PROCEDURE — 97110 THERAPEUTIC EXERCISES: CPT

## 2022-08-24 PROCEDURE — 97140 MANUAL THERAPY 1/> REGIONS: CPT

## 2022-08-24 RX ORDER — TIZANIDINE 4 MG/1
4 TABLET ORAL EVERY 8 HOURS PRN
Qty: 21 TABLET | Refills: 0 | Status: SHIPPED | OUTPATIENT
Start: 2022-08-24

## 2022-08-24 NOTE — TELEPHONE ENCOUNTER
Pt should be seen for ffup either by pcp, np or other open provider;  Her back pain had been going on for 6 weeks already so needs reeval if she still needs to be given muscle relaxant .

## 2022-08-24 NOTE — TELEPHONE ENCOUNTER
Spoke with patient, (  Name and  verified ) informed of 's  instructions below    Patient requesting provider to know that she started PT on  ( PT  could not accomodates her until  then )  and yesterday lifted a laundry basket and the back pain flared up again     Rates pain at 7/10 lower left back pain radiates from lumbar  back to left foot   Can weight bear and it is tolerable     Takes Aleve twice and then takes the Tizanidine at bedtime    Asking for refill since she just began PT       Please advise and thank you.

## 2022-08-24 NOTE — TELEPHONE ENCOUNTER
Please review. Protocol failed / No protocol.     Requested Prescriptions   Pending Prescriptions Disp Refills    TIZANIDINE 4 MG Oral Tab [Pharmacy Med Name: TIZANIDINE 4MG TABLETS] 21 tablet 0     Sig: TAKE 1 TABLET(4 MG) BY MOUTH EVERY 8 HOURS AS NEEDED        There is no refill protocol information for this order           Future Appointments         Provider Department Appt Notes    In 5 days Padmini Kirby, PT [facility] [address] [place] [address] 6 visits 8/17 to 10/17  [employer]  $20 c/p    In 1 week Padmini Kirby PT [facility] [address] [place] [address] 6 visits 8/17 to 10/17  [employer]  $20 c/p    In 1 week Padmini Kirby PT [facility] [address]  c/p 20    In 2 weeks Padmini Kirby PT [facility] [address]  c/p 20    In 2 weeks Maddison Paiz MD [address], [address] [place] [address] 2 mo f/u    In 2 months Kieran Mcqueen MD [facility] Ophthalmology DM EE, Dr Enrrique Sotelo pt    In 3 months Maddison Paiz MD [facility], [address] [place] [address] 6 mo f/u    In 3 months Divina Lucio, [address] [place] [address] Endocrinology 5-6 month fu             Recent Outpatient Visits              Today Sciatica of left side    [facility], Nisha, PT    Office Visit    2 days ago Sciatica of left side    [facility]    [facility] Visit    5 days ago Sciatica of left side    [facility], Nisha, PT    Office Visit    1 week ago Sciatica of left side    [facility], Nisha, PT    Office Visit    1 month ago Sciatica of left side    Linnea Rowley MD    Office Visit

## 2022-08-24 NOTE — PROGRESS NOTES
Diagnosis: Sciatica of left side (M54.32)     Next MD visit: none scheduled  Fall Risk: standard         Precautions: n/a          Medication Changes since last visit?: No    Subjective: Patient reports a flare up of pain with symptoms from her thigh to her lower leg today. She reports she did some laundry yesterday and this may have contributed to her pain and she has been dealing with some stressors yesterday and this morning as well.        Objective:     Date: 8/24/2022  Visit #: 4/6 [employer] [facility] (exp. 10/17/2022) Date: 8/22/2022  Visit #: 3/6 [employer] [facility]CR (exp. 10/17/2022) Date: 8/19/2022  Visit #: 2/6 [employer] MCR (exp. 10/17/2022)   HEP   - continue prone on elbows & standing lumbar extension every few hours - daily  - updated HEP - see pt instructions section   Therapeutic Exercise   - standing lumbar extension 2 x 10  - prone lying 2 minutes  - YENNY x 3 minutes  - prone knee bends with ball 2 x 10  - prone R/L hip extension 2 x 10 ea  - supine abdominal hip iso push with hands 1 x 10 3-5 sec holds  - sidelying R/L hip abduction 2 x 10 ea   - standing lumbar extension 2 x 10  - YENNY x 2 minutes, x 3 minutes  - prone knee bends with ball 2 x 10  - prone R/L hip extension 2 x 10 ea  - sidelying clams with GTB above knees 2 x 10 ea  - supine abdominal hip iso push with hands 1 x 10 3-5 sec holds  - dead bug 2 x 5 reps each  - shuttle machine B knee ext/flx 6 bands 2 x 10  - seated c-extension with towel 10x - standing lumbar extension 10x  - repeat standing lumbar extension 10x, 10x  - YENNY 3 minutes  - prone lying x 2 minutes  - prone knee bends with ball 10x  - prone R/L hip extension 2 x 10 ea  - sidelying clams with GTB above knees 2 x 10 ea  - supine abdominal hip iso push with hands 1 x 10 3-5 sec holds  - dead bug 1 x 6 reps each  - shuttle machine B knee ext/flx 6 bands 2 x 10  - shuttle machine R/L knee ext/flx 4 bands 1 x 10 ea  - standing B gastroc stretch on slant board level 4 2 x 30 sec holds   Manual Therapy   - lumbar spine mobilizations in prone x 10 minutes grade 2-3 - lumbar spine mobilizations in prone x 10 minutes grade 2-3 - lumbar spine mobilizations in prone x 10 minutes grade 2   Therapeutic Activity     -   Modalities                    Assessment: Patient reports reduction in pain symptoms in lower leg to 2/10 and abolishment of thigh pain post interventions.     Plan: continue PT    Charges: Ex 3 Man 1   Total Timed Treatment: 50 min  Total Treatment Time: 50 min

## 2022-08-25 RX ORDER — CALCIUM CITRATE/VITAMIN D3 200MG-6.25
TABLET ORAL
Qty: 200 STRIP | Refills: 0 | Status: SHIPPED | OUTPATIENT
Start: 2022-08-25

## 2022-08-25 RX ORDER — BLOOD-GLUCOSE CONTROL, LOW
EACH MISCELLANEOUS
Qty: 1 EACH | Refills: 0 | Status: SHIPPED | OUTPATIENT
Start: 2022-08-25

## 2022-08-29 ENCOUNTER — OFFICE VISIT (OUTPATIENT)
Dept: PHYSICAL THERAPY | Age: 72
End: 2022-08-29
Attending: INTERNAL MEDICINE
Payer: MEDICARE

## 2022-08-29 DIAGNOSIS — M54.32 SCIATICA OF LEFT SIDE: ICD-10-CM

## 2022-08-29 PROCEDURE — 97110 THERAPEUTIC EXERCISES: CPT

## 2022-08-29 NOTE — PROGRESS NOTES
Diagnosis: Sciatica of left side (M54.32)     Next MD visit: none scheduled  Fall Risk: standard         Precautions: n/a          Medication Changes since last visit?: No    Subjective: Pt reports 5/10 right lateral thigh pain this morning. She reports her pain is starting to move up closer to her spine as she has not felt the great toe numbness. Patient reports 2-3/10 mild back pain.      Objective:     Date: 8/29/2022  Visit #: 5/6 [employer] MCR (exp. 10/17/2022) Date: 8/24/2022  Visit #: 4/6 [employer] MCR (exp. 10/17/2022) Date: 8/22/2022  Visit #: 3/6 [employer] MCR (exp. 10/17/2022)   HEP    - continue prone on elbows & standing lumbar extension every few hours - daily    Therapeutic Exercise   - prone lying 1 minute, 1 minute   - YENNY 2 minutes  - prone knee bends with ball 3 x 10  - prone R/L hip extension 2 x 10 ea  - supine bridges 2 x 10  - supine bridges with SB 2 x 10  - dead bug 1 x 10  - sidelying R/L clams with GTB above knees 2 x 10 ea  - PPU 2 x 5 reps  - shuttle machine B knee ext/flx 6 bands 2 x 10  - shuttle machine R/L knee ext/flx 5 bands 1 x 10 ea  - standing lumbar extension 2 x 10  - standing B gastroc stretch on slant board level 4-5 3 x 30 sec holds - standing lumbar extension 2 x 10  - prone lying 2 minutes  - YENNY x 3 minutes  - prone knee bends with ball 2 x 10  - prone R/L hip extension 2 x 10 ea  - supine abdominal hip iso push with hands 1 x 10 3-5 sec holds  - sidelying R/L hip abduction 2 x 10 ea   - standing lumbar extension 2 x 10  - YENNY x 2 minutes, x 3 minutes  - prone knee bends with ball 2 x 10  - prone R/L hip extension 2 x 10 ea  - sidelying clams with GTB above knees 2 x 10 ea  - supine abdominal hip iso push with hands 1 x 10 3-5 sec holds  - dead bug 2 x 5 reps each  - shuttle machine B knee ext/flx 6 bands 2 x 10  - seated c-extension with towel 10x   Manual Therapy    - lumbar spine mobilizations in prone x 10 minutes grade 2-3 - lumbar spine mobilizations in prone x 10 minutes grade 2-3   Therapeutic Activity        Modalities                    Assessment: Advanced global hip strengthening interventions today.     Plan: continue PT    Charges: Ex 3   Total Timed Treatment: 40 min  Total Treatment Time: 40 min

## 2022-08-31 ENCOUNTER — OFFICE VISIT (OUTPATIENT)
Dept: PHYSICAL THERAPY | Age: 72
End: 2022-08-31
Attending: INTERNAL MEDICINE
Payer: MEDICARE

## 2022-08-31 DIAGNOSIS — M54.32 SCIATICA OF LEFT SIDE: ICD-10-CM

## 2022-08-31 PROCEDURE — 97110 THERAPEUTIC EXERCISES: CPT

## 2022-09-06 ENCOUNTER — OFFICE VISIT (OUTPATIENT)
Dept: PHYSICAL THERAPY | Age: 72
End: 2022-09-06
Attending: INTERNAL MEDICINE
Payer: MEDICARE

## 2022-09-06 PROCEDURE — 97110 THERAPEUTIC EXERCISES: CPT

## 2022-09-06 NOTE — PROGRESS NOTES
Diagnosis: Sciatica of left side (M54.32)     Next MD visit: none scheduled  Fall Risk: standard         Precautions: n/a          Medication Changes since last visit?: No    Subjective:  Pt reports she was doing some bending activities and her leg did not bother her lately which is an improvement. She reports 2-3/10 low back pain today.      Objective:    8/31/2022:  Lumbar ROM:  Ext: WNL     Date: 9/6/2022  Visit #: 7/12 [employer] MCR (exp. 11/2022) Date: 8/31/2022  Visit #: 6/6 [employer] MCR (exp. 10/17/2022) Date: 8/29/2022  Visit #: 5/6 [employer] MCR (exp. 10/17/2022)   HEP    - perform lumbar extension SAG at wall for home program    Therapeutic Exercise   - YENNY 3 minutes  - sidelying R/L hip abduction 1.5# 2 x 10 ea  - supine bridges 3 x 10  - supine B OH reach with 7# DB with legs in table top position 1 x 10  - supine c-retraction 10x  - dead bug 2 x 10  - shuttle machine B knee ext/flx 6 bands 2 x 10  - shuttle machine R/L knee ext/flx 4 bands 1 x 10 ea  - standing lumbar extension 1 x 10  - standing B gastroc stretch on slant board level 5 2 x 30 sec holds  - standing lumbar extension SAG at wall 3 x 10  - standing R/L hip abduction with GTB at shins 2 x 10 ea  - standing R/L hip extension with GTB at shins 2 x 10 ea - prone lying > YENNY 10x  - prone R/L hip extension 2 x 10 ea  - sidelying R/L hip abduction 1.5# 2 x 10 ea  - supine bridges 2 x 10  - supine c-retraction 10x  - dead bug 2 x 10  - shuttle machine B knee ext/flx 6 bands 1 x 10  - standing lumbar extension 2 x 10  - standing B gastroc stretch on slant board level 4-5 2 x 30 sec holds  - standing lumbar extension SAG at wall 2 x 10   - prone lying 1 minute, 1 minute   - YENNY 2 minutes  - prone knee bends with ball 3 x 10  - prone R/L hip extension 2 x 10 ea  - supine bridges 2 x 10  - supine bridges with SB 2 x 10  - dead bug 1 x 10  - sidelying R/L clams with GTB above knees 2 x 10 ea  - PPU 2 x 5 reps  - shuttle machine B knee ext/flx 6 bands 2 x 10  - shuttle machine R/L knee ext/flx 5 bands 1 x 10 ea  - standing lumbar extension 2 x 10  - standing B gastroc stretch on slant board level 4-5 3 x 30 sec holds   Manual Therapy        Therapeutic Activity        Modalities                  Assessment: Advanced hip strengthening interventions today into standing position.      Plan: continue PT    Charges: Ex 3   Total Timed Treatment: 45 min  Total Treatment Time: 45 min

## 2022-09-08 ENCOUNTER — OFFICE VISIT (OUTPATIENT)
Dept: INTERNAL MEDICINE CLINIC | Facility: CLINIC | Age: 72
End: 2022-09-08
Payer: MEDICARE

## 2022-09-08 ENCOUNTER — LAB ENCOUNTER (OUTPATIENT)
Dept: LAB | Age: 72
End: 2022-09-08
Attending: INTERNAL MEDICINE
Payer: MEDICARE

## 2022-09-08 ENCOUNTER — OFFICE VISIT (OUTPATIENT)
Dept: PHYSICAL THERAPY | Age: 72
End: 2022-09-08
Attending: INTERNAL MEDICINE
Payer: MEDICARE

## 2022-09-08 VITALS
SYSTOLIC BLOOD PRESSURE: 138 MMHG | BODY MASS INDEX: 22.98 KG/M2 | HEART RATE: 94 BPM | WEIGHT: 143 LBS | OXYGEN SATURATION: 99 % | DIASTOLIC BLOOD PRESSURE: 64 MMHG | RESPIRATION RATE: 18 BRPM | HEIGHT: 66 IN

## 2022-09-08 DIAGNOSIS — D64.9 ANEMIA, UNSPECIFIED TYPE: ICD-10-CM

## 2022-09-08 DIAGNOSIS — M54.32 SCIATICA OF LEFT SIDE: ICD-10-CM

## 2022-09-08 DIAGNOSIS — E11.9 TYPE 2 DIABETES MELLITUS WITHOUT COMPLICATION, WITHOUT LONG-TERM CURRENT USE OF INSULIN (HCC): Primary | ICD-10-CM

## 2022-09-08 LAB
BASOPHILS # BLD AUTO: 0.08 X10(3) UL (ref 0–0.2)
BASOPHILS NFR BLD AUTO: 0.9 %
DEPRECATED HBV CORE AB SER IA-ACNC: 8.5 NG/ML
DEPRECATED RDW RBC AUTO: 48.9 FL (ref 35.1–46.3)
EOSINOPHIL # BLD AUTO: 0.14 X10(3) UL (ref 0–0.7)
EOSINOPHIL NFR BLD AUTO: 1.6 %
ERYTHROCYTE [DISTWIDTH] IN BLOOD BY AUTOMATED COUNT: 16.4 % (ref 11–15)
HCT VFR BLD AUTO: 35.3 %
HGB BLD-MCNC: 10.8 G/DL
IMM GRANULOCYTES # BLD AUTO: 0.03 X10(3) UL (ref 0–1)
IMM GRANULOCYTES NFR BLD: 0.4 %
IRON SATN MFR SERPL: 7 %
IRON SERPL-MCNC: 39 UG/DL
LYMPHOCYTES # BLD AUTO: 2.26 X10(3) UL (ref 1–4)
LYMPHOCYTES NFR BLD AUTO: 26.4 %
MCH RBC QN AUTO: 24.9 PG (ref 26–34)
MCHC RBC AUTO-ENTMCNC: 30.6 G/DL (ref 31–37)
MCV RBC AUTO: 81.5 FL
MONOCYTES # BLD AUTO: 0.66 X10(3) UL (ref 0.1–1)
MONOCYTES NFR BLD AUTO: 7.7 %
NEUTROPHILS # BLD AUTO: 5.4 X10 (3) UL (ref 1.5–7.7)
NEUTROPHILS # BLD AUTO: 5.4 X10(3) UL (ref 1.5–7.7)
NEUTROPHILS NFR BLD AUTO: 63 %
PLATELET # BLD AUTO: 330 10(3)UL (ref 150–450)
RBC # BLD AUTO: 4.33 X10(6)UL
TIBC SERPL-MCNC: 595 UG/DL (ref 240–450)
TRANSFERRIN SERPL-MCNC: 399 MG/DL (ref 200–360)
VIT B12 SERPL-MCNC: 358 PG/ML (ref 193–986)
WBC # BLD AUTO: 8.6 X10(3) UL (ref 4–11)

## 2022-09-08 PROCEDURE — 99214 OFFICE O/P EST MOD 30 MIN: CPT | Performed by: INTERNAL MEDICINE

## 2022-09-08 PROCEDURE — 3078F DIAST BP <80 MM HG: CPT | Performed by: INTERNAL MEDICINE

## 2022-09-08 PROCEDURE — 82607 VITAMIN B-12: CPT

## 2022-09-08 PROCEDURE — 83540 ASSAY OF IRON: CPT

## 2022-09-08 PROCEDURE — 84466 ASSAY OF TRANSFERRIN: CPT

## 2022-09-08 PROCEDURE — 1126F AMNT PAIN NOTED NONE PRSNT: CPT | Performed by: INTERNAL MEDICINE

## 2022-09-08 PROCEDURE — 3008F BODY MASS INDEX DOCD: CPT | Performed by: INTERNAL MEDICINE

## 2022-09-08 PROCEDURE — 82728 ASSAY OF FERRITIN: CPT

## 2022-09-08 PROCEDURE — 97110 THERAPEUTIC EXERCISES: CPT

## 2022-09-08 PROCEDURE — 36415 COLL VENOUS BLD VENIPUNCTURE: CPT

## 2022-09-08 PROCEDURE — 3075F SYST BP GE 130 - 139MM HG: CPT | Performed by: INTERNAL MEDICINE

## 2022-09-08 PROCEDURE — 85025 COMPLETE CBC W/AUTO DIFF WBC: CPT

## 2022-09-08 RX ORDER — TIZANIDINE 4 MG/1
4 TABLET ORAL EVERY 8 HOURS PRN
Qty: 30 TABLET | Refills: 2 | Status: SHIPPED | OUTPATIENT
Start: 2022-09-08

## 2022-09-08 RX ORDER — METFORMIN HYDROCHLORIDE 500 MG/1
1000 TABLET, EXTENDED RELEASE ORAL 2 TIMES DAILY WITH MEALS
Qty: 360 TABLET | Refills: 1 | Status: SHIPPED | OUTPATIENT
Start: 2022-09-08

## 2022-09-08 NOTE — PROGRESS NOTES
Diagnosis: Sciatica of left side (M54.32)     Next MD visit: none scheduled  Fall Risk: standard         Precautions: n/a          Medication Changes since last visit?: No    Subjective:  Pt reports no pain today; only stiffness. She reports no leg pain symptoms.     Objective:    8/31/2022:  Lumbar ROM:  Ext: WNL     Date: 9/6/2022  Visit #: 8/12 [employer] MCR (exp. 11/2022) Date: 9/6/2022  Visit #: 7/12 [employer] MCR (exp. 11/2022) Date: 8/31/2022  Visit #: 6/6 [employer] MCR (exp. 10/17/2022)   HEP     - perform lumbar extension SAG at wall for home program   Therapeutic Exercise   - PPU 1 x 5 reps   - prone R/L hip extension 3 x 10 ea  - supine bridges 3 x 10  - supine B OH reach with 7# DB with legs in table top position 1 x 10  - shuttle machine B knee ext/flx 6 bands 2 x 10  - shuttle machine R/L knee ext/flx 4 bands 1 x 10 ea  - standing lumbar extension over mat table 2 x 10  - standing B gastroc stretch on slant board level 5 2 x 30 sec holds  - standing R/L hip abduction with GTB at shins 3 x 10 ea  - standing R/L hip extension with GTB at shins 3 x 10 ea - YENNY 3 minutes  - sidelying R/L hip abduction 1.5# 2 x 10 ea  - supine bridges 3 x 10  - supine B OH reach with 7# DB with legs in table top position 1 x 10  - supine c-retraction 10x  - dead bug 2 x 10  - shuttle machine B knee ext/flx 6 bands 2 x 10  - shuttle machine R/L knee ext/flx 4 bands 1 x 10 ea  - standing lumbar extension 1 x 10  - standing B gastroc stretch on slant board level 5 2 x 30 sec holds  - standing lumbar extension SAG at wall 3 x 10  - standing R/L hip abduction with GTB at shins 2 x 10 ea  - standing R/L hip extension with GTB at shins 2 x 10 ea - prone lying > YENNY 10x  - prone R/L hip extension 2 x 10 ea  - sidelying R/L hip abduction 1.5# 2 x 10 ea  - supine bridges 2 x 10  - supine c-retraction 10x  - dead bug 2 x 10  - shuttle machine B knee ext/flx 6 bands 1 x 10  - standing lumbar extension 2 x 10  - standing B gastroc stretch on slant board level 4-5 2 x 30 sec holds  - standing lumbar extension SAG at wall 2 x 10     Manual Therapy        Therapeutic Activity        Modalities                  Assessment: Advanced reps of current therapeutic exercises. Patient with no reports of pain during session.  Updated home program.    Plan: continue PT    Charges: Ex 3   Total Timed Treatment: 45 min  Total Treatment Time: 45 min

## 2022-09-08 NOTE — ASSESSMENT & PLAN NOTE
Patient has chronic anemia. She has been taking iron every other day. She does not take it daily due to side effects. Check CBC today.

## 2022-09-13 ENCOUNTER — OFFICE VISIT (OUTPATIENT)
Dept: PHYSICAL THERAPY | Age: 72
End: 2022-09-13
Attending: INTERNAL MEDICINE
Payer: MEDICARE

## 2022-09-13 PROCEDURE — 97110 THERAPEUTIC EXERCISES: CPT

## 2022-09-19 ENCOUNTER — OFFICE VISIT (OUTPATIENT)
Dept: PHYSICAL THERAPY | Age: 72
End: 2022-09-19
Attending: INTERNAL MEDICINE
Payer: MEDICARE

## 2022-09-19 PROCEDURE — 97110 THERAPEUTIC EXERCISES: CPT

## 2022-09-19 RX ORDER — DULAGLUTIDE 1.5 MG/.5ML
INJECTION, SOLUTION SUBCUTANEOUS
Qty: 6 ML | Refills: 0 | Status: SHIPPED | OUTPATIENT
Start: 2022-09-19

## 2022-09-21 ENCOUNTER — APPOINTMENT (OUTPATIENT)
Dept: PHYSICAL THERAPY | Age: 72
End: 2022-09-21
Attending: INTERNAL MEDICINE
Payer: MEDICARE

## 2022-09-26 ENCOUNTER — APPOINTMENT (OUTPATIENT)
Dept: PHYSICAL THERAPY | Age: 72
End: 2022-09-26
Attending: INTERNAL MEDICINE
Payer: MEDICARE

## 2022-09-28 ENCOUNTER — APPOINTMENT (OUTPATIENT)
Dept: PHYSICAL THERAPY | Age: 72
End: 2022-09-28
Attending: INTERNAL MEDICINE
Payer: MEDICARE

## 2022-10-08 DIAGNOSIS — E11.9 CONTROLLED TYPE 2 DIABETES MELLITUS WITHOUT COMPLICATION, WITHOUT LONG-TERM CURRENT USE OF INSULIN (HCC): ICD-10-CM

## 2022-10-10 RX ORDER — GLIMEPIRIDE 2 MG/1
TABLET ORAL
Qty: 135 TABLET | Refills: 0 | Status: SHIPPED | OUTPATIENT
Start: 2022-10-10 | End: 2022-12-28

## 2022-10-25 ENCOUNTER — OFFICE VISIT (OUTPATIENT)
Dept: OPHTHALMOLOGY | Facility: CLINIC | Age: 72
End: 2022-10-25
Payer: MEDICARE

## 2022-10-25 DIAGNOSIS — H25.13 AGE-RELATED NUCLEAR CATARACT OF BOTH EYES: ICD-10-CM

## 2022-10-25 DIAGNOSIS — E11.9 DIABETES MELLITUS TYPE 2 WITHOUT RETINOPATHY (HCC): Primary | ICD-10-CM

## 2022-10-25 DIAGNOSIS — H43.393 VITREOUS FLOATERS, BILATERAL: ICD-10-CM

## 2022-10-25 PROCEDURE — 92014 COMPRE OPH EXAM EST PT 1/>: CPT | Performed by: OPHTHALMOLOGY

## 2022-10-25 PROCEDURE — 1126F AMNT PAIN NOTED NONE PRSNT: CPT | Performed by: OPHTHALMOLOGY

## 2022-10-25 PROCEDURE — 2023F DILAT RTA XM W/O RTNOPTHY: CPT | Performed by: OPHTHALMOLOGY

## 2022-10-25 NOTE — PATIENT INSTRUCTIONS
Diabetes mellitus type 2 without retinopathy ([facility]  Diabetes type II: no background of retinopathy, no signs of neovascularization noted. Discussed ocular and systemic benefits of blood sugar control. Diagnosis and treatment discussed in detail with patient. Vitreous floaters, bilateral   There is no evidence of retinal pathology. All signs and symptoms of retinal detachment/tears explained in detail. Patient instructed to call the office if they experience increase in floaters, increase in flashes of light, loss of vision or curtain or veil effect. Age-related nuclear cataract of both eyes  Discussed moderate cataracts in both eyes that are affecting vision but are not surgical at this time.

## 2022-10-25 NOTE — ASSESSMENT & PLAN NOTE
Discussed moderate cataracts in both eyes that are affecting vision but are not surgical at this time.

## 2022-10-28 DIAGNOSIS — K21.9 GASTROESOPHAGEAL REFLUX DISEASE: ICD-10-CM

## 2022-10-28 DIAGNOSIS — I10 ESSENTIAL HYPERTENSION WITH GOAL BLOOD PRESSURE LESS THAN 130/85: ICD-10-CM

## 2022-10-30 RX ORDER — PANTOPRAZOLE SODIUM 20 MG/1
TABLET, DELAYED RELEASE ORAL
Qty: 90 TABLET | Refills: 1 | Status: SHIPPED | OUTPATIENT
Start: 2022-10-30

## 2022-10-30 RX ORDER — DILTIAZEM HYDROCHLORIDE 360 MG/1
CAPSULE, EXTENDED RELEASE ORAL
Qty: 90 CAPSULE | Refills: 1 | Status: SHIPPED | OUTPATIENT
Start: 2022-10-30

## 2022-10-31 DIAGNOSIS — E03.9 ACQUIRED HYPOTHYROIDISM: ICD-10-CM

## 2022-10-31 RX ORDER — LEVOTHYROXINE SODIUM 88 UG/1
TABLET ORAL
Qty: 110 TABLET | Refills: 0 | Status: SHIPPED | OUTPATIENT
Start: 2022-10-31

## 2022-11-07 DIAGNOSIS — I10 ESSENTIAL HYPERTENSION WITH GOAL BLOOD PRESSURE LESS THAN 130/85: ICD-10-CM

## 2022-11-07 RX ORDER — TRANDOLAPRIL TABLETS 4 MG/1
TABLET ORAL
Qty: 180 TABLET | Refills: 1 | Status: SHIPPED | OUTPATIENT
Start: 2022-11-07

## 2022-12-12 ENCOUNTER — LAB ENCOUNTER (OUTPATIENT)
Dept: LAB | Age: 72
End: 2022-12-12
Attending: INTERNAL MEDICINE
Payer: MEDICARE

## 2022-12-12 DIAGNOSIS — E55.9 VITAMIN D DEFICIENCY: ICD-10-CM

## 2022-12-12 DIAGNOSIS — E11.69 TYPE 2 DIABETES MELLITUS WITH OTHER SPECIFIED COMPLICATION, UNSPECIFIED WHETHER LONG TERM INSULIN USE (HCC): ICD-10-CM

## 2022-12-12 DIAGNOSIS — E78.5 DYSLIPIDEMIA: ICD-10-CM

## 2022-12-12 LAB
ANION GAP SERPL CALC-SCNC: 12 MMOL/L (ref 0–18)
BUN BLD-MCNC: 12 MG/DL (ref 7–18)
BUN/CREAT SERPL: 14 (ref 10–20)
CALCIUM BLD-MCNC: 9.8 MG/DL (ref 8.5–10.1)
CHLORIDE SERPL-SCNC: 105 MMOL/L (ref 98–112)
CHOLEST SERPL-MCNC: 141 MG/DL (ref ?–200)
CO2 SERPL-SCNC: 25 MMOL/L (ref 21–32)
CREAT BLD-MCNC: 0.86 MG/DL
CREAT UR-SCNC: 121 MG/DL
FASTING PATIENT LIPID ANSWER: YES
FASTING STATUS PATIENT QL REPORTED: YES
GFR SERPLBLD BASED ON 1.73 SQ M-ARVRAT: 73 ML/MIN/1.73M2 (ref 60–?)
GLUCOSE BLD-MCNC: 118 MG/DL (ref 70–99)
HDLC SERPL-MCNC: 73 MG/DL (ref 40–59)
LDLC SERPL CALC-MCNC: 46 MG/DL (ref ?–100)
MICROALBUMIN UR-MCNC: 5.91 MG/DL
MICROALBUMIN/CREAT 24H UR-RTO: 48.8 UG/MG (ref ?–30)
NONHDLC SERPL-MCNC: 68 MG/DL (ref ?–130)
OSMOLALITY SERPL CALC.SUM OF ELEC: 295 MOSM/KG (ref 275–295)
POTASSIUM SERPL-SCNC: 3.7 MMOL/L (ref 3.5–5.1)
SODIUM SERPL-SCNC: 142 MMOL/L (ref 136–145)
TRIGL SERPL-MCNC: 130 MG/DL (ref 30–149)
VIT D+METAB SERPL-MCNC: 41 NG/ML (ref 30–100)
VLDLC SERPL CALC-MCNC: 18 MG/DL (ref 0–30)

## 2022-12-12 PROCEDURE — 36415 COLL VENOUS BLD VENIPUNCTURE: CPT

## 2022-12-12 PROCEDURE — 3060F POS MICROALBUMINURIA REV: CPT | Performed by: INTERNAL MEDICINE

## 2022-12-12 PROCEDURE — 80048 BASIC METABOLIC PNL TOTAL CA: CPT

## 2022-12-12 PROCEDURE — 80061 LIPID PANEL: CPT

## 2022-12-12 PROCEDURE — 3061F NEG MICROALBUMINURIA REV: CPT | Performed by: INTERNAL MEDICINE

## 2022-12-12 PROCEDURE — 82570 ASSAY OF URINE CREATININE: CPT

## 2022-12-12 PROCEDURE — 82306 VITAMIN D 25 HYDROXY: CPT

## 2022-12-12 PROCEDURE — 82043 UR ALBUMIN QUANTITATIVE: CPT

## 2022-12-15 ENCOUNTER — OFFICE VISIT (OUTPATIENT)
Dept: INTERNAL MEDICINE CLINIC | Facility: CLINIC | Age: 72
End: 2022-12-15
Payer: MEDICARE

## 2022-12-15 VITALS
BODY MASS INDEX: 22.98 KG/M2 | OXYGEN SATURATION: 99 % | RESPIRATION RATE: 18 BRPM | WEIGHT: 143 LBS | HEIGHT: 66 IN | DIASTOLIC BLOOD PRESSURE: 63 MMHG | SYSTOLIC BLOOD PRESSURE: 135 MMHG | HEART RATE: 75 BPM

## 2022-12-15 DIAGNOSIS — D64.9 ANEMIA, UNSPECIFIED TYPE: ICD-10-CM

## 2022-12-15 DIAGNOSIS — E03.9 ACQUIRED HYPOTHYROIDISM: ICD-10-CM

## 2022-12-15 DIAGNOSIS — Z12.31 BREAST CANCER SCREENING BY MAMMOGRAM: ICD-10-CM

## 2022-12-15 DIAGNOSIS — R80.9 TYPE 2 DIABETES MELLITUS WITH MICROALBUMINURIA, WITHOUT LONG-TERM CURRENT USE OF INSULIN (HCC): Primary | ICD-10-CM

## 2022-12-15 DIAGNOSIS — E11.29 TYPE 2 DIABETES MELLITUS WITH MICROALBUMINURIA, WITHOUT LONG-TERM CURRENT USE OF INSULIN (HCC): Primary | ICD-10-CM

## 2022-12-15 LAB
CARTRIDGE LOT#: 535 NUMERIC
HEMOGLOBIN A1C: 6.8 % (ref 4.3–5.6)

## 2022-12-15 PROCEDURE — 3078F DIAST BP <80 MM HG: CPT | Performed by: INTERNAL MEDICINE

## 2022-12-15 PROCEDURE — 1126F AMNT PAIN NOTED NONE PRSNT: CPT | Performed by: INTERNAL MEDICINE

## 2022-12-15 PROCEDURE — 83036 HEMOGLOBIN GLYCOSYLATED A1C: CPT | Performed by: INTERNAL MEDICINE

## 2022-12-15 PROCEDURE — 3008F BODY MASS INDEX DOCD: CPT | Performed by: INTERNAL MEDICINE

## 2022-12-15 PROCEDURE — 3075F SYST BP GE 130 - 139MM HG: CPT | Performed by: INTERNAL MEDICINE

## 2022-12-15 PROCEDURE — 99214 OFFICE O/P EST MOD 30 MIN: CPT | Performed by: INTERNAL MEDICINE

## 2022-12-15 PROCEDURE — 3044F HG A1C LEVEL LT 7.0%: CPT | Performed by: INTERNAL MEDICINE

## 2022-12-15 RX ORDER — LEVOTHYROXINE SODIUM 88 UG/1
TABLET ORAL
Qty: 110 TABLET | Refills: 0 | Status: SHIPPED | OUTPATIENT
Start: 2022-12-15

## 2022-12-15 NOTE — ASSESSMENT & PLAN NOTE
I reviewed the patient's labs. Her urine was positive for microalbuminuria, however she is on trandolapril. A1c today was 6.8. Continue current medications.

## 2022-12-15 NOTE — PATIENT INSTRUCTIONS
Do fasting labs 2-4 days before your next appointment. Fast for 12 hours. Water and meds are okay.    Do not take vitamins or supplements for 3 days prior to the blood test.

## 2022-12-28 DIAGNOSIS — E11.9 CONTROLLED TYPE 2 DIABETES MELLITUS WITHOUT COMPLICATION, WITHOUT LONG-TERM CURRENT USE OF INSULIN (HCC): ICD-10-CM

## 2023-01-01 RX ORDER — GLIMEPIRIDE 2 MG/1
TABLET ORAL
Qty: 135 TABLET | Refills: 0 | Status: SHIPPED | OUTPATIENT
Start: 2023-01-01

## 2023-01-06 ENCOUNTER — HOSPITAL ENCOUNTER (OUTPATIENT)
Dept: MAMMOGRAPHY | Age: 73
Discharge: HOME | End: 2023-01-06
Attending: INTERNAL MEDICINE
Payer: MEDICARE

## 2023-01-06 DIAGNOSIS — Z12.31 BREAST CANCER SCREENING BY MAMMOGRAM: ICD-10-CM

## 2023-01-06 PROCEDURE — 77067 SCR MAMMO BI INCL CAD: CPT | Performed by: INTERNAL MEDICINE

## 2023-01-06 PROCEDURE — 77063 BREAST TOMOSYNTHESIS BI: CPT | Performed by: INTERNAL MEDICINE

## 2023-02-09 RX ORDER — DULAGLUTIDE 1.5 MG/.5ML
INJECTION, SOLUTION SUBCUTANEOUS
Qty: 6 ML | Refills: 0 | Status: SHIPPED | OUTPATIENT
Start: 2023-02-09

## 2023-02-16 ENCOUNTER — LAB ENCOUNTER (OUTPATIENT)
Dept: LAB | Age: 73
End: 2023-02-16
Attending: INTERNAL MEDICINE
Payer: MEDICARE

## 2023-02-16 DIAGNOSIS — E11.29 TYPE 2 DIABETES MELLITUS WITH MICROALBUMINURIA, WITHOUT LONG-TERM CURRENT USE OF INSULIN (HCC): ICD-10-CM

## 2023-02-16 DIAGNOSIS — E03.9 ACQUIRED HYPOTHYROIDISM: ICD-10-CM

## 2023-02-16 DIAGNOSIS — R80.9 TYPE 2 DIABETES MELLITUS WITH MICROALBUMINURIA, WITHOUT LONG-TERM CURRENT USE OF INSULIN (HCC): ICD-10-CM

## 2023-02-16 DIAGNOSIS — D64.9 ANEMIA, UNSPECIFIED TYPE: ICD-10-CM

## 2023-02-16 LAB
ALBUMIN SERPL-MCNC: 4.2 G/DL (ref 3.4–5)
ALBUMIN/GLOB SERPL: 1.2 {RATIO} (ref 1–2)
ALP LIVER SERPL-CCNC: 75 U/L
ALT SERPL-CCNC: 75 U/L
ANION GAP SERPL CALC-SCNC: 7 MMOL/L (ref 0–18)
AST SERPL-CCNC: 72 U/L (ref 15–37)
BASOPHILS # BLD AUTO: 0.06 X10(3) UL (ref 0–0.2)
BASOPHILS NFR BLD AUTO: 1 %
BILIRUB SERPL-MCNC: 0.5 MG/DL (ref 0.1–2)
BUN BLD-MCNC: 11 MG/DL (ref 7–18)
BUN/CREAT SERPL: 12.6 (ref 10–20)
CALCIUM BLD-MCNC: 9.7 MG/DL (ref 8.5–10.1)
CHLORIDE SERPL-SCNC: 108 MMOL/L (ref 98–112)
CO2 SERPL-SCNC: 28 MMOL/L (ref 21–32)
CREAT BLD-MCNC: 0.87 MG/DL
DEPRECATED HBV CORE AB SER IA-ACNC: 12.4 NG/ML
DEPRECATED RDW RBC AUTO: 45.3 FL (ref 35.1–46.3)
EOSINOPHIL # BLD AUTO: 0.13 X10(3) UL (ref 0–0.7)
EOSINOPHIL NFR BLD AUTO: 2.2 %
ERYTHROCYTE [DISTWIDTH] IN BLOOD BY AUTOMATED COUNT: 16.1 % (ref 11–15)
FASTING STATUS PATIENT QL REPORTED: YES
FOLATE SERPL-MCNC: >20 NG/ML (ref 8.7–?)
GFR SERPLBLD BASED ON 1.73 SQ M-ARVRAT: 72 ML/MIN/1.73M2 (ref 60–?)
GLOBULIN PLAS-MCNC: 3.4 G/DL (ref 2.8–4.4)
GLUCOSE BLD-MCNC: 152 MG/DL (ref 70–99)
HCT VFR BLD AUTO: 35.1 %
HGB BLD-MCNC: 10.7 G/DL
IMM GRANULOCYTES # BLD AUTO: 0.02 X10(3) UL (ref 0–1)
IMM GRANULOCYTES NFR BLD: 0.3 %
IRON SATN MFR SERPL: 7 %
IRON SERPL-MCNC: 41 UG/DL
LYMPHOCYTES # BLD AUTO: 1.82 X10(3) UL (ref 1–4)
LYMPHOCYTES NFR BLD AUTO: 30.7 %
MCH RBC QN AUTO: 23.8 PG (ref 26–34)
MCHC RBC AUTO-ENTMCNC: 30.5 G/DL (ref 31–37)
MCV RBC AUTO: 78 FL
MONOCYTES # BLD AUTO: 0.41 X10(3) UL (ref 0.1–1)
MONOCYTES NFR BLD AUTO: 6.9 %
NEUTROPHILS # BLD AUTO: 3.49 X10 (3) UL (ref 1.5–7.7)
NEUTROPHILS # BLD AUTO: 3.49 X10(3) UL (ref 1.5–7.7)
NEUTROPHILS NFR BLD AUTO: 58.9 %
OSMOLALITY SERPL CALC.SUM OF ELEC: 298 MOSM/KG (ref 275–295)
PLATELET # BLD AUTO: 324 10(3)UL (ref 150–450)
POTASSIUM SERPL-SCNC: 3.8 MMOL/L (ref 3.5–5.1)
PROT SERPL-MCNC: 7.6 G/DL (ref 6.4–8.2)
RBC # BLD AUTO: 4.5 X10(6)UL
SODIUM SERPL-SCNC: 143 MMOL/L (ref 136–145)
TIBC SERPL-MCNC: 596 UG/DL (ref 240–450)
TRANSFERRIN SERPL-MCNC: 400 MG/DL (ref 200–360)
TSI SER-ACNC: 1.43 MIU/ML (ref 0.36–3.74)
VIT B12 SERPL-MCNC: 524 PG/ML (ref 193–986)
WBC # BLD AUTO: 5.9 X10(3) UL (ref 4–11)

## 2023-02-16 PROCEDURE — 82746 ASSAY OF FOLIC ACID SERUM: CPT

## 2023-02-16 PROCEDURE — 84443 ASSAY THYROID STIM HORMONE: CPT

## 2023-02-16 PROCEDURE — 82607 VITAMIN B-12: CPT

## 2023-02-16 PROCEDURE — 36415 COLL VENOUS BLD VENIPUNCTURE: CPT

## 2023-02-16 PROCEDURE — 82728 ASSAY OF FERRITIN: CPT

## 2023-02-16 PROCEDURE — 84466 ASSAY OF TRANSFERRIN: CPT

## 2023-02-16 PROCEDURE — 85025 COMPLETE CBC W/AUTO DIFF WBC: CPT

## 2023-02-16 PROCEDURE — 83540 ASSAY OF IRON: CPT

## 2023-02-16 PROCEDURE — 80053 COMPREHEN METABOLIC PANEL: CPT

## 2023-02-21 ENCOUNTER — TELEPHONE (OUTPATIENT)
Dept: INTERNAL MEDICINE CLINIC | Facility: CLINIC | Age: 73
End: 2023-02-21

## 2023-02-21 ENCOUNTER — OFFICE VISIT (OUTPATIENT)
Dept: ENDOCRINOLOGY CLINIC | Facility: CLINIC | Age: 73
End: 2023-02-21

## 2023-02-21 VITALS
BODY MASS INDEX: 22.98 KG/M2 | SYSTOLIC BLOOD PRESSURE: 126 MMHG | DIASTOLIC BLOOD PRESSURE: 66 MMHG | HEIGHT: 66 IN | WEIGHT: 143 LBS | HEART RATE: 73 BPM

## 2023-02-21 DIAGNOSIS — E11.9 TYPE 2 DIABETES MELLITUS WITHOUT COMPLICATION, WITHOUT LONG-TERM CURRENT USE OF INSULIN (HCC): ICD-10-CM

## 2023-02-21 DIAGNOSIS — E11.9 CONTROLLED TYPE 2 DIABETES MELLITUS WITHOUT COMPLICATION, WITHOUT LONG-TERM CURRENT USE OF INSULIN (HCC): Primary | ICD-10-CM

## 2023-02-21 DIAGNOSIS — E11.9 TYPE 2 DIABETES MELLITUS WITHOUT COMPLICATION, UNSPECIFIED WHETHER LONG TERM INSULIN USE (HCC): ICD-10-CM

## 2023-02-21 DIAGNOSIS — E78.5 DYSLIPIDEMIA: ICD-10-CM

## 2023-02-21 DIAGNOSIS — E11.69 TYPE 2 DIABETES MELLITUS WITH OTHER SPECIFIED COMPLICATION, UNSPECIFIED WHETHER LONG TERM INSULIN USE (HCC): Primary | ICD-10-CM

## 2023-02-21 DIAGNOSIS — E11.9 CONTROLLED TYPE 2 DIABETES MELLITUS WITHOUT COMPLICATION, WITHOUT LONG-TERM CURRENT USE OF INSULIN (HCC): ICD-10-CM

## 2023-02-21 LAB
CARTRIDGE LOT#: ABNORMAL NUMERIC
GLUCOSE BLOOD: 160
HEMOGLOBIN A1C: 6.5 % (ref 4.3–5.6)
TEST STRIP LOT #: NORMAL NUMERIC

## 2023-02-21 PROCEDURE — 3074F SYST BP LT 130 MM HG: CPT | Performed by: INTERNAL MEDICINE

## 2023-02-21 PROCEDURE — 82947 ASSAY GLUCOSE BLOOD QUANT: CPT | Performed by: INTERNAL MEDICINE

## 2023-02-21 PROCEDURE — 3044F HG A1C LEVEL LT 7.0%: CPT | Performed by: INTERNAL MEDICINE

## 2023-02-21 PROCEDURE — 83036 HEMOGLOBIN GLYCOSYLATED A1C: CPT | Performed by: INTERNAL MEDICINE

## 2023-02-21 PROCEDURE — 3078F DIAST BP <80 MM HG: CPT | Performed by: INTERNAL MEDICINE

## 2023-02-21 PROCEDURE — 3008F BODY MASS INDEX DOCD: CPT | Performed by: INTERNAL MEDICINE

## 2023-02-21 PROCEDURE — 99213 OFFICE O/P EST LOW 20 MIN: CPT | Performed by: INTERNAL MEDICINE

## 2023-02-21 RX ORDER — GLUCOSAM/CHON-MSM1/C/MANG/BOSW 500-416.6
TABLET ORAL
Qty: 200 EACH | Refills: 0 | Status: SHIPPED | OUTPATIENT
Start: 2023-02-21

## 2023-02-21 RX ORDER — CALCIUM CITRATE/VITAMIN D3 200MG-6.25
TABLET ORAL
Qty: 200 STRIP | Refills: 0 | Status: SHIPPED | OUTPATIENT
Start: 2023-02-21

## 2023-02-21 NOTE — ADDENDUM NOTE
Addended by: Mariana Saini on: 2/21/2023 11:52 AM     Modules accepted: Orders Yes - the patient is able to be screened

## 2023-02-28 DIAGNOSIS — E11.9 TYPE 2 DIABETES MELLITUS WITHOUT COMPLICATION, WITHOUT LONG-TERM CURRENT USE OF INSULIN (HCC): ICD-10-CM

## 2023-03-01 RX ORDER — METFORMIN HYDROCHLORIDE 500 MG/1
1000 TABLET, EXTENDED RELEASE ORAL 2 TIMES DAILY WITH MEALS
Qty: 360 TABLET | Refills: 3 | Status: SHIPPED | OUTPATIENT
Start: 2023-03-01

## 2023-03-02 ENCOUNTER — OFFICE VISIT (OUTPATIENT)
Dept: INTERNAL MEDICINE CLINIC | Facility: CLINIC | Age: 73
End: 2023-03-02

## 2023-03-02 VITALS
RESPIRATION RATE: 18 BRPM | BODY MASS INDEX: 23.14 KG/M2 | HEIGHT: 66 IN | HEART RATE: 72 BPM | SYSTOLIC BLOOD PRESSURE: 140 MMHG | TEMPERATURE: 98 F | WEIGHT: 144 LBS | OXYGEN SATURATION: 95 % | DIASTOLIC BLOOD PRESSURE: 68 MMHG

## 2023-03-02 DIAGNOSIS — H25.13 AGE-RELATED NUCLEAR CATARACT OF BOTH EYES: ICD-10-CM

## 2023-03-02 DIAGNOSIS — E03.9 ACQUIRED HYPOTHYROIDISM: ICD-10-CM

## 2023-03-02 DIAGNOSIS — K21.9 GASTROESOPHAGEAL REFLUX DISEASE, UNSPECIFIED WHETHER ESOPHAGITIS PRESENT: ICD-10-CM

## 2023-03-02 DIAGNOSIS — Z00.00 ENCOUNTER FOR MEDICARE ANNUAL WELLNESS EXAM: Primary | ICD-10-CM

## 2023-03-02 DIAGNOSIS — R80.9 TYPE 2 DIABETES MELLITUS WITH MICROALBUMINURIA, WITHOUT LONG-TERM CURRENT USE OF INSULIN (HCC): ICD-10-CM

## 2023-03-02 DIAGNOSIS — E78.00 HYPERCHOLESTEROLEMIA: ICD-10-CM

## 2023-03-02 DIAGNOSIS — D64.9 ANEMIA, UNSPECIFIED TYPE: ICD-10-CM

## 2023-03-02 DIAGNOSIS — Z78.0 ASYMPTOMATIC MENOPAUSE: ICD-10-CM

## 2023-03-02 DIAGNOSIS — I10 ESSENTIAL HYPERTENSION WITH GOAL BLOOD PRESSURE LESS THAN 130/85: ICD-10-CM

## 2023-03-02 DIAGNOSIS — E11.29 TYPE 2 DIABETES MELLITUS WITH MICROALBUMINURIA, WITHOUT LONG-TERM CURRENT USE OF INSULIN (HCC): ICD-10-CM

## 2023-03-02 DIAGNOSIS — K76.0 FATTY LIVER: ICD-10-CM

## 2023-03-02 DIAGNOSIS — M85.80 OSTEOPENIA, UNSPECIFIED LOCATION: ICD-10-CM

## 2023-03-02 PROBLEM — M54.32 SCIATICA OF LEFT SIDE: Status: RESOLVED | Noted: 2022-07-12 | Resolved: 2023-03-02

## 2023-03-02 PROBLEM — H43.393 VITREOUS FLOATERS, BILATERAL: Status: RESOLVED | Noted: 2018-05-04 | Resolved: 2023-03-02

## 2023-03-02 RX ORDER — TRANDOLAPRIL TABLETS 4 MG/1
8 TABLET ORAL DAILY
Qty: 180 TABLET | Refills: 1 | Status: SHIPPED | OUTPATIENT
Start: 2023-03-02

## 2023-03-02 NOTE — ASSESSMENT & PLAN NOTE
Patient's anemia is stable, however she has only been taking the iron every other day. She will increase it to every day.

## 2023-03-02 NOTE — TELEPHONE ENCOUNTER
Refill passed per [facility], [facility] protocol.    Requested Prescriptions   Pending Prescriptions Disp Refills    METFORMIN  MG Oral Tablet 24 Hr [Pharmacy Med Name: METFORMIN ER 500MG 24HR TABS] 360 tablet 1     Sig: TAKE 2 TABLETS(1000 MG) BY MOUTH TWICE DAILY WITH MEALS       Diabetes Medication Protocol Passed - 2/28/2023  1:33 PM        Passed - Last A1C < 7.5 and within past 6 months     Lab Results   Component Value Date    A1C 6.5 (A) 02/21/2023             Passed - In person appointment or virtual visit in the past 6 mos or appointment in next 3 mos     Recent Outpatient Visits              1 week ago Type 2 diabetes mellitus with other specified complication, unspecified whether long term insulin use ([facility]    Cassia Bray MD    Office Visit    2 months ago Type 2 diabetes mellitus with microalbuminuria, without long-term current use of insulin ([facility]    [address], [facility] Milan Gilliam MD    Office Visit    4 months ago Diabetes mellitus type 2 without retinopathy ([facility]    [address], [address] [place] [address],[address], [place] Glenis Jackson MD    Office Visit    5 months ago     ERNESTO Becerra in [facility] [place], [place] [address]    Office Visit    5 months ago     ERNESTO Becerra in [facility] [place], [place] [address]    Office Visit          Future Appointments         Provider Department Appt Notes    Tomorrow Linda King MD [address], [address], [place] Over all [facility]    In 4 months MD [facility], [place] [address] [place] [address] 5 - 6 mos               Passed - EGFRCR or GFRNAA > 50     GFR Evaluation  EGFRCR: 72 , resulted on 2/16/2023          Passed - GFR in the past 12 months             Recent Outpatient Visits              1 week ago Type 2 diabetes mellitus with other specified complication, unspecified whether long term insulin use [facility])    Jillian Weller MD    Office Visit    2 months ago Type 2 diabetes mellitus with microalbuminuria, without long-term current use of insulin ([facility]    Kayla Raya, [facility], Mina Ash, Anthony Gonzalez MD    Office Visit    4 months ago Diabetes mellitus type 2 without retinopathy [facility])    Kayla Raya, [address] [facility],[address] Floor, [place] Jaswant Tubbs MD    Office Visit    5 months ago     ERNESTO Becerra in [facility], [place] [address] PT    Office Visit    5 months ago     ERNESTO Becerra in [facility], [place] [address] [facility]    Office Visit            Future Appointments         Provider Department Appt Notes    Tomorrow MD Kayla Ames, [address], [place] Over all health    In 4 months MD Kayla Edge, [address] [facility], [place] 5 - 6 mos

## 2023-04-04 ENCOUNTER — HOSPITAL ENCOUNTER (OUTPATIENT)
Dept: ULTRASOUND IMAGING | Age: 73
Discharge: HOME | End: 2023-04-04
Attending: INTERNAL MEDICINE
Payer: MEDICARE

## 2023-04-04 ENCOUNTER — HOSPITAL ENCOUNTER (OUTPATIENT)
Dept: BONE DENSITY | Age: 73
Discharge: HOME | End: 2023-04-04
Attending: INTERNAL MEDICINE
Payer: MEDICARE

## 2023-04-04 DIAGNOSIS — Z78.0 ASYMPTOMATIC MENOPAUSE: ICD-10-CM

## 2023-04-04 DIAGNOSIS — K76.0 FATTY LIVER: ICD-10-CM

## 2023-04-04 PROCEDURE — 77080 DXA BONE DENSITY AXIAL: CPT | Performed by: INTERNAL MEDICINE

## 2023-04-04 PROCEDURE — 76705 ECHO EXAM OF ABDOMEN: CPT | Performed by: INTERNAL MEDICINE

## 2023-05-05 DIAGNOSIS — I10 ESSENTIAL HYPERTENSION WITH GOAL BLOOD PRESSURE LESS THAN 130/85: ICD-10-CM

## 2023-05-05 NOTE — TELEPHONE ENCOUNTER
Please review; protocol failed.  Or has no protocol    Requested Prescriptions   Pending Prescriptions Disp Refills    DILTIAZEM  MG Oral Capsule SR 24 Hr [Pharmacy Med Name: DILTIAZEM ER 360MG CAPSULES (24 HR)] 90 capsule 1     Sig: TAKE 1 CAPSULE(360 MG) BY MOUTH DAILY       Hypertensive Medications Protocol Failed - 5/5/2023  2:14 PM        Failed - Last BP reading less than 140/90     BP Readings from Last 1 Encounters:  03/02/23 : 140/68                Passed - In person appointment in the past 12 or next 3 months     Recent Outpatient Visits              2 months ago Encounter for Cecikael Anselmo annual wellness exam    Micah Garrido, Crispin Campuzano MD    Office Visit    2 months ago Type 2 diabetes mellitus with other specified complication, unspecified whether long term insulin use [facility])    [address], [address] [place], [place], MD    Office Visit    4 months ago Type 2 diabetes mellitus with microalbuminuria, without long-term current use of insulin ([facility]    [address], [facility]    Office Visit    6 months ago Diabetes mellitus type 2 without retinopathy ([facility]    [address], [address],[facility] [facility], Carmen Mejia MD    Office Visit    7 months ago     ERNESTO Becerra in [address], [address] [facility]    Office Visit          Future Appointments         Provider Department Appt Notes    In 2 months Yovany Tristan MD [address], 602 [facility] 5 - 6 mos    In 4 months Nelson Cr MD [address], [address], [place] 6 mnth f/u               Passed - CMP or BMP in past 6 months     Recent Results (from the past 4392 hour(s))   COMP METABOLIC PANEL (14)    Collection Time: 02/16/23  8:48 AM   Result Value Ref Range    Glucose 152 (H) 70 - 99 mg/dL    Sodium 143 136 - 145 mmol/L    Potassium 3.8 3.5 - 5.1 mmol/L Chloride 108 98 - 112 mmol/L    CO2 28.0 21.0 - 32.0 mmol/L    Anion Gap 7 0 - 18 mmol/L    BUN 11 7 - 18 mg/dL    Creatinine 0.87 0.55 - 1.02 mg/dL    BUN/CREA Ratio 12.6 10.0 - 20.0    Calcium, Total 9.7 8.5 - 10.1 mg/dL    Calculated Osmolality 298 (H) 275 - 295 mOsm/kg    eGFR-Cr 72 >=60 mL/min/1.73m2    ALT 75 (H) 13 - 56 U/L    AST 72 (H) 15 - 37 U/L    Alkaline Phosphatase 75 55 - 142 U/L    Bilirubin, Total 0.5 0.1 - 2.0 mg/dL    Total Protein 7.6 6.4 - 8.2 g/dL    Albumin 4.2 3.4 - 5.0 g/dL    Globulin  3.4 2.8 - 4.4 g/dL    A/G Ratio 1.2 1.0 - 2.0    Patient Fasting for CMP? Yes      *Note: Due to a large number of results and/or encounters for the requested time period, some results have not been displayed. A complete set of results can be found in Results Review.                  Passed - In person appointment or virtual visit in the past 6 months     Recent Outpatient Visits              2 months ago Encounter for [employer] annual wellness exam    Dave Michaels MD    Office Visit    2 months ago Type 2 diabetes mellitus with other specified complication, unspecified whether long term insulin use [facility])    [address], [address] [facility] MD jah    Office Visit    4 months ago Type 2 diabetes mellitus with microalbuminuria, without long-term current use of insulin ([facility]    [address], [employer], Rita Sathya Davila MD    Office Visit    6 months ago Diabetes mellitus type 2 without retinopathy [facility])    [address], [address] [place] [address],[address], [place] Yoav Fang MD    Office Visit    7 months ago     ERNESTO Becerra in [facility] [address] PT    Office Visit          Future Appointments         Provider Department Appt Notes    In 2 months Yamilet Dunn MD [address], 602 [facility] 5 - 6 mos    In 4 months Sunshine Benavides MD [facility], [address], [place] 6 mnth f/u               Passed - [facility] or GFRNAA > 50     GFR Evaluation  EGFRCR: 72 , resulted on 2/16/2023                  Recent Outpatient Visits              2 months ago Encounter for [employer] annual wellness exam    Lakesha Meredith MD    Office Visit    2 months ago Type 2 diabetes mellitus with other specified complication, unspecified whether long term insulin use [facility])    Tiki Hamilton, [address] [place] [address], Erick Giraldo MD    Office Visit    4 months ago Type 2 diabetes mellitus with microalbuminuria, without long-term current use of insulin ([facility]    Tiki Hamilton, [facility], Eleni Whitaker MD    Office Visit    6 months ago Diabetes mellitus type 2 without retinopathy [facility])    Tiki Hamilton, [address] [facility],[address]Rd Floor, [place] Akash Fields MD    Office Visit    7 months ago     ERNESTO Becerra in [facility] [address] [facility]    Office Visit           Future Appointments         Provider Department Appt Notes    In 2 months MD Tiki Flores, 602 [facility], [address] 5 - 6 mos    In 4 months MD Tiki Gracia, [address], [place] 6 mnth f/u

## 2023-05-07 RX ORDER — DILTIAZEM HYDROCHLORIDE 360 MG/1
360 CAPSULE, EXTENDED RELEASE ORAL DAILY
Qty: 90 CAPSULE | Refills: 1 | Status: SHIPPED | OUTPATIENT
Start: 2023-05-07

## 2023-05-08 DIAGNOSIS — I10 ESSENTIAL HYPERTENSION WITH GOAL BLOOD PRESSURE LESS THAN 130/85: ICD-10-CM

## 2023-05-08 DIAGNOSIS — M54.32 SCIATICA OF LEFT SIDE: ICD-10-CM

## 2023-05-09 RX ORDER — TRANDOLAPRIL TABLETS 4 MG/1
8 TABLET ORAL DAILY
Qty: 180 TABLET | Refills: 1 | OUTPATIENT
Start: 2023-05-09

## 2023-05-09 NOTE — TELEPHONE ENCOUNTER
Please review. Protocol failed / No protocol. Requested Prescriptions   Pending Prescriptions Disp Refills    tiZANidine 4 MG Oral Tab 30 tablet 2     Sig: Take 1 tablet (4 mg total) by mouth every 8 (eight) hours as needed. There is no refill protocol information for this order      Refused Prescriptions Disp Refills    trandolapril 4 MG Oral Tab 180 tablet 1     Sig: Take 2 tablets (8 mg total) by mouth daily.        Hypertensive Medications Protocol Failed - 5/8/2023  7:57 AM        Failed - Last BP reading less than 140/90     BP Readings from Last 1 Encounters:  03/02/23 : 140/68                Passed - In person appointment in the past 12 or next 3 months     Recent Outpatient Visits              2 months ago Encounter for [employer] annual wellness exam    Jefe Sullivan MD    Office Visit    2 months ago Type 2 diabetes mellitus with other specified complication, unspecified whether long term insulin use [facility])    [address], 602 [facility], Patty Adelso [facility] MD jah    Office Visit    4 months ago Type 2 diabetes mellitus with microalbuminuria, without long-term current use of insulin ([facility]    [address], [facility], Acie Olszewski, MD    Office Visit    6 months ago Diabetes mellitus type 2 without retinopathy ([facility]    [address], [address], [place] Daniel Thomas MD    Office Visit    7 months ago     ERNESTO Becerra in [facility] [address], [address] [facility]    Office Visit          Future Appointments         Provider Department Appt Notes    In 2 months Gina Mccartney MD [address], [address] [facility], [place] 5 - 6 mos    In 4 months Howard Early MD [address], [address], [place] 6 mnth f/u               Passed - CMP or BMP in past 6 months     Recent Results (from the past 4392 hour(s))   COMP METABOLIC PANEL (14)    Collection Time: 02/16/23  8:48 AM   Result Value Ref Range    Glucose 152 (H) 70 - 99 mg/dL    Sodium 143 136 - 145 mmol/L    Potassium 3.8 3.5 - 5.1 mmol/L    Chloride 108 98 - 112 mmol/L    CO2 28.0 21.0 - 32.0 mmol/L    Anion Gap 7 0 - 18 mmol/L    BUN 11 7 - 18 mg/dL    Creatinine 0.87 0.55 - 1.02 mg/dL    BUN/CREA Ratio 12.6 10.0 - 20.0    Calcium, Total 9.7 8.5 - 10.1 mg/dL    Calculated Osmolality 298 (H) 275 - 295 mOsm/kg    eGFR-Cr 72 >=60 mL/min/1.73m2    ALT 75 (H) 13 - 56 U/L    AST 72 (H) 15 - 37 U/L    Alkaline Phosphatase 75 55 - 142 U/L    Bilirubin, Total 0.5 0.1 - 2.0 mg/dL    Total Protein 7.6 6.4 - 8.2 g/dL    Albumin 4.2 3.4 - 5.0 g/dL    Globulin  3.4 2.8 - 4.4 g/dL    A/G Ratio 1.2 1.0 - 2.0    Patient Fasting for CMP? Yes      *Note: Due to a large number of results and/or encounters for the requested time period, some results have not been displayed. A complete set of results can be found in Results Review.                  Passed - In person appointment or virtual visit in the past 6 months     Recent Outpatient Visits              2 months ago Encounter for [employer] annual wellness exam    Charisse Correa MD    Office Visit    2 months ago Type 2 diabetes mellitus with other specified complication, unspecified whether long term insulin use [facility])    Viktoriya Vaughan MD    Office Visit    4 months ago Type 2 diabetes mellitus with microalbuminuria, without long-term current use of insulin ([facility]    [address], [facility], Robert Xie MD    Office Visit    6 months ago Diabetes mellitus type 2 without retinopathy [facility])    [address], [address] [facility] [address], Kylee Cooper MD    Office Visit    7 months ago     [address] PT    Office Visit          Future Appointments         Provider Department Appt Notes    In 2 months Mimi Hauser MD [address], [address] [facility] 5 - 6 mos    In 4 months Gume Jj MD [address], [address], [place] 6 mnth f/u               Passed - [facility] or GFRNAA > 50     GFR Evaluation  EGFRCR: 72 , resulted on 2/16/2023                 Recent Outpatient Visits              2 months ago Encounter for [employer] annual wellness exam    [address], Jeffrey Minaya MD    Office Visit    2 months ago Type 2 diabetes mellitus with other specified complication, unspecified whether long term insulin use [facility])    [address], [address] [facility] Erick Darden MD    Office Visit    4 months ago Type 2 diabetes mellitus with microalbuminuria, without long-term current use of insulin ([facility]    [address], [facility], Jeffrey Minaya MD    Office Visit    6 months ago Diabetes mellitus type 2 without retinopathy [facility])    [address], [address], Rory García MD    Office Visit    7 months ago     [employer] in [address] [place]    Office Visit            Future Appointments         Provider Department Appt Notes    In 2 months Mimi Hauser MD [address], [address] [facility] 5 - 6 mos    In 4 months Gume Jj MD [address], [address], [place] 6 mnth f/u

## 2023-05-09 NOTE — TELEPHONE ENCOUNTER
Please review; protocol failed/ no protocol  Medication pended for your review and approval.    Requested Prescriptions   Pending Prescriptions Disp Refills    tiZANidine 4 MG Oral Tab 30 tablet 2     Sig: Take 1 tablet (4 mg total) by mouth every 8 (eight) hours as needed. There is no refill protocol information for this order       trandolapril 4 MG Oral Tab 180 tablet 1     Sig: Take 2 tablets (8 mg total) by mouth daily.        Hypertensive Medications Protocol Failed - 5/8/2023  7:57 AM        Failed - Last BP reading less than 140/90     BP Readings from Last 1 Encounters:  03/02/23 : 140/68                Passed - In person appointment in the past 12 or next 3 months     Recent Outpatient Visits              2 months ago Encounter for [employer] annual wellness exam    Della Hernandez MD    Office Visit    2 months ago Type 2 diabetes mellitus with other specified complication, unspecified whether long term insulin use [facility])    [address], [address] [facility], Kaci Darden, [place], MD    Office Visit    4 months ago Type 2 diabetes mellitus with microalbuminuria, without long-term current use of insulin ([facility]    [address], [facility], Steve Lozoya MD    Office Visit    6 months ago Diabetes mellitus type 2 without retinopathy ([facility]    [address], [address], [place] Santy Whitten MD    Office Visit    7 months ago     ERNESTO Becerra in [address]    Office Visit          Future Appointments         Provider Department Appt Notes    In 2 months Elliot Daniel MD [address], [address] [facility], [place] 5 - 6 mos    In 4 months Rosendo Bruce MD [address], [address], [place] 6 mnth f/u               Passed - CMP or BMP in past 6 months     Recent Results (from the past 4392 hour(s))   COMP METABOLIC PANEL (14) Collection Time: 02/16/23  8:48 AM   Result Value Ref Range    Glucose 152 (H) 70 - 99 mg/dL    Sodium 143 136 - 145 mmol/L    Potassium 3.8 3.5 - 5.1 mmol/L    Chloride 108 98 - 112 mmol/L    CO2 28.0 21.0 - 32.0 mmol/L    Anion Gap 7 0 - 18 mmol/L    BUN 11 7 - 18 mg/dL    Creatinine 0.87 0.55 - 1.02 mg/dL    BUN/CREA Ratio 12.6 10.0 - 20.0    Calcium, Total 9.7 8.5 - 10.1 mg/dL    Calculated Osmolality 298 (H) 275 - 295 mOsm/kg    eGFR-Cr 72 >=60 mL/min/1.73m2    ALT 75 (H) 13 - 56 U/L    AST 72 (H) 15 - 37 U/L    Alkaline Phosphatase 75 55 - 142 U/L    Bilirubin, Total 0.5 0.1 - 2.0 mg/dL    Total Protein 7.6 6.4 - 8.2 g/dL    Albumin 4.2 3.4 - 5.0 g/dL    Globulin  3.4 2.8 - 4.4 g/dL    A/G Ratio 1.2 1.0 - 2.0    Patient Fasting for CMP? Yes      *Note: Due to a large number of results and/or encounters for the requested time period, some results have not been displayed. A complete set of results can be found in Results Review.                  Passed - In person appointment or virtual visit in the past 6 months     Recent Outpatient Visits              2 months ago Encounter for [employer] annual wellness exam    Segun García MD    Office Visit    2 months ago Type 2 diabetes mellitus with other specified complication, unspecified whether long term insulin use [facility])    Ev Morley MD    Office Visit    4 months ago Type 2 diabetes mellitus with microalbuminuria, without long-term current use of insulin ([facility]    Tiff Gant, [address] [place], Justin Fishman MD    Office Visit    6 months ago Diabetes mellitus type 2 without retinopathy [facility])    Tiff Gant, [address], Lawrence Dominguez MD    Office Visit    7 months ago     ERNESTO Becerra in [facility], [address] PT    Office Visit          Future Appointments         Provider Department Appt Notes    In 2 months Jose Luis Rudd MD [address], [address] [facility], [facility] 5 - 6 mos    In 4 months Cristy Wright MD [address], [place] 6 mnth f/u               Passed - [facility] or GFRNAA > 50     GFR Evaluation  EGFRCR: 72 , resulted on 2/16/2023

## 2023-05-10 RX ORDER — TIZANIDINE 4 MG/1
4 TABLET ORAL EVERY 8 HOURS PRN
Qty: 30 TABLET | Refills: 2 | Status: SHIPPED | OUTPATIENT
Start: 2023-05-10

## 2023-06-07 RX ORDER — DULAGLUTIDE 1.5 MG/.5ML
1.5 INJECTION, SOLUTION SUBCUTANEOUS WEEKLY
Qty: 6 ML | Refills: 0 | Status: SHIPPED | OUTPATIENT
Start: 2023-06-07

## 2023-06-19 DIAGNOSIS — E78.00 HYPERCHOLESTEROLEMIA: ICD-10-CM

## 2023-06-19 RX ORDER — ATORVASTATIN CALCIUM 10 MG/1
10 TABLET, FILM COATED ORAL NIGHTLY
Qty: 90 TABLET | Refills: 3 | Status: SHIPPED | OUTPATIENT
Start: 2023-06-19

## 2023-06-19 NOTE — TELEPHONE ENCOUNTER
Refill passed per [place] [facility] protocol. Requested Prescriptions   Pending Prescriptions Disp Refills    atorvastatin 10 MG Oral Tab 90 tablet 1     Sig: Take 1 tablet (10 mg total) by mouth nightly.        Cholesterol Medication Protocol Passed - 6/19/2023  2:18 PM        Passed - ALT in past 12 months        Passed - LDL in past 12 months        Passed - Last ALT < 80     Lab Results   Component Value Date    ALT 75 (H) 02/16/2023             Passed - Last LDL < 130     Lab Results   Component Value Date    LDL 46 12/12/2022             Passed - In person appointment or virtual visit in the past 12 mos or appointment in next 3 mos     Recent Outpatient Visits              3 months ago Encounter for [employer] annual wellness exam    Roe Moulton MD    Office Visit    3 months ago Type 2 diabetes mellitus with other specified complication, unspecified whether long term insulin use [facility])    Brayn Ulrich, [address] [place] [address], [facility] MD jah    Office Visit    6 months ago Type 2 diabetes mellitus with microalbuminuria, without long-term current use of insulin ([facility]    Bryan Ulrich, [facility], [facility]    Office Visit    7 months ago Diabetes mellitus type 2 without retinopathy ([facility]    Bryan Ulrich, [address], Richi Cabezas MD    Office Visit    9 months ago     ERNESTO Becerra in [facility] [address] [facility]    Office Visit          Future Appointments         Provider Department Appt Notes    In 1 month MD Bryan Vazquez, [address] [facility] 5 - 6 mos    In 3 months MD Bryan Nguyen, [address] [place] [address], [address] [place] [address] 402 6 month follow up policy informed to pt                     Future Appointments         Provider Department Appt Notes    In 1 month Isaiah Douglass MD [facility], [address] [facility] 5 - 6 mos    In 3 months MD Emily Altamirano, [facility], [place] 6 month follow up policy informed to pt            Recent Outpatient Visits              3 months ago Encounter for Pati Anselmo annual wellness exam    Denita Johnson, Sander Maravilla MD    Office Visit    3 months ago Type 2 diabetes mellitus with other specified complication, unspecified whether long term insulin use [facility])    Neel Doran MD    Office Visit    6 months ago Type 2 diabetes mellitus with microalbuminuria, without long-term current use of insulin ([facility]    Emily Vu, [facility], Joana Reeder, Dunia Gallegos MD    Office Visit    7 months ago Diabetes mellitus type 2 without retinopathy [facility])    Emily Vu, [address], Pablito Pang MD    Office Visit    9 months ago     ERNESTO Becerra in Yadiel HandBeau mcdonald [address] PT    Office Visit

## 2023-07-05 DIAGNOSIS — E11.9 CONTROLLED TYPE 2 DIABETES MELLITUS WITHOUT COMPLICATION, WITHOUT LONG-TERM CURRENT USE OF INSULIN (HCC): ICD-10-CM

## 2023-07-05 RX ORDER — GLIMEPIRIDE 2 MG/1
TABLET ORAL
Qty: 135 TABLET | Refills: 0 | Status: SHIPPED | OUTPATIENT
Start: 2023-07-05

## 2023-07-25 ENCOUNTER — OFFICE VISIT (OUTPATIENT)
Dept: ENDOCRINOLOGY CLINIC | Facility: CLINIC | Age: 73
End: 2023-07-25

## 2023-07-25 VITALS
DIASTOLIC BLOOD PRESSURE: 69 MMHG | HEART RATE: 79 BPM | BODY MASS INDEX: 22.92 KG/M2 | SYSTOLIC BLOOD PRESSURE: 131 MMHG | HEIGHT: 66 IN | WEIGHT: 142.63 LBS

## 2023-07-25 DIAGNOSIS — E11.69 TYPE 2 DIABETES MELLITUS WITH OTHER SPECIFIED COMPLICATION, UNSPECIFIED WHETHER LONG TERM INSULIN USE (HCC): ICD-10-CM

## 2023-07-25 DIAGNOSIS — E78.5 DYSLIPIDEMIA: Primary | ICD-10-CM

## 2023-07-25 DIAGNOSIS — E11.9 TYPE 2 DIABETES MELLITUS WITHOUT COMPLICATION, WITHOUT LONG-TERM CURRENT USE OF INSULIN (HCC): ICD-10-CM

## 2023-07-25 LAB
CARTRIDGE LOT#: ABNORMAL NUMERIC
GLUCOSE BLOOD: 142
HEMOGLOBIN A1C: 6.4 % (ref 4.3–5.6)
TEST STRIP LOT #: NORMAL NUMERIC

## 2023-07-25 PROCEDURE — 83036 HEMOGLOBIN GLYCOSYLATED A1C: CPT | Performed by: INTERNAL MEDICINE

## 2023-07-25 PROCEDURE — 3044F HG A1C LEVEL LT 7.0%: CPT | Performed by: INTERNAL MEDICINE

## 2023-07-25 PROCEDURE — 82947 ASSAY GLUCOSE BLOOD QUANT: CPT | Performed by: INTERNAL MEDICINE

## 2023-07-25 PROCEDURE — 3078F DIAST BP <80 MM HG: CPT | Performed by: INTERNAL MEDICINE

## 2023-07-25 PROCEDURE — 99213 OFFICE O/P EST LOW 20 MIN: CPT | Performed by: INTERNAL MEDICINE

## 2023-07-25 PROCEDURE — 3008F BODY MASS INDEX DOCD: CPT | Performed by: INTERNAL MEDICINE

## 2023-07-25 PROCEDURE — 3075F SYST BP GE 130 - 139MM HG: CPT | Performed by: INTERNAL MEDICINE

## 2023-07-25 PROCEDURE — 1160F RVW MEDS BY RX/DR IN RCRD: CPT | Performed by: INTERNAL MEDICINE

## 2023-07-25 PROCEDURE — 1159F MED LIST DOCD IN RCRD: CPT | Performed by: INTERNAL MEDICINE

## 2023-07-25 RX ORDER — DULAGLUTIDE 1.5 MG/.5ML
1.5 INJECTION, SOLUTION SUBCUTANEOUS WEEKLY
Qty: 6 ML | Refills: 0 | Status: SHIPPED | OUTPATIENT
Start: 2023-07-25

## 2023-07-25 RX ORDER — METFORMIN HYDROCHLORIDE 500 MG/1
1000 TABLET, EXTENDED RELEASE ORAL 2 TIMES DAILY WITH MEALS
Qty: 360 TABLET | Refills: 1 | Status: SHIPPED | OUTPATIENT
Start: 2023-07-25

## 2023-09-21 ENCOUNTER — LAB ENCOUNTER (OUTPATIENT)
Dept: LAB | Age: 73
End: 2023-09-21
Attending: INTERNAL MEDICINE
Payer: MEDICARE

## 2023-09-21 ENCOUNTER — OFFICE VISIT (OUTPATIENT)
Dept: INTERNAL MEDICINE CLINIC | Facility: CLINIC | Age: 73
End: 2023-09-21

## 2023-09-21 VITALS
SYSTOLIC BLOOD PRESSURE: 144 MMHG | HEART RATE: 75 BPM | RESPIRATION RATE: 18 BRPM | DIASTOLIC BLOOD PRESSURE: 72 MMHG | OXYGEN SATURATION: 96 % | BODY MASS INDEX: 22.98 KG/M2 | HEIGHT: 66 IN | WEIGHT: 143 LBS

## 2023-09-21 DIAGNOSIS — R42 DIZZINESS: ICD-10-CM

## 2023-09-21 DIAGNOSIS — H92.02 OTALGIA OF LEFT EAR: Primary | ICD-10-CM

## 2023-09-21 DIAGNOSIS — I10 ESSENTIAL HYPERTENSION WITH GOAL BLOOD PRESSURE LESS THAN 130/85: ICD-10-CM

## 2023-09-21 DIAGNOSIS — E11.69 TYPE 2 DIABETES MELLITUS WITH OTHER SPECIFIED COMPLICATION, UNSPECIFIED WHETHER LONG TERM INSULIN USE (HCC): ICD-10-CM

## 2023-09-21 DIAGNOSIS — E11.9 CONTROLLED TYPE 2 DIABETES MELLITUS WITHOUT COMPLICATION, WITHOUT LONG-TERM CURRENT USE OF INSULIN (HCC): ICD-10-CM

## 2023-09-21 DIAGNOSIS — K76.0 FATTY LIVER: ICD-10-CM

## 2023-09-21 LAB
ALBUMIN SERPL-MCNC: 4.3 G/DL (ref 3.4–5)
ALBUMIN/GLOB SERPL: 1.3 {RATIO} (ref 1–2)
ALP LIVER SERPL-CCNC: 62 U/L
ALT SERPL-CCNC: 51 U/L
ANION GAP SERPL CALC-SCNC: 8 MMOL/L (ref 0–18)
AST SERPL-CCNC: 35 U/L (ref 15–37)
BILIRUB SERPL-MCNC: 0.4 MG/DL (ref 0.1–2)
BUN BLD-MCNC: 15 MG/DL (ref 7–18)
BUN/CREAT SERPL: 14.2 (ref 10–20)
CALCIUM BLD-MCNC: 9.4 MG/DL (ref 8.5–10.1)
CHLORIDE SERPL-SCNC: 107 MMOL/L (ref 98–112)
CO2 SERPL-SCNC: 27 MMOL/L (ref 21–32)
CREAT BLD-MCNC: 1.06 MG/DL
CREAT UR-SCNC: 49.5 MG/DL
EGFRCR SERPLBLD CKD-EPI 2021: 56 ML/MIN/1.73M2 (ref 60–?)
FASTING STATUS PATIENT QL REPORTED: NO
GLOBULIN PLAS-MCNC: 3.4 G/DL (ref 2.8–4.4)
GLUCOSE BLD-MCNC: 101 MG/DL (ref 70–99)
MICROALBUMIN UR-MCNC: 1.71 MG/DL
MICROALBUMIN/CREAT 24H UR-RTO: 34.5 UG/MG (ref ?–30)
OSMOLALITY SERPL CALC.SUM OF ELEC: 295 MOSM/KG (ref 275–295)
POTASSIUM SERPL-SCNC: 3.6 MMOL/L (ref 3.5–5.1)
PROT SERPL-MCNC: 7.7 G/DL (ref 6.4–8.2)
SODIUM SERPL-SCNC: 142 MMOL/L (ref 136–145)

## 2023-09-21 PROCEDURE — 36415 COLL VENOUS BLD VENIPUNCTURE: CPT

## 2023-09-21 PROCEDURE — 3060F POS MICROALBUMINURIA REV: CPT | Performed by: INTERNAL MEDICINE

## 2023-09-21 PROCEDURE — 90662 IIV NO PRSV INCREASED AG IM: CPT | Performed by: INTERNAL MEDICINE

## 2023-09-21 PROCEDURE — G0008 ADMIN INFLUENZA VIRUS VAC: HCPCS | Performed by: INTERNAL MEDICINE

## 2023-09-21 PROCEDURE — 3078F DIAST BP <80 MM HG: CPT | Performed by: INTERNAL MEDICINE

## 2023-09-21 PROCEDURE — 99214 OFFICE O/P EST MOD 30 MIN: CPT | Performed by: INTERNAL MEDICINE

## 2023-09-21 PROCEDURE — 82043 UR ALBUMIN QUANTITATIVE: CPT

## 2023-09-21 PROCEDURE — 1160F RVW MEDS BY RX/DR IN RCRD: CPT | Performed by: INTERNAL MEDICINE

## 2023-09-21 PROCEDURE — 1159F MED LIST DOCD IN RCRD: CPT | Performed by: INTERNAL MEDICINE

## 2023-09-21 PROCEDURE — 82570 ASSAY OF URINE CREATININE: CPT

## 2023-09-21 PROCEDURE — 3008F BODY MASS INDEX DOCD: CPT | Performed by: INTERNAL MEDICINE

## 2023-09-21 PROCEDURE — 80053 COMPREHEN METABOLIC PANEL: CPT

## 2023-09-21 PROCEDURE — 3077F SYST BP >= 140 MM HG: CPT | Performed by: INTERNAL MEDICINE

## 2023-09-21 PROCEDURE — 1125F AMNT PAIN NOTED PAIN PRSNT: CPT | Performed by: INTERNAL MEDICINE

## 2023-09-21 RX ORDER — GLIMEPIRIDE 2 MG/1
TABLET ORAL
Qty: 135 TABLET | Refills: 1 | Status: SHIPPED | OUTPATIENT
Start: 2023-09-21

## 2023-09-21 RX ORDER — TRANDOLAPRIL TABLETS 4 MG/1
8 TABLET ORAL DAILY
Qty: 180 TABLET | Refills: 1 | Status: SHIPPED | OUTPATIENT
Start: 2023-09-21

## 2023-09-21 RX ORDER — AMLODIPINE BESYLATE 10 MG/1
10 TABLET ORAL DAILY
Qty: 90 TABLET | Refills: 1 | Status: SHIPPED | OUTPATIENT
Start: 2023-09-21

## 2023-09-21 RX ORDER — DILTIAZEM HYDROCHLORIDE 360 MG/1
360 CAPSULE, EXTENDED RELEASE ORAL DAILY
Qty: 90 CAPSULE | Refills: 1 | Status: CANCELLED | OUTPATIENT
Start: 2023-09-21

## 2023-09-21 RX ORDER — MECLIZINE HYDROCHLORIDE 25 MG/1
25 TABLET ORAL 3 TIMES DAILY PRN
Qty: 40 TABLET | Refills: 2 | Status: SHIPPED | OUTPATIENT
Start: 2023-09-21

## 2023-09-21 NOTE — PATIENT INSTRUCTIONS
My last day will  be January 11, 2024. I recommend that following providers:  Dr. Timoteo Torres ([facility]  Dr. Vinicio Noland ([facility])  Dr. Mt Mathew ([facility]  Dr. Farhana French ([place])  Dr. Trudy Broussard.  Milana Davis ([place])  Skye CORREIA ([facility])  Dr. Denice Neff ([facility])

## 2023-09-22 ENCOUNTER — TELEPHONE (OUTPATIENT)
Dept: OPHTHALMOLOGY | Facility: CLINIC | Age: 73
End: 2023-09-22

## 2023-09-22 NOTE — TELEPHONE ENCOUNTER
Patient requesting to be seen sooner for Diabetic eye exam. Patient requesting a late appointment closer to 5pm if possible.

## 2023-10-10 ENCOUNTER — TELEPHONE (OUTPATIENT)
Dept: INTERNAL MEDICINE CLINIC | Facility: CLINIC | Age: 73
End: 2023-10-10

## 2023-10-10 DIAGNOSIS — K21.9 GASTROESOPHAGEAL REFLUX DISEASE, UNSPECIFIED WHETHER ESOPHAGITIS PRESENT: Primary | ICD-10-CM

## 2023-10-10 NOTE — TELEPHONE ENCOUNTER
Patient called, verified Name and . She states spouse recently had an endoscopy/colonoscopy end of September and received a letter that he has H.pylori. Antibiotic treatment started on 10/4. Patient has been reading about it and is concerned if she needs to be tested and treated as well. She has an upcoming trip at the end of the week and wants to have this addressed before then. Dr. Starr Lennox please advise.

## 2023-10-10 NOTE — TELEPHONE ENCOUNTER
Spoke with patient (name and  verified). Dr. Laura Reyna recommendations discussed. Patient verbalized understanding and agrees with plan. Patient scheduled for a video visit. Patient advised to complete the E-check in [employer], if active. Understands to follow the prompts and links to complete the visit.     Best call back:  [phone]      Future Appointments   Date Time Provider Linda Sagrario   10/11/2023 10:10 AM Ruba Rosales MD [facility] [place]   2023  7:45 AM Jaswant Tubbs MD [address] [place] [address]   2023  9:10 AM Ruba Rosales MD [facility] [place]   2024 11:00 AM [facility]   3/5/2024  4:45 PM Jaswant Tubbs MD [address] [place] [address]

## 2023-10-11 ENCOUNTER — TELEMEDICINE (OUTPATIENT)
Dept: INTERNAL MEDICINE CLINIC | Facility: CLINIC | Age: 73
End: 2023-10-11

## 2023-10-11 DIAGNOSIS — D64.9 ANEMIA, UNSPECIFIED TYPE: ICD-10-CM

## 2023-10-11 DIAGNOSIS — Z12.31 ENCOUNTER FOR SCREENING MAMMOGRAM FOR MALIGNANT NEOPLASM OF BREAST: ICD-10-CM

## 2023-10-11 DIAGNOSIS — K21.9 GASTROESOPHAGEAL REFLUX DISEASE, UNSPECIFIED WHETHER ESOPHAGITIS PRESENT: Primary | ICD-10-CM

## 2023-10-11 PROCEDURE — 1160F RVW MEDS BY RX/DR IN RCRD: CPT | Performed by: INTERNAL MEDICINE

## 2023-10-11 PROCEDURE — 1159F MED LIST DOCD IN RCRD: CPT | Performed by: INTERNAL MEDICINE

## 2023-10-11 PROCEDURE — 99214 OFFICE O/P EST MOD 30 MIN: CPT | Performed by: INTERNAL MEDICINE

## 2023-10-11 RX ORDER — FAMOTIDINE 40 MG/1
40 TABLET, FILM COATED ORAL 2 TIMES DAILY
Qty: 28 TABLET | Refills: 0 | Status: SHIPPED | OUTPATIENT
Start: 2023-10-11 | End: 2023-10-25

## 2023-10-11 NOTE — ASSESSMENT & PLAN NOTE
Patient has chronic GERD and she is taking pantoprazole. Now her  has been diagnosed with H. pylori and she would like to be tested. Advised patient that she needs to come off the pantoprazole for 2 weeks prior to the test and she can switch to Pepcid. She should be off Pepcid for 2 days before the test.  Prescription strength Pepcid sent to pharmacy.

## 2023-10-11 NOTE — PROGRESS NOTES
Video Progress Note    This visit is conducted using Telemedicine with live, interactive video and audio. Patient has been referred to the [facility] website at [email or web address] to review the yearly Consent to Treat document. Patient understands and accepts financial responsibility for any deductible, co-insurance and/or co-pays associated with this service. HPI:    Patient ID: Asif Sheffield is a 72year old female. Pt's  has H.pylori. Pt has chronic GERD which has been worse recently. She has been having more belching. No nausea, vomiting, abdominal pain. She scheduled her eye exam for her diabetes. Current Outpatient Medications   Medication Sig Dispense Refill    famotidine 40 MG Oral Tab Take 1 tablet (40 mg total) by mouth 2 (two) times daily for 14 days. Stop medication 48 hours prior to the H. Pylori test. 28 tablet 0    trandolapril 4 MG Oral Tab Take 2 tablets (8 mg total) by mouth daily. 180 tablet 1    glimepiride 2 MG Oral Tab TAKE 1 TABLET BY MOUTH WITH BREAKFAST AND ONE-HALF TABLET WITH DINNER 135 tablet 1    meclizine 25 MG Oral Tab Take 1 tablet (25 mg total) by mouth 3 (three) times daily as needed for Dizziness. 40 tablet 2    amLODIPine 10 MG Oral Tab Take 1 tablet (10 mg total) by mouth daily. 90 tablet 1    metFORMIN  MG Oral Tablet 24 Hr Take 2 tablets (1,000 mg total) by mouth 2 (two) times daily with meals. 360 tablet 1    Dulaglutide (TRULICITY) 1.5 SE/1.9ZU Subcutaneous Solution Pen-injector Inject 1.5 mg into the skin once a week. 6 mL 0    atorvastatin 10 MG Oral Tab Take 1 tablet (10 mg total) by mouth nightly. 90 tablet 3    tiZANidine 4 MG Oral Tab Take 1 tablet (4 mg total) by mouth every 8 (eight) hours as needed. 30 tablet 2    dilTIAZem  MG Oral Capsule SR 24 Hr Take 1 capsule (360 mg total) by mouth daily.  90 capsule 1    pantoprazole 20 MG Oral Tab EC Take 1 tablet (20 mg total) by mouth before breakfast. 90 tablet 3 levothyroxine 88 MCG Oral Tab TAKE 1.5 TABLETS ON SUNDAY AND 1 TABLET BY MOUTH ALL OTHER DAYS 110 tablet 3    Glucose Blood (TRUE METRIX BLOOD GLUCOSE TEST) In Vitro Strip USE AS DIRECTED TO CHECK BLOOD GLUCOSE 2 TIMES DAILY 200 strip 0    TRUEplus Lancets 33G Does not apply Misc Check sugars twice a day 200 each 0    TRUE METRIX LEVEL 1 Low In Vitro Solution USE AS DIRECTED 1 each 0    folic acid 433 MCG Oral Tab       Cholecalciferol (VITAMIN D) 1000 UNITS Oral Cap Take 1,000 mg by mouth daily. aspirin 81 MG Oral Tab Take  by mouth. take 1 tablet (81MG)  by oral route  every day         Allergies:  Seasonal                UNKNOWN     Review of Systems   Gastrointestinal:  Negative for abdominal pain, nausea and vomiting. Alan Baker LMP  (LMP Unknown)   PHYSICAL EXAM:   Physical Exam  Constitutional:       General: She is not in acute distress. Appearance: Normal appearance. Pulmonary:      Effort: No respiratory distress. Neurological:      Mental Status: She is alert and oriented to person, place, and time. ASSESSMENT/PLAN:   1. Gastroesophageal reflux disease, unspecified whether esophagitis present (Primary)  Assessment & Plan:  Patient has chronic GERD and she is taking pantoprazole. Now her  has been diagnosed with H. pylori and she would like to be tested. Advised patient that she needs to come off the pantoprazole for 2 weeks prior to the test and she can switch to Pepcid. She should be off Pepcid for 2 days before the test.  Prescription strength Pepcid sent to pharmacy. Orders:  -     Helicobacter Pylori Breath Test; Future; Expected date: 10/11/2023  2. Encounter for screening mammogram for malignant neoplasm of breast  -     [facility] LAM 2D+3D SCREENING BILAT (CPT=77067/90988); Future; Expected date: 01/06/2024  3. Anemia, unspecified type  Overview:  Panendoscopy 2018  Assessment & Plan:  Recheck CBC  Orders:  -     CBC With Differential With Platelet;  Future; Expected date: 10/11/2023  Other orders  -     Famotidine; Take 1 tablet (40 mg total) by mouth 2 (two) times daily for 14 days. Stop medication 48 hours prior to the H. Pylori test.  Dispense: 28 tablet; Refill: 0        The patient expressed understanding and agreed with the plan. Meds This Visit:  Requested Prescriptions     Signed Prescriptions Disp Refills    famotidine 40 MG Oral Tab 28 tablet 0     Sig: Take 1 tablet (40 mg total) by mouth 2 (two) times daily for 14 days.  Stop medication 48 hours prior to the H. Pylori test.       Reviewed problem list, medication list, allergies, social history and PMH/PSH

## 2023-10-30 ENCOUNTER — NURSE ONLY (OUTPATIENT)
Dept: LAB | Age: 73
End: 2023-10-30
Attending: INTERNAL MEDICINE
Payer: MEDICARE

## 2023-10-30 DIAGNOSIS — K21.9 GASTROESOPHAGEAL REFLUX DISEASE, UNSPECIFIED WHETHER ESOPHAGITIS PRESENT: ICD-10-CM

## 2023-10-30 DIAGNOSIS — D64.9 ANEMIA, UNSPECIFIED TYPE: ICD-10-CM

## 2023-10-30 LAB
BASOPHILS # BLD AUTO: 0.07 X10(3) UL (ref 0–0.2)
BASOPHILS NFR BLD AUTO: 0.8 %
EOSINOPHIL # BLD AUTO: 0.13 X10(3) UL (ref 0–0.7)
EOSINOPHIL NFR BLD AUTO: 1.5 %
ERYTHROCYTE [DISTWIDTH] IN BLOOD BY AUTOMATED COUNT: 16.4 %
HCT VFR BLD AUTO: 37.8 %
HGB BLD-MCNC: 11.6 G/DL
IMM GRANULOCYTES # BLD AUTO: 0.02 X10(3) UL (ref 0–1)
IMM GRANULOCYTES NFR BLD: 0.2 %
LYMPHOCYTES # BLD AUTO: 1.92 X10(3) UL (ref 1–4)
LYMPHOCYTES NFR BLD AUTO: 22.7 %
MCH RBC QN AUTO: 25.3 PG (ref 26–34)
MCHC RBC AUTO-ENTMCNC: 30.7 G/DL (ref 31–37)
MCV RBC AUTO: 82.4 FL
MONOCYTES # BLD AUTO: 0.58 X10(3) UL (ref 0.1–1)
MONOCYTES NFR BLD AUTO: 6.8 %
NEUTROPHILS # BLD AUTO: 5.75 X10 (3) UL (ref 1.5–7.7)
NEUTROPHILS # BLD AUTO: 5.75 X10(3) UL (ref 1.5–7.7)
NEUTROPHILS NFR BLD AUTO: 68 %
PLATELET # BLD AUTO: 313 10(3)UL (ref 150–450)
RBC # BLD AUTO: 4.59 X10(6)UL
WBC # BLD AUTO: 8.5 X10(3) UL (ref 4–11)

## 2023-10-30 PROCEDURE — 83013 H PYLORI (C-13) BREATH: CPT

## 2023-10-30 PROCEDURE — 85025 COMPLETE CBC W/AUTO DIFF WBC: CPT

## 2023-10-31 LAB — H PYLORI BREATH TEST: NEGATIVE

## 2023-11-12 ENCOUNTER — TELEPHONE (OUTPATIENT)
Dept: INTERNAL MEDICINE CLINIC | Facility: CLINIC | Age: 73
End: 2023-11-12

## 2023-11-12 NOTE — TELEPHONE ENCOUNTER
Problems   The patient or proxy has not reviewed this information, and there are updates pending:   Requested Problem Removals Date Noted Reported By  Comments   Vitamin B12 deficiency 8/18/2016 My SANCHEZ TEST RESULTS AND A MESSAGE FROM DR. Olamide Prather. Jorge Quinteroiter TO DISCONTINUE AS LEVELS ARE FINE

## 2023-11-15 ENCOUNTER — OFFICE VISIT (OUTPATIENT)
Dept: OPHTHALMOLOGY | Facility: CLINIC | Age: 73
End: 2023-11-15
Payer: MEDICARE

## 2023-11-15 DIAGNOSIS — E11.9 DIABETES MELLITUS TYPE 2 WITHOUT RETINOPATHY (HCC): Primary | ICD-10-CM

## 2023-11-15 DIAGNOSIS — H25.13 AGE-RELATED NUCLEAR CATARACT OF BOTH EYES: ICD-10-CM

## 2023-11-15 DIAGNOSIS — H43.393 VITREOUS FLOATERS, BILATERAL: ICD-10-CM

## 2023-11-15 PROCEDURE — 2023F DILAT RTA XM W/O RTNOPTHY: CPT | Performed by: OPHTHALMOLOGY

## 2023-11-15 PROCEDURE — 3072F LOW RISK FOR RETINOPATHY: CPT | Performed by: OPHTHALMOLOGY

## 2023-11-15 PROCEDURE — 1159F MED LIST DOCD IN RCRD: CPT | Performed by: OPHTHALMOLOGY

## 2023-11-15 PROCEDURE — 1160F RVW MEDS BY RX/DR IN RCRD: CPT | Performed by: OPHTHALMOLOGY

## 2023-11-15 PROCEDURE — 92014 COMPRE OPH EXAM EST PT 1/>: CPT | Performed by: OPHTHALMOLOGY

## 2023-11-15 NOTE — PROGRESS NOTES
Kaitlin Valerio is a 72year old female. HPI:     HPI    Pt is here for diabetic eye exam.  Pt denies any vision changes since last eye exam.  Pt has new progressive glasses. Pt has been a diabetic for 10+ years       Pt's diabetes is currently controlled by pills and injectable  Pt checks BS e very other day  Pt's last blood sugar was  127 this morning  Last HA1C was 6.4 on 23  Endocrinologist: Dale Allen     Consult: per Dr. Sree Florentino    Last edited by Raya Werner, O.T. on 11/15/2023  7:52 AM.        Patient History:  Past Medical History:   Diagnosis Date    Acute respiratory failure with hypoxia ([address] 2021    Elective      Esophageal reflux     Fatty liver     High cholesterol     Hypothyroid     Hypothyroidism     ON GENERIC SYNTHROID    Lipid screening 2014    per NG    MVP (mitral valve prolapse)     antibiotic prophylaxis w/ procedures    Other and unspecified hyperlipidemia     Post-menopausal bleeding     Type II or unspecified type diabetes mellitus without mention of complication, not stated as uncontrolled     Unspecified essential hypertension        Surgical History: Kaitlin Valerio has a past surgical history that includes ; tonsillectomy; endometrial biopsy - jar(s): 2 () (negative); colonoscopy (); colonoscopy (, ); colonoscopy (N/A, 2017) (Procedure: COLONOSCOPY, POSSIBLE BIOPSY, POSSIBLE POLYPECTOMY 35068;  Surgeon: Vergie Primrose, MD;  Location: [address] [place] [address]); egd (2018); egd (N/A, 2018) (Procedure: ESOPHAGOGASTRODUODENOSCOPY, COLONOSCOPY, POSSIBLE BIOPSY, POSSIBLE POLYPECTOMY 22061, 25673;  Surgeon: Vergie Primrose, MD;  Location: [address] [place] [address]); d & c; and other surgical history (FATTY LIVER) (2 BIOPSIES IN THE PAST).     Family History   Problem Relation Age of Onset    Cancer Mother         OVARIAN AND COLON    Diabetes Mother     Ovarian Cancer Mother 72    Heart Surgery Father carotid artery surgery     Hypertension Father     Breast Cancer Sister 47    Cataracts Sister     Breast Cancer Sister     Anemia Sister     Cancer Maternal Grandmother         BREAST CANCER,    Breast Cancer Maternal Grandmother 48    Glaucoma Neg     Macular degeneration Neg        Social History:   Social History     Socioeconomic History    Marital status:    Tobacco Use    Smoking status: Never    Smokeless tobacco: Never   Vaping Use    Vaping Use: Never used   Substance and Sexual Activity    Alcohol use: Not Currently     Alcohol/week: 1.7 standard drinks of alcohol     Types: 2 Glasses of wine per week     Comment: very rarely    Drug use: No    Sexual activity: Not Currently     Partners: Male   Other Topics Concern    Caffeine Concern Yes     Comment: Coffee 1 cup daily       Medications:  Current Outpatient Medications   Medication Sig Dispense Refill    Calcium Carbonate-Vitamin D (CALCIUM 600-D OR)       Cyanocobalamin (VITAMIN B12) 500 MCG Oral Tab       Iron Glycinate 29 MG Oral Cap       trandolapril 4 MG Oral Tab Take 2 tablets (8 mg total) by mouth daily. 180 tablet 1    glimepiride 2 MG Oral Tab TAKE 1 TABLET BY MOUTH WITH BREAKFAST AND ONE-HALF TABLET WITH DINNER 135 tablet 1    meclizine 25 MG Oral Tab Take 1 tablet (25 mg total) by mouth 3 (three) times daily as needed for Dizziness. 40 tablet 2    amLODIPine 10 MG Oral Tab Take 1 tablet (10 mg total) by mouth daily. 90 tablet 1    metFORMIN  MG Oral Tablet 24 Hr Take 2 tablets (1,000 mg total) by mouth 2 (two) times daily with meals. 360 tablet 1    Dulaglutide (TRULICITY) 1.5 CQ/2.3TB Subcutaneous Solution Pen-injector Inject 1.5 mg into the skin once a week. 6 mL 0    atorvastatin 10 MG Oral Tab Take 1 tablet (10 mg total) by mouth nightly. 90 tablet 3    tiZANidine 4 MG Oral Tab Take 1 tablet (4 mg total) by mouth every 8 (eight) hours as needed.  30 tablet 2    pantoprazole 20 MG Oral Tab EC Take 1 tablet (20 mg total) by mouth before breakfast. 90 tablet 3    levothyroxine 88 MCG Oral Tab TAKE 1.5 TABLETS ON SUNDAY AND 1 TABLET BY MOUTH ALL OTHER DAYS 110 tablet 3    Glucose Blood (TRUE METRIX BLOOD GLUCOSE TEST) In Vitro Strip USE AS DIRECTED TO CHECK BLOOD GLUCOSE 2 TIMES DAILY 200 strip 0    TRUEplus Lancets 33G Does not apply Misc Check sugars twice a day 200 each 0    TRUE METRIX LEVEL 1 Low In Vitro Solution USE AS DIRECTED 1 each 0    folic acid 447 MCG Oral Tab       Cholecalciferol (VITAMIN D) 1000 UNITS Oral Cap Take 1,000 mg by mouth daily. aspirin 81 MG Oral Tab Take  by mouth. take 1 tablet (81MG)  by oral route  every day         Allergies:   Allergies   Allergen Reactions    Seasonal UNKNOWN       ROS:     ROS    Positive for: Endocrine, Eyes  Negative for: Constitutional, Gastrointestinal, Neurological, Skin, Genitourinary, Musculoskeletal, HENT, Cardiovascular, Respiratory, Psychiatric, Allergic/Imm, Heme/Lymph  Last edited by Lexi Jaeger OHI on 11/15/2023  7:52 AM.          PHYSICAL EXAM:     Base Eye Exam       Visual Acuity (Snellen - Linear)         Right Left    Dist cc 20/20 -2 20/20 -1    Near cc 20/20 20/20      Correction: Glasses              Tonometry ([facility], 8:04 AM)         Right Left    Pressure 15 15              Pupils         Pupils    Right PERRL    Left PERRL              Visual Fields         Left Right     Full Full              Extraocular Movement         Right Left     Full, Ortho Full, Ortho              Neuro/Psych       Oriented x3: Yes    Mood/Affect: Normal              Dilation       Both eyes: 1.0% Mydriacyl and 2.5% Kris Synephrine @ 8:04 AM                  Slit Lamp and Fundus Exam       External Exam         Right Left    External Normal Normal              Slit Lamp Exam         Right Left    Lids/Lashes Meibomian gland dysfunction, Dermatochalasis Meibomian gland dysfunction, Dermatochalasis    Conjunctiva/Sclera Normal Normal    Cornea trace pigment Clear Anterior Chamber Deep and quiet Deep and quiet    Iris Normal Normal    Lens 2+ Nuclear sclerosis, 2+ Cortical cataract 2+ Nuclear sclerosis, 2+ Cortical cataract    Vitreous Vitreous floaters Vitreous floaters              Fundus Exam         Right Left    Disc Good rim, Temporal crescent Good rim, Temporal crescent    C/D Ratio 0.5 0.5    Macula Normal, no BDR Normal,  no BDR    Vessels Normal Normal    Periphery Normal Normal                  Refraction       Wearing Rx         Sphere Cylinder Axis Add    Right -5.25 +1.50 065 +2.50    Left -4.75 +0.75 105 +2.50      Age: 6m    Type: Progressive bifocal              Manifest Refraction    Declined refraction. Goes elsewhere. ASSESSMENT/PLAN:     Diagnoses and Plan:     Diabetes mellitus type 2 without retinopathy ([facility] [address]  Diabetes type II: no background of retinopathy, no signs of neovascularization noted. Discussed ocular and systemic benefits of blood sugar control. Diagnosis and treatment discussed in detail with patient. Will see patient in 1 year for a diabetic exam    Age-related nuclear cataract of both eyes  Discussed moderate cataracts in both eyes that are affecting vision but are not surgical at this time. Vitreous floaters, bilateral   There is no evidence of retinal pathology. All signs and symptoms of retinal detachment/tears explained in detail. Patient instructed to call the office if they experience increase in floaters, increase in flashes of light, loss of vision or curtain or veil effect. No orders of the defined types were placed in this encounter.       Meds This Visit:  Requested Prescriptions      No prescriptions requested or ordered in this encounter        Follow up instructions:  Return in about 1 year (around 11/15/2024) for Diabetic eye exam.    11/15/2023  Scribed by: Elizabeth Fagan MD

## 2023-11-15 NOTE — PATIENT INSTRUCTIONS
Diabetes mellitus type 2 without retinopathy ([address]  Diabetes type II: no background of retinopathy, no signs of neovascularization noted. Discussed ocular and systemic benefits of blood sugar control. Diagnosis and treatment discussed in detail with patient. Will see patient in 1 year for a diabetic exam    Age-related nuclear cataract of both eyes  Discussed moderate cataracts in both eyes that are affecting vision but are not surgical at this time. Vitreous floaters, bilateral   There is no evidence of retinal pathology. All signs and symptoms of retinal detachment/tears explained in detail. Patient instructed to call the office if they experience increase in floaters, increase in flashes of light, loss of vision or curtain or veil effect.

## 2023-11-28 RX ORDER — DULAGLUTIDE 1.5 MG/.5ML
1.5 INJECTION, SOLUTION SUBCUTANEOUS WEEKLY
Qty: 6 ML | Refills: 0 | Status: SHIPPED | OUTPATIENT
Start: 2023-11-28

## 2023-12-17 DIAGNOSIS — I10 ESSENTIAL HYPERTENSION WITH GOAL BLOOD PRESSURE LESS THAN 130/85: ICD-10-CM

## 2023-12-18 RX ORDER — AMLODIPINE BESYLATE 10 MG/1
10 TABLET ORAL DAILY
Qty: 90 TABLET | Refills: 1 | OUTPATIENT
Start: 2023-12-18

## 2023-12-21 ENCOUNTER — OFFICE VISIT (OUTPATIENT)
Dept: INTERNAL MEDICINE CLINIC | Facility: CLINIC | Age: 73
End: 2023-12-21
Payer: MEDICARE

## 2023-12-21 VITALS
DIASTOLIC BLOOD PRESSURE: 69 MMHG | SYSTOLIC BLOOD PRESSURE: 128 MMHG | BODY MASS INDEX: 22.34 KG/M2 | HEIGHT: 66 IN | WEIGHT: 139 LBS | OXYGEN SATURATION: 97 % | HEART RATE: 83 BPM | TEMPERATURE: 98 F

## 2023-12-21 DIAGNOSIS — I10 ESSENTIAL HYPERTENSION WITH GOAL BLOOD PRESSURE LESS THAN 130/85: ICD-10-CM

## 2023-12-21 DIAGNOSIS — E11.9 CONTROLLED TYPE 2 DIABETES MELLITUS WITHOUT COMPLICATION, WITHOUT LONG-TERM CURRENT USE OF INSULIN (HCC): ICD-10-CM

## 2023-12-21 DIAGNOSIS — D64.9 ANEMIA, UNSPECIFIED TYPE: Primary | ICD-10-CM

## 2023-12-21 PROCEDURE — 1160F RVW MEDS BY RX/DR IN RCRD: CPT | Performed by: INTERNAL MEDICINE

## 2023-12-21 PROCEDURE — 1126F AMNT PAIN NOTED NONE PRSNT: CPT | Performed by: INTERNAL MEDICINE

## 2023-12-21 PROCEDURE — 3008F BODY MASS INDEX DOCD: CPT | Performed by: INTERNAL MEDICINE

## 2023-12-21 PROCEDURE — 1159F MED LIST DOCD IN RCRD: CPT | Performed by: INTERNAL MEDICINE

## 2023-12-21 PROCEDURE — 3074F SYST BP LT 130 MM HG: CPT | Performed by: INTERNAL MEDICINE

## 2023-12-21 PROCEDURE — 3078F DIAST BP <80 MM HG: CPT | Performed by: INTERNAL MEDICINE

## 2023-12-21 PROCEDURE — 99214 OFFICE O/P EST MOD 30 MIN: CPT | Performed by: INTERNAL MEDICINE

## 2023-12-21 RX ORDER — GLIMEPIRIDE 2 MG/1
TABLET ORAL
Qty: 135 TABLET | Refills: 1 | Status: SHIPPED | OUTPATIENT
Start: 2023-12-21

## 2023-12-21 RX ORDER — AMLODIPINE BESYLATE 10 MG/1
10 TABLET ORAL DAILY
Qty: 90 TABLET | Refills: 1 | Status: SHIPPED | OUTPATIENT
Start: 2023-12-21

## 2023-12-21 NOTE — ASSESSMENT & PLAN NOTE
Controlled. Continue present management.   F/u with Dr Rebecca Morales Detail Level: Zone Detail Level: Detailed

## 2023-12-21 NOTE — PATIENT INSTRUCTIONS
Dr. Becky Orozco, ARGENISS  Dentist in [facility]  Address: [id number] [place] [address] Drive Radha Zaldivar, [address] [facility]  Phone: [phone]    My last day will  be January 11, 2024.   I recommend the following providers:    [place] office: [address] [place] [address]   Dr. Reyes Car (family [phone]    [address] [place] [address] (Internal Medicine):  [phone]  Dr. Colene [place]

## 2023-12-28 DIAGNOSIS — E11.9 TYPE 2 DIABETES MELLITUS WITHOUT COMPLICATION, UNSPECIFIED WHETHER LONG TERM INSULIN USE (HCC): ICD-10-CM

## 2023-12-28 RX ORDER — GLUCOSAM/CHON-MSM1/C/MANG/BOSW 500-416.6
TABLET ORAL
Qty: 200 EACH | Refills: 1 | Status: SHIPPED | OUTPATIENT
Start: 2023-12-28

## 2023-12-28 RX ORDER — CALCIUM CITRATE/VITAMIN D3 200MG-6.25
TABLET ORAL
Qty: 200 STRIP | Refills: 1 | Status: SHIPPED | OUTPATIENT
Start: 2023-12-28

## 2024-01-03 ENCOUNTER — OFFICE VISIT (OUTPATIENT)
Dept: FAMILY MEDICINE CLINIC | Facility: CLINIC | Age: 74
End: 2024-01-03
Payer: MEDICARE

## 2024-01-03 VITALS
DIASTOLIC BLOOD PRESSURE: 66 MMHG | OXYGEN SATURATION: 96 % | BODY MASS INDEX: 22.31 KG/M2 | TEMPERATURE: 98 F | HEIGHT: 66 IN | WEIGHT: 138.81 LBS | SYSTOLIC BLOOD PRESSURE: 129 MMHG | HEART RATE: 98 BPM

## 2024-01-03 DIAGNOSIS — J06.9 VIRAL URI: ICD-10-CM

## 2024-01-03 DIAGNOSIS — E11.9 CONTROLLED TYPE 2 DIABETES MELLITUS WITHOUT COMPLICATION, WITHOUT LONG-TERM CURRENT USE OF INSULIN (HCC): Primary | ICD-10-CM

## 2024-01-03 PROBLEM — E11.69 TYPE 2 DIABETES MELLITUS WITH OTHER SPECIFIED COMPLICATION, UNSPECIFIED WHETHER LONG TERM INSULIN USE (HCC): Status: ACTIVE | Noted: 2022-07-12

## 2024-01-03 PROCEDURE — 1126F AMNT PAIN NOTED NONE PRSNT: CPT | Performed by: STUDENT IN AN ORGANIZED HEALTH CARE EDUCATION/TRAINING PROGRAM

## 2024-01-03 PROCEDURE — 3008F BODY MASS INDEX DOCD: CPT | Performed by: STUDENT IN AN ORGANIZED HEALTH CARE EDUCATION/TRAINING PROGRAM

## 2024-01-03 PROCEDURE — 1159F MED LIST DOCD IN RCRD: CPT | Performed by: STUDENT IN AN ORGANIZED HEALTH CARE EDUCATION/TRAINING PROGRAM

## 2024-01-03 PROCEDURE — 3074F SYST BP LT 130 MM HG: CPT | Performed by: STUDENT IN AN ORGANIZED HEALTH CARE EDUCATION/TRAINING PROGRAM

## 2024-01-03 PROCEDURE — 3078F DIAST BP <80 MM HG: CPT | Performed by: STUDENT IN AN ORGANIZED HEALTH CARE EDUCATION/TRAINING PROGRAM

## 2024-01-03 PROCEDURE — 99203 OFFICE O/P NEW LOW 30 MIN: CPT | Performed by: STUDENT IN AN ORGANIZED HEALTH CARE EDUCATION/TRAINING PROGRAM

## 2024-01-03 NOTE — PROGRESS NOTES
HPI:    Patient ID: Mary Marmolejo is a 73 year old female.    HPI  Pt presenting to [facility] care. Previously seen by Dr. Dietz.    Notes throat irritation since this AM  Swollen glands, dry cough  Denies fever, resp distress, N/V/D, rashes   sick  No throat clearing  Sleeping well  OTC sugar-free robitussin  Cold/cough tablet  Tylenol    H/o T2DM  Last A1c 6.4 July 2023    Review of Systems   A comprehensive 10 point review of systems was completed.  Pertinent positives and negatives noted in the the HPI.       Current Outpatient Medications   Medication Sig Dispense Refill    TRUEplus Lancets 33G Does not apply Misc Check sugars twice a day 200 each 1    Glucose Blood (TRUE METRIX BLOOD GLUCOSE TEST) In Vitro Strip USE AS DIRECTED TO CHECK BLOOD GLUCOSE 2 TIMES DAILY 200 strip 1    glimepiride 2 MG Oral Tab TAKE 1 TABLET BY MOUTH WITH BREAKFAST AND ONE-HALF TABLET WITH DINNER 135 tablet 1    amLODIPine 10 MG Oral Tab Take 1 tablet (10 mg total) by mouth daily. 90 tablet 1    Dulaglutide (TRULICITY) 1.5 MG/0.5ML Subcutaneous Solution Pen-injector Inject 1.5 mg into the skin once a week. 6 mL 0    Calcium Carbonate-Vitamin D (CALCIUM 600-D OR)       Cyanocobalamin (VITAMIN B12) 500 MCG Oral Tab       Iron Glycinate 29 MG Oral Cap       trandolapril 4 MG Oral Tab Take 2 tablets (8 mg total) by mouth daily. 180 tablet 1    meclizine 25 MG Oral Tab Take 1 tablet (25 mg total) by mouth 3 (three) times daily as needed for Dizziness. 40 tablet 2    metFORMIN  MG Oral Tablet 24 Hr Take 2 tablets (1,000 mg total) by mouth 2 (two) times daily with meals. 360 tablet 1    atorvastatin 10 MG Oral Tab Take 1 tablet (10 mg total) by mouth nightly. 90 tablet 3    pantoprazole 20 MG Oral Tab EC Take 1 tablet (20 mg total) by mouth before breakfast. 90 tablet 3    levothyroxine 88 MCG Oral Tab TAKE 1.5 TABLETS ON SUNDAY AND 1 TABLET BY MOUTH ALL OTHER DAYS 110 tablet 3    TRUE METRIX LEVEL 1 Low In Vitro  Solution USE AS DIRECTED 1 each 0    folic acid 800 MCG Oral Tab       Cholecalciferol (VITAMIN D) 1000 UNITS Oral Cap Take 1,000 mg by mouth daily.        aspirin 81 MG Oral Tab Take  by mouth. take 1 tablet (81MG)  by oral route  every day       Allergies:  Allergies   Allergen Reactions    Seasonal UNKNOWN      Vitals:    01/03/24 1454   BP: 129/66   Pulse: 98   Temp: 98.1 °F (36.7 °C)   TempSrc: Temporal   SpO2: 96%   Weight: 138 lb 12.8 oz (63 kg)   Height: 5' 6\" (1.676 m)       Body mass index is 22.4 kg/m².   PHYSICAL EXAM:   Physical Exam  Vitals reviewed.   Constitutional:       General: She is not in acute distress.     Appearance: Normal appearance. She is well-developed.   HENT:      Head: Normocephalic and atraumatic.      Right Ear: Tympanic membrane, ear canal and external ear normal.      Left Ear: Tympanic membrane, ear canal and external ear normal.      Mouth/Throat:      Pharynx: Posterior oropharyngeal erythema present.   Eyes:      Conjunctiva/sclera: Conjunctivae normal.   Neck:      Thyroid: No thyroid mass or thyroid tenderness.   Cardiovascular:      Rate and Rhythm: Normal rate and regular rhythm.      Pulses: Normal pulses.      Heart sounds: Normal heart sounds, S1 normal and S2 normal. No murmur heard.  Pulmonary:      Effort: Pulmonary effort is normal. No respiratory distress.      Breath sounds: Normal breath sounds. No wheezing, rhonchi or rales.   Abdominal:      General: Bowel sounds are normal.      Palpations: Abdomen is soft.      Tenderness: There is no abdominal tenderness. There is no guarding or rebound.   Musculoskeletal:      Cervical back: Normal range of motion and neck supple. No muscular tenderness.      Right lower leg: No edema.      Left lower leg: No edema.   Feet:      Comments: Bilateral barefoot skin diabetic exam is normal, visualized feet and the appearance is normal.  Bilateral monofilament/sensation of both feet is normal.  Pulsation pedal pulse exam of  both lower legs/feet is normal as well.  Lymphadenopathy:      Cervical: No cervical adenopathy.   Skin:     General: Skin is warm and dry.      Coloration: Skin is not jaundiced.   Neurological:      General: No focal deficit present.      Mental Status: She is alert and oriented to person, place, and time. Mental status is at baseline.   Psychiatric:         Attention and Perception: Attention normal.         Mood and Affect: Mood normal.         Behavior: Behavior normal. Behavior is cooperative.         Cognition and Memory: Cognition normal.             ASSESSMENT/PLAN:   1. Controlled type 2 diabetes mellitus without complication, without long-term current use of insulin ([facility])  Last A1c 6.4 July 2023 -- followed by Endo  - discussed home blood glucose monitoring  - counseled on healthy diet and lifestyle changes for overall wellness, which includes decreased carb and sugar intake, increased fiber intake, and increased water intake as tolerated  - discussed exercise as able  - continue current regimen  - anticipate diabetic Ophtho exam  - Endocrine Referral - In Network  - Ophthalmology Referral - In Network    2. Viral URI  - continue supportive care including nasal saline/suction, humidifier use  - increase hydration and rest as tolerated  - discussed red flags for urgent evaluation  - to call with any questions/concerns    Pt verbalized understanding and agrees with plan.        No orders of the defined types were placed in this encounter.      Meds This Visit:  Requested Prescriptions      No prescriptions requested or ordered in this encounter       Imaging & Referrals:  ENDOCRINOLOGY - INTERNAL  OPHTHALMOLOGY - INTERNAL         ID#[id number]

## 2024-01-08 ENCOUNTER — HOSPITAL ENCOUNTER (OUTPATIENT)
Dept: MAMMOGRAPHY | Facility: HOSPITAL | Age: 74
Discharge: HOME | End: 2024-01-08
Attending: INTERNAL MEDICINE
Payer: MEDICARE

## 2024-01-08 DIAGNOSIS — Z12.31 ENCOUNTER FOR SCREENING MAMMOGRAM FOR MALIGNANT NEOPLASM OF BREAST: ICD-10-CM

## 2024-01-08 PROCEDURE — 77067 SCR MAMMO BI INCL CAD: CPT | Performed by: INTERNAL MEDICINE

## 2024-01-08 PROCEDURE — 77063 BREAST TOMOSYNTHESIS BI: CPT | Performed by: INTERNAL MEDICINE

## 2024-01-23 ENCOUNTER — OFFICE VISIT (OUTPATIENT)
Dept: ENDOCRINOLOGY CLINIC | Facility: CLINIC | Age: 74
End: 2024-01-23
Payer: MEDICARE

## 2024-01-23 VITALS
WEIGHT: 141 LBS | HEART RATE: 95 BPM | HEIGHT: 66 IN | DIASTOLIC BLOOD PRESSURE: 58 MMHG | BODY MASS INDEX: 22.66 KG/M2 | SYSTOLIC BLOOD PRESSURE: 112 MMHG

## 2024-01-23 DIAGNOSIS — E11.65 TYPE 2 DIABETES MELLITUS WITH HYPERGLYCEMIA, WITHOUT LONG-TERM CURRENT USE OF INSULIN (HCC): Primary | ICD-10-CM

## 2024-01-23 DIAGNOSIS — E78.5 DYSLIPIDEMIA: ICD-10-CM

## 2024-01-23 LAB
CARTRIDGE LOT#: ABNORMAL NUMERIC
GLUCOSE BLOOD: 161
HEMOGLOBIN A1C: 6.2 % (ref 4.3–5.6)
TEST STRIP LOT #: NORMAL NUMERIC

## 2024-01-23 PROCEDURE — 83036 HEMOGLOBIN GLYCOSYLATED A1C: CPT | Performed by: INTERNAL MEDICINE

## 2024-01-23 PROCEDURE — 3044F HG A1C LEVEL LT 7.0%: CPT | Performed by: INTERNAL MEDICINE

## 2024-01-23 PROCEDURE — 3008F BODY MASS INDEX DOCD: CPT | Performed by: INTERNAL MEDICINE

## 2024-01-23 PROCEDURE — 1160F RVW MEDS BY RX/DR IN RCRD: CPT | Performed by: INTERNAL MEDICINE

## 2024-01-23 PROCEDURE — 99214 OFFICE O/P EST MOD 30 MIN: CPT | Performed by: INTERNAL MEDICINE

## 2024-01-23 PROCEDURE — 82947 ASSAY GLUCOSE BLOOD QUANT: CPT | Performed by: INTERNAL MEDICINE

## 2024-01-23 PROCEDURE — 3074F SYST BP LT 130 MM HG: CPT | Performed by: INTERNAL MEDICINE

## 2024-01-23 PROCEDURE — 3078F DIAST BP <80 MM HG: CPT | Performed by: INTERNAL MEDICINE

## 2024-01-23 PROCEDURE — 1159F MED LIST DOCD IN RCRD: CPT | Performed by: INTERNAL MEDICINE

## 2024-01-23 NOTE — PROGRESS NOTES
Return Office Visit     CHIEF COMPLAINT:    Type 2 DM  Dyslipidemia     HISTORY OF PRESENT ILLNESS:  Mary Marmolejo is a 73 year old female who presents for follow up for Type 2 DM.      DM HISTORY  Diagnosed: about 10 years ago        HISTORY OF DIABETES COMPLICATIONS: :  History of Retinopathy: No - last eye exam: nov 2023   History of Neuropathy: No, she was diagnosed with sciatica  History of Nephropathy: No     ASSOCIATED COMPLICATIONS:   HTN: Yes  Hyperlipidemia: Yes  Coronary Artery Disease:  No  Cerebrovascular Disease: No        HOME GLUCOSE READINGS:   Checks sugars 1-2 times a day  Alternates timings  Range in 120-150  No low BG        CURRENT DIABETIC MEDICATIONS INCLUDE:  MTF ER 1000 mg BID  Amaryl 2 mg pills: she takes 1.5 tabs daily  Trulicity 1.5 mg once a week --> has been out for the last 10 days    MEALS:  Three meals a day  Dietary compliance with a low carb is moderate     EXERCISE:  No       CURRENT MEDICATION:    Current Outpatient Medications   Medication Sig Dispense Refill    TRUEplus Lancets 33G Does not apply Misc Check sugars twice a day 200 each 1    Glucose Blood (TRUE METRIX BLOOD GLUCOSE TEST) In Vitro Strip USE AS DIRECTED TO CHECK BLOOD GLUCOSE 2 TIMES DAILY 200 strip 1    glimepiride 2 MG Oral Tab TAKE 1 TABLET BY MOUTH WITH BREAKFAST AND ONE-HALF TABLET WITH DINNER 135 tablet 1    amLODIPine 10 MG Oral Tab Take 1 tablet (10 mg total) by mouth daily. 90 tablet 1    Dulaglutide (TRULICITY) 1.5 MG/0.5ML Subcutaneous Solution Pen-injector Inject 1.5 mg into the skin once a week. 6 mL 0    Calcium Carbonate-Vitamin D (CALCIUM 600-D OR)       Cyanocobalamin (VITAMIN B12) 500 MCG Oral Tab       Iron Glycinate 29 MG Oral Cap       trandolapril 4 MG Oral Tab Take 2 tablets (8 mg total) by mouth daily. 180 tablet 1    meclizine 25 MG Oral Tab Take 1 tablet (25 mg total) by mouth 3 (three) times daily as needed for Dizziness. 40 tablet 2    metFORMIN  MG Oral Tablet 24 Hr  Take 2 tablets (1,000 mg total) by mouth 2 (two) times daily with meals. 360 tablet 1    atorvastatin 10 MG Oral Tab Take 1 tablet (10 mg total) by mouth nightly. 90 tablet 3    pantoprazole 20 MG Oral Tab EC Take 1 tablet (20 mg total) by mouth before breakfast. 90 tablet 3    levothyroxine 88 MCG Oral Tab TAKE 1.5 TABLETS ON SUNDAY AND 1 TABLET BY MOUTH ALL OTHER DAYS 110 tablet 3    TRUE METRIX LEVEL 1 Low In Vitro Solution USE AS DIRECTED 1 each 0    folic acid 800 MCG Oral Tab       Cholecalciferol (VITAMIN D) 1000 UNITS Oral Cap Take 1,000 mg by mouth daily.        aspirin 81 MG Oral Tab Take  by mouth. take 1 tablet (81MG)  by oral route  every day           ALLERGY:  Allergies   Allergen Reactions    Seasonal UNKNOWN       PAST MEDICAL, SOCIAL AND FAMILY HISTORY:  See past medical history marked as reviewed.  See past surgical history marked as reviewed.  See past family history marked as reviewed.  See past social history marked as reviewed.    ASSESSMENTS:       REVIEW OF SYSTEMS:  Constitutional: Negative for: weight change, fever, fatigue, cold/heat intolerance  Eyes: Negative for:  Visual changes, proptosis, blurring  ENT: Negative for:  dysphagia, neck swelling, dysphonia  Respiratory: Negative for:  dyspnea, cough  Cardiovascular: Negative for:  chest pain, palpitations, orthopnea  GI: Negative for:  abdominal pain, nausea, vomiting, diarrhea, constipation, bleeding  Neurology: Negative for: headache, numbness, weakness  Genito-Urinary: Negative for: dysuria, frequency  Psychiatric: Negative for:  depression, anxiety  Hematology/Lymphatics: Negative for: bruising, lower extremity edema  Endocrine: Negative for: polyuria, polydypsia  Skin: Negative for: rash, blister, cellulitis,   +  Left side sciatica      PHYSICAL EXAM:   Vitals:    01/23/24 1056   BP: 112/58   Pulse: 95   Weight: 141 lb (64 kg)   Height: 5' 6\" (1.676 m)     BMI: Body mass index is 22.76 kg/m².         General Appearance:  alert,  well developed, in no acute distress  Head: Atraumatic  Eyes:  normal conjunctivae, sclera., normal sclera and normal pupils  Throat/Neck: normal sound to voice. Normal hearing, normal speech  Respiratory:  Speaking in full sentences, non-labored. no increased work of breathing, no audible wheezing    Neuro: motor grossly intact, moving all extremities without difficulty  Psychiatric:  oriented to time, self, and place  EXTREMITIES:   Bilateral barefoot skin diabetic exam is normal, visualized feet and the appearance is normal.  Bilateral monofilament/sensation of both feet is normal.  Pulsation pedal pulse exam of both lower legs/feet is normal as well.          DATA:       Pertinent labs reviewed  A1c is 6.2 % (1/2024)     ASSESSMENT AND PLAN:     1. Type 2 DM:     Plan:  Discussed the pathogenesis, natural course of diabetes. Patient understands the importance of glycemic control and the implications of uncontrolled diabetes including Diabetic ketoacidosis and various micro vascular and macrovascular complications.        a). Medications:  - Metformin ER 1000 mg BID  Due to back order on trulicity, we will increase amaryl to 2 mg breakfast and 2 mg dinner  Take with food  Check sugar before breakfast and dinner  Update me on my chart in one week   Will adjust further as needed  To call if she has low Bg under 80   Once she has trulicity, she will go back to amaryl 3 mg daily and resume trulicity     - Trulicity 1.5 mg q week.   No personal or family history of MEN syndrome  Patient counselled regarding side effects including injection site reactions, nausea, vomiting, diarrhea, pancreatitis, gastroparesis and rare side effect jimmie Checo syndrome.      Low carb diet  Hydrate well  Stay active    Call if she has low BG under 70     b). No Nephropathy.  c). Instructed on importance of annual eye exams  d). Foot exam: Daily feet exam explained  e). BG log maintainence explained in great detail, to get log and  glucometer on next visit.  f). Life style changes discussed  g). Hypoglycemia recognition and management discussed     2. Patient’s BP is normal  today  3. Dyslipidemia  A) Discussed lifestyle modifications including reductions in dietary total and saturated fat, weight loss, aerobic exercise, and eating a diet rich in fruits and vegetables.  B) c/w statin  Discussed the potential side effects of statins including muscle and liver injury.     RTC in 4-5 months  Call with BG as instructed.      Orders Placed This Encounter   Procedures    POC HemoCue Glucose 201 (Finger stick glucose)    POC Glycohemoglobin [55224]    Lipid Panel [E]    Microalb/Creat Ratio, Random Urine [E]       Pili Young MD                      Patient verbalized a complete  understanding of all of the above and did not have any further questions.

## 2024-02-20 ENCOUNTER — PATIENT MESSAGE (OUTPATIENT)
Dept: ENDOCRINOLOGY CLINIC | Facility: CLINIC | Age: 74
End: 2024-02-20

## 2024-02-20 RX ORDER — DULAGLUTIDE 3 MG/.5ML
3 INJECTION, SOLUTION SUBCUTANEOUS WEEKLY
Qty: 6 ML | Refills: 0 | Status: SHIPPED | OUTPATIENT
Start: 2024-02-20

## 2024-02-20 NOTE — TELEPHONE ENCOUNTER
From: Mary Marmolejo  To: Pili Young  Sent: 2/20/2024 8:24 AM CST  Subject: Following up from last visit.    As there seems to be a demand for Trulicity [facility] been [facility] of [facility]. My last injection was on 1/6/24. I have also checked with [facility] and [facility]...no luck..  My morning readings started out in the 120 ranges and now are in the 150 ranges. My after dinner ranges are between 180 and 250.  I have been taking my 2 metformin and 1 glimiperide (2mg) before breakfast around 8:30 and my evening meds about 5:00 p.m. before dinner.  Shall I continue or do you want to prescribe a different injectable until the Trulicity can be re-stocked.

## 2024-03-18 DIAGNOSIS — E03.9 ACQUIRED HYPOTHYROIDISM: ICD-10-CM

## 2024-03-18 NOTE — TELEPHONE ENCOUNTER
levothyroxine 88 MCG Oral Tab, TAKE 1.5 TABLETS ON SUNDAY AND 1 TABLET BY MOUTH ALL OTHER DAYS, Disp: 110 tablet, Rfl: 3

## 2024-03-20 RX ORDER — LEVOTHYROXINE SODIUM 88 UG/1
TABLET ORAL
Qty: 110 TABLET | Refills: 3 | Status: SHIPPED | OUTPATIENT
Start: 2024-03-20

## 2024-03-20 NOTE — TELEPHONE ENCOUNTER
Please review. Protocol failed / No Protocol.    Requested Prescriptions   Pending Prescriptions Disp Refills    levothyroxine 88 MCG Oral Tab 110 tablet 3     Sig: TAKE 1.5 TABLETS ON SUNDAY AND 1 TABLET BY MOUTH ALL OTHER DAYS       Thyroid Medication Protocol Failed - 3/18/2024  7:17 PM        Failed - TSH in past 12 months        Passed - Last TSH value is normal     Lab Results   Component Value Date    TSH 1.430 02/16/2023                 Passed - In person appointment or virtual visit in the past 12 mos or appointment in next 3 mos     Recent Outpatient Visits              1 month ago Type 2 diabetes mellitus with hyperglycemia, without long-term current use of insulin ([facility])    [facility] Pili Young MD    Office Visit    2 months ago Controlled type 2 diabetes mellitus without complication, without long-term current use of insulin ([facility])    [facility] Melodie Fajardo MD    Office Visit    3 months ago Anemia, unspecified type    [facility] [place] Vetrone, Laura, MD    Office Visit    4 months ago Diabetes mellitus type 2 without retinopathy ([facility])    [facility] Kenton Collins MD    Office Visit    4 months ago Gastroesophageal reflux disease, unspecified whether esophagitis present    [address]    Nurse Only          Future Appointments         Provider Department Appt Notes    In 1 month Melodie Fajardo MD [facility] [place] Supervisit    In 4 months Pili Young MD [facility] 6 [facility]    In 8 months Kenton Collins MD [facility] EP/DM EE

## 2024-04-10 ENCOUNTER — TELEPHONE (OUTPATIENT)
Dept: FAMILY MEDICINE CLINIC | Facility: CLINIC | Age: 74
End: 2024-04-10

## 2024-04-10 NOTE — TELEPHONE ENCOUNTER
Spoke with patient. She will call [employer] to have to PCP changed from Ondina Pride to Dr. Fajardo.

## 2024-05-03 RX ORDER — TIZANIDINE 4 MG/1
TABLET ORAL
COMMUNITY
Start: 2024-03-08 | End: 2024-05-06

## 2024-05-06 ENCOUNTER — LAB ENCOUNTER (OUTPATIENT)
Dept: LAB | Age: 74
End: 2024-05-06
Attending: STUDENT IN AN ORGANIZED HEALTH CARE EDUCATION/TRAINING PROGRAM
Payer: MEDICARE

## 2024-05-06 ENCOUNTER — OFFICE VISIT (OUTPATIENT)
Dept: FAMILY MEDICINE CLINIC | Facility: CLINIC | Age: 74
End: 2024-05-06

## 2024-05-06 ENCOUNTER — TELEPHONE (OUTPATIENT)
Dept: INTERNAL MEDICINE CLINIC | Facility: CLINIC | Age: 74
End: 2024-05-06

## 2024-05-06 VITALS
SYSTOLIC BLOOD PRESSURE: 128 MMHG | HEART RATE: 79 BPM | HEIGHT: 66 IN | WEIGHT: 135 LBS | TEMPERATURE: 98 F | BODY MASS INDEX: 21.69 KG/M2 | DIASTOLIC BLOOD PRESSURE: 68 MMHG | OXYGEN SATURATION: 97 %

## 2024-05-06 DIAGNOSIS — K76.0 FATTY LIVER: ICD-10-CM

## 2024-05-06 DIAGNOSIS — K21.9 GASTROESOPHAGEAL REFLUX DISEASE: ICD-10-CM

## 2024-05-06 DIAGNOSIS — M85.80 OSTEOPENIA, UNSPECIFIED LOCATION: ICD-10-CM

## 2024-05-06 DIAGNOSIS — K21.9 GASTROESOPHAGEAL REFLUX DISEASE, UNSPECIFIED WHETHER ESOPHAGITIS PRESENT: ICD-10-CM

## 2024-05-06 DIAGNOSIS — E11.9 TYPE 2 DIABETES MELLITUS WITHOUT COMPLICATION, UNSPECIFIED WHETHER LONG TERM INSULIN USE (HCC): ICD-10-CM

## 2024-05-06 DIAGNOSIS — I10 ESSENTIAL HYPERTENSION WITH GOAL BLOOD PRESSURE LESS THAN 130/85: ICD-10-CM

## 2024-05-06 DIAGNOSIS — E78.00 HYPERCHOLESTEROLEMIA: ICD-10-CM

## 2024-05-06 DIAGNOSIS — Z00.00 ENCOUNTER FOR ANNUAL HEALTH EXAMINATION: Primary | ICD-10-CM

## 2024-05-06 DIAGNOSIS — D64.9 ANEMIA, UNSPECIFIED TYPE: ICD-10-CM

## 2024-05-06 DIAGNOSIS — L98.9 SKIN LESION OF BACK: ICD-10-CM

## 2024-05-06 DIAGNOSIS — Z82.49 FAMILY HISTORY OF CAROTID ARTERY STENOSIS: ICD-10-CM

## 2024-05-06 DIAGNOSIS — I05.9 MITRAL VALVE DISEASE: ICD-10-CM

## 2024-05-06 DIAGNOSIS — E11.9 DIABETES MELLITUS TYPE 2 WITHOUT RETINOPATHY (HCC): ICD-10-CM

## 2024-05-06 DIAGNOSIS — E11.9 TYPE 2 DIABETES MELLITUS WITHOUT COMPLICATION, WITHOUT LONG-TERM CURRENT USE OF INSULIN (HCC): ICD-10-CM

## 2024-05-06 DIAGNOSIS — E11.9 CONTROLLED TYPE 2 DIABETES MELLITUS WITHOUT COMPLICATION, WITHOUT LONG-TERM CURRENT USE OF INSULIN (HCC): ICD-10-CM

## 2024-05-06 DIAGNOSIS — H25.13 AGE-RELATED NUCLEAR CATARACT OF BOTH EYES: ICD-10-CM

## 2024-05-06 DIAGNOSIS — R42 DIZZINESS: ICD-10-CM

## 2024-05-06 DIAGNOSIS — E03.9 ACQUIRED HYPOTHYROIDISM: ICD-10-CM

## 2024-05-06 LAB
ALBUMIN SERPL-MCNC: 5 G/DL (ref 3.2–4.8)
ALBUMIN/GLOB SERPL: 1.9 {RATIO} (ref 1–2)
ALP LIVER SERPL-CCNC: 61 U/L
ALT SERPL-CCNC: 45 U/L
ANION GAP SERPL CALC-SCNC: 9 MMOL/L (ref 0–18)
AST SERPL-CCNC: 42 U/L (ref ?–34)
BASOPHILS # BLD AUTO: 0.06 X10(3) UL (ref 0–0.2)
BASOPHILS NFR BLD AUTO: 1 %
BILIRUB SERPL-MCNC: 0.7 MG/DL (ref 0.2–1.1)
BILIRUB UR QL: NEGATIVE
BUN BLD-MCNC: 11 MG/DL (ref 9–23)
BUN/CREAT SERPL: 13.8 (ref 10–20)
CALCIUM BLD-MCNC: 9.8 MG/DL (ref 8.7–10.4)
CHLORIDE SERPL-SCNC: 105 MMOL/L (ref 98–112)
CHOLEST SERPL-MCNC: 131 MG/DL (ref ?–200)
CLARITY UR: CLEAR
CO2 SERPL-SCNC: 28 MMOL/L (ref 21–32)
CREAT BLD-MCNC: 0.8 MG/DL
CREAT UR-SCNC: 72.1 MG/DL
DEPRECATED HBV CORE AB SER IA-ACNC: 7.4 NG/ML
DEPRECATED RDW RBC AUTO: 50.1 FL (ref 35.1–46.3)
EGFRCR SERPLBLD CKD-EPI 2021: 78 ML/MIN/1.73M2 (ref 60–?)
EOSINOPHIL # BLD AUTO: 0.1 X10(3) UL (ref 0–0.7)
EOSINOPHIL NFR BLD AUTO: 1.7 %
ERYTHROCYTE [DISTWIDTH] IN BLOOD BY AUTOMATED COUNT: 16.9 % (ref 11–15)
FASTING PATIENT LIPID ANSWER: YES
FASTING STATUS PATIENT QL REPORTED: YES
FOLATE SERPL-MCNC: >24 NG/ML (ref 5.4–?)
GLOBULIN PLAS-MCNC: 2.7 G/DL (ref 2–3.5)
GLUCOSE BLD-MCNC: 125 MG/DL (ref 70–99)
GLUCOSE UR-MCNC: NORMAL MG/DL
HCT VFR BLD AUTO: 35.6 %
HDLC SERPL-MCNC: 64 MG/DL (ref 40–59)
HGB BLD-MCNC: 11.6 G/DL
HGB UR QL STRIP.AUTO: NEGATIVE
IMM GRANULOCYTES # BLD AUTO: 0.01 X10(3) UL (ref 0–1)
IMM GRANULOCYTES NFR BLD: 0.2 %
IRON SATN MFR SERPL: 8 %
IRON SERPL-MCNC: 43 UG/DL
KETONES UR-MCNC: NEGATIVE MG/DL
LDLC SERPL CALC-MCNC: 43 MG/DL (ref ?–100)
LEUKOCYTE ESTERASE UR QL STRIP.AUTO: NEGATIVE
LYMPHOCYTES # BLD AUTO: 1.93 X10(3) UL (ref 1–4)
LYMPHOCYTES NFR BLD AUTO: 31.9 %
MCH RBC QN AUTO: 26.7 PG (ref 26–34)
MCHC RBC AUTO-ENTMCNC: 32.6 G/DL (ref 31–37)
MCV RBC AUTO: 81.8 FL
MICROALBUMIN UR-MCNC: 3.3 MG/DL
MICROALBUMIN/CREAT 24H UR-RTO: 45.8 UG/MG (ref ?–30)
MONOCYTES # BLD AUTO: 0.49 X10(3) UL (ref 0.1–1)
MONOCYTES NFR BLD AUTO: 8.1 %
NEUTROPHILS # BLD AUTO: 3.46 X10 (3) UL (ref 1.5–7.7)
NEUTROPHILS # BLD AUTO: 3.46 X10(3) UL (ref 1.5–7.7)
NEUTROPHILS NFR BLD AUTO: 57.1 %
NITRITE UR QL STRIP.AUTO: NEGATIVE
NONHDLC SERPL-MCNC: 67 MG/DL (ref ?–130)
OSMOLALITY SERPL CALC.SUM OF ELEC: 295 MOSM/KG (ref 275–295)
PH UR: 6 [PH] (ref 5–8)
PLATELET # BLD AUTO: 290 10(3)UL (ref 150–450)
POTASSIUM SERPL-SCNC: 4 MMOL/L (ref 3.5–5.1)
PROT SERPL-MCNC: 7.7 G/DL (ref 5.7–8.2)
PROT UR-MCNC: NEGATIVE MG/DL
RBC # BLD AUTO: 4.35 X10(6)UL
SODIUM SERPL-SCNC: 142 MMOL/L (ref 136–145)
SP GR UR STRIP: 1.02 (ref 1–1.03)
TIBC SERPL-MCNC: 571 UG/DL (ref 250–425)
TRANSFERRIN SERPL-MCNC: 383 MG/DL (ref 250–380)
TRIGL SERPL-MCNC: 143 MG/DL (ref 30–149)
TSI SER-ACNC: 1.14 MIU/ML (ref 0.55–4.78)
UROBILINOGEN UR STRIP-ACNC: NORMAL
VIT B12 SERPL-MCNC: 797 PG/ML (ref 211–911)
VLDLC SERPL CALC-MCNC: 20 MG/DL (ref 0–30)
WBC # BLD AUTO: 6.1 X10(3) UL (ref 4–11)

## 2024-05-06 PROCEDURE — 84443 ASSAY THYROID STIM HORMONE: CPT

## 2024-05-06 PROCEDURE — 82043 UR ALBUMIN QUANTITATIVE: CPT

## 2024-05-06 PROCEDURE — 82728 ASSAY OF FERRITIN: CPT

## 2024-05-06 PROCEDURE — 81003 URINALYSIS AUTO W/O SCOPE: CPT

## 2024-05-06 PROCEDURE — 80053 COMPREHEN METABOLIC PANEL: CPT

## 2024-05-06 PROCEDURE — 85025 COMPLETE CBC W/AUTO DIFF WBC: CPT

## 2024-05-06 PROCEDURE — 84466 ASSAY OF TRANSFERRIN: CPT

## 2024-05-06 PROCEDURE — 82607 VITAMIN B-12: CPT

## 2024-05-06 PROCEDURE — 82746 ASSAY OF FOLIC ACID SERUM: CPT

## 2024-05-06 PROCEDURE — 80061 LIPID PANEL: CPT

## 2024-05-06 PROCEDURE — 82570 ASSAY OF URINE CREATININE: CPT

## 2024-05-06 PROCEDURE — 83540 ASSAY OF IRON: CPT

## 2024-05-06 PROCEDURE — 36415 COLL VENOUS BLD VENIPUNCTURE: CPT

## 2024-05-06 RX ORDER — ATORVASTATIN CALCIUM 10 MG/1
10 TABLET, FILM COATED ORAL NIGHTLY
Qty: 90 TABLET | Refills: 3 | Status: SHIPPED | OUTPATIENT
Start: 2024-05-06

## 2024-05-06 RX ORDER — AMLODIPINE BESYLATE 10 MG/1
10 TABLET ORAL DAILY
Qty: 90 TABLET | Refills: 3 | Status: SHIPPED | OUTPATIENT
Start: 2024-05-06

## 2024-05-06 RX ORDER — GLIMEPIRIDE 2 MG/1
TABLET ORAL
Qty: 135 TABLET | Refills: 1 | Status: SHIPPED | OUTPATIENT
Start: 2024-05-06

## 2024-05-06 RX ORDER — METFORMIN HYDROCHLORIDE 500 MG/1
1000 TABLET, EXTENDED RELEASE ORAL 2 TIMES DAILY WITH MEALS
Qty: 360 TABLET | Refills: 1 | Status: SHIPPED | OUTPATIENT
Start: 2024-05-06

## 2024-05-06 RX ORDER — PANTOPRAZOLE SODIUM 20 MG/1
20 TABLET, DELAYED RELEASE ORAL
Qty: 90 TABLET | Refills: 3 | Status: SHIPPED | OUTPATIENT
Start: 2024-05-06

## 2024-05-06 RX ORDER — CALCIUM CITRATE/VITAMIN D3 200MG-6.25
TABLET ORAL
Qty: 200 STRIP | Refills: 3 | Status: SHIPPED | OUTPATIENT
Start: 2024-05-06

## 2024-05-06 RX ORDER — TRANDOLAPRIL TABLETS 4 MG/1
8 TABLET ORAL DAILY
Qty: 180 TABLET | Refills: 3 | Status: SHIPPED | OUTPATIENT
Start: 2024-05-06

## 2024-05-06 RX ORDER — TRIAMCINOLONE ACETONIDE 1 MG/G
CREAM TOPICAL 2 TIMES DAILY PRN
Qty: 15 G | Refills: 1 | Status: SHIPPED | OUTPATIENT
Start: 2024-05-06 | End: 2024-05-20

## 2024-05-06 RX ORDER — TIZANIDINE 4 MG/1
4 TABLET ORAL ONCE AS NEEDED
Qty: 30 TABLET | Refills: 3 | Status: SHIPPED | OUTPATIENT
Start: 2024-05-06 | End: 2024-05-06

## 2024-05-06 RX ORDER — GLUCOSAM/CHON-MSM1/C/MANG/BOSW 500-416.6
TABLET ORAL
Qty: 200 EACH | Refills: 3 | Status: SHIPPED | OUTPATIENT
Start: 2024-05-06

## 2024-05-06 NOTE — PROGRESS NOTES
Subjective:   Mary Marmolejo is a 74 year old female who presents for a MA (Medicare Advantage) Supervisit (Once per calendar year)  Medicare Initial Preventative Physical Exam (Welcome to Medicare- < 12 months on Medicare) and scheduled follow up of multiple significant but stable problems    Pt presenting for routine physical and annual medicare wellness check. Denies any acute issues or recent illnesses. Chronic problems as below. Past medical/surgical history, family history, and social history were reviewed. Diet and exercise have been fair. Medicare screening questions per nurse were reviewed. Pt requesting annual testing and med refills.     H/o sciatica on Left  Admits to inconsistent exercises  Feels Right side has started to change  Reports b/l hip achiness, intermittent  Denies any loss of bowel/bladder control, saddle anesthesia, distal weakness  Denies any pain radiating down legs, numbness, tingling    FHx carotid stenosis (father)  Notes recurrent dizziness with neck extension      History/Other:   Fall Risk Assessment:   She has been screened for Falls and is low risk.      Cognitive Assessment:   She had a completely normal cognitive assessment - see flowsheet entries     Functional Ability/Status:   Mary Marmolejo has some abnormal functions as listed below:  She has Vision problems based on screening of functional status.       Depression Screening (PHQ-2/PHQ-9): PHQ-2 SCORE: 0  , done 4/29/2024        <5 minutes spent screening and counseling for depression    Advanced Directives:   She does NOT have a Living Will. [Do you have a living will?: No]  She has a Power of  for Health Care on file in [facility].  Discussed Advance Care Planning with patient (and family/surrogate if present). Standard forms made available to patient in After Visit Summary.      Patient Active Problem List   Diagnosis    Acquired hypothyroidism    Diabetes mellitus type 2 without retinopathy (HCC)     Essential hypertension with goal blood pressure less than 130/85    Hypercholesterolemia    Osteopenia    Vitreous floaters, bilateral    Fatty liver    Age-related nuclear cataract of both eyes    Encounter for Medicare annual wellness exam    Gastroesophageal reflux disease    Anemia    Type 2 diabetes mellitus with other specified complication, unspecified whether long term insulin use (HCC)    Otalgia of left ear    Cough    Dysfunction of eustachian tube    Dysuria    Hematuria    Mitral valve disease    Pain in soft tissues of limb    Vitamin D deficiency     Allergies:  She is allergic to seasonal.    Current Medications:  Outpatient Medications Marked as Taking for the 24 encounter (Office Visit) with Melodie Fajardo MD   Medication Sig    pantoprazole 20 MG Oral Tab EC Take 1 tablet (20 mg total) by mouth before breakfast.    atorvastatin 10 MG Oral Tab Take 1 tablet (10 mg total) by mouth nightly.    metFORMIN  MG Oral Tablet 24 Hr Take 2 tablets (1,000 mg total) by mouth 2 (two) times daily with meals.    trandolapril 4 MG Oral Tab Take 2 tablets (8 mg total) by mouth daily.    amLODIPine 10 MG Oral Tab Take 1 tablet (10 mg total) by mouth daily.    glimepiride 2 MG Oral Tab TAKE 1 TABLET BY MOUTH WITH BREAKFAST AND ONE-HALF TABLET WITH DINNER    [] tiZANidine 4 MG Oral Tab Take 1 tablet (4 mg total) by mouth once as needed.    Glucose Blood (TRUE METRIX BLOOD GLUCOSE TEST) In Vitro Strip USE AS DIRECTED TO CHECK BLOOD GLUCOSE 2 TIMES DAILY    TRUEplus Lancets 33G Does not apply Misc Check sugars twice a day    triamcinolone 0.1 % External Cream Apply topically 2 (two) times daily as needed.    levothyroxine 88 MCG Oral Tab TAKE 1.5 TABLETS ON  AND 1 TABLET BY MOUTH ALL OTHER DAYS    Dulaglutide (TRULICITY) 3 MG/0.5ML Subcutaneous Solution Pen-injector Inject 3 mg into the skin once a week.    Calcium Carbonate-Vitamin D (CALCIUM 600-D OR)     Cyanocobalamin (VITAMIN B12) 500  MCG Oral Tab     Iron Glycinate 29 MG Oral Cap     meclizine 25 MG Oral Tab Take 1 tablet (25 mg total) by mouth 3 (three) times daily as needed for Dizziness.    TRUE METRIX LEVEL 1 Low In Vitro Solution USE AS DIRECTED    folic acid 800 MCG Oral Tab     Cholecalciferol (VITAMIN D) 1000 UNITS Oral Cap Take 1,000 mg by mouth daily.      aspirin 81 MG Oral Tab Take  by mouth. take 1 tablet (81MG)  by oral route  every day       Medical History:  She  has a past medical history of Acute respiratory failure with hypoxia (HCC) (2021), Elective , Esophageal reflux, Fatty liver, High cholesterol, Hypothyroid, Hypothyroidism, Lipid screening (2014), MVP (mitral valve prolapse), Other and unspecified hyperlipidemia, Post-menopausal bleeding, Type II or unspecified type diabetes mellitus without mention of complication, not stated as uncontrolled, and Unspecified essential hypertension.  Surgical History:  She  has a past surgical history that includes ; tonsillectomy; endometrial biopsy - jar(s): 2 (); colonoscopy (); colonoscopy (, ); colonoscopy (N/A, 2017); egd (2018); egd (N/A, 2018); d & c; and other surgical history (FATTY LIVER).   Family History:  Her family history includes Anemia in her sister; Breast Cancer in her sister; Breast Cancer (age of onset: 53) in her maternal grandmother; Breast Cancer (age of onset: 54) in her sister; Cancer in her maternal grandmother and mother; Cataracts in her sister; Diabetes in her mother; Heart Surgery in her father; Hypertension in her father; Ovarian Cancer (age of onset: 65) in her mother.  Social History:  She  reports that she has never smoked. She has never used smokeless tobacco. She reports that she does not currently use alcohol after a past usage of about 1.7 standard drinks of alcohol per week. She reports that she does not use drugs.    Tobacco:  She has never smoked tobacco.    CAGE Alcohol Screen:   CAGE  screening score of 0 on 4/29/2024, showing low risk of alcohol abuse.      Patient Care Team:  Ondina Pride MD as PCP - General (Internal Medicine)  Aamir Martin MD (GASTROENTEROLOGY)  Pili Young MD (ENDOCRINOLOGY)    Review of Systems  Negative except intermittent dizziness, intermittent achiness    Objective:   Physical Exam  General appearance: alert, appears stated age, and cooperative  Head: Normocephalic, without obvious abnormality, atraumatic  Eyes: negative  Ears: normal TM's and external ear canals both ears  Nose: no discharge  Throat: normal findings: lips normal without lesions and soft palate, uvula, and tonsils normal  Neck: no adenopathy, supple, symmetrical, trachea midline, and thyroid not enlarged, symmetric, no tenderness/mass/nodules  Back: negative  Lungs: clear to auscultation bilaterally  Breasts:   deferred  Heart: regular rate and rhythm  Abdomen: soft, non-tender; bowel sounds normal; no masses,  no organomegaly  Pelvic: deferred  Extremities: extremities normal, atraumatic, no cyanosis or edema  Pulses: 2+ and symmetric  Skin:  dry flat plaque on Right upper back  Lymph nodes:  negative  Neurologic: Grossly normal    /68   Pulse 79   Temp 97.5 °F (36.4 °C) (Temporal)   Ht 5' 6\" (1.676 m)   Wt 135 lb (61.2 kg)   LMP  (LMP Unknown)   SpO2 97%   BMI 21.79 kg/m²  Estimated body mass index is 21.79 kg/m² as calculated from the following:    Height as of this encounter: 5' 6\" (1.676 m).    Weight as of this encounter: 135 lb (61.2 kg).    Medicare Hearing Assessment:   Hearing Screening    Screening Method: Questionnaire  I have a problem hearing over the telephone: No I have trouble following the conversations when two or more people are talking at the same time: Sometimes   I have trouble understanding things on the TV: No I have to strain to understand conversations: No   I have to worry about missing the telephone ring or doorbell: No I have trouble hearing  conversations in a noisy background such as a crowded room or restaurant: No   I get confused about where sounds come from: No I misunderstand some words in a sentence and need to ask people to repeat themselves: Yes   I especially have trouble understanding the speech of women and children: No I have trouble understanding the speaker in a large room such as at a meeting or place of Congregation: No   Many people I talk to seem to mumble (or don't speak clearly): No People get annoyed because I misunderstand what they say: No   I misunderstand what others are saying and make inappropriate responses: No I avoid social activities because I cannot hear well and fear I will reply improperly: No   Family members and friends have told me they think I may have hearing loss: No                   Assessment & Plan:   Mary Marmolejo is a 74 year old female who presents for a Medicare Assessment.     1. Encounter for annual health examination (Primary)  Healthy physical exam. Advised to exercise regularly, monitor diet and weight, and follow-up as directed. Will check labs. Continue current medications. Regular follow-up with Ophtho. Annual Medicare physical.  Discussed preventative screenings  - mammogram 1/2024  - Cscope 4/2018, 10yr recall  - will check labs as below  - encouraged to continue diet/exercise for overall wellness  - follow-up with eye doctor annually  - follow-up with dentist every 6 months  - return yearly for physicals  - annual flu shot  - tetanus booster every 10yrs  2. Diabetes mellitus type 2 without retinopathy (HCC)  - discussed home blood glucose monitoring  - counseled on healthy diet and lifestyle changes for overall wellness, which includes decreased carb and sugar intake, increased fiber intake, and increased water intake as tolerated  - discussed exercise as able  - provided with diabetic diet information  - continue regimen  -     Microalb/Creat Ratio, Random Urine; Future; Expected date:  05/06/2024  -     True Metrix Blood Glucose Test; USE AS DIRECTED TO CHECK BLOOD GLUCOSE 2 TIMES DAILY  Dispense: 200 strip; Refill: 3  -     TRUEplus Lancets 33G; Check sugars twice a day  Dispense: 200 each; Refill: 3  3. Essential hypertension with goal blood pressure less than 130/85  In-office BP stable, pt otherwise asymptomatic  - discussed benefits of DASH diet and daily activity  - continue BP monitoring  - continue daily medication  - will check labwork  - advised to call if BP is persistently elevated  -     Comp Metabolic Panel (14); Future; Expected date: 05/06/2024  -     TSH W Reflex To Free T4; Future; Expected date: 05/06/2024  -     CBC With Differential With Platelet; Future; Expected date: 05/06/2024  -     Urinalysis, Routine; Future; Expected date: 05/06/2024  -     Trandolapril; Take 2 tablets (8 mg total) by mouth daily.  Dispense: 180 tablet; Refill: 3  -     amLODIPine Besylate; Take 1 tablet (10 mg total) by mouth daily.  Dispense: 90 tablet; Refill: 3  4. Hypercholesterolemia  Continue statin dosing  -     Atorvastatin Calcium; Take 1 tablet (10 mg total) by mouth nightly.  Dispense: 90 tablet; Refill: 3  -     US CAROTID DOPPLER BILAT - DIAG IMG (CPT=93880); Future; Expected date: 05/06/2024  5. Mitral valve disease  Anticipate TTE  Overview:  Formatting of this note might be different from the original.   Needs prophylaxis for dental work.  6. Acquired hypothyroidism  Continue dosing  Overview:  TSH normal 2/16/23  7. Fatty liver  - discussed healthy diet and lifestyle changes for overall wellness, which includes decreased carb and sugar intake, increased fiber intake, and increased water intake as tolerated, as well as regular exercise  8. Gastroesophageal reflux disease, unspecified whether esophagitis present  Stable, continue PPI  9. Osteopenia, unspecified location  Last DEXA 4/2023 reviewed  Continue calcium supplementation, exercise as able  Overview:  T score -2.0 in 2023  10.  Age-related nuclear cataract of both eyes  Stable, followed by Ophtho  11. Anemia, unspecified type  Stable, CTM  Overview:  Panendoscopy 2018  Orders:  -     Vitamin B12; Future; Expected date: 05/06/2024  -     Folic Acid Serum (Folate); Future; Expected date: 05/06/2024  -     Iron And Tibc; Future; Expected date: 05/06/2024  -     Ferritin; Future; Expected date: 05/06/2024  12. Gastroesophageal reflux disease  -     Pantoprazole Sodium; Take 1 tablet (20 mg total) by mouth before breakfast.  Dispense: 90 tablet; Refill: 3  13. Type 2 diabetes mellitus without complication, without long-term current use of insulin (Formerly [facility])  -     metFORMIN HCl ER; Take 2 tablets (1,000 mg total) by mouth 2 (two) times daily with meals.  Dispense: 360 tablet; Refill: 1  14. Controlled type 2 diabetes mellitus without complication, without long-term current use of insulin ([facility])  -     Glimepiride; TAKE 1 TABLET BY MOUTH WITH BREAKFAST AND ONE-HALF TABLET WITH DINNER  Dispense: 135 tablet; Refill: 1  15. Type 2 diabetes mellitus without complication, unspecified whether long term insulin use ([facility])  -     True Metrix Blood Glucose Test; USE AS DIRECTED TO CHECK BLOOD GLUCOSE 2 TIMES DAILY  Dispense: 200 strip; Refill: 3  16. Skin lesion of back  - encouraged frequent emollient application  - to apply topical steroid to affected area as needed  - avoid prolonged sun exposure  - avoid other irritants  - increase hydration and rest as tolerated  - to call with any questions/concerns  -     Triamcinolone Acetonide; Apply topically 2 (two) times daily as needed.  Dispense: 15 g; Refill: 1  17. Family history of carotid artery stenosis  -     US CAROTID DOPPLER BILAT - DIAG IMG (CPT=93880); Future; Expected date: 05/06/2024  18. Dizziness  Discussed DDx   Will check Carotid US  - increase hydration and rest as tolerated  - discussed red flags for urgent reevaluation  -     US CAROTID DOPPLER BILAT - DIAG IMG (CPT=93880); Future; Expected  date: 05/06/2024  Other orders  -     Lipid Panel; Future; Expected date: 05/06/2024  -     tiZANidine HCl; Take 1 tablet (4 mg total) by mouth once as needed.  Dispense: 30 tablet; Refill: 3  The patient indicates understanding of these issues and agrees to the plan.  Reinforced healthy diet, lifestyle, and exercise.      Return in 6 months (on 11/6/2024).     Melodie Fajardo MD, 5/6/2024     Supplementary Documentation:   General Health:  In the past six months, have you lost more than 10 pounds without trying?: 2 - No  Has your appetite been poor?: No  Type of Diet: Diabetic  How does the patient maintain a good energy level?: Other  How would you describe your daily physical activity?: Moderate  How would you describe your current health state?: Good  How do you maintain positive mental well-being?: Social Interaction;Puzzles;Games;Visiting Friends;Visiting Family  On a scale of 0 to 10, with 0 being no pain and 10 being severe pain, what is your pain level?: 2 - (Mild)  In the past six months, have you experienced urine leakage?: 1-Yes  At any time do you feel concerned for the safety/well-being of yourself and/or your children, in your home or elsewhere?: No  Have you had any immunizations at another office such as Influenza, Hepatitis B, Tetanus, or Pneumococcal?: No         Mary Marmolejo's SCREENING SCHEDULE   Tests on this list are recommended by your physician but may not be covered, or covered at this frequency, by your insurer.   Please check with your insurance carrier before scheduling to verify coverage.   PREVENTATIVE SERVICES FREQUENCY &  COVERAGE DETAILS LAST COMPLETION DATE   Diabetes Screening    Fasting Blood Sugar /  Glucose    One screening every 12 months if never tested or if previously tested but not diagnosed with pre-diabetes   One screening every 6 months if diagnosed with pre-diabetes Lab Results   Component Value Date     (H) 05/06/2024        Cardiovascular  Disease Screening    Lipid Panel  Cholesterol  Lipoprotein (HDL)  Triglycerides Covered every 5 years for all Medicare beneficiaries without apparent signs or symptoms of cardiovascular disease Lab Results   Component Value Date    CHOLEST 131 05/06/2024    HDL 64 (H) 05/06/2024    LDL 43 05/06/2024    TRIG 143 05/06/2024         Electrocardiogram (EKG)   Covered if needed at Welcome to Medicare, and non-screening if indicated for medical reasons -      Ultrasound Screening for Abdominal Aortic Aneurysm (AAA) Covered once in a lifetime for one of the following risk factors    Men who are 65-75 years old and have ever smoked    Anyone with a family history -     Colorectal Cancer Screening  Covered for ages 50-85; only need ONE of the following:    Colonoscopy   Covered every 10 years    Covered every 2 years if patient is at high risk or previous colonoscopy was abnormal 04/04/2018    Health Maintenance   Topic Date Due    Colorectal Cancer Screening  04/04/2028       Flexible Sigmoidoscopy   Covered every 4 years -    Fecal Occult Blood Test Covered annually -   Bone Density Screening    Bone density screening    Covered every 2 years after age 65 if diagnosed with risk of osteoporosis or estrogen deficiency.    Covered yearly for long-term glucocorticoid medication use (Steroids) Last Dexa Scan:    XR DEXA BONE DENSITOMETRY (CPT=77080) 04/04/2023      No recommendations at this time   Pap and Pelvic    Pap   Covered every 2 years for women at normal risk; Annually if at high risk 07/03/2020  No recommendations at this time    Chlamydia Annually if high risk -  No recommendations at this time   Screening Mammogram    Mammogram     Recommend annually for all female patients aged 40 and older    One baseline mammogram covered for patients aged 35-39 01/08/2024    Health Maintenance   Topic Date Due    Mammogram  01/08/2025       Immunizations    Influenza Covered once per flu season  Please get every year  09/21/2023  No recommendations at this time    Pneumococcal Each vaccine ([id number] & [id number]) covered once after 65 Prevnar 13: 08/03/2017    [id number]: 08/02/2018     No recommendations at this time    Hepatitis B One screening covered for patients with certain risk factors   -  No recommendations at this time    Tetanus Toxoid Not covered by Medicare Part B unless medically necessary (cut with metal); may be covered with your pharmacy prescription benefits -    Tetanus, Diptheria and Pertusis TD and TDaP Not covered by Medicare Part B -  No recommendations at this time    Zoster Not covered by Medicare Part B; may be covered with your pharmacy  prescription benefits 11/17/2016  No recommendations at this time     Diabetes      Hemoglobin A1C Annually; if last result is elevated, may be asked to retest more frequently.    Medicare covers every 3 months Lab Results   Component Value Date     (H) 05/17/2021    A1C 6.2 (A) 01/23/2024       No recommendations at this time    Creat/alb ratio Annually Lab Results   Component Value Date    MICROALBCREA 45.8 (H) 05/06/2024       LDL Annually Lab Results   Component Value Date    LDL 43 05/06/2024       Dilated Eye Exam Annually Last Diabetic Eye Exam:  Last Dilated Eye Exam: 11/15/23  Eye Exam shows Diabetic Retinopathy?: No       Annual Monitoring of Persistent Medications (ACE/ARB, digoxin diuretics, anticonvulsants)    Potassium Annually Lab Results   Component Value Date    K 4.0 05/06/2024         Creatinine   Annually Lab Results   Component Value Date    CREATSERUM 0.80 05/06/2024         BUN Annually Lab Results   Component Value Date    BUN 11 05/06/2024       Drug Serum Conc Annually No results found for: \"DIGOXIN\", \"DIG\", \"VALP\"

## 2024-05-06 NOTE — PATIENT INSTRUCTIONS
Mary Marmolejo's SCREENING SCHEDULE   Tests on this list are recommended by your physician but may not be covered, or covered at this frequency, by your insurer.   Please check with your insurance carrier before scheduling to verify coverage.   PREVENTATIVE SERVICES FREQUENCY &  COVERAGE DETAILS LAST COMPLETION DATE   Diabetes Screening    Fasting Blood Sugar /  Glucose    One screening every 12 months if never tested or if previously tested but not diagnosed with pre-diabetes   One screening every 6 months if diagnosed with pre-diabetes Lab Results   Component Value Date     (H) 09/21/2023        Cardiovascular Disease Screening    Lipid Panel  Cholesterol  Lipoprotein (HDL)  Triglycerides Covered every 5 years for all Medicare beneficiaries without apparent signs or symptoms of cardiovascular disease Lab Results   Component Value Date    CHOLEST 141 12/12/2022    HDL 73 (H) 12/12/2022    LDL 46 12/12/2022    TRIG 130 12/12/2022         Electrocardiogram (EKG)   Covered if needed at Welcome to Medicare, and non-screening if indicated for medical reasons -      Ultrasound Screening for Abdominal Aortic Aneurysm (AAA) Covered once in a lifetime for one of the following risk factors   • Men who are 65-75 years old and have ever smoked   • Anyone with a family history -     Colorectal Cancer Screening  Covered for ages 50-85; only need ONE of the following:    Colonoscopy   Covered every 10 years    Covered every 2 years if patient is at high risk or previous colonoscopy was abnormal 04/04/2018    Health Maintenance   Topic Date Due   • Colorectal Cancer Screening  04/04/2028       Flexible Sigmoidoscopy   Covered every 4 years -    Fecal Occult Blood Test Covered annually -   Bone Density Screening    Bone density screening    Covered every 2 years after age 65 if diagnosed with risk of osteoporosis or estrogen deficiency.    Covered yearly for long-term glucocorticoid medication use (Steroids) Last  Dexa Scan:    XR DEXA BONE DENSITOMETRY (CPT=77080) 04/04/2023      No recommendations at this time   Pap and Pelvic    Pap   Covered every 2 years for women at normal risk; Annually if at high risk 07/03/2020  No recommendations at this time    Chlamydia Annually if high risk -  No recommendations at this time   Screening Mammogram    Mammogram     Recommend annually for all female patients aged 40 and older    One baseline mammogram covered for patients aged 35-39 01/08/2024    Health Maintenance   Topic Date Due   • Mammogram  01/08/2025       Immunizations    Influenza Covered once per flu season  Please get every year 09/21/2023  No recommendations at this time    Pneumococcal Each vaccine ([id number] & [id number]) covered once after 65 Prevnar 13: 08/03/2017    [id number]: 08/02/2018     No recommendations at this time    Hepatitis B One screening covered for patients with certain risk factors   -  No recommendations at this time    Tetanus Toxoid Not covered by Medicare Part B unless medically necessary (cut with metal); may be covered with your pharmacy prescription benefits -    Tetanus, Diptheria and Pertusis TD and TDaP Not covered by Medicare Part B -  No recommendations at this time    Zoster Not covered by Medicare Part B; may be covered with your pharmacy  prescription benefits 11/17/2016  No recommendations at this time     Diabetes      Hemoglobin A1C Annually; if last result is elevated, may be asked to retest more frequently.    Medicare covers every 3 months Lab Results   Component Value Date     (H) 05/17/2021    A1C 6.2 (A) 01/23/2024       No recommendations at this time    Creat/alb ratio Annually Lab Results   Component Value Date    MICROALBCREA 34.5 (H) 09/21/2023       LDL Annually Lab Results   Component Value Date    LDL 46 12/12/2022       Dilated Eye Exam Annually Last Diabetic Eye Exam:  Last Dilated Eye Exam: 11/15/23  Eye Exam shows Diabetic Retinopathy?: No       Annual  Monitoring of Persistent Medications (ACE/ARB, digoxin diuretics, anticonvulsants)    Potassium Annually Lab Results   Component Value Date    K 3.6 09/21/2023         Creatinine   Annually Lab Results   Component Value Date    CREATSERUM 1.06 (H) 09/21/2023         BUN Annually Lab Results   Component Value Date    BUN 15 09/21/2023       Drug Serum Conc Annually No results found for: \"DIGOXIN\", \"DIG\", \"VALP\"

## 2024-05-22 ENCOUNTER — HOSPITAL ENCOUNTER (OUTPATIENT)
Dept: ULTRASOUND IMAGING | Facility: HOSPITAL | Age: 74
Discharge: HOME | End: 2024-05-22
Attending: STUDENT IN AN ORGANIZED HEALTH CARE EDUCATION/TRAINING PROGRAM

## 2024-05-22 DIAGNOSIS — E78.00 HYPERCHOLESTEROLEMIA: ICD-10-CM

## 2024-05-22 DIAGNOSIS — Z82.49 FAMILY HISTORY OF CAROTID ARTERY STENOSIS: ICD-10-CM

## 2024-05-22 DIAGNOSIS — R42 DIZZINESS: ICD-10-CM

## 2024-05-22 PROCEDURE — 93880 EXTRACRANIAL BILAT STUDY: CPT | Performed by: STUDENT IN AN ORGANIZED HEALTH CARE EDUCATION/TRAINING PROGRAM

## 2024-06-13 RX ORDER — DULAGLUTIDE 3 MG/.5ML
INJECTION, SOLUTION SUBCUTANEOUS
Qty: 6 ML | Refills: 0 | Status: SHIPPED | OUTPATIENT
Start: 2024-06-13

## 2024-08-02 DIAGNOSIS — E11.9 TYPE 2 DIABETES MELLITUS WITHOUT COMPLICATION, WITHOUT LONG-TERM CURRENT USE OF INSULIN (HCC): ICD-10-CM

## 2024-08-06 RX ORDER — METFORMIN HYDROCHLORIDE 500 MG/1
1000 TABLET, EXTENDED RELEASE ORAL 2 TIMES DAILY WITH MEALS
Qty: 360 TABLET | Refills: 3 | Status: SHIPPED | OUTPATIENT
Start: 2024-08-06

## 2024-08-07 NOTE — TELEPHONE ENCOUNTER
Protocol Failed/ No Protocol    Requested Prescriptions   Pending Prescriptions Disp Refills    METFORMIN  MG Oral Tablet 24 Hr [Pharmacy Med Name: METFORMIN ER 500MG 24HR TABS] 360 tablet 1     Sig: TAKE 2 TABLETS(1000 MG) BY MOUTH TWICE DAILY WITH MEALS       Diabetes Medication Protocol Failed - 8/2/2024  2:08 PM        Failed - Last A1C < 7.5 and within past 6 months     Lab Results   Component Value Date    A1C 6.2 (A) 01/23/2024             Passed - In person appointment or virtual visit in the past 6 mos or appointment in next 3 mos     Recent Outpatient Visits              3 months ago Encounter for annual health examination    [facility] Melodie Fajardo MD    Office Visit    6 months ago Type 2 diabetes mellitus with hyperglycemia, without long-term current use of insulin ([facility])    [facility] Pili Young MD    Office Visit    7 months ago Controlled type 2 diabetes mellitus without complication, without long-term current use of insulin ([facility])    [facility] Melodie Fajardo MD    Office Visit    7 months ago Anemia, unspecified type    [facility] [place] Vetrone, Laura, MD    Office Visit    8 months ago Diabetes mellitus type 2 without retinopathy ([facility])    [facility] Kenton Collins MD    Office Visit          Future Appointments         Provider Department Appt Notes    In 3 weeks Pili Young MD [facility] 6 mos    In 3 months Melodie Fajardo MD [facility] 6 month follow up    In 3 months Kenton Collins MD [facility] EP/DM EE    In 9 months Melodie Fajardo MD [facility]  Supervisit                    Passed - Microalbumin procedure in past 12 months or taking ACE/ARB        Passed - EGFRCR or GFRNAA > 50     GFR Evaluation  EGFRCR: 78 , resulted on 5/6/2024          Passed - GFR in the past 12 months             Future Appointments         Provider Department Appt Notes    In 3 weeks Pili Young MD [facility] 6 mos    In 3 months Melodie Fajardo MD [facility] 6 month follow up    In 3 months Kenton Collins MD [facility] EP/DM EE    In 9 months Melodie Fajardo MD [facility] Supervisit          Recent Outpatient Visits              3 months ago Encounter for annual health examination    [facility] Melodie Fajardo MD    Office Visit    6 months ago Type 2 diabetes mellitus with hyperglycemia, without long-term current use of insulin ([facility])    [facility] Pili Young MD    Office Visit    7 months ago Controlled type 2 diabetes mellitus without complication, without long-term current use of insulin ([facility])    [facility] Melodie Fajardo MD    Office Visit    7 months ago Anemia, unspecified type    [facility] [place] Vetrone, Laura, MD    Office Visit    8 months ago Diabetes mellitus type 2 without retinopathy (HCC)    [facility] Kenton Collins MD    Office Visit

## 2024-08-29 ENCOUNTER — LAB ENCOUNTER (OUTPATIENT)
Dept: LAB | Age: 74
End: 2024-08-29
Attending: INTERNAL MEDICINE
Payer: MEDICARE

## 2024-08-29 DIAGNOSIS — E11.65 TYPE 2 DIABETES MELLITUS WITH HYPERGLYCEMIA, WITHOUT LONG-TERM CURRENT USE OF INSULIN (HCC): ICD-10-CM

## 2024-08-29 DIAGNOSIS — E78.5 DYSLIPIDEMIA: ICD-10-CM

## 2024-08-29 LAB
CHOLEST SERPL-MCNC: 133 MG/DL (ref ?–200)
CREAT UR-SCNC: 123.9 MG/DL
FASTING PATIENT LIPID ANSWER: YES
HDLC SERPL-MCNC: 54 MG/DL (ref 40–59)
LDLC SERPL CALC-MCNC: 58 MG/DL (ref ?–100)
MICROALBUMIN UR-MCNC: 1.6 MG/DL
MICROALBUMIN/CREAT 24H UR-RTO: 12.9 UG/MG (ref ?–30)
NONHDLC SERPL-MCNC: 79 MG/DL (ref ?–130)
TRIGL SERPL-MCNC: 118 MG/DL (ref 30–149)
VLDLC SERPL CALC-MCNC: 17 MG/DL (ref 0–30)

## 2024-08-29 PROCEDURE — 80061 LIPID PANEL: CPT

## 2024-08-29 PROCEDURE — 82570 ASSAY OF URINE CREATININE: CPT

## 2024-08-29 PROCEDURE — 82043 UR ALBUMIN QUANTITATIVE: CPT

## 2024-08-29 PROCEDURE — 36415 COLL VENOUS BLD VENIPUNCTURE: CPT

## 2024-08-30 ENCOUNTER — OFFICE VISIT (OUTPATIENT)
Dept: ENDOCRINOLOGY CLINIC | Facility: CLINIC | Age: 74
End: 2024-08-30
Payer: MEDICARE

## 2024-08-30 VITALS
BODY MASS INDEX: 22.44 KG/M2 | SYSTOLIC BLOOD PRESSURE: 120 MMHG | HEART RATE: 94 BPM | HEIGHT: 66 IN | DIASTOLIC BLOOD PRESSURE: 69 MMHG | WEIGHT: 139.63 LBS

## 2024-08-30 DIAGNOSIS — E11.69 TYPE 2 DIABETES MELLITUS WITH OTHER SPECIFIED COMPLICATION, WITHOUT LONG-TERM CURRENT USE OF INSULIN (HCC): Primary | ICD-10-CM

## 2024-08-30 DIAGNOSIS — E78.5 DYSLIPIDEMIA: ICD-10-CM

## 2024-08-30 LAB
GLUCOSE BLOOD: 137
HEMOGLOBIN A1C: 6.3 % (ref 4.3–5.6)
TEST STRIP LOT #: NORMAL NUMERIC

## 2024-08-30 PROCEDURE — 1159F MED LIST DOCD IN RCRD: CPT | Performed by: INTERNAL MEDICINE

## 2024-08-30 PROCEDURE — 3008F BODY MASS INDEX DOCD: CPT | Performed by: INTERNAL MEDICINE

## 2024-08-30 PROCEDURE — 3078F DIAST BP <80 MM HG: CPT | Performed by: INTERNAL MEDICINE

## 2024-08-30 PROCEDURE — 83036 HEMOGLOBIN GLYCOSYLATED A1C: CPT | Performed by: INTERNAL MEDICINE

## 2024-08-30 PROCEDURE — 99213 OFFICE O/P EST LOW 20 MIN: CPT | Performed by: INTERNAL MEDICINE

## 2024-08-30 PROCEDURE — 3061F NEG MICROALBUMINURIA REV: CPT | Performed by: INTERNAL MEDICINE

## 2024-08-30 PROCEDURE — 3074F SYST BP LT 130 MM HG: CPT | Performed by: INTERNAL MEDICINE

## 2024-08-30 PROCEDURE — 82947 ASSAY GLUCOSE BLOOD QUANT: CPT | Performed by: INTERNAL MEDICINE

## 2024-08-30 PROCEDURE — 1160F RVW MEDS BY RX/DR IN RCRD: CPT | Performed by: INTERNAL MEDICINE

## 2024-08-30 RX ORDER — DULAGLUTIDE 3 MG/.5ML
3 INJECTION, SOLUTION SUBCUTANEOUS WEEKLY
Qty: 6 ML | Refills: 1 | Status: SHIPPED | OUTPATIENT
Start: 2024-08-30

## 2024-08-30 RX ORDER — GLIMEPIRIDE 2 MG/1
TABLET ORAL
Qty: 135 TABLET | Refills: 1 | Status: SHIPPED | OUTPATIENT
Start: 2024-08-30

## 2024-08-30 NOTE — PROGRESS NOTES
Return Office Visit     CHIEF COMPLAINT:    Type 2 DM  Dyslipidemia     HISTORY OF PRESENT ILLNESS:  Mary Marmolejo is a 74 year old female who presents for follow up for Type 2 DM.      DM HISTORY  Diagnosed: about 10 years ago        HISTORY OF DIABETES COMPLICATIONS: :  History of Retinopathy: No - last eye exam: nov 2023   History of Neuropathy: No, she was diagnosed with sciatica, stable  History of Nephropathy: No     ASSOCIATED COMPLICATIONS:   HTN: Yes  Hyperlipidemia: Yes  Coronary Artery Disease:  No  Cerebrovascular Disease: No        HOME GLUCOSE READINGS:   Checks sugars 1-2 times a day  Alternates timings  Range in 110-130  No low BG        CURRENT DIABETIC MEDICATIONS INCLUDE:  MTF ER 1000 mg BID  Amaryl 2 mg pills: she takes 1.5 tabs daily  Trulicity 3 mg once a week     MEALS:  Three meals a day  Dietary compliance with a low carb is moderate     EXERCISE:  No       CURRENT MEDICATION:    Current Outpatient Medications   Medication Sig Dispense Refill    metFORMIN  MG Oral Tablet 24 Hr Take 2 tablets (1,000 mg total) by mouth 2 (two) times daily with meals. 360 tablet 3    TRULICITY 3 MG/0.5ML Subcutaneous Solution Pen-injector ADMINISTER 3 MG UNDER THE SKIN 1 TIME A WEEK 6 mL 0    pantoprazole 20 MG Oral Tab EC Take 1 tablet (20 mg total) by mouth before breakfast. 90 tablet 3    atorvastatin 10 MG Oral Tab Take 1 tablet (10 mg total) by mouth nightly. 90 tablet 3    trandolapril 4 MG Oral Tab Take 2 tablets (8 mg total) by mouth daily. 180 tablet 3    amLODIPine 10 MG Oral Tab Take 1 tablet (10 mg total) by mouth daily. 90 tablet 3    glimepiride 2 MG Oral Tab TAKE 1 TABLET BY MOUTH WITH BREAKFAST AND ONE-HALF TABLET WITH DINNER 135 tablet 1    Glucose Blood (TRUE METRIX BLOOD GLUCOSE TEST) In Vitro Strip USE AS DIRECTED TO CHECK BLOOD GLUCOSE 2 TIMES DAILY 200 strip 3    TRUEplus Lancets 33G Does not apply Misc Check sugars twice a day 200 each 3    levothyroxine 88 MCG Oral Tab  TAKE 1.5 TABLETS ON SUNDAY AND 1 TABLET BY MOUTH ALL OTHER DAYS 110 tablet 3    Calcium Carbonate-Vitamin D (CALCIUM 600-D OR)       Cyanocobalamin (VITAMIN B12) 500 MCG Oral Tab       Iron Glycinate 29 MG Oral Cap       meclizine 25 MG Oral Tab Take 1 tablet (25 mg total) by mouth 3 (three) times daily as needed for Dizziness. 40 tablet 2    TRUE METRIX LEVEL 1 Low In Vitro Solution USE AS DIRECTED 1 each 0    folic acid 800 MCG Oral Tab       Cholecalciferol (VITAMIN D) 1000 UNITS Oral Cap Take 1,000 mg by mouth daily.        aspirin 81 MG Oral Tab Take  by mouth. take 1 tablet (81MG)  by oral route  every day           ALLERGY:  Allergies   Allergen Reactions    Seasonal UNKNOWN       PAST MEDICAL, SOCIAL AND FAMILY HISTORY:  See past medical history marked as reviewed.  See past surgical history marked as reviewed.  See past family history marked as reviewed.  See past social history marked as reviewed.    ASSESSMENTS:       REVIEW OF SYSTEMS:  Constitutional: Negative for: weight change, fever, fatigue, cold/heat intolerance  Eyes: Negative for:  Visual changes, proptosis, blurring  ENT: Negative for:  dysphagia, neck swelling, dysphonia  Respiratory: Negative for:  dyspnea, cough  Cardiovascular: Negative for:  chest pain, palpitations, orthopnea  GI: Negative for:  abdominal pain, nausea, vomiting, diarrhea, constipation, bleeding  Neurology: Negative for: headache, numbness, weakness  Genito-Urinary: Negative for: dysuria, frequency  Psychiatric: Negative for:  depression, anxiety  Hematology/Lymphatics: Negative for: bruising, lower extremity edema  Endocrine: Negative for: polyuria, polydypsia  Skin: Negative for: rash, blister, cellulitis,       PHYSICAL EXAM:   Vitals:    08/30/24 0834   BP: 120/69   Pulse: 94   Weight: 139 lb 9.6 oz (63.3 kg)   Height: 5' 6\" (1.676 m)     BMI: Body mass index is 22.53 kg/m².         General Appearance:  alert, well developed, in no acute distress  Head:  Atraumatic  Eyes:  normal conjunctivae, sclera., normal sclera and normal pupils  Throat/Neck: normal sound to voice. Normal hearing, normal speech  Respiratory:  Speaking in full sentences, non-labored. no increased work of breathing, no audible wheezing    Neuro: motor grossly intact, moving all extremities without difficulty  Psychiatric:  oriented to time, self, and place  EXTREMITIES:  August 2024  Bilateral barefoot skin diabetic exam is normal, visualized feet and the appearance is normal.  Bilateral monofilament/sensation of both feet is normal.  Pulsation pedal pulse exam of both lower legs/feet is normal as well.          DATA:       Pertinent labs reviewed  A1c is 6.3 % (8/2024)     ASSESSMENT AND PLAN:     1. Type 2 DM:     Plan:  Discussed the pathogenesis, natural course of diabetes. Patient understands the importance of glycemic control and the implications of uncontrolled diabetes including Diabetic ketoacidosis and various micro vascular and macrovascular complications.        a). Medications:  - Metformin ER 1000 mg BID  Take with food  GI SE reviewed    - Amaryl 2 mg : 1.5 tablets daily   Take with food  Risk of hypoglycemia reviewed     - Trulicity 3 mg q week.   No personal or family history of MEN syndrome  Patient counselled regarding side effects including injection site reactions, nausea, vomiting, diarrhea, pancreatitis, gastroparesis and rare side effect jimmie Checo syndrome.      Call if she has low BG under 80     b). No Nephropathy.  c). Instructed on importance of annual eye exams  d). Foot exam: Daily feet exam explained  e). BG log maintainence explained in great detail, to get log and glucometer on next visit.  f). Life style changes discussed  g). Hypoglycemia recognition and management discussed     2. Patient’s BP is normal  today  3. Dyslipidemia  A) Discussed lifestyle modifications including reductions in dietary total and saturated fat, weight loss, aerobic exercise, and  eating a diet rich in fruits and vegetables.  B) c/w statin  Discussed the potential side effects of statins including muscle and liver injury.     RTC in 4-5 months  Call with BG as instructed.      Orders Placed This Encounter   Procedures    POC HemoCue Glucose 201 (Finger stick glucose)    POC Glycohemoglobin [63016]       Pili Young MD                      Patient verbalized a complete  understanding of all of the above and did not have any further questions.

## 2024-10-04 ENCOUNTER — APPOINTMENT (OUTPATIENT)
Dept: GENERAL RADIOLOGY | Facility: HOSPITAL | Age: 74
End: 2024-10-04
Attending: EMERGENCY MEDICINE
Payer: MEDICARE

## 2024-10-04 ENCOUNTER — APPOINTMENT (OUTPATIENT)
Dept: CT IMAGING | Facility: HOSPITAL | Age: 74
End: 2024-10-04
Attending: EMERGENCY MEDICINE
Payer: MEDICARE

## 2024-10-04 ENCOUNTER — HOSPITAL ENCOUNTER (INPATIENT)
Facility: HOSPITAL | Age: 74
LOS: 3 days | Discharge: HOME HEALTH | End: 2024-10-08
Attending: EMERGENCY MEDICINE | Admitting: STUDENT IN AN ORGANIZED HEALTH CARE EDUCATION/TRAINING PROGRAM
Payer: MEDICARE

## 2024-10-04 DIAGNOSIS — S72.032A: Primary | ICD-10-CM
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
## 2024-10-04 DIAGNOSIS — S72.009A HIP FRACTURE (HCC): ICD-10-CM

## 2024-10-04 LAB
ALBUMIN SERPL-MCNC: 5.3 G/DL (ref 3.2–4.8)
ALBUMIN/GLOB SERPL: 2 {RATIO} (ref 1–2)
ALP LIVER SERPL-CCNC: 72 U/L
ALT SERPL-CCNC: 56 U/L
ANION GAP SERPL CALC-SCNC: 9 MMOL/L (ref 0–18)
ANTIBODY SCREEN: NEGATIVE
AST SERPL-CCNC: 53 U/L (ref ?–34)
BASOPHILS # BLD AUTO: 0.07 X10(3) UL (ref 0–0.2)
BASOPHILS NFR BLD AUTO: 0.7 %
BILIRUB SERPL-MCNC: 0.5 MG/DL (ref 0.2–1.1)
BUN BLD-MCNC: 17 MG/DL (ref 9–23)
BUN/CREAT SERPL: 15.7 (ref 10–20)
CALCIUM BLD-MCNC: 10.2 MG/DL (ref 8.7–10.4)
CHLORIDE SERPL-SCNC: 107 MMOL/L (ref 98–112)
CO2 SERPL-SCNC: 24 MMOL/L (ref 21–32)
CREAT BLD-MCNC: 1.08 MG/DL
DEPRECATED RDW RBC AUTO: 46.9 FL (ref 35.1–46.3)
EGFRCR SERPLBLD CKD-EPI 2021: 55 ML/MIN/1.73M2 (ref 60–?)
EOSINOPHIL # BLD AUTO: 0.23 X10(3) UL (ref 0–0.7)
EOSINOPHIL NFR BLD AUTO: 2.2 %
ERYTHROCYTE [DISTWIDTH] IN BLOOD BY AUTOMATED COUNT: 15.4 % (ref 11–15)
GLOBULIN PLAS-MCNC: 2.7 G/DL (ref 2–3.5)
GLUCOSE BLD-MCNC: 138 MG/DL (ref 70–99)
HCT VFR BLD AUTO: 35.8 %
HGB BLD-MCNC: 12 G/DL
IMM GRANULOCYTES # BLD AUTO: 0.08 X10(3) UL (ref 0–1)
IMM GRANULOCYTES NFR BLD: 0.8 %
LYMPHOCYTES # BLD AUTO: 4.15 X10(3) UL (ref 1–4)
LYMPHOCYTES NFR BLD AUTO: 40.1 %
MCH RBC QN AUTO: 27.8 PG (ref 26–34)
MCHC RBC AUTO-ENTMCNC: 33.5 G/DL (ref 31–37)
MCV RBC AUTO: 83.1 FL
MONOCYTES # BLD AUTO: 0.78 X10(3) UL (ref 0.1–1)
MONOCYTES NFR BLD AUTO: 7.5 %
NEUTROPHILS # BLD AUTO: 5.05 X10 (3) UL (ref 1.5–7.7)
NEUTROPHILS # BLD AUTO: 5.05 X10(3) UL (ref 1.5–7.7)
NEUTROPHILS NFR BLD AUTO: 48.7 %
OSMOLALITY SERPL CALC.SUM OF ELEC: 294 MOSM/KG (ref 275–295)
PLATELET # BLD AUTO: 308 10(3)UL (ref 150–450)
POTASSIUM SERPL-SCNC: 3.8 MMOL/L (ref 3.5–5.1)
PROT SERPL-MCNC: 8 G/DL (ref 5.7–8.2)
RBC # BLD AUTO: 4.31 X10(6)UL
RH BLOOD TYPE: POSITIVE
SODIUM SERPL-SCNC: 140 MMOL/L (ref 136–145)
WBC # BLD AUTO: 10.4 X10(3) UL (ref 4–11)

## 2024-10-04 PROCEDURE — 99222 1ST HOSP IP/OBS MODERATE 55: CPT | Performed by: HOSPITALIST

## 2024-10-04 PROCEDURE — 70450 CT HEAD/BRAIN W/O DYE: CPT | Performed by: EMERGENCY MEDICINE

## 2024-10-04 PROCEDURE — 73502 X-RAY EXAM HIP UNI 2-3 VIEWS: CPT | Performed by: EMERGENCY MEDICINE

## 2024-10-04 RX ORDER — MORPHINE SULFATE 4 MG/ML
4 INJECTION, SOLUTION INTRAMUSCULAR; INTRAVENOUS ONCE
Status: COMPLETED | OUTPATIENT
Start: 2024-10-04 | End: 2024-10-04

## 2024-10-05 ENCOUNTER — APPOINTMENT (OUTPATIENT)
Dept: GENERAL RADIOLOGY | Facility: HOSPITAL | Age: 74
End: 2024-10-05
Attending: HOSPITALIST
Payer: MEDICARE

## 2024-10-05 ENCOUNTER — APPOINTMENT (OUTPATIENT)
Dept: GENERAL RADIOLOGY | Facility: HOSPITAL | Age: 74
End: 2024-10-05
Attending: STUDENT IN AN ORGANIZED HEALTH CARE EDUCATION/TRAINING PROGRAM
Payer: MEDICARE

## 2024-10-05 ENCOUNTER — ANESTHESIA (OUTPATIENT)
Dept: SURGERY | Facility: HOSPITAL | Age: 74
End: 2024-10-05
Payer: MEDICARE

## 2024-10-05 ENCOUNTER — ANESTHESIA EVENT (OUTPATIENT)
Dept: SURGERY | Facility: HOSPITAL | Age: 74
End: 2024-10-05
Payer: MEDICARE
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
## 2024-10-05 LAB
ANION GAP SERPL CALC-SCNC: 8 MMOL/L (ref 0–18)
ATRIAL RATE: 109 BPM
BASOPHILS # BLD AUTO: 0.06 X10(3) UL (ref 0–0.2)
BASOPHILS NFR BLD AUTO: 0.6 %
BUN BLD-MCNC: 14 MG/DL (ref 9–23)
BUN/CREAT SERPL: 16.1 (ref 10–20)
CALCIUM BLD-MCNC: 9.6 MG/DL (ref 8.7–10.4)
CHLORIDE SERPL-SCNC: 105 MMOL/L (ref 98–112)
CO2 SERPL-SCNC: 27 MMOL/L (ref 21–32)
CREAT BLD-MCNC: 0.87 MG/DL
DEPRECATED RDW RBC AUTO: 47.2 FL (ref 35.1–46.3)
EGFRCR SERPLBLD CKD-EPI 2021: 71 ML/MIN/1.73M2 (ref 60–?)
EOSINOPHIL # BLD AUTO: 0.02 X10(3) UL (ref 0–0.7)
EOSINOPHIL NFR BLD AUTO: 0.2 %
ERYTHROCYTE [DISTWIDTH] IN BLOOD BY AUTOMATED COUNT: 15.2 % (ref 11–15)
GLUCOSE BLD-MCNC: 170 MG/DL (ref 70–99)
GLUCOSE BLDC GLUCOMTR-MCNC: 142 MG/DL (ref 70–99)
GLUCOSE BLDC GLUCOMTR-MCNC: 149 MG/DL (ref 70–99)
GLUCOSE BLDC GLUCOMTR-MCNC: 163 MG/DL (ref 70–99)
GLUCOSE BLDC GLUCOMTR-MCNC: 170 MG/DL (ref 70–99)
GLUCOSE BLDC GLUCOMTR-MCNC: 197 MG/DL (ref 70–99)
HCT VFR BLD AUTO: 34 %
HGB BLD-MCNC: 10.9 G/DL
IMM GRANULOCYTES # BLD AUTO: 0.06 X10(3) UL (ref 0–1)
IMM GRANULOCYTES NFR BLD: 0.6 %
LYMPHOCYTES # BLD AUTO: 1.55 X10(3) UL (ref 1–4)
LYMPHOCYTES NFR BLD AUTO: 14.8 %
MCH RBC QN AUTO: 27.3 PG (ref 26–34)
MCHC RBC AUTO-ENTMCNC: 32.1 G/DL (ref 31–37)
MCV RBC AUTO: 85 FL
MONOCYTES # BLD AUTO: 0.85 X10(3) UL (ref 0.1–1)
MONOCYTES NFR BLD AUTO: 8.1 %
MRSA DNA SPEC QL NAA+PROBE: NEGATIVE
NEUTROPHILS # BLD AUTO: 7.93 X10 (3) UL (ref 1.5–7.7)
NEUTROPHILS # BLD AUTO: 7.93 X10(3) UL (ref 1.5–7.7)
NEUTROPHILS NFR BLD AUTO: 75.7 %
OSMOLALITY SERPL CALC.SUM OF ELEC: 294 MOSM/KG (ref 275–295)
P AXIS: 59 DEGREES
P-R INTERVAL: 140 MS
PLATELET # BLD AUTO: 266 10(3)UL (ref 150–450)
POTASSIUM SERPL-SCNC: 4 MMOL/L (ref 3.5–5.1)
Q-T INTERVAL: 334 MS
QRS DURATION: 76 MS
QTC CALCULATION (BEZET): 449 MS
R AXIS: -34 DEGREES
RBC # BLD AUTO: 4 X10(6)UL
SODIUM SERPL-SCNC: 140 MMOL/L (ref 136–145)
T AXIS: 97 DEGREES
VENTRICULAR RATE: 109 BPM
WBC # BLD AUTO: 10.5 X10(3) UL (ref 4–11)

## 2024-10-05 PROCEDURE — 73501 X-RAY EXAM HIP UNI 1 VIEW: CPT | Performed by: STUDENT IN AN ORGANIZED HEALTH CARE EDUCATION/TRAINING PROGRAM

## 2024-10-05 PROCEDURE — 27236 TREAT THIGH FRACTURE: CPT | Performed by: STUDENT IN AN ORGANIZED HEALTH CARE EDUCATION/TRAINING PROGRAM

## 2024-10-05 PROCEDURE — 99223 1ST HOSP IP/OBS HIGH 75: CPT | Performed by: STUDENT IN AN ORGANIZED HEALTH CARE EDUCATION/TRAINING PROGRAM

## 2024-10-05 PROCEDURE — 73100 X-RAY EXAM OF WRIST: CPT | Performed by: HOSPITALIST

## 2024-10-05 PROCEDURE — 0SRS0J9 REPLACEMENT OF LEFT HIP JOINT, FEMORAL SURFACE WITH SYNTHETIC SUBSTITUTE, CEMENTED, OPEN APPROACH: ICD-10-PCS | Performed by: STUDENT IN AN ORGANIZED HEALTH CARE EDUCATION/TRAINING PROGRAM

## 2024-10-05 PROCEDURE — 99233 SBSQ HOSP IP/OBS HIGH 50: CPT | Performed by: HOSPITALIST

## 2024-10-05 DEVICE — IMPLANTABLE DEVICE
Type: IMPLANTABLE DEVICE | Site: HIP | Status: FUNCTIONAL
Brand: BIOMET® HIP SYSTEM

## 2024-10-05 DEVICE — IMPLANTABLE DEVICE
Type: IMPLANTABLE DEVICE | Site: HIP | Status: FUNCTIONAL
Brand: REFOBACIN® BONE CEMENT R

## 2024-10-05 DEVICE — IMPLANTABLE DEVICE: Type: IMPLANTABLE DEVICE | Site: HIP | Status: FUNCTIONAL

## 2024-10-05 DEVICE — IMPLANTABLE DEVICE
Type: IMPLANTABLE DEVICE | Site: HIP | Status: FUNCTIONAL
Brand: ECHO FX™ HIP SYSTEM

## 2024-10-05 DEVICE — IMPLANTABLE DEVICE
Type: IMPLANTABLE DEVICE | Site: HIP | Status: FUNCTIONAL
Brand: RINGLOC BI-POLAR HIP SYSTEM

## 2024-10-05 RX ORDER — AMLODIPINE BESYLATE 10 MG/1
10 TABLET ORAL DAILY
Status: DISCONTINUED | OUTPATIENT
Start: 2024-10-05 | End: 2024-10-08

## 2024-10-05 RX ORDER — HYDROMORPHONE HYDROCHLORIDE 1 MG/ML
0.5 INJECTION, SOLUTION INTRAMUSCULAR; INTRAVENOUS; SUBCUTANEOUS EVERY 2 HOUR PRN
Status: DISCONTINUED | OUTPATIENT
Start: 2024-10-05 | End: 2024-10-05 | Stop reason: ALTCHOICE

## 2024-10-05 RX ORDER — OXYCODONE HYDROCHLORIDE 5 MG/1
2.5 TABLET ORAL EVERY 4 HOURS PRN
Status: DISCONTINUED | OUTPATIENT
Start: 2024-10-05 | End: 2024-10-08

## 2024-10-05 RX ORDER — NICOTINE POLACRILEX 4 MG
30 LOZENGE BUCCAL
Status: DISCONTINUED | OUTPATIENT
Start: 2024-10-05 | End: 2024-10-08

## 2024-10-05 RX ORDER — NICOTINE POLACRILEX 4 MG
15 LOZENGE BUCCAL
Status: DISCONTINUED | OUTPATIENT
Start: 2024-10-05 | End: 2024-10-05 | Stop reason: HOSPADM

## 2024-10-05 RX ORDER — MORPHINE SULFATE 4 MG/ML
4 INJECTION, SOLUTION INTRAMUSCULAR; INTRAVENOUS EVERY 10 MIN PRN
Status: DISCONTINUED | OUTPATIENT
Start: 2024-10-05 | End: 2024-10-05 | Stop reason: HOSPADM

## 2024-10-05 RX ORDER — HYDROMORPHONE HYDROCHLORIDE 1 MG/ML
0.4 INJECTION, SOLUTION INTRAMUSCULAR; INTRAVENOUS; SUBCUTANEOUS EVERY 2 HOUR PRN
Status: DISCONTINUED | OUTPATIENT
Start: 2024-10-05 | End: 2024-10-05 | Stop reason: ALTCHOICE

## 2024-10-05 RX ORDER — NALOXONE HYDROCHLORIDE 0.4 MG/ML
0.08 INJECTION, SOLUTION INTRAMUSCULAR; INTRAVENOUS; SUBCUTANEOUS AS NEEDED
Status: DISCONTINUED | OUTPATIENT
Start: 2024-10-05 | End: 2024-10-05 | Stop reason: HOSPADM

## 2024-10-05 RX ORDER — ATORVASTATIN CALCIUM 10 MG/1
10 TABLET, FILM COATED ORAL NIGHTLY
Status: DISCONTINUED | OUTPATIENT
Start: 2024-10-05 | End: 2024-10-08

## 2024-10-05 RX ORDER — SODIUM CHLORIDE, SODIUM LACTATE, POTASSIUM CHLORIDE, CALCIUM CHLORIDE 600; 310; 30; 20 MG/100ML; MG/100ML; MG/100ML; MG/100ML
INJECTION, SOLUTION INTRAVENOUS CONTINUOUS PRN
Status: DISCONTINUED | OUTPATIENT
Start: 2024-10-05 | End: 2024-10-05 | Stop reason: SURG

## 2024-10-05 RX ORDER — ZOLPIDEM TARTRATE 5 MG/1
5 TABLET ORAL NIGHTLY PRN
Status: DISCONTINUED | OUTPATIENT
Start: 2024-10-05 | End: 2024-10-08

## 2024-10-05 RX ORDER — MORPHINE SULFATE 10 MG/ML
6 INJECTION, SOLUTION INTRAMUSCULAR; INTRAVENOUS EVERY 10 MIN PRN
Status: DISCONTINUED | OUTPATIENT
Start: 2024-10-05 | End: 2024-10-05 | Stop reason: HOSPADM

## 2024-10-05 RX ORDER — ACETAMINOPHEN 10 MG/ML
1000 INJECTION, SOLUTION INTRAVENOUS ONCE
Status: DISCONTINUED | OUTPATIENT
Start: 2024-10-05 | End: 2024-10-05 | Stop reason: HOSPADM

## 2024-10-05 RX ORDER — ENOXAPARIN SODIUM 100 MG/ML
40 INJECTION SUBCUTANEOUS DAILY
Status: DISCONTINUED | OUTPATIENT
Start: 2024-10-06 | End: 2024-10-08

## 2024-10-05 RX ORDER — ONDANSETRON 2 MG/ML
INJECTION INTRAMUSCULAR; INTRAVENOUS AS NEEDED
Status: DISCONTINUED | OUTPATIENT
Start: 2024-10-05 | End: 2024-10-05 | Stop reason: SURG

## 2024-10-05 RX ORDER — SODIUM CHLORIDE, SODIUM LACTATE, POTASSIUM CHLORIDE, CALCIUM CHLORIDE 600; 310; 30; 20 MG/100ML; MG/100ML; MG/100ML; MG/100ML
INJECTION, SOLUTION INTRAVENOUS CONTINUOUS
Status: DISCONTINUED | OUTPATIENT
Start: 2024-10-05 | End: 2024-10-05 | Stop reason: HOSPADM

## 2024-10-05 RX ORDER — DEXTROSE MONOHYDRATE 25 G/50ML
50 INJECTION, SOLUTION INTRAVENOUS
Status: DISCONTINUED | OUTPATIENT
Start: 2024-10-05 | End: 2024-10-08

## 2024-10-05 RX ORDER — ACETAMINOPHEN 500 MG
1000 TABLET ORAL EVERY 8 HOURS SCHEDULED
Status: DISCONTINUED | OUTPATIENT
Start: 2024-10-05 | End: 2024-10-08

## 2024-10-05 RX ORDER — POLYETHYLENE GLYCOL 3350 17 G/17G
17 POWDER, FOR SOLUTION ORAL DAILY PRN
Status: DISCONTINUED | OUTPATIENT
Start: 2024-10-05 | End: 2024-10-08

## 2024-10-05 RX ORDER — HYDROMORPHONE HYDROCHLORIDE 1 MG/ML
0.2 INJECTION, SOLUTION INTRAMUSCULAR; INTRAVENOUS; SUBCUTANEOUS EVERY 2 HOUR PRN
Status: DISCONTINUED | OUTPATIENT
Start: 2024-10-05 | End: 2024-10-08

## 2024-10-05 RX ORDER — METOCLOPRAMIDE HYDROCHLORIDE 5 MG/ML
5 INJECTION INTRAMUSCULAR; INTRAVENOUS EVERY 8 HOURS PRN
Status: DISCONTINUED | OUTPATIENT
Start: 2024-10-05 | End: 2024-10-05 | Stop reason: HOSPADM

## 2024-10-05 RX ORDER — HYDROMORPHONE HYDROCHLORIDE 1 MG/ML
0.2 INJECTION, SOLUTION INTRAMUSCULAR; INTRAVENOUS; SUBCUTANEOUS EVERY 2 HOUR PRN
Status: DISCONTINUED | OUTPATIENT
Start: 2024-10-05 | End: 2024-10-05 | Stop reason: ALTCHOICE

## 2024-10-05 RX ORDER — LEVOTHYROXINE SODIUM 88 UG/1
88 TABLET ORAL
Status: DISCONTINUED | OUTPATIENT
Start: 2024-10-05 | End: 2024-10-08

## 2024-10-05 RX ORDER — HYDROMORPHONE HYDROCHLORIDE 1 MG/ML
0.2 INJECTION, SOLUTION INTRAMUSCULAR; INTRAVENOUS; SUBCUTANEOUS EVERY 5 MIN PRN
Status: DISCONTINUED | OUTPATIENT
Start: 2024-10-05 | End: 2024-10-05 | Stop reason: HOSPADM

## 2024-10-05 RX ORDER — MORPHINE SULFATE 4 MG/ML
4 INJECTION, SOLUTION INTRAMUSCULAR; INTRAVENOUS EVERY 2 HOUR PRN
Status: DISCONTINUED | OUTPATIENT
Start: 2024-10-05 | End: 2024-10-05

## 2024-10-05 RX ORDER — NICOTINE POLACRILEX 4 MG
15 LOZENGE BUCCAL
Status: DISCONTINUED | OUTPATIENT
Start: 2024-10-05 | End: 2024-10-08

## 2024-10-05 RX ORDER — BISACODYL 10 MG
10 SUPPOSITORY, RECTAL RECTAL
Status: DISCONTINUED | OUTPATIENT
Start: 2024-10-05 | End: 2024-10-08

## 2024-10-05 RX ORDER — DEXTROSE MONOHYDRATE 25 G/50ML
50 INJECTION, SOLUTION INTRAVENOUS
Status: DISCONTINUED | OUTPATIENT
Start: 2024-10-05 | End: 2024-10-05 | Stop reason: HOSPADM

## 2024-10-05 RX ORDER — LIDOCAINE HYDROCHLORIDE 10 MG/ML
INJECTION, SOLUTION EPIDURAL; INFILTRATION; INTRACAUDAL; PERINEURAL AS NEEDED
Status: DISCONTINUED | OUTPATIENT
Start: 2024-10-05 | End: 2024-10-05 | Stop reason: SURG

## 2024-10-05 RX ORDER — MIDAZOLAM HYDROCHLORIDE 1 MG/ML
INJECTION INTRAMUSCULAR; INTRAVENOUS AS NEEDED
Status: DISCONTINUED | OUTPATIENT
Start: 2024-10-05 | End: 2024-10-05 | Stop reason: SURG

## 2024-10-05 RX ORDER — HYDROMORPHONE HYDROCHLORIDE 1 MG/ML
1 INJECTION, SOLUTION INTRAMUSCULAR; INTRAVENOUS; SUBCUTANEOUS EVERY 2 HOUR PRN
Status: DISCONTINUED | OUTPATIENT
Start: 2024-10-05 | End: 2024-10-05 | Stop reason: ALTCHOICE

## 2024-10-05 RX ORDER — ROCURONIUM BROMIDE 10 MG/ML
INJECTION, SOLUTION INTRAVENOUS AS NEEDED
Status: DISCONTINUED | OUTPATIENT
Start: 2024-10-05 | End: 2024-10-05 | Stop reason: SURG

## 2024-10-05 RX ORDER — NEOSTIGMINE METHYLSULFATE 1 MG/ML
INJECTION INTRAVENOUS AS NEEDED
Status: DISCONTINUED | OUTPATIENT
Start: 2024-10-05 | End: 2024-10-05 | Stop reason: SURG

## 2024-10-05 RX ORDER — ONDANSETRON 2 MG/ML
4 INJECTION INTRAMUSCULAR; INTRAVENOUS EVERY 6 HOURS PRN
Status: DISCONTINUED | OUTPATIENT
Start: 2024-10-05 | End: 2024-10-05 | Stop reason: HOSPADM

## 2024-10-05 RX ORDER — SODIUM CHLORIDE 450 MG/100ML
INJECTION, SOLUTION INTRAVENOUS CONTINUOUS
Status: DISCONTINUED | OUTPATIENT
Start: 2024-10-05 | End: 2024-10-08

## 2024-10-05 RX ORDER — DOCUSATE SODIUM 100 MG/1
100 CAPSULE, LIQUID FILLED ORAL 2 TIMES DAILY
Status: DISCONTINUED | OUTPATIENT
Start: 2024-10-05 | End: 2024-10-08

## 2024-10-05 RX ORDER — OXYCODONE HYDROCHLORIDE 5 MG/1
5 TABLET ORAL EVERY 4 HOURS PRN
Status: DISCONTINUED | OUTPATIENT
Start: 2024-10-05 | End: 2024-10-08

## 2024-10-05 RX ORDER — HYDROMORPHONE HYDROCHLORIDE 1 MG/ML
0.6 INJECTION, SOLUTION INTRAMUSCULAR; INTRAVENOUS; SUBCUTANEOUS EVERY 5 MIN PRN
Status: DISCONTINUED | OUTPATIENT
Start: 2024-10-05 | End: 2024-10-05 | Stop reason: HOSPADM

## 2024-10-05 RX ORDER — MORPHINE SULFATE 4 MG/ML
2 INJECTION, SOLUTION INTRAMUSCULAR; INTRAVENOUS EVERY 10 MIN PRN
Status: DISCONTINUED | OUTPATIENT
Start: 2024-10-05 | End: 2024-10-05 | Stop reason: HOSPADM

## 2024-10-05 RX ORDER — DOXEPIN HYDROCHLORIDE 50 MG/1
1 CAPSULE ORAL DAILY
Status: DISCONTINUED | OUTPATIENT
Start: 2024-10-06 | End: 2024-10-08

## 2024-10-05 RX ORDER — PHENYLEPHRINE HCL 10 MG/ML
VIAL (ML) INJECTION AS NEEDED
Status: DISCONTINUED | OUTPATIENT
Start: 2024-10-05 | End: 2024-10-05 | Stop reason: SURG

## 2024-10-05 RX ORDER — GLYCOPYRROLATE 0.2 MG/ML
INJECTION, SOLUTION INTRAMUSCULAR; INTRAVENOUS AS NEEDED
Status: DISCONTINUED | OUTPATIENT
Start: 2024-10-05 | End: 2024-10-05 | Stop reason: SURG

## 2024-10-05 RX ORDER — HYDROMORPHONE HYDROCHLORIDE 1 MG/ML
0.4 INJECTION, SOLUTION INTRAMUSCULAR; INTRAVENOUS; SUBCUTANEOUS EVERY 5 MIN PRN
Status: DISCONTINUED | OUTPATIENT
Start: 2024-10-05 | End: 2024-10-05 | Stop reason: HOSPADM

## 2024-10-05 RX ORDER — NICOTINE POLACRILEX 4 MG
30 LOZENGE BUCCAL
Status: DISCONTINUED | OUTPATIENT
Start: 2024-10-05 | End: 2024-10-05 | Stop reason: HOSPADM

## 2024-10-05 RX ORDER — TRANEXAMIC ACID 10 MG/ML
INJECTION, SOLUTION INTRAVENOUS AS NEEDED
Status: DISCONTINUED | OUTPATIENT
Start: 2024-10-05 | End: 2024-10-05 | Stop reason: SURG

## 2024-10-05 RX ORDER — SODIUM PHOSPHATE, DIBASIC AND SODIUM PHOSPHATE, MONOBASIC 7; 19 G/230ML; G/230ML
1 ENEMA RECTAL ONCE AS NEEDED
Status: DISCONTINUED | OUTPATIENT
Start: 2024-10-05 | End: 2024-10-08

## 2024-10-05 RX ORDER — HYDROMORPHONE HYDROCHLORIDE 1 MG/ML
0.4 INJECTION, SOLUTION INTRAMUSCULAR; INTRAVENOUS; SUBCUTANEOUS EVERY 2 HOUR PRN
Status: DISCONTINUED | OUTPATIENT
Start: 2024-10-05 | End: 2024-10-08

## 2024-10-05 RX ORDER — HYDRALAZINE HYDROCHLORIDE 20 MG/ML
10 INJECTION INTRAMUSCULAR; INTRAVENOUS EVERY 4 HOURS PRN
Status: DISCONTINUED | OUTPATIENT
Start: 2024-10-05 | End: 2024-10-08

## 2024-10-05 RX ORDER — MORPHINE SULFATE 2 MG/ML
2 INJECTION, SOLUTION INTRAMUSCULAR; INTRAVENOUS EVERY 2 HOUR PRN
Status: DISCONTINUED | OUTPATIENT
Start: 2024-10-05 | End: 2024-10-05

## 2024-10-05 RX ORDER — HYDROMORPHONE HYDROCHLORIDE 1 MG/ML
0.1 INJECTION, SOLUTION INTRAMUSCULAR; INTRAVENOUS; SUBCUTANEOUS EVERY 2 HOUR PRN
Status: DISCONTINUED | OUTPATIENT
Start: 2024-10-05 | End: 2024-10-05 | Stop reason: ALTCHOICE

## 2024-10-05 RX ORDER — ONDANSETRON 2 MG/ML
4 INJECTION INTRAMUSCULAR; INTRAVENOUS EVERY 6 HOURS PRN
Status: DISCONTINUED | OUTPATIENT
Start: 2024-10-05 | End: 2024-10-08

## 2024-10-05 RX ADMIN — ROCURONIUM BROMIDE 20 MG: 10 INJECTION, SOLUTION INTRAVENOUS at 10:43:00

## 2024-10-05 RX ADMIN — NEOSTIGMINE METHYLSULFATE 3 MG: 1 INJECTION INTRAVENOUS at 12:23:00

## 2024-10-05 RX ADMIN — GLYCOPYRROLATE 0.2 MG: 0.2 INJECTION, SOLUTION INTRAMUSCULAR; INTRAVENOUS at 10:39:00

## 2024-10-05 RX ADMIN — PHENYLEPHRINE HCL 100 MCG: 10 MG/ML VIAL (ML) INJECTION at 11:58:00

## 2024-10-05 RX ADMIN — ROCURONIUM BROMIDE 10 MG: 10 INJECTION, SOLUTION INTRAVENOUS at 10:30:00

## 2024-10-05 RX ADMIN — TRANEXAMIC ACID 1000 MG: 10 INJECTION, SOLUTION INTRAVENOUS at 12:08:00

## 2024-10-05 RX ADMIN — TRANEXAMIC ACID 1000 MG: 10 INJECTION, SOLUTION INTRAVENOUS at 10:34:00

## 2024-10-05 RX ADMIN — MIDAZOLAM HYDROCHLORIDE 2 MG: 1 INJECTION INTRAMUSCULAR; INTRAVENOUS at 10:24:00

## 2024-10-05 RX ADMIN — SODIUM CHLORIDE, SODIUM LACTATE, POTASSIUM CHLORIDE, CALCIUM CHLORIDE: 600; 310; 30; 20 INJECTION, SOLUTION INTRAVENOUS at 10:24:00

## 2024-10-05 RX ADMIN — LIDOCAINE HYDROCHLORIDE 50 MG: 10 INJECTION, SOLUTION EPIDURAL; INFILTRATION; INTRACAUDAL; PERINEURAL at 10:30:00

## 2024-10-05 RX ADMIN — GLYCOPYRROLATE 0.4 MG: 0.2 INJECTION, SOLUTION INTRAMUSCULAR; INTRAVENOUS at 12:23:00

## 2024-10-05 RX ADMIN — ONDANSETRON 4 MG: 2 INJECTION INTRAMUSCULAR; INTRAVENOUS at 12:40:00

## 2024-10-05 NOTE — ANESTHESIA PROCEDURE NOTES
Peripheral IV  Date/Time: 10/5/2024 10:37 AM  Inserted by: Kylee Duran DO    Placement  Needle size: 18 G  Laterality: right  Location: hand  Local anesthetic: none  Site prep: alcohol  Technique: anatomical landmarks  Attempts: 1

## 2024-10-05 NOTE — BRIEF OP NOTE
Pre-Operative Diagnosis: Hip fracture (HCC) [S72.009A]     Post-Operative Diagnosis: Hip fracture (HCC) [S72.009A]      Procedure Performed:   LEFT HIP HEMIARTHROPLASTY    Surgeons and Role:     * Eliel Tolbert MD - Primary    Assistant(s):  Surgical Assistant.: Linda Farley     Surgical Findings: left femoral neck fracture     Specimen: None     Estimated Blood Loss: 100cc    Dictation Number:  N/A    Eliel Tolbert MD  10/5/2024  12:26 PM

## 2024-10-05 NOTE — PROGRESS NOTES
[facility]  part of [facility]  Hospitalist Progress Note     Mary Marmolejo Patient Status:  Inpatient    1950  74 year old CSN [id number]   Location [facility] PACU/[facility] PACU Attending Simba Segura MD   Hosp Day # 0 PCP Ondina Pride MD     Assessment & Plan:   ----------------------------------  Hip fracture, acute.  Left. Location is femoral neck. Contributing factors include age.  Pain is controlled.  Status post hemiarthroplasty 10/5  -Orthopedic consultation appreciated  -Surgery: Hemiarthroplasty 10/5  -Pain control  -PT/OT  -Will likely need rehab placement    Other problems  Type 2 diabetes  Hypertension    DVT Mechanical Prophylaxis:   SCDs,    DVT Pharmacologic Prophylaxis   Medication   None            dispo: to be determined  If applicable, malnutrition status at bottom of note    I personally reviewed the available laboratories, imaging including. I discussed/will discuss the case with consultants. I ordered laboratories and/or radiographic studies. I adjusted medications as detailed above.  Medical decision making high, risk is high.    Subjective:   ----------------------------------  Seen in PACU, somnolent.      Objective:   Chief Complaint:   Chief Complaint   Patient presents with    Fall     ----------------------------------  Temp:  [97.6 °F (36.4 °C)-98.8 °F (37.1 °C)] 97.8 °F (36.6 °C)  Pulse:  [] 95  Resp:  [13-20] 16  BP: (110-168)/(52-92) 140/61  SpO2:  [92 %-99 %] 93 %  Gen: Somnolent no distress.   HEENT: NCAT, neck supple, no carotid bruit.  CV: RRR, S1S2, and intact distal pulses. No gallop, rub, murmur.  Pulm: Effort and breath sounds normal. No distress, wheezes, rales, rhonchi.  Abd: Soft, NTND, BS normal, no mass, no HSM, no rebound/guarding.   Neuro: Normal reflexes, CN. Sensory/motor exams grossly normal deficit.   MS: No joint effusions.  No peripheral edema.  Incision clean dry and intact  Skin: Skin is warm and dry. No rashes, erythema,  diaphoresis.   Psych: Normal mood and affect. Calm, cooperative    Labs:  Lab Results   Component Value Date    HGB 10.9 (L) 10/05/2024    WBC 10.5 10/05/2024    .0 10/05/2024     10/05/2024    K 4.0 10/05/2024    CREATSERUM 0.87 10/05/2024    AST 53 (H) 10/04/2024    ALT 56 (H) 10/04/2024    TROP <0.045 02/09/2021            [Transfer Hold] amLODIPine  10 mg Oral Daily    [Transfer Hold] atorvastatin  10 mg Oral Nightly    [Transfer Hold] levothyroxine  88 mcg Oral Daily @ 0700    acetaminophen  1,000 mg Intravenous Once       [Transfer Hold] ondansetron    [Transfer Hold] hydrALAzine    [Transfer Hold] zolpidem    [Transfer Hold] HYDROmorphone **OR** [Transfer Hold] HYDROmorphone **OR** [Transfer Hold] HYDROmorphone    influenza virus vaccine PF    [Transfer Hold] HYDROmorphone **OR** [Transfer Hold] HYDROmorphone    glucose **OR** glucose **OR** glucose-vitamin C **OR** dextrose **OR** glucose **OR** glucose **OR** glucose-vitamin C    lactated ringers    atropine    naloxone    fentaNYL    fentaNYL    HYDROmorphone    HYDROmorphone    HYDROmorphone    morphINE    morphINE    morphINE PF    ondansetron    metoclopramide  **Certification      PHYSICIAN Certification of Need for Inpatient Hospitalization - Initial Certification    Patient will require inpatient services that will reasonably be expected to span two midnight's based on the clinical documentation in H+P.   Based on patients current state of illness, I anticipate that, after discharge, patient will require TBD.

## 2024-10-05 NOTE — ANESTHESIA POSTPROCEDURE EVALUATION
Patient: Mary Marmolejo    Procedure Summary       Date: 10/05/24 Room / Location: [facility] MAIN OR 05 / [facility] MAIN OR    Anesthesia Start: 1024 Anesthesia Stop: 1255    Procedure: LEFT HIP HEMIARTHROPLASTY (Left: Hip) Diagnosis:       Hip fracture (HCC)      (Hip fracture (HCC) [S72.009A])    Surgeons: Eliel Tolbert MD Anesthesiologist: Kylee Duran DO    Anesthesia Type: general ASA Status: 3 - Emergent            Anesthesia Type: general    Vitals Value Taken Time   /57 10/05/24 1304   Temp 97.6 °F (36.4 °C) 10/05/24 1250   Pulse 78 10/05/24 1313   Resp 12 10/05/24 1313   SpO2 96 % 10/05/24 1313   Vitals shown include unfiled device data.    [facility] AN Post Evaluation:   Patient Evaluated in PACU  Patient Participation: complete - patient participated  Level of Consciousness: awake  Pain Score: 3  Pain Management: adequate  Airway Patency:patent  Dental exam unchanged from preop  Yes    Nausea/Vomiting: none  Cardiovascular Status: hemodynamically stable  Respiratory Status: spontaneous ventilation, nasal cannula, airway suctioned, unassisted and nonlabored ventilation  Postoperative Hydration stable      Kylee Duran DO  10/5/2024 1:14 PM

## 2024-10-05 NOTE — ANESTHESIA PROCEDURE NOTES
Airway  Date/Time: 10/5/2024 10:32 AM  Urgency: Elective    Airway not difficult    General Information and Staff    Patient location during procedure: OR  Anesthesiologist: Kylee Duran DO  Performed: anesthesiologist   Performed by: Kylee Duran DO  Authorized by: Kylee Duran DO      Indications and Patient Condition  Indications for airway management: anesthesia  Spontaneous Ventilation: absent  Sedation level: deep  Preoxygenated: yes  Patient position: sniffing  Mask difficulty assessment: 1 - vent by mask    Final Airway Details  Final airway type: endotracheal airway      Successful airway: ETT  Cuffed: yes   Successful intubation technique: direct laryngoscopy  Facilitating devices/methods: intubating stylet  Endotracheal tube insertion site: oral  Blade: Melquiades  Blade size: #3  ETT size (mm): 7.0    Cormack-Lehane Classification: grade IIB - view of arytenoids or posterior of glottis only  Placement verified by: capnometry   Measured from: teeth  ETT to teeth (cm): 20  Number of attempts at approach: 1  Number of other approaches attempted: 0

## 2024-10-05 NOTE — PLAN OF CARE
Patient alert and oriented x4 on room air. VSS. POD of left hip hemiarthroplasty. Pain managed with prn oxycodone. Iv abx prophylactic. Plan PT/OT tomorrow.     Problem: Patient Centered Care  Goal: Patient preferences are identified and integrated in the patient's plan of care  Description: Interventions:  - Provide timely, complete, and accurate information to patient/family  - Incorporate patient and family knowledge, values, beliefs, and cultural backgrounds into the planning and delivery of care  - Encourage patient/family to participate in care and decision-making at the level they choose  - Honor patient and family perspectives and choices  Outcome: Progressing     Problem: Diabetes/Glucose Control  Goal: Glucose maintained within prescribed range  Description: INTERVENTIONS:  - Monitor Blood Glucose as ordered  - Assess for signs and symptoms of hyperglycemia and hypoglycemia  - Administer ordered medications to maintain glucose within target range  - Assess barriers to adequate nutritional intake and initiate nutrition consult as needed  - Instruct patient on self management of diabetes  Outcome: Progressing     Problem: PAIN - ADULT  Goal: Verbalizes/displays adequate comfort level or patient's stated pain goal  Description: INTERVENTIONS:  - Encourage pt to monitor pain and request assistance  - Assess pain using appropriate pain scale  - Administer analgesics based on type and severity of pain and evaluate response  - Implement non-pharmacological measures as appropriate and evaluate response  - Consider cultural and social influences on pain and pain management  - Manage/alleviate anxiety  - Utilize distraction and/or relaxation techniques  - Monitor for opioid side effects  - Notify MD/LIP if interventions unsuccessful or patient reports new pain  - Anticipate increased pain with activity and pre-medicate as appropriate  Outcome: Progressing     Problem: SAFETY ADULT - FALL  Goal: Free from fall  injury  Description: INTERVENTIONS:  - Assess pt frequently for physical needs  - Identify cognitive and physical deficits and behaviors that affect risk of falls.  - [place] fall precautions as indicated by assessment.  - Educate pt/family on patient safety including physical limitations  - Instruct pt to call for assistance with activity based on assessment  - Modify environment to reduce risk of injury  - Provide assistive devices as appropriate  - Consider OT/PT consult to assist with strengthening/mobility  - Encourage toileting schedule  Outcome: Progressing

## 2024-10-05 NOTE — ANESTHESIA PREPROCEDURE EVALUATION
Anesthesia PreOp Note    HPI:     Mary Marmolejo is a 74 year old female who presents for preoperative consultation requested by: Eliel Tolbert MD    Date of Surgery: 10/4/2024 - 10/5/2024    Procedure(s):  LEFT HIP HEMIARTHROPLASTY  Indication: Hip fracture (HCC) [S72.009A]    Relevant Problems   No relevant active problems       NPO:  Last Liquid Consumption Date: 10/04/24  Last Liquid Consumption Time: 2300  Last Solid Consumption Date: 10/04/24  Last Solid Consumption Time: 2300  Last Liquid Consumption Date: 10/04/24          History Review:  Patient Active Problem List    Diagnosis Date Noted    Fall, initial encounter 10/05/2024    Closed transcervical fracture of left femur, initial encounter ([facility]) 10/04/2024    Otalgia of left ear 2023    Type 2 diabetes mellitus with other specified complication, unspecified whether long term insulin use ([facility]) 2022    Anemia 2021    Gastroesophageal reflux disease 2020    Encounter for Medicare annual wellness exam 2019    Age-related nuclear cataract of both eyes 2019    Fatty liver 2019    Vitreous floaters, bilateral 2018    Hypercholesterolemia 2017    Osteopenia 2017    Acquired hypothyroidism 2016    Diabetes mellitus type 2 without retinopathy ([facility]) 2016    Essential hypertension with goal blood pressure less than 130/85 2016    Hematuria 12/10/2013    Pain in soft tissues of limb 10/15/2012    Dysuria 2012    Cough 2012    Vitamin D deficiency 2011    Dysfunction of eustachian tube 2010    Mitral valve disease 02/10/2005       Past Medical History:    Acute respiratory failure with hypoxia ([facility])    Elective     Esophageal reflux    Fatty liver    High cholesterol    Hypothyroid    Hypothyroidism    ON GENERIC SYNTHROID    Lipid screening    per NG    MVP (mitral valve prolapse)    antibiotic prophylaxis w/ procedures    Other and unspecified  hyperlipidemia    Post-menopausal bleeding    Type II or unspecified type diabetes mellitus without mention of complication, not stated as uncontrolled    Unspecified essential hypertension       Past Surgical History:   Procedure Laterality Date          Colonoscopy      Colonoscopy  ,     Colonoscopy N/A 2017    Procedure: COLONOSCOPY, POSSIBLE BIOPSY, POSSIBLE POLYPECTOMY 23595;  Surgeon: Aamir Martin MD;  Location: [facility]    D & c      Egd  2018    Egd N/A 2018    Procedure: ESOPHAGOGASTRODUODENOSCOPY, COLONOSCOPY, POSSIBLE BIOPSY, POSSIBLE POLYPECTOMY 11784, 31371;  Surgeon: Aamir Martin MD;  Location: [facility]    Endometrial biopsy - jar(s): 2      negative    Other surgical history  FATTY LIVER    2 BIOPSIES IN THE PAST    Tonsillectomy         Medications Prior to Admission   Medication Sig Dispense Refill Last Dose    Dulaglutide (TRULICITY) 3 MG/0.5ML Subcutaneous Solution Pen-injector Inject 3 mg into the skin once a week. ADMINISTER 3 MG UNDER THE SKIN 1 TIME A WEEK 6 mL 1     glimepiride 2 MG Oral Tab TAKE 1 TABLET BY MOUTH WITH BREAKFAST AND ONE-HALF TABLET WITH DINNER 135 tablet 1     metFORMIN  MG Oral Tablet 24 Hr Take 2 tablets (1,000 mg total) by mouth 2 (two) times daily with meals. 360 tablet 3     pantoprazole 20 MG Oral Tab EC Take 1 tablet (20 mg total) by mouth before breakfast. 90 tablet 3     atorvastatin 10 MG Oral Tab Take 1 tablet (10 mg total) by mouth nightly. 90 tablet 3     trandolapril 4 MG Oral Tab Take 2 tablets (8 mg total) by mouth daily. 180 tablet 3     amLODIPine 10 MG Oral Tab Take 1 tablet (10 mg total) by mouth daily. 90 tablet 3     Glucose Blood (TRUE METRIX BLOOD GLUCOSE TEST) In Vitro Strip USE AS DIRECTED TO CHECK BLOOD GLUCOSE 2 TIMES DAILY 200 strip 3     TRUEplus Lancets 33G Does not apply Misc Check sugars twice a day 200 each 3     levothyroxine 88 MCG Oral Tab TAKE 1.5 TABLETS  ON SUNDAY AND 1 TABLET BY MOUTH ALL OTHER DAYS 110 tablet 3     Calcium Carbonate-Vitamin D (CALCIUM 600-D OR)        Cyanocobalamin (VITAMIN B12) 500 MCG Oral Tab        Iron Glycinate 29 MG Oral Cap        meclizine 25 MG Oral Tab Take 1 tablet (25 mg total) by mouth 3 (three) times daily as needed for Dizziness. 40 tablet 2     TRUE METRIX LEVEL 1 Low In Vitro Solution USE AS DIRECTED 1 each 0     folic acid 800 MCG Oral Tab        Cholecalciferol (VITAMIN D) 1000 UNITS Oral Cap Take 1,000 mg by mouth daily.         aspirin 81 MG Oral Tab Take  by mouth. take 1 tablet (81MG)  by oral route  every day        Current Facility-Administered Medications Ordered in Epic   Medication Dose Route Frequency Provider Last Rate Last Admin    [Transfer Hold] amLODIPine (Norvasc) tab 10 mg  10 mg Oral Daily Alize Pantoja MD        [Transfer Hold] atorvastatin (Lipitor) tab 10 mg  10 mg Oral Nightly Alize Pantoja MD   10 mg at 10/05/24 0126    [Transfer Hold] levothyroxine (Synthroid) tab 88 mcg  88 mcg Oral Daily @ 0700 Alize Pantoja MD        [Transfer Hold] ondansetron (Zofran) 4 MG/2ML injection 4 mg  4 mg Intravenous Q6H PRN Alize Pantoja MD   4 mg at 10/05/24 0410    [Transfer Hold] hydrALAzine (Apresoline) 20 mg/mL injection 10 mg  10 mg Intravenous Q4H PRN Alize Pantoja MD        [Transfer Hold] zolpidem (Ambien) tab 5 mg  5 mg Oral Nightly PRN Alize Pantoja MD        sodium chloride 0.45% infusion   Intravenous Continuous Alize Pantoja MD 83 mL/hr at 10/05/24 0123 New Bag at 10/05/24 0123    [Transfer Hold] HYDROmorphone (Dilaudid) 1 MG/ML injection 0.1 mg  0.1 mg Intravenous Q2H PRN Lizette Melton MD        Or    [Transfer Hold] HYDROmorphone (Dilaudid) 1 MG/ML injection 0.2 mg  0.2 mg Intravenous Q2H PRN Lizette Melton MD        Or    [Transfer Hold] HYDROmorphone (Dilaudid) 1 MG/ML injection 0.4 mg  0.4 mg Intravenous Q2H PRN Lizette Melton MD   0.4 mg at 10/05/24 0863     influenza virus trivalent high dose PF (Fluzone HD) 0.5 mL injection (ages >/= 65 years) 0.5 mL  0.5 mL Intramuscular Prior to discharge Lizette Melton MD        [Transfer Hold] HYDROmorphone (Dilaudid) 1 MG/ML injection 0.5 mg  0.5 mg Intravenous Q2H PRN Alize Pantoja MD        Or    [Transfer Hold] HYDROmorphone (Dilaudid) 1 MG/ML injection 1 mg  1 mg Intravenous Q2H PRN Alize Pantoja MD        clonidine-EPINEPHrine-ropivacaine-ketorolac (CERTS) (Duraclon-Adrenalin-Naropin-Toradol) pain cocktail irrigation   Intra-articular Once (Intra-Op) Eliel Tolbert MD         No current [facility]-ordered outpatient medications on file.       Allergies   Allergen Reactions    Seasonal UNKNOWN       Family History   Problem Relation Age of Onset    Cancer Mother         OVARIAN AND COLON    Diabetes Mother     Ovarian Cancer Mother 65    Heart Surgery Father         carotid artery surgery     Hypertension Father     Breast Cancer Sister 54    Cataracts Sister     Breast Cancer Sister     Anemia Sister     Cancer Maternal Grandmother         BREAST CANCER,    Breast Cancer Maternal Grandmother 53    Glaucoma Neg     Macular degeneration Neg      Social History     Socioeconomic History    Marital status:    Tobacco Use    Smoking status: Never    Smokeless tobacco: Never   Vaping Use    Vaping status: Never Used   Substance and Sexual Activity    Alcohol use: Not Currently     Alcohol/week: 1.7 standard drinks of alcohol     Types: 2 Glasses of wine per week     Comment: very rarely    Drug use: No    Sexual activity: Not Currently     Partners: Male   Other Topics Concern    Caffeine Concern Yes     Comment: Coffee 1 cup daily       Available pre-op labs reviewed.  Lab Results   Component Value Date    WBC 10.5 10/05/2024    RBC 4.00 10/05/2024    HGB 10.9 (L) 10/05/2024    HCT 34.0 (L) 10/05/2024    MCV 85.0 10/05/2024    MCH 27.3 10/05/2024    MCHC 32.1 10/05/2024    RDW 15.2 (H) 10/05/2024    .0  10/05/2024     Lab Results   Component Value Date     10/05/2024    K 4.0 10/05/2024     10/05/2024    CO2 27.0 10/05/2024    BUN 14 10/05/2024    CREATSERUM 0.87 10/05/2024     (H) 10/05/2024    PGLU 149 (H) 10/05/2024    CA 9.6 10/05/2024          Vital Signs:  Body mass index is 22.44 kg/m².   height is 1.676 m (5' 6\") and weight is 63 kg (139 lb). Her oral temperature is 98.2 °F (36.8 °C). Her blood pressure is 119/66 and her pulse is 89. Her respiration is 16 and oxygen saturation is 96%.   Vitals:    10/05/24 0028 10/05/24 0029 10/05/24 0353 10/05/24 0800   BP:  142/88 136/76 119/66   Pulse:  110 102 89   Resp:  18 18 16   Temp:  98.2 °F (36.8 °C) 98.3 °F (36.8 °C) 98.2 °F (36.8 °C)   TempSrc:  Oral Oral Oral   SpO2:  95% 97% 96%   Weight: 63 kg (139 lb)      Height:            Anesthesia Evaluation     Patient summary reviewed and Nursing notes reviewed    No history of anesthetic complications   Airway   Mallampati: II  TM distance: >3 FB  Neck ROM: full  Dental - Dentition appears grossly intact     Pulmonary - negative ROS and normal exam   (-) asthma, shortness of breath, recent URI  Cardiovascular   (+) hypertension, valvular problems/murmurs MVP  (-) angina, MOULTON    ECG reviewed  Rhythm: regular  Rate: normal  ROS comment: EKG (10/04/24)  Sinus tachycardia   Left axis deviation   Moderate voltage criteria for LVH, may be normal variant ( R in aVL , Clive product )   T wave abnormality, consider lateral ischemia   Abnormal ECG   No previous ECGs found in [place]         Neuro/Psych - negative ROS   (-) seizures, neuromuscular disease    GI/Hepatic/Renal    (+) GERD, liver disease    Endo/Other    (+) diabetes mellitus, hypothyroidism  Abdominal  - normal exam                 Anesthesia Plan:   ASA:  3  Emergent    Plan:   General  Airway:  ETT  Post-op Pain Management: IV analgesics  Informed Consent Plan and Risks Discussed With:  Patient  Use of Blood Products Discussed With:   Patient  Blood Product Use Consented    Discussed plan with:  Surgeon      I have informed Mary Yeungyoana Sweeneyas and/or legal guardian or family member of the nature of the anesthetic plan, benefits, risks including possible dental damage if relevant, major complications, and any alternative forms of anesthetic management.   All of the patient's questions were answered to the best of my ability. The patient desires the anesthetic management as planned.  Kylee Duran DO  10/5/2024 10:00 AM  Present on Admission:  **None**

## 2024-10-05 NOTE — DISCHARGE INSTRUCTIONS
Sometimes managing your health at home requires assistance.  The [facility] team has recognized your preference to use [facility].  They can be reached by phone at [phone] The fax number for your reference is [phone].  A representative from the home health agency will contact you or your family to schedule your first visit.     Hip Replacement Post-Op Instructions      WEIGHT BEARING: You may bear weight on your operative leg immediately after surgery. This may be uncomfortable and difficult at first due to pain, however it is safe from a fracture/healing perspective. You should walk at a slow pace and should avoid jumping, impact activities, running, and high exertional activities for the first weeks after the surgery until cleared by your surgeon.  After 2 weeks from the surgery, you may also begin to slowly work on progressing strength in your leg with light resistance exercises.     RANGE OF MOTION: You may move your leg as tolerated with few exceptions. For the first 3 months after surgery, while your hip capsule heals, we recommend avoiding deep flexion/bending of the hip, particularly with the knee crossing your midline. Your therapist will generally help you learn to avoid this. Otherwise, motion is important to avoid post-operative stiffness.     PAIN MEDICATIONS:   For many people, post-operative pain can be managed with simple measures, such as elevation of the operative extremity above the level of the heart, cryotherapy and ice, compression, etc.   In addition to these measures, we have prescribed pain medications. To minimize narcotic use and establish better pain control, we employ a multimodal pain approach. This protocol combines the use of scheduled acetaminophen, gabapentin, and narcotics.  We will often use anti-inflammatories (NSAID's such as ibuprofen, celecoxib, and/or naproxen) to further assist with pain control thus allowing for a lower dose of  narcotic to be used. Dosing of medications will vary from patient to patient based on their needs and past medical history, so please refer to your discharge medication list.  Opiate pain medications (oxycodone) will typically only be refilled after an in-person visit.     ICE PACK: Use frequently over the next 7-10 days.  Ice bags/packs/bladder should be used for 20-30 minutes over the surgical site every 3-4 hours during waking hours. Protect your skin from frostbite with a washcloth, towel, or ace wrap between the ice bag/pack and your skin.     DRESSINGS/WOUND CARE:   The dressing on your hip is a special water-proof, long-lasting bandage. You may remove this after one week from surgery. There are sutures/staples underneath and these will be checked/removed at your postoperative visit.  If your dressing becomes saturated with blood that extends to an edge of the pad, you should have it evaluated and replaced by your surgeon.  Follow the bathing instructions below.  If you have increased drainage, incision redness, foul smell, or fevers - inform the office.  You may need to be evaluated in the office earlier than your follow-up appointment.    BATHING:   You may shower normally with your current bandage so long as the edges of the dressing do not have any gaps that may let water in. We do recommend avoiding direct water pressure on the bandage and scrubbing around it to prevent the dressing peeling off.  After removal of the dressing, you may allow warm soapy water to wash over the wound. Try to avoid direct water pressure aimed at your surgical site.  You should keep your surgical site clean and dry when possible until you are seen for your postoperative visit.   Do not soak the wound/incision, use hot tubs or swimming pools, oceans, lakes, ponds until 4 weeks after surgery   Following these guidelines will help avoid any wound healing issues and/or infections.    PHYSICAL/OCCUPATIONAL THERAPY (PT/OT): To  maximize surgical benefit, PT/OT is necessary. If you do not have an appointment, you should contact PT/OT to set up an appointment. You should start working with the therapist immediately after surgery. If you have problems setting up PT, please contact our team.    DVT PREVENTION:   Deep vein thrombosis (DVT) or blood clots sometimes occur following surgery. It is important to understand the signs/symptoms and methods to avoid DVTs.   Symptoms of DVT include swelling, throbbing and cramping in the extremity, swollen veins that are hard or sore. DVTs can travel to the lungs and cause a pulmonary embolus (PE) and death. Symptoms of a pulmonary embolus include chest pain and shortness of breath. If you experience any of these symptoms you should contact the clinic immediately and/or go to the nearest emergency room.   To prevent blood clots, you should move your extremities and joints, limited by the restrictions above. Perform foot pumps, ankle circles, leg lifts, etc. to circulate blood in the extremity.   If you have been prescribed Aspirin, please take it as prescribed for DVT prophylaxis. If you plan to travel in a car or plane for long periods (> 1 hour), contact your surgeon. If you have a history of blood clots, and have not been prescribed a medication, contact your surgeon immediately.     DRIVING: Driving after your surgery is dependent on several factors. You may not drive if you are taking opiate pain medications. You may not drive if you're physically unable to steer with 2 hands and press the brake with full force. If you are unsure whether you are safe to drive, you should visit your local DMV. We are not medically or legally responsible for an accident caused by unsafe driving.     POST-OPERATIVE APPOINTMENT: Your first post-operative appointment is scheduled for 2-3 weeks after the procedure. If you do not have an appointment scheduled yet, please contact our scheduling office.    SPECIAL  INSTRUCTIONS: If you experience fevers greater than 100.4°F for two consecutive days or any single temperature greater than 102.2°F, or if you experience chest pain, difficulty breathing, confusion, or an allergic reaction, return to your nearest emergency department as soon as possible.    Eliel Tolbert MD  Orthopaedic Surgery     Dear Patient,     [facility] cares about your progress with recovery following your joint replacement surgery.     300 days from your scheduled surgery, [facility] will send you a follow-up survey to help us understand how your surgery impacted your mobility, pain, and overall quality of life. Please make every effort to complete this survey. The information collected from this survey will be used by your physician to track your recovery.     Sincerely,     [facility] [place]

## 2024-10-05 NOTE — ED PROVIDER NOTES
Patient Seen in: [facility] Emergency Department    History     Chief Complaint   Patient presents with    Fall       HPI    History is provided by patient/independent historian: patient, EMS  74 year old female with history of diabetes, hypertension, hyperlipidemia, here with complaints of left-sided hip pain after a trip and fall on the sidewalk.  Positive head injury.  No loss of consciousness.  She does take aspirin.  No numbness.  EMS reports she was walking on the leg initially after the fall.    History reviewed.   Past Medical History:    Acute respiratory failure with hypoxia (HCC)    Elective     Esophageal reflux    Fatty liver    High cholesterol    Hypothyroid    Hypothyroidism    ON GENERIC SYNTHROID    Lipid screening    per NG    MVP (mitral valve prolapse)    antibiotic prophylaxis w/ procedures    Other and unspecified hyperlipidemia    Post-menopausal bleeding    Type II or unspecified type diabetes mellitus without mention of complication, not stated as uncontrolled    Unspecified essential hypertension         History reviewed.   Past Surgical History:   Procedure Laterality Date          Colonoscopy      Colonoscopy  ,     Colonoscopy N/A 2017    Procedure: COLONOSCOPY, POSSIBLE BIOPSY, POSSIBLE POLYPECTOMY 23894;  Surgeon: Aamir Martin MD;  Location: [facility]    D & c      Egd  2018    Egd N/A 2018    Procedure: ESOPHAGOGASTRODUODENOSCOPY, COLONOSCOPY, POSSIBLE BIOPSY, POSSIBLE POLYPECTOMY 89187, 07643;  Surgeon: Aamir Martin MD;  Location: [facility]    Endometrial biopsy - jar(s): 2      negative    Other surgical history  FATTY LIVER    2 BIOPSIES IN THE PAST    Tonsillectomy           Home Medications reviewed :  (Not in a hospital admission)        History reviewed.   Social History     Socioeconomic History    Marital status:    Tobacco Use    Smoking status: Never    Smokeless tobacco:  Never   Vaping Use    Vaping status: Never Used   Substance and Sexual Activity    Alcohol use: Not Currently     Alcohol/week: 1.7 standard drinks of alcohol     Types: 2 Glasses of wine per week     Comment: very rarely    Drug use: No    Sexual activity: Not Currently     Partners: Male   Other Topics Concern    Caffeine Concern Yes     Comment: Coffee 1 cup daily         ROS  Review of Systems   Respiratory:  Negative for shortness of breath.    Cardiovascular:  Negative for chest pain.   Musculoskeletal:  Positive for arthralgias and gait problem.   All other systems reviewed and are negative.     All other pertinent organ systems are reviewed and are negative.      Physical Exam     ED Triage Vitals   BP 10/04/24 1949 (!) 168/80   Pulse 10/04/24 1946 107   Resp 10/04/24 1946 20   Temp 10/04/24 1949 98.8 °F (37.1 °C)   Temp src 10/04/24 1949 Temporal   SpO2 10/04/24 1946 96 %   O2 Device 10/04/24 1946 None (Room air)     Vital signs reviewed.      Physical Exam  Vitals and nursing note reviewed.   Neck:      Comments: No C-spine tenderness  Cardiovascular:      Pulses: Normal pulses.   Pulmonary:      Effort: No respiratory distress.   Abdominal:      General: There is no distension.   Musculoskeletal:      Comments: Left lower extremity shortened and externally rotated, tenderness with range of motion of left hip, left knee and ankle unremarkable, left wrist nontender to palpation, no external injuries, 2+ radial pulse   Neurological:      Mental Status: She is alert.         ED Course       Labs:     Labs Reviewed   CBC WITH DIFFERENTIAL WITH PLATELET - Abnormal; Notable for the following components:       Result Value    RDW-SD 46.9 (*)     RDW 15.4 (*)     Lymphocyte Absolute 4.15 (*)     All other components within normal limits   COMP METABOLIC PANEL (14) - Abnormal; Notable for the following components:    Glucose 138 (*)     Creatinine 1.08 (*)     eGFR-Cr 55 (*)     ALT 56 (*)     AST 53 (*)      Albumin 5.3 (*)     All other components within normal limits   TYPE AND SCREEN    Narrative:     The following orders were created for panel order Type and screen.                  Procedure                               Abnormality         Status                                     ---------                               -----------         ------                                     ABORH (Blood Type)[[id number]]                               In process                                 Antibody Screen[[id number]]                                  In process                                                   Please view results for these tests on the individual orders.   ABORH (BLOOD TYPE)   ANTIBODY SCREEN   RAINBOW DRAW LAVENDER   RAINBOW DRAW LIGHT GREEN   RAINBOW DRAW BLUE         My EKG Interpretation:   My interpretation of EKG: rate 109, rhythm sinus, axis left, no acute ST changes  Interpretation: abnormal for rate, normal for rhythm    As reviewed and Interpreted by me      Imaging Results Available and Reviewed while in ED:   CT BRAIN OR HEAD (CPT=70450)    Result Date: 10/4/2024  CONCLUSION:   No acute fracture or intracranial hemorrhage.  Small nonspecific 1.2 cm lucent lesion of the left frontal calvarium.  Further assessment can be made with myomatous workup, including laboratory values.    Dictated by (CST): Ramila Ontiveros MD on 10/04/2024 at 9:45 PM     Finalized by (CST): Ramila Ontiveros MD on 10/04/2024 at 9:50 PM          XR HIP W OR WO PELVIS 2 OR 3 VIEWS, LEFT (CPT=73502)    Result Date: 10/4/2024  PROCEDURE: XR HIP W OR WO PELVIS 2 OR 3 VIEWS, LEFT (CPT=73502)  COMPARISON: None.  INDICATIONS: Left hip post accidental fall today.  TECHNIQUE: 3. views were obtained.   FINDINGS/conclusions:   There is an acute moderately angulated transcervical fracture of the proximal left femur.  There is moderate apex lateral angulation of the fracture.  There is mild anterior displacement of the distal femur in  relation to the femoral head.  The femoral acetabular alignment is maintained.  There is partially imaged multilevel degenerative disease of the spine.  Right hip is unremarkable on a single view.    Dictated by (CST): Ramila Ontiveros MD on 10/04/2024 at 9:07 PM     Finalized by (CST): Ramila Ontiveros MD on 10/04/2024 at 9:08 PM         My review and independent interpretation of CT, XR images: L hip fracture. Radiology report corroborates this in addition to other details as reported by them.      Decision rules/scores evaluated: none      Diagnostic labs/tests considered but not ordered: none    ED Medications Administered:   Medications   morphINE PF 4 MG/ML injection 4 mg (4 mg Intravenous Given 10/4/24 1953)   morphINE PF 4 MG/ML injection 4 mg (4 mg Intravenous Given 10/4/24 2133)                MDM       Medical Decision Making      Differential Diagnosis: After obtaining the patient's history, performing the physical exam and reviewing the diagnostics, multiple initial diagnoses were considered based on the presenting problem including fracture, dislocation, contusion, ICH    External document review: I personally reviewed available external medical records for any recent pertinent discharge summaries, testing, and procedures - the findings are as follows: 24 visit with Dr. Young for DM    Complicating Factors: The patient already  has a past medical history of Acute respiratory failure with hypoxia (HCC) (2021), Elective , Esophageal reflux, Fatty liver, High cholesterol, Hypothyroid, Hypothyroidism, Lipid screening (2014), MVP (mitral valve prolapse), Other and unspecified hyperlipidemia, Post-menopausal bleeding, Type II or unspecified type diabetes mellitus without mention of complication, not stated as uncontrolled, and Unspecified essential hypertension. to contribute to the complexity of this ED evaluation.    Procedures performed: none    Discussed management with  physician/appropriate source: Dr. Tolbert, Dr. Melton    Considered admission/deescalation of care for: none    Social determinants of health affecting patient care: none    Prescription medications considered: discussed continuing current medication regimen    The patient requires continuous monitoring for: hip pain after fall    Shared decision making: discussed possible admission        Disposition and Plan     Clinical Impression:  1. Closed transcervical fracture of left femur, initial encounter ([facility]ly [facility])    2. Fall, initial encounter        Disposition:  Admit    Follow-up:  No follow-up provider specified.    Medications Prescribed:  Current Discharge Medication List          Hospital Problems       Present on Admission  Date Reviewed: 8/30/2024            ICD-10-CM Noted POA    * (Principal) Closed transcervical fracture of left femur, initial encounter (Former[facility]) S72.032A 10/4/2024 Unknown

## 2024-10-05 NOTE — PLAN OF CARE
Patient is Aox4 on RA. Bedfast. NPO. Voiding via the purewick. Dilaudid given for pain. Zofran given for nausea. Plan for Sx at 10am w/ [place].      Problem: Patient Centered Care  Goal: Patient preferences are identified and integrated in the patient's plan of care  Description: Interventions:  - What would you like us to know as we care for you? I have a twin sister  - Provide timely, complete, and accurate information to patient/family  - Incorporate patient and family knowledge, values, beliefs, and cultural backgrounds into the planning and delivery of care  - Encourage patient/family to participate in care and decision-making at the level they choose  - Honor patient and family perspectives and choices  Outcome: Progressing     Problem: Diabetes/Glucose Control  Goal: Glucose maintained within prescribed range  Description: INTERVENTIONS:  - Monitor Blood Glucose as ordered  - Assess for signs and symptoms of hyperglycemia and hypoglycemia  - Administer ordered medications to maintain glucose within target range  - Assess barriers to adequate nutritional intake and initiate nutrition consult as needed  - Instruct patient on self management of diabetes  Outcome: Progressing     Problem: PAIN - ADULT  Goal: Verbalizes/displays adequate comfort level or patient's stated pain goal  Description: INTERVENTIONS:  - Encourage pt to monitor pain and request assistance  - Assess pain using appropriate pain scale  - Administer analgesics based on type and severity of pain and evaluate response  - Implement non-pharmacological measures as appropriate and evaluate response  - Consider cultural and social influences on pain and pain management  - Manage/alleviate anxiety  - Utilize distraction and/or relaxation techniques  - Monitor for opioid side effects  - Notify MD/LIP if interventions unsuccessful or patient reports new pain  - Anticipate increased pain with activity and pre-medicate as appropriate  Outcome:  Progressing     Problem: SAFETY ADULT - FALL  Goal: Free from fall injury  Description: INTERVENTIONS:  - Assess pt frequently for physical needs  - Identify cognitive and physical deficits and behaviors that affect risk of falls.  - [place] fall precautions as indicated by assessment.  - Educate pt/family on patient safety including physical limitations  - Instruct pt to call for assistance with activity based on assessment  - Modify environment to reduce risk of injury  - Provide assistive devices as appropriate  - Consider OT/PT consult to assist with strengthening/mobility  - Encourage toileting schedule  Outcome: Progressing

## 2024-10-05 NOTE — H&P
[facility]  part of [facility]    History & Physical    Mary Marmolejo Patient Status:  Emergency    1950 MRN [id number]   Location [facility] EMERGENCY DEPARTMENT Attending Ankit Segura MD   Hosp Day # 0 PCP Ondina Pride MD     Date:  10/4/2024  Date of Admission:  10/4/2024    Chief Complaint:  Chief Complaint   Patient presents with    Fall       History of Present Illness:  Mary Marmolejo is a(n) 74 year old female, with a past medical history significant for diabetes hypertension and hypothyroidism presented to the ER complaining of left hip pain.  Patient had a fall when she tripped on the sidewalk landed on her left hip, was able to get up and move with some difficulty, however came to the ER when pain worsened and was unable to bear weight any longer.  Rates her pain as an 8 out of 10 at its worst aggravated by movement with no relieving factors.  Denies any significant bruising, numbness or tingling.  X-rays done indicated presence of left femur neck fracture.  Denies any recent chest pain palpitations shortness of breath focal numbness weakness or tingling    History:  Past Medical History:    Acute respiratory failure with hypoxia (HCC)    Elective     Esophageal reflux    Fatty liver    High cholesterol    Hypothyroid    Hypothyroidism    ON GENERIC SYNTHROID    Lipid screening    per NG    MVP (mitral valve prolapse)    antibiotic prophylaxis w/ procedures    Other and unspecified hyperlipidemia    Post-menopausal bleeding    Type II or unspecified type diabetes mellitus without mention of complication, not stated as uncontrolled    Unspecified essential hypertension     Past Surgical History:   Procedure Laterality Date          Colonoscopy      Colonoscopy  ,     Colonoscopy N/A 2017    Procedure: COLONOSCOPY, POSSIBLE BIOPSY, POSSIBLE POLYPECTOMY 17798;  Surgeon: Aamir Martin MD;  [place] [facility] [place] [facility]  [facility]    D & c      Egd  04/2018    Egd N/A 4/4/2018    Procedure: ESOPHAGOGASTRODUODENOSCOPY, COLONOSCOPY, POSSIBLE BIOPSY, POSSIBLE POLYPECTOMY 41430, 52281;  Surgeon: Aamir Martin MD;  [place] [facility] [place]    Endometrial biopsy - jar(s): 2 2009    negative    Other surgical history  FATTY LIVER    2 BIOPSIES IN THE PAST    Tonsillectomy       Family History   Problem Relation Age of Onset    Cancer Mother         OVARIAN AND COLON    Diabetes Mother     Ovarian Cancer Mother 65    Heart Surgery Father         carotid artery surgery     Hypertension Father     Breast Cancer Sister 54    Cataracts Sister     Breast Cancer Sister     Anemia Sister     Cancer Maternal Grandmother         BREAST CANCER,    Breast Cancer Maternal Grandmother 53    Glaucoma Neg     Macular degeneration Neg       reports that she has never smoked. She has never used smokeless tobacco. She reports that she does not currently use alcohol after a past usage of about 1.7 standard drinks of alcohol per week. She reports that she does not use drugs.    Allergies:  Allergies   Allergen Reactions    Seasonal UNKNOWN       Home Medications:  Prior to Admission Medications   Prescriptions Last Dose Informant Patient Reported? Taking?   Calcium Carbonate-Vitamin D (CALCIUM 600-D OR)   Yes No   Cholecalciferol (VITAMIN D) 1000 UNITS Oral Cap   Yes No   Sig: Take 1,000 mg by mouth daily.     Cyanocobalamin (VITAMIN B12) 500 MCG Oral Tab   Yes No   Dulaglutide (TRULICITY) 3 MG/0.5ML Subcutaneous Solution Pen-injector   No No   Sig: Inject 3 mg into the skin once a week. ADMINISTER 3 MG UNDER THE SKIN 1 TIME A WEEK   Glucose Blood (TRUE METRIX BLOOD GLUCOSE TEST) In Vitro Strip   No No   Sig: USE AS DIRECTED TO CHECK BLOOD GLUCOSE 2 TIMES DAILY   Iron Glycinate 29 MG Oral Cap   Yes No   TRUE METRIX LEVEL 1 Low In Vitro Solution   No No   Sig: USE AS DIRECTED   TRUEplus Lancets 33G Does not apply Misc   No No   Sig: Check  sugars twice a day   amLODIPine 10 MG Oral Tab   No No   Sig: Take 1 tablet (10 mg total) by mouth daily.   aspirin 81 MG Oral Tab   Yes No   Sig: Take  by mouth. take 1 tablet (81MG)  by oral route  every day   atorvastatin 10 MG Oral Tab   No No   Sig: Take 1 tablet (10 mg total) by mouth nightly.   folic acid 800 MCG Oral Tab   Yes No   glimepiride 2 MG Oral Tab   No No   Sig: TAKE 1 TABLET BY MOUTH WITH BREAKFAST AND ONE-HALF TABLET WITH DINNER   levothyroxine 88 MCG Oral Tab   No No   Sig: TAKE 1.5 TABLETS ON SUNDAY AND 1 TABLET BY MOUTH ALL OTHER DAYS   meclizine 25 MG Oral Tab   No No   Sig: Take 1 tablet (25 mg total) by mouth 3 (three) times daily as needed for Dizziness.   metFORMIN  MG Oral Tablet 24 Hr   No No   Sig: Take 2 tablets (1,000 mg total) by mouth 2 (two) times daily with meals.   pantoprazole 20 MG Oral Tab EC   No No   Sig: Take 1 tablet (20 mg total) by mouth before breakfast.   trandolapril 4 MG Oral Tab   No No   Sig: Take 2 tablets (8 mg total) by mouth daily.      Facility-Administered Medications: None       Review of Systems:  Constitutional:  Weakness, Fatigue.  Eye:  Negative.  Ear/Nose/Mouth/Throat:  Negative.  Respiratory:  Negative  Cardiovascular: Negative  Gastrointestinal:  Negative.  Genitourinary:  Negative  Endocrine:  Negative.  Immunologic:  Negative.  Musculoskeletal: Left hip pain  Integumentary:  Negative.  Neurologic:  Negative.  Psychiatric:  Negative.  ROS reviewed as documented in chart    Physical Exam:  Temp:  [98.8 °F (37.1 °C)] 98.8 °F (37.1 °C)  Pulse:  [101-107] 105  Resp:  [20] 20  BP: (140-168)/(66-80) 146/75  SpO2:  [95 %-96 %] 95 %    General:  Alert and oriented.  Diffuse skin problem:  None.  Eye:  Pupils are equal, round and reactive to light, extraocular movements are intact, Normal conjunctiva.  HENT:  Normocephalic, oral mucosa is moist.  Head:  Normocephalic, atraumatic.  Neck:  Supple, non-tender, no carotid bruit, no jugular venous  distention, no lymphadenopathy, no thyromegaly.  Respiratory:  Lungs are clear to auscultation, respirations are non-labored, breath sounds are equal, symmetrical chest wall expansion.  Cardiovascular:  Normal rate, regular rhythm, no murmur, no edema.  Gastrointestinal:  Soft, non-tender, non-distended, normal bowel sounds, no organomegaly.  Lymphatics:  No lymphadenopathy neck, axilla, groin.  Musculoskeletal: Decreased range of motion left hip secondary to pain  Feet:  Normal pulses.  Neurologic:  Alert, oriented, no focal deficits, cranial nerves II-XII are grossly intact.  Cognition and Speech:  Oriented, speech clear and coherent.  Psychiatric:  Cooperative, appropriate mood & affect.      Laboratory Data:   Lab Results   Component Value Date    WBC 10.4 10/04/2024    HGB 12.0 10/04/2024    HCT 35.8 10/04/2024    .0 10/04/2024    CREATSERUM 1.08 10/04/2024    BUN 17 10/04/2024     10/04/2024    K 3.8 10/04/2024     10/04/2024    CO2 24.0 10/04/2024     10/04/2024    CA 10.2 10/04/2024    ALB 5.3 10/04/2024    ALKPHO 72 10/04/2024    BILT 0.5 10/04/2024    TP 8.0 10/04/2024    AST 53 10/04/2024    ALT 56 10/04/2024       Imaging:  CT BRAIN OR HEAD (CPT=70450)    Result Date: 10/4/2024  CONCLUSION:   No acute fracture or intracranial hemorrhage.  Small nonspecific 1.2 cm lucent lesion of the left frontal calvarium.  Further assessment can be made with myomatous workup, including laboratory values.    Dictated by (CST): Ramila Ontiveros MD on 10/04/2024 at 9:45 PM     Finalized by (CST): Ramila Ontiveros MD on 10/04/2024 at 9:50 PM          XR HIP W OR WO PELVIS 2 OR 3 VIEWS, LEFT (CPT=73502)    Result Date: 10/4/2024  PROCEDURE: XR HIP W OR WO PELVIS 2 OR 3 VIEWS, LEFT (CPT=73502)  COMPARISON: None.  INDICATIONS: Left hip post accidental fall today.  TECHNIQUE: 3. views were obtained.   FINDINGS/conclusions:   There is an acute moderately angulated transcervical fracture of the proximal  left femur.  There is moderate apex lateral angulation of the fracture.  There is mild anterior displacement of the distal femur in relation to the femoral head.  The femoral acetabular alignment is maintained.  There is partially imaged multilevel degenerative disease of the spine.  Right hip is unremarkable on a single view.    Dictated by (CST): Ramila Ontiveros MD on 10/04/2024 at 9:07 PM     Finalized by (CST): Ramila Ontiveros MD on 10/04/2024 at 9:08 PM            Assessment and Plan:    Displaced left femur neck fracture  Orthopedics consulted, patient will remain on bedrest for now use morphine as needed for pain.  Will remain n.p.o. considering possible replacement in AM.  Patient with medically acceptable risk factors for urgent surgery.    Type 2 diabetes with associated nephropathy  Blood sugars well-controlled at this time, hold oral hypoglycemics for now we will use sliding scale coverage as needed.  Last A1c 6.3    Essential hypertension  Resume home meds, will use IV hydralazine for uncontrolled pressures.    Prophylaxis  SCDs, heparin on hold till patient evaluated by orthopedics    CODE STATUS  Full    Primary care physician  Ondina Pride MD    60 minutes spent on this admission - examining patient, obtaining history, reviewing previous medical records, going over test results/imaging and discussing plan of care. All questions answered.     Disposition  Clinical course will dictate outcome      BEBE LEMONS MD  10/4/2024  9:53 PM

## 2024-10-05 NOTE — OPERATIVE REPORT
PATIENT NAME: Mary Marmolejo  DATE OF OPERATION: 10/5/2024       PREOPERATIVE DIAGNOSIS: Left displaced femoral neck fracture  POSTOPERATIVE DIAGNOSIS:  Left displaced femoral neck fracture     PROCEDURE PERFORMED:    1.  Left hip hemiarthroplasty     STAFF SURGEON:  Eliel Tolbert MD   FIRST ASSISTANT: Linda Farley  ANESTHESIA:  General    ANTIBIOTICS:   Ancef 2 grams.     IMPLANTS:  Implant Name Type Inv. Item Serial No.  Lot No. LRB No. Used Action   CEMENT BNE 40GM W/ GENT HI VISC RADPQ - SNA  CEMENT BNE 40GM W/ GENT HI VISC RADPQ NA [employer]  [id number] Left 1 Implanted   CEMENT BNE 40GM W/ GENT HI VISC RADPQ - SNA  CEMENT BNE 40GM W/ GENT HI VISC RADPQ NA [employer]  [id number] Left 1 Implanted   STEM FEM 7MM STD OFFSET COCR ECHO FX - SNA  STEM FEM 7MM STD OFFSET COCR ECHO FX NA [employer]  [id number] Left 1 Implanted   distal centralizer/centering sleeve   [employer] [id number] Left 1 Implanted   small cement restrictor   NA Rick [id number] Left 1 Implanted   HEAD FEM 28MM NK STD OFFSET COCR MTL MOD - SN  HEAD FEM 28MM NK STD OFFSET COCR MTL MOD N [employer]  [id number] Left 1 Implanted   SHELL BPLR OD46MM ID28MM HIP COCR RINGLOC - SNA  SHELL BPLR OD46MM ID28MM HIP COCR RINGLOC NA [employer]  [id number] Left 1 Implanted      COMPLICATIONS:  None.   CONDITION:  Stable to post anesthesia care unit.      INDICATION FOR PROCEDURE: Mary Marmolejo  is a 74 year old female who presented with the above diagnosis after GLF.  We discussed the risks and benefits of operative versus nonoperative management. The risks of nonoperative management specifically the risk of malunion, nonunion, loss of mobility, and persistent pain were discussed and the patient elected to undergo operative treatment.  We discussed benefits of the surgery including return of mobility and pain management. We discussed alternative options including nonoperative management and  alternative forms of fixation. We additionally discussed the risks of surgery, which include, but are not limited to bleeding, pain, infection, damage to nerves/vessels, incomplete relief of pain, incomplete return of function, nonunion, need for additional surgery, prominent hardware, blood clots, and death. We specifically emphasized that there is a decreased rate of mortality associated with operative management but that mortality is still a high risk regardless. The patient understands the above risks and elected to proceed.      DESCRIPTION OF THE OPERATION:  On the day of the procedure, the patient was identified in the preoperative holding area using three identifiers and the correct operative site was verified with the patient and marked by myself.  The patient was then transferred to the operating room.  Once in the operating room, they underwent anesthesia without complications. We then placed the patient in the lateral position on the operating table with a peg board and all bony prominences well padded. The patient was then prepped and draped in the usual sterile fashion.   A surgical time-out was performed to confirm both the correct patient site and the procedure to be performed.  Antibiotics and TXA were given within 1 hour of incision time.     We then made a curvilinear incision over the lateral aspect of the femur, curving slightly posterior at the proximal aspect of the greater trochanter in line with the gluteus musculature. Sharp and dull diussection was performed until the iliotibial fascia was encountered. This was then incised longitudinally, extending approximately 6cm distal and curving posterior proximally to split the gluteus. A charnley retractor was then placed. The trochanteric bursa was excised. The short external rotators were identified and carefully cleared of overlying soft tissue. The gluteus was retracted proximal and the leg was internally rotated to place the tendons under  tension. These were then dissected from the femur with the capsule, extending inferior to the proximal edge of the quadratus femoris. The capsule was dissected from the femur in an L shaped fashion and the tendons and capsule were tagged with #5 Ethibond for retraction and later repair. This exposed the fracture and the fracture hematoma.     The femoral head was then removed using the corkscrew and was taken to the back table for measurement.  The remaining femoral neck was then delivered with the Owens retractor and recut to approximately 1.5 cm from the lesser trochanter.  Next, the  was used to start followed by the canal finder.  We then used the lateralizing reamer to appropriately lateralize the trajectory.  Next, we broached the femoral canal, sequentially upsizing until we reached an appropriate fit with the 7 broach.  The trial head ball was then applied and the hip was reduced.  We tried various sizes until an appropriate one was selected, which was a 46 head ball with standard offset.  We confirmed that this had good length and offset, and appropriate stability.  We then dislocated the hip and removed the trial head ball.  The broach was removed and the canal was irrigated thoroughly and cleared of debris.      The cement restrictor was then applied to a depth of approximately 2 cm distal to the tip of the implant.  We then filled the canal with cement, pressurizing appropriately.  We then placed the size 7 femoral stem implant in appropriate version and allowed the cement to cure.  The trial head liner was then applied and was appropriate.  We thoroughly cleaned and dried the Gomez taper and applied the final bipolar head ball.  This was then reduced back into the socket and taken through a range of motion, confirming stability, offset, and length.    We then soaked the joint in Betadine solution for 3 minutes and thoroughly irrigated.  Next, 2 tunnels were drilled through the greater  trochanter and a Lima suture passer was used to retrieve the Ethibond sutures, which were tied over top to fastened down the short external rotators and capsule. The Charnley retractor was removed.  The iliotibial fascia was closed with stratafix. We closed the skin with interrupted 2-0 vicryl deep dermal stitches followed by 3-0 stratafix. The wound was covered in Dermabond and an Aquacel dressing. The patient was then transferred to the hospital bed and to the PACU in good condition     POSTOPERATIVE INSTRUCTIONS:   - Postop ancef for 24H  - Resume DVT ppx on POD1  - WBAT  - Posterior hip precautions  - Start PT for mobilization  - Patient will follow up with me at two weeks      First Assist Necessity and Involvement     For this procedure, a surgical assistant was present for, and assisted with, the entirety of the case. The surgical assistant was involved with patient positioning, prepping and draping, directly assisting with key portions of the case including retracting and/or managing instrumentation, and closing. The surgical assistant was necessary to ensure greater likelihood of a safe and efficient operation, and no Orthopaedic Surgery resident was available to operate as an assistant.

## 2024-10-05 NOTE — ED QUICK NOTES
Orders for admission, patient is aware of plan and ready to go upstairs. Any questions, please call ED RN Bria at extension [phone].     Patient Covid vaccination status: Fully vaccinated     COVID Test Ordered in ED: None    COVID Suspicion at Admission: N/A    Running Infusions:  None    Mental Status/LOC at time of transport: axox4    Other pertinent information:   CIWA score: N/A   NIH score:  N/A

## 2024-10-06 PROBLEM — D62 ABLA (ACUTE BLOOD LOSS ANEMIA): Status: ACTIVE | Noted: 2024-10-06

## 2024-10-06 LAB
GLUCOSE BLDC GLUCOMTR-MCNC: 160 MG/DL (ref 70–99)
GLUCOSE BLDC GLUCOMTR-MCNC: 179 MG/DL (ref 70–99)
GLUCOSE BLDC GLUCOMTR-MCNC: 185 MG/DL (ref 70–99)
GLUCOSE BLDC GLUCOMTR-MCNC: 216 MG/DL (ref 70–99)
HCT VFR BLD AUTO: 26.3 %
HGB BLD-MCNC: 8.6 G/DL

## 2024-10-06 PROCEDURE — 99233 SBSQ HOSP IP/OBS HIGH 50: CPT | Performed by: HOSPITALIST

## 2024-10-06 RX ORDER — PANTOPRAZOLE SODIUM 20 MG/1
20 TABLET, DELAYED RELEASE ORAL
Status: DISCONTINUED | OUTPATIENT
Start: 2024-10-06 | End: 2024-10-08

## 2024-10-06 NOTE — OCCUPATIONAL THERAPY NOTE
OCCUPATIONAL THERAPY EVALUATION - INPATIENT     Room Number: 411/411-A  Evaluation Date: 10/6/2024  Type of Evaluation: Initial  Presenting Problem: Left  femoral neck fracture  POD 1 left hip cemented bipolar hemiarthroplasty ; left wrist sprain    Physician Order: IP Consult to Occupational Therapy  Reason for Therapy: ADL/IADL Dysfunction and Discharge Planning    OCCUPATIONAL THERAPY ASSESSMENT   Patient is a 74 year old female admitted 10/4/2024 for s/p fall -- POD 1 left hip cemented bipolar hemiarthroplasty. Posterior hip precautions, WBAT.      Prior to admission, patient's baseline is independent with ADLs, functional mobility without AD.  Patient is currently functioning below baseline with ADLs and functional mobility.  Patient is requiring CGA- Mansi as a result of the following impairments: limited reach, pain, distracted. Occupational Therapy will continue to follow for duration of hospitalization.    Patient will benefit from continued skilled OT Services at discharge to promote prior level of function and safety with additional support and return home with home health OT.    PLAN DURING HOSPITALIZATION  OT Device Recommendations: Reacher;Sock aid;Long-handled shoehorn;Long-handled sponge;Transfer tub bench;Raised toilet seat  OT Treatment Plan: Balance activities;Energy conservation/work simplification techniques;ADL training;Functional transfer training;Endurance training;Equipment eval/education;Compensatory technique education     OCCUPATIONAL THERAPY MEDICAL/SOCIAL HISTORY   Problem List   Principal Problem:    Closed transcervical fracture of left femur, initial encounter ([facility])  Active Problems:    Fall, initial encounter    ABLA (acute blood loss anemia)    HOME SITUATION  Type of Home: House  Home Layout: Multi-level; Bed/bath upstairs  Lives With: Spouse (NAY (deaf))  Drives: Yes  Patient Regularly Uses: Glasses    SUBJECTIVE  \"I guess I can ask my sister in law for help... my , not so  much. He has dementia\"     OCCUPATIONAL THERAPY EXAMINATION   OBJECTIVE  Precautions: DESIREE - posterior  Fall Risk: High fall risk    WEIGHT BEARING RESTRICTION  L Lower Extremity: Weight Bearing as Tolerated    PAIN ASSESSMENT  Rating: Unable to rate  Location: left hip  Management Techniques: Activity promotion; Body mechanics    ACTIVITY TOLERANCE  Room air    COGNITION  Overall WFL  Distracted - by pain, conversation     RANGE OF MOTION   Upper extremity ROM is within functional limits     STRENGTH ASSESSMENT  Upper extremity strength is within functional limits     ACTIVITIES OF DAILY LIVING ASSESSMENT  AM-PAC ‘6-Clicks’ Inpatient Daily Activity Short Form  How much help from another person does the patient currently need…  -   Putting on and taking off regular lower body clothing?: A Lot  -   Bathing (including washing, rinsing, drying)?: A Little  -   Toileting, which includes using toilet, bedpan or urinal? : A Little  -   Putting on and taking off regular upper body clothing?: A Little  -   Taking care of personal grooming such as brushing teeth?: A Little  -   Eating meals?: None    AM-PAC Score:  Score: 18  Approx Degree of Impairment: 46.65%  Standardized Score (AM-PAC Scale): 38.66  CMS Modifier (G-Code): CK    FUNCTIONAL TRANSFER ASSESSMENT  Sit to Stand: Edge of Bed  Edge of Bed: Contact Guard Assist  Toilet Transfer: Minimal Assist    BED MOBILITY  Supine to Sit : Minimal Assist    FUNCTIONAL ADL ASSESSMENT  Grooming Standing: Contact Guard Assist  LB Dressing Seated: Minimal Assist (w/ reacher, don underwear)  LB Dressing Standing: Contact Guard Assist    Skilled Therapy Provided: RN cleared pt for participation in occupational therapy session. Upon arrival, pt was supine in bed and agreeable to activity. No family was present during session. Pt benefited from additional time, verbal cues, LHAE, RW to maximize participation.    Pt benefited from frequent verbal cues to maintain hip precautions during  session. Provided pt education on hip precautions and activity modification / compensatory strategies for maintaining (LE dress with LHAE, bathing with LHAE, functional transfer, purpose of raised toilet seat / raised surfaces).  Pt recalled 1/3 precautions post activity. Reviewed education packet.     To assess pt's safe participation during daily tasks while also providing intermittent education to maximize safety and participation, occupational therapist facilitated the following: bed mobility, toilet transfer, grooming routine at sink, LE dressing w/ LHAE.     EDUCATION PROVIDED  Patient Education : Role of Occupational Therapy; Plan of Care; Functional Transfer Techniques; Weight Bear Status; Surgical Precautions; Posture/Positioning, ADL -compensatory strategies    Patient's Response to Education: Verbalized Understanding; Returned Demonstration  The patient's Approx Degree of Impairment: 46.65% has been calculated based on documentation in the [facility] '6 clicks' Inpatient Daily Activity Short Form.  Research supports that patients with this level of impairment may benefit from HHOT.  Final disposition will be made by interdisciplinary medical team.    Patient End of Session: Up in chair;Needs met;Call light within reach;RN aware of session/findings    OT Goals  Patient self-stated goal is: home     Patient will complete LE dressing with Mod I  Comment:     Patient will complete toilet transfer with Mod I  Comment:     Patient will complete self care task at sink level with Mod I   Comment:    Patient will independently recall posterior hip precautions  Comment:         Goals  on: 10/16/24  Frequency: 3-5x/w    Patient Evaluation Complexity Level:   Occupational Profile/Medical History LOW - Brief history including review of medical or therapy records    Specific performance deficits impacting engagement in ADL/IADL LOW  1 - 3 performance deficits    Client Assessment/Performance Deficits LOW - No  comorbidities nor modifications of tasks    Clinical Decision Making LOW - Analysis of occupational profile, problem-focused assessments, limited treatment options    Overall Complexity LOW     OT Session Time: 40 minutes  Self-Care Home Management: 40 minutes

## 2024-10-06 NOTE — PHYSICAL THERAPY NOTE
PHYSICAL THERAPY HIP EVALUATION - INPATIENT     Room Number: 411/411-A  Evaluation Date: 10/6/2024  Type of Evaluation: Initial  Physician Order: PT Eval and Treat    Presenting Problem: Closed transcervical fx L femur now s/p L DESIREE - posterior  Co-Morbidities : L wrist sprain  Reason for Therapy: Mobility Dysfunction and Discharge Planning    PHYSICAL THERAPY ASSESSMENT   Patient is a 74 year old female admitted 10/4/2024 for closed transcervical fx L femur now s/p L DESIREE, posterior approach.  Prior to admission, patient's baseline is independent without a device.  Patient is currently functioning below baseline with bed mobility, transfers, gait, and stair negotiation.  Patient is requiring minimal assist as a result of the following impairments: decreased functional strength, pain, and difficulty maintaining precautions.  Physical Therapy will continue to follow for duration of hospitalization.    Patient will benefit from continued skilled PT Services at discharge to promote prior level of function and safety with additional support and return home with home health PT.    PLAN DURING HOSPITALIZATION  Nursing Mobility Recommendation : 1 Assist  PT Device Recommendation: Rolling walker  PT Treatment Plan: Bed mobility;Body mechanics;Endurance;Energy conservation;Patient education;Family education;Gait training;Strengthening;Range of motion;Stair training;Stoop training;Transfer training;Balance training  Rehab Potential : Good  Frequency (Obs): Daily     PHYSICAL THERAPY MEDICAL/SOCIAL HISTORY   History related to current admission: Pt presented with L femur fx and L wrist sprain     Problem List  Principal Problem:    Closed transcervical fracture of left femur, initial encounter ([facility])  Active Problems:    Fall, initial encounter    ABLA (acute blood loss anemia)      HOME SITUATION  Type of Home: House  Home Layout: Multi-level;Bed/bath upstairs  Stairs to Enter : 2   Railing: No    Stairs to Bedroom: 5     Railing: Yes    Lives With: Spouse (NAY (deaf))    Drives: Yes   Patient Regularly Uses: Glasses     Prior Level of [place]: Per pt she is active and independent at baseline does not use an assistive device.     SUBJECTIVE  \"You should have been here yesterday, I had $28 worth of italian cookies here.\"    PHYSICAL THERAPY EXAMINATION   OBJECTIVE  Precautions: DESIREE - posterior  Fall Risk: High fall risk    WEIGHT BEARING RESTRICTION  L Lower Extremity: Weight Bearing as Tolerated      PAIN ASSESSMENT  Rating:  (Not rated)  Location: L hip, L wrist  Management Techniques: Activity promotion;Body mechanics;Repositioning    COGNITION  Overall Cognitive Status:  Needs cues to maintain task and recalls only 1/3 hip precautions  Following Commands:  follows multistep commands with repetition  Safety Judgement:  decreased awareness of need for assistance and decreased awareness of need for safety    RANGE OF MOTION AND STRENGTH ASSESSMENT  Upper extremity ROM and strength are within functional limits   Lower extremity ROM is within functional limits with exception of L hip precautions  Lower extremity strength is limited at LLE s/p DESIREE    BALANCE  Static Sitting: Fair +  Dynamic Sitting: Fair  Static Standing: Fair -  Dynamic Standing: Poor +    ADDITIONAL TESTS                                    ACTIVITY TOLERANCE                         O2 WALK       AM-PAC '6-Clicks' INPATIENT SHORT FORM - BASIC MOBILITY  How much difficulty does the patient currently have...  Patient Difficulty: Turning over in bed (including adjusting bedclothes, sheets and blankets)?: A Little   Patient Difficulty: Sitting down on and standing up from a chair with arms (e.g., wheelchair, bedside commode, etc.): A Little   Patient Difficulty: Moving from lying on back to sitting on the side of the bed?: A Little   How much help from another person does the patient currently need...   Help from Another: Moving to and from a bed to a chair  (including a wheelchair)?: A Little   Help from Another: Need to walk in hospital room?: A Little   Help from Another: Climbing 3-5 steps with a railing?: A Little     AM-PAC Score:  Raw Score: 18   Approx Degree of Impairment: 46.58%   Standardized Score (AM-PAC Scale): 43.63   CMS Modifier (G-Code): CK    FUNCTIONAL ABILITY STATUS  Functional Mobility/Gait Assessment  Gait Assistance: Contact guard assist  Distance (ft): 15', 25'  Assistive Device: Rolling walker  Pattern: L Decreased stance time;L Foot flat  Supine to Sit: minimal assist  Sit to Stand: contact guard assist    Exercise/Education Provided:  Bed mobility  Body mechanics  Energy conservation  Functional activity tolerated  Gait training  ROM  Strengthening  Lower therapeutic exercise:  Ankle pumps  LAQ  Transfer training  Posterior hip precautions, role of IP PT, safety precautions    Skilled Therapy Provided: Pt received supine in bed, agreeable to PT evaluation. Pt requires Min A for supine > sit. Reviewed hip precautions, pt unaware of any at beginning of session and only able to recall 1/3 at end of session. Written handout provided. Pt requires CGA for ambulation with walker with frequent cues for lower extremity sequencing and appropriate use of walker. Pt requires increased time to complete all tasks - easily distracted and conversing. Pt transfers on/off bed, toilet and chair with RW and CGA - cues for hand placement. Pt completes facial cleansing/teeth brushing standing at sink with CGA. Pt ended session back in chair with all needs in reach, alarm on and RN updated.    The patient's Approx Degree of Impairment: 46.58% has been calculated based on documentation in the [facility] '6 clicks' Inpatient Basic Mobility Short Form.  Research supports that patients with this level of impairment may benefit from HH PT.  Final disposition will be made by interdisciplinary medical team.    Patient End of Session: Up in chair;Needs met;Call light within  reach;RN aware of session/findings;All patient questions and concerns addressed;Alarm set    CURRENT GOALS  Goals to be met by: 10/20/24  Patient Goal Patient's self-stated goal is: return home   Goal #1 Patient is able to demonstrate supine - sit EOB @ level: modified independent   Goal #1   Current Status    Goal #2 Patient is able to demonstrate transfers Sit to/from Stand at assistance level: modified independent     Goal #2  Current Status    Goal #3 Patient is able to ambulate 300 feet with assistive device at assistance level: modified independent    Goal #3   Current Status    Goal #4 Patient will negotiate 5 stairs/one curb w/ assistive device and supervision   Goal #4   Current Status    Goal #5 Patient verbalizes and/or demonstrates all precautions and safety concerns independently   Goal #5   Current Status    Goal #6 Patient independently performs home exercise program for ROM/strengthening per the instructions provided in preparation for discharge.   Goal #6  Current Status      Patient Evaluation Complexity Level:  History Moderate - 1 or 2 personal factors and/or co-morbidities   Examination of body systems High - addressing a total of 4 or more elements   Clinical Presentation  Moderate - Evolving   Clinical Decision Making  Moderate Complexity     Gait Trainin minutes  Therapeutic Activity:  30 minutes  Therapeutic Exercise: 8 minutes  PT Mod Complex Eval

## 2024-10-06 NOTE — PLAN OF CARE
POD 1. Patient is Aox4 on RA. Bedfast. General diet. Voiding via the purewick. Oxy given for pain. ACHS accucheck. Plan for PT/OT.      Problem: Patient Centered Care  Goal: Patient preferences are identified and integrated in the patient's plan of care  Description: Interventions:  - What would you like us to know as we care for you? I have a twin sister  - Provide timely, complete, and accurate information to patient/family  - Incorporate patient and family knowledge, values, beliefs, and cultural backgrounds into the planning and delivery of care  - Encourage patient/family to participate in care and decision-making at the level they choose  - Honor patient and family perspectives and choices  Outcome: Progressing     Problem: Diabetes/Glucose Control  Goal: Glucose maintained within prescribed range  Description: INTERVENTIONS:  - Monitor Blood Glucose as ordered  - Assess for signs and symptoms of hyperglycemia and hypoglycemia  - Administer ordered medications to maintain glucose within target range  - Assess barriers to adequate nutritional intake and initiate nutrition consult as needed  - Instruct patient on self management of diabetes  Outcome: Progressing     Problem: PAIN - ADULT  Goal: Verbalizes/displays adequate comfort level or patient's stated pain goal  Description: INTERVENTIONS:  - Encourage pt to monitor pain and request assistance  - Assess pain using appropriate pain scale  - Administer analgesics based on type and severity of pain and evaluate response  - Implement non-pharmacological measures as appropriate and evaluate response  - Consider cultural and social influences on pain and pain management  - Manage/alleviate anxiety  - Utilize distraction and/or relaxation techniques  - Monitor for opioid side effects  - Notify MD/LIP if interventions unsuccessful or patient reports new pain  - Anticipate increased pain with activity and pre-medicate as appropriate  Outcome: Progressing      Problem: SAFETY ADULT - FALL  Goal: Free from fall injury  Description: INTERVENTIONS:  - Assess pt frequently for physical needs  - Identify cognitive and physical deficits and behaviors that affect risk of falls.  - [place] fall precautions as indicated by assessment.  - Educate pt/family on patient safety including physical limitations  - Instruct pt to call for assistance with activity based on assessment  - Modify environment to reduce risk of injury  - Provide assistive devices as appropriate  - Consider OT/PT consult to assist with strengthening/mobility  - Encourage toileting schedule  Outcome: Progressing

## 2024-10-06 NOTE — PROGRESS NOTES
[facility]  part of [facility] Hospitalist Progress Note     Mary Marmolejo Patient Status:  Inpatient    1950  74 year old CSN [id number]   Location [facility] PACU/[facility] PACU Attending Simba Segura MD   Hosp Day # 1 PCP Ondina Pride MD     Assessment & Plan:   ----------------------------------  Hip fracture, acute.  Left. Location is femoral neck. Contributing factors include age.  Pain is controlled.  Status post hemiarthroplasty 10/5  -Orthopedic consultation appreciated  -Surgery: Hemiarthroplasty 10/5  -Pain control  -PT/OT  -Will likely need rehab placement    Anemia, acute blood loss.  Anticipated for this type of surgery.  Hemodynamically stable. Ongoing bleeding not present.  -Transfuse to keep hemoglobin above 7  -Recheck hemoglobin am  -Antiplatelets, anticoagulation on hold    Other problems  Type 2 diabetes  Hypertension    DVT Mechanical Prophylaxis:   SCDs, Early ambuation  DVT Pharmacologic Prophylaxis   Medication    enoxaparin (Lovenox) 40 MG/0.4ML SUBQ injection 40 mg            dispo: to be determined  If applicable, malnutrition status at bottom of note    I personally reviewed the available laboratories, imaging including. I discussed/will discuss the case with consultants. I ordered laboratories and/or radiographic studies. I adjusted medications as detailed above.  Medical decision making high, risk is high.    Subjective:   ----------------------------------  Walking with assistance.  Pain is moderate.  No chest pain or shortness of breath.      Objective:   Chief Complaint:   Chief Complaint   Patient presents with    Fall     ----------------------------------  Temp:  [97.6 °F (36.4 °C)-99.3 °F (37.4 °C)] 98.2 °F (36.8 °C)  Pulse:  [] 94  Resp:  [13-19] 18  BP: (108-140)/(52-92) 119/87  SpO2:  [92 %-99 %] 97 %  Gen: Somnolent no distress.   HEENT: NCAT, neck supple, no carotid bruit.  CV: RRR, S1S2, and intact distal pulses. No gallop, rub,  murmur.  Pulm: Effort and breath sounds normal. No distress, wheezes, rales, rhonchi.  Abd: Soft, NTND, BS normal, no mass, no HSM, no rebound/guarding.   Neuro: Normal reflexes, CN. Sensory/motor exams grossly normal deficit.   MS: No joint effusions.  No peripheral edema.  Incision clean dry and intact  Skin: Skin is warm and dry. No rashes, erythema, diaphoresis.   Psych: Normal mood and affect. Calm, cooperative    Labs:  Lab Results   Component Value Date    HGB 8.6 (L) 10/06/2024    WBC 10.5 10/05/2024    .0 10/05/2024     10/05/2024    K 4.0 10/05/2024    CREATSERUM 0.87 10/05/2024    AST 53 (H) 10/04/2024    ALT 56 (H) 10/04/2024    TROP <0.045 02/09/2021            pantoprazole  20 mg Oral QAM AC    amLODIPine  10 mg Oral Daily    atorvastatin  10 mg Oral Nightly    levothyroxine  88 mcg Oral Daily @ 0700    docusate sodium  100 mg Oral BID    multivitamin  1 tablet Oral Daily    enoxaparin  40 mg Subcutaneous Daily    acetaminophen  1,000 mg Oral Q8H JOSE    insulin aspart  1-7 Units Subcutaneous TID CC       ondansetron    hydrALAzine    zolpidem    influenza virus vaccine PF    polyethylene glycol (PEG 3350)    magnesium hydroxide    bisacodyl    fleet enema    oxyCODONE **OR** oxyCODONE    HYDROmorphone **OR** HYDROmorphone    glucose **OR** glucose **OR** glucose-vitamin C **OR** dextrose **OR** glucose **OR** glucose **OR** glucose-vitamin C  **Certification      PHYSICIAN Certification of Need for Inpatient Hospitalization - Initial Certification    Patient will require inpatient services that will reasonably be expected to span two midnight's based on the clinical documentation in H+P.   Based on patients current state of illness, I anticipate that, after discharge, patient will require TBD.

## 2024-10-06 NOTE — PLAN OF CARE
Patient alert and orientated x4. Post-op day #1. Dressing in place to left hip. VSS. SL.  Tolerating diet. Voiding freely.  SCDs on for DVT prophylaxis. PRN oxycodone given for Pain medication provided as needed. Up with x1 assist and a walker. Encouraged frequent ambulation and use of incentive spirometer. Fall precautions maintained- bed alarm on, bed locked in lowest position, call light and personal belongings within reach, non-skid socks in place to bilateral feet. Frequent rounding by nursing staff. Plan to discharge home when medically cleared.          Problem: Patient Centered Care  Goal: Patient preferences are identified and integrated in the patient's plan of care  Description: Interventions:  - What would you like us to know as we care for you?   - Provide timely, complete, and accurate information to patient/family  - Incorporate patient and family knowledge, values, beliefs, and cultural backgrounds into the planning and delivery of care  - Encourage patient/family to participate in care and decision-making at the level they choose  - Honor patient and family perspectives and choices  Outcome: Progressing     Problem: Diabetes/Glucose Control  Goal: Glucose maintained within prescribed range  Description: INTERVENTIONS:  - Monitor Blood Glucose as ordered  - Assess for signs and symptoms of hyperglycemia and hypoglycemia  - Administer ordered medications to maintain glucose within target range  - Assess barriers to adequate nutritional intake and initiate nutrition consult as needed  - Instruct patient on self management of diabetes  Outcome: Progressing     Problem: Patient/Family Goals  Goal: Patient/Family Long Term Goal  Description: Patient's Long Term Goal:    Interventions:  - See additional Care Plan goals for specific interventions  Outcome: Progressing  Goal: Patient/Family Short Term Goal  Description: Patient's Short Term Goal:     Interventions:   - See additional Care Plan goals for  specific interventions  Outcome: Progressing     Problem: PAIN - ADULT  Goal: Verbalizes/displays adequate comfort level or patient's stated pain goal  Description: INTERVENTIONS:  - Encourage pt to monitor pain and request assistance  - Assess pain using appropriate pain scale  - Administer analgesics based on type and severity of pain and evaluate response  - Implement non-pharmacological measures as appropriate and evaluate response  - Consider cultural and social influences on pain and pain management  - Manage/alleviate anxiety  - Utilize distraction and/or relaxation techniques  - Monitor for opioid side effects  - Notify MD/LIP if interventions unsuccessful or patient reports new pain  - Anticipate increased pain with activity and pre-medicate as appropriate  Outcome: Progressing

## 2024-10-06 NOTE — PROGRESS NOTES
Orthopaedic Surgery Inpatient Consult Progress Note  _______________________________________________________________________________________________________________  _______________________________________________________________________________________________________________    Mary Marmolejo Patient Status:  Inpatient    1950 MRN [id number]   Location [facility] 4W/SW/SE Attending Simba Segura MD     DATE: 10/6/2024     HISTORY OF PRESENT ILLNESS: Mary Marmolejo is a 74 year old female who presented as a consult to the Orthopaedic Surgery service for left displaced femoral neck fracture.  She underwent left hip cemented hemiarthroplasty on 10/5/2024.     S/24H EVENTS: Pain fairly well controlled overnight. Denies any overnight issues. Tolerating po fluids. Denies fevers, chills, chest pain, dyspnea.  Reports left wrist pain improving.     PHYSICAL EXAM  /73 (BP Location: Left arm)   Pulse 91   Temp 99.3 °F (37.4 °C) (Oral)   Resp 16   Ht 5' 6\" (1.676 m)   Wt 139 lb (63 kg)   LMP  (LMP Unknown)   SpO2 93%   BMI 22.44 kg/m²      Constitutional: The patient is well-developed, well-nourished, in no acute distress.  Neurological: Alert and oriented to person, place, and time.  Psychiatric: Mood and affect normal.  Head: Normocephalic and atraumatic.  Cardiovascular: regular rate by palpation  Pulmonary/Chest: Effort normal. No respiratory distress. Breathing non-labored  Abdominal: Abdomen exhibits no distension.   Left  Leg  INSPECTION/PALPATION  Dressings c/d/i  Moderate  swelling about thigh  ROM  deferred   MOTOR  Intact to TA/GSC/peroneals/EHL/FHL  Hip Flexion: 2/5  Hip Extension: 2/5  Knee Flexion: 3/5  Knee Extension: 3/5   SILT Tahir/Saph/SPN/DPN/T  2+ DP/PT pulse, brisk capillary refill     LAB RESULTS  Lab Results   Component Value Date    WBC 10.5 10/05/2024    HGB 8.6 (L) 10/06/2024    HCT 26.3 (L) 10/06/2024    .0 10/05/2024    CREATSERUM 0.87  10/05/2024    BUN 14 10/05/2024     10/05/2024    K 4.0 10/05/2024     10/05/2024    CO2 27.0 10/05/2024     (H) 10/05/2024    CA 9.6 10/05/2024    ALB 5.3 (H) 10/04/2024    ALKPHO 72 10/04/2024    BILT 0.5 10/04/2024    TP 8.0 10/04/2024    AST 53 (H) 10/04/2024    ALT 56 (H) 10/04/2024    T4F 1.3 06/11/2022    TSH 1.143 05/06/2024    DDIMER 0.48 02/18/2021    CRP <0.29 02/18/2021    MG 2.3 02/18/2021    PHOS 3.8 02/18/2021    TROP <0.045 02/09/2021     02/09/2021    B12 797 05/06/2024       IMAGING  I independently viewed and interpreted the imaging. Radiologist interpretation is available in the imaging report.  X-ray: Plain films of the left hip demonstrate postoperative changes consistent with left hip hemiarthroplasty.  No evidence of complications.  Appropriate position of implants  X-ray:Plain films of the left wrist were reviewed including AP, lateral, and oblique views. These demonstrate no acute osseous abnormalities. The wrist is well aligned with minimal to no degerative changes.     XR WRIST (2 VIEWS), LEFT (CPT=73100)    Result Date: 10/5/2024  CONCLUSION:   No definite acute fracture or dislocation.    Dictated by (CST): Rudi Castro MD on 10/05/2024 at 7:10 PM     Finalized by (CST): Rudi Castro MD on 10/05/2024 at 7:11 PM          XR HIP W OR WO PELVIS 1 VIEW, LEFT (CPT=73501)    Result Date: 10/5/2024  CONCLUSION: Status post total left hip arthroplasty.    Dictated by (CST): Rudi Castro MD on 10/05/2024 at 1:59 PM     Finalized by (CST): Rudi Castro MD on 10/05/2024 at 2:00 PM          CT BRAIN OR HEAD (CPT=70450)    Result Date: 10/4/2024  CONCLUSION:   No acute fracture or intracranial hemorrhage.  Small nonspecific 1.2 cm lucent lesion of the left frontal calvarium.  Further assessment can be made with myomatous workup, including laboratory values.    Dictated by (CST): Ramila Ontiveros MD on 10/04/2024 at 9:45 PM     Finalized by (CST): Rama  MD Ramila on 10/04/2024 at 9:50 PM          XR HIP W OR WO PELVIS 2 OR 3 VIEWS, LEFT (CPT=73502)    Result Date: 10/4/2024  PROCEDURE: XR HIP W OR WO PELVIS 2 OR 3 VIEWS, LEFT (CPT=73502)  COMPARISON: None.  INDICATIONS: Left hip post accidental fall today.  TECHNIQUE: 3. views were obtained.   FINDINGS/conclusions:   There is an acute moderately angulated transcervical fracture of the proximal left femur.  There is moderate apex lateral angulation of the fracture.  There is mild anterior displacement of the distal femur in relation to the femoral head.  The femoral acetabular alignment is maintained.  There is partially imaged multilevel degenerative disease of the spine.  Right hip is unremarkable on a single view.    Dictated by (CST): Ramila Ontiveros MD on 10/04/2024 at 9:07 PM     Finalized by (CST): Ramila Ontiveros MD on 10/04/2024 at 9:08 PM         EKG    Result Date: 10/5/2024  Sinus tachycardia Left axis deviation Moderate voltage criteria for LVH, may be normal variant Nonspecific ST and T wave abnormality Abnormal ECG No previous ECGs found in [facility] Confirmed by GABRIELA GANT EVANS ([id number]) on 10/5/2024 4:07:08 PM     IMPRESSIONS/RECOMMENDATIONS: Mary Marmolejo is a 74 year old female who presents as a consult to the Orthopaedic surgery service for left femoral neck fracture.  She is now postoperative day 1 after left hip cemented bipolar hemiarthroplasty.  She is doing well from this.  She also sustained a left wrist sprain.  Would consider removable wrist brace for this    Discussed the history, physical exam, treatment to date, and reviewed relevant imaging an studies with the patient.  SURGICAL PLANS: No further operative plans  ABX: 24 hours postoperative  PAIN: Continue to wean/titrate to an appropriate oral regimen  DIET: Regular/diabetic  DVT PPX: SCDs, ambulation, chemoppx 30 days postop, lovenox or ASA okay  DISPO: Pending PT/OT  MOBILITY: OOBTC  WEIGHT BEARING STATUS: Weightbearing  as tolerated  RANGE-OF-MOTION LIMITATIONS:  Posterior hip precautions  IMAGING: No additional imaging necessary    I have personally seen Mary Marmolejo and discussed in detail their plan of care. Thank you for allowing me to participate in the care of your patient.   Please note that this note was written in combination with voice recognition/dictation software and there is a possibility of transcription errors which were not identified at the time of note submission. If clarification is necessary, please contact the author or clinic staff.    Eliel Tolbert MD  Orthopaedic Surgery  10/6/2024

## 2024-10-07 LAB
GLUCOSE BLDC GLUCOMTR-MCNC: 191 MG/DL (ref 70–99)
GLUCOSE BLDC GLUCOMTR-MCNC: 198 MG/DL (ref 70–99)
GLUCOSE BLDC GLUCOMTR-MCNC: 211 MG/DL (ref 70–99)
GLUCOSE BLDC GLUCOMTR-MCNC: 238 MG/DL (ref 70–99)
HGB BLD-MCNC: 9.8 G/DL

## 2024-10-07 PROCEDURE — 99233 SBSQ HOSP IP/OBS HIGH 50: CPT | Performed by: HOSPITALIST

## 2024-10-07 NOTE — PROGRESS NOTES
[facility]  part of [place] [facility]ist Progress Note     Mary Marmolejo Patient Status:  Inpatient    1950  74 year old CSN [id number]   Location [facility] PACU/[facility] PACU Attending Simba Segura MD   Hosp Day # 2 PCP Ondina Pride MD     Assessment & Plan:   ----------------------------------  Hip fracture, acute.  Left. Location is femoral neck. Contributing factors include age.  Pain is controlled.  Status post hemiarthroplasty 10/5  -Orthopedic consultation appreciated  -Surgery: Hemiarthroplasty 10/5  -Pain control  -PT/OT  -HH vs YUDITH    Anemia, acute blood loss.  Anticipated for this type of surgery.  Hemodynamically stable. Ongoing bleeding not present. Hgb now stable  -Transfuse to keep hemoglobin above 7  -Recheck hemoglobin if clinical change    Other problems  Type 2 diabetes  Hypertension    DVT Mechanical Prophylaxis:   SCDs, Early ambuation  DVT Pharmacologic Prophylaxis   Medication    enoxaparin (Lovenox) 40 MG/0.4ML SUBQ injection 40 mg            dispo: to be determined. Poss home with HH tomorrow  If applicable, malnutrition status at bottom of note    I personally reviewed the available laboratories, imaging including. I discussed/will discuss the case with consultants. I ordered laboratories and/or radiographic studies. I adjusted medications as detailed above.  Medical decision making high, risk is high.    Subjective:   ----------------------------------  Pain better. Slept well. No cp or sob. Eating well      Objective:   Chief Complaint:   Chief Complaint   Patient presents with    Fall     ----------------------------------  Temp:  [98.3 °F (36.8 °C)-99.3 °F (37.4 °C)] 98.3 °F (36.8 °C)  Pulse:  [100-112] 100  Resp:  [18] 18  BP: (128-136)/(64-81) 136/64  SpO2:  [92 %-95 %] 95 %  Gen: Somnolent no distress.   HEENT: NCAT, neck supple, no carotid bruit.  CV: RRR, S1S2, and intact distal pulses. No gallop, rub, murmur.  Pulm: Effort and breath sounds normal.  No distress, wheezes, rales, rhonchi.  Abd: Soft, NTND, BS normal, no mass, no HSM, no rebound/guarding.   Neuro: Normal reflexes, CN. Sensory/motor exams grossly normal deficit.   MS: No joint effusions.  No peripheral edema.  Incision clean dry and intact  Skin: Skin is warm and dry. No rashes, erythema, diaphoresis.   Psych: Normal mood and affect. Calm, cooperative    Labs:  Lab Results   Component Value Date    HGB 9.8 (L) 10/07/2024    WBC 10.5 10/05/2024    .0 10/05/2024     10/05/2024    K 4.0 10/05/2024    CREATSERUM 0.87 10/05/2024    AST 53 (H) 10/04/2024    ALT 56 (H) 10/04/2024    TROP <0.045 02/09/2021            pantoprazole  20 mg Oral QAM AC    amLODIPine  10 mg Oral Daily    atorvastatin  10 mg Oral Nightly    levothyroxine  88 mcg Oral Daily @ 0700    docusate sodium  100 mg Oral BID    multivitamin  1 tablet Oral Daily    enoxaparin  40 mg Subcutaneous Daily    acetaminophen  1,000 mg Oral Q8H JOSE    insulin aspart  1-7 Units Subcutaneous TID CC       ondansetron    hydrALAzine    zolpidem    influenza virus vaccine PF    polyethylene glycol (PEG 3350)    magnesium hydroxide    bisacodyl    fleet enema    oxyCODONE **OR** oxyCODONE    HYDROmorphone **OR** HYDROmorphone    glucose **OR** glucose **OR** glucose-vitamin C **OR** dextrose **OR** glucose **OR** glucose **OR** glucose-vitamin C  **Certification      PHYSICIAN Certification of Need for Inpatient Hospitalization - Initial Certification    Patient will require inpatient services that will reasonably be expected to span two midnight's based on the clinical documentation in H+P.   Based on patients current state of illness, I anticipate that, after discharge, patient will require TBD.

## 2024-10-07 NOTE — PLAN OF CARE
Post-op day 2. Alert and oriented x4 on room air. Dressing clean, dry, and intact. Refused SCDs last night. Lovenox for DVT prophylaxis. PRN oxy and extra strength tylenol given for pain. Up x1 assist with walker. Call light within reach. Plan for HH pending acceptance and choice.    Problem: Patient Centered Care  Goal: Patient preferences are identified and integrated in the patient's plan of care  Description: Interventions:  - What would you like us to know as we care for you?   - Provide timely, complete, and accurate information to patient/family  - Incorporate patient and family knowledge, values, beliefs, and cultural backgrounds into the planning and delivery of care  - Encourage patient/family to participate in care and decision-making at the level they choose  - Honor patient and family perspectives and choices  Outcome: Progressing     Problem: Diabetes/Glucose Control  Goal: Glucose maintained within prescribed range  Description: INTERVENTIONS:  - Monitor Blood Glucose as ordered  - Assess for signs and symptoms of hyperglycemia and hypoglycemia  - Administer ordered medications to maintain glucose within target range  - Assess barriers to adequate nutritional intake and initiate nutrition consult as needed  - Instruct patient on self management of diabetes  Outcome: Progressing     Problem: Patient/Family Goals  Goal: Patient/Family Long Term Goal  Description: Patient's Long Term Goal:     Interventions:  -   - See additional Care Plan goals for specific interventions  Outcome: Progressing  Goal: Patient/Family Short Term Goal  Description: Patient's Short Term Goal:     Interventions:   -   - See additional Care Plan goals for specific interventions  Outcome: Progressing     Problem: PAIN - ADULT  Goal: Verbalizes/displays adequate comfort level or patient's stated pain goal  Description: INTERVENTIONS:  - Encourage pt to monitor pain and request assistance  - Assess pain using appropriate pain  scale  - Administer analgesics based on type and severity of pain and evaluate response  - Implement non-pharmacological measures as appropriate and evaluate response  - Consider cultural and social influences on pain and pain management  - Manage/alleviate anxiety  - Utilize distraction and/or relaxation techniques  - Monitor for opioid side effects  - Notify MD/LIP if interventions unsuccessful or patient reports new pain  - Anticipate increased pain with activity and pre-medicate as appropriate  Outcome: Progressing     Problem: SAFETY ADULT - FALL  Goal: Free from fall injury  Description: INTERVENTIONS:  - Assess pt frequently for physical needs  - Identify cognitive and physical deficits and behaviors that affect risk of falls.  - [place] fall precautions as indicated by assessment.  - Educate pt/family on patient safety including physical limitations  - Instruct pt to call for assistance with activity based on assessment  - Modify environment to reduce risk of injury  - Provide assistive devices as appropriate  - Consider OT/PT consult to assist with strengthening/mobility  - Encourage toileting schedule  Outcome: Progressing

## 2024-10-07 NOTE — PHYSICAL THERAPY NOTE
PHYSICAL THERAPY TREATMENT NOTE - INPATIENT     Room Number: 411/411-A       Presenting Problem: Closed transcervical fx L femur now s/p L DESIREE - posterior  Co-Morbidities : L wrist sprain    Problem List  Principal Problem:    Closed transcervical fracture of left femur, initial encounter ([facility])  Active Problems:    Fall, initial encounter    ABLA (acute blood loss anemia)      PHYSICAL THERAPY ASSESSMENT   Patient demonstrates good  progress this session, goals  remain in progress.      Patient is requiring contact guard assist and minimal assist as a result of the following impairments: decreased functional strength, decreased endurance/aerobic capacity, and pain.     Patient continues to function below baseline with bed mobility, transfers, and gait.  Next session anticipate patient to progress bed mobility, transfers, and gait.  Physical Therapy will continue to follow patient for duration of hospitalization.    Patient continues to benefit from continued skilled PT services: at discharge to promote prior level of function.  Anticipate patient will return home with home health PT.    PLAN DURING HOSPITALIZATION  Nursing Mobility Recommendation : 1 Assist  PT Device Recommendation: Rolling walker  PT Treatment Plan: Bed mobility;Body mechanics;Endurance;Gait training  Frequency (Obs): Daily     SUBJECTIVE  Pt reports being ready for PT RX    OBJECTIVE  Precautions: DESIREE - posterior    WEIGHT BEARING RESTRICTION  L Lower Extremity: Weight Bearing as Tolerated      PAIN ASSESSMENT   Ratin  Location: L hip, L wrist  Management Techniques: Activity promotion;Body mechanics;Repositioning    BALANCE  Static Sitting: Fair +  Dynamic Sitting: Fair  Static Standing: Fair -  Dynamic Standing: Poor +    ACTIVITY TOLERANCE                          O2 WALK       AM-PAC '6-Clicks' INPATIENT SHORT FORM - BASIC MOBILITY  How much difficulty does the patient currently have...  Patient Difficulty: Turning over in bed (including  adjusting bedclothes, sheets and blankets)?: A Little   Patient Difficulty: Sitting down on and standing up from a chair with arms (e.g., wheelchair, bedside commode, etc.): A Little   Patient Difficulty: Moving from lying on back to sitting on the side of the bed?: A Little   How much help from another person does the patient currently need...   Help from Another: Moving to and from a bed to a chair (including a wheelchair)?: None   Help from Another: Need to walk in hospital room?: None   Help from Another: Climbing 3-5 steps with a railing?: None     AM-PAC Score:  Raw Score: 21   Approx Degree of Impairment: 28.97%   Standardized Score (AM-PAC Scale): 50.25   CMS Modifier (G-Code): CJ    FUNCTIONAL ABILITY STATUS  Functional Mobility/Gait Assessment  Gait Assistance: Contact guard assist  Distance (ft): 2 x 50  Assistive Device: Rolling walker  Pattern: L Decreased stance time  Stairs: Stairs  How Many Stairs: 4  Device: 1 Rail  Assist: Minimal assist  Pattern: Ascend and Descend  Rolling: minimal assist  Supine to Sit: minimal assist  Sit to Supine: minimal assist  Sit to Stand: minimal assist    Skilled Therapy Provided: Pt seen daily.Min a for bed mobility and transfer;EOB sitting balance activity with emphasis on core stabilization.Pt amb 2 x 50 ft with RW and CGA;cuing for gait pattern as well as for postural awareness.Navigated 4 stairs with Min a.There ex.    The patient's Approx Degree of Impairment: 28.97% has been calculated based on documentation in the [facility] '6 clicks' Inpatient Daily Activity Short Form.  Research supports that patients with this level of impairment may benefit from Home with Home Health PT.  Final disposition will be made by interdisciplinary medical team.    THERAPEUTIC EXERCISES  Lower Extremity Ankle pumps  Glut sets  Quad sets     Position Supine       Patient End of Session: Up in chair    CURRENT GOALS     Patient Goal Patient's self-stated goal is: return home   Goal #1  Patient is able to demonstrate supine - sit EOB @ level: modified independent   Goal #1   Current Status Min a    Goal #2 Patient is able to demonstrate transfers Sit to/from Stand at assistance level: modified independent     Goal #2  Current Status Min a   Goal #3 Patient is able to ambulate 300 feet with assistive device at assistance level: modified independent    Goal #3   Current Status Pt amb 2 x 50 ft with RW and CGA   Goal #4 Patient will negotiate 5 stairs/one curb w/ assistive device and supervision   Goal #4   Current Status Navigated 4 stairs with Min a   Goal #5 Patient verbalizes and/or demonstrates all precautions and safety concerns independently   Goal #5   Current Status In progress   Goal #6 Patient independently performs home exercise program for ROM/strengthening per the instructions provided in preparation for discharge.   Goal #6  Current Status In progress     Gait Training: 15 minutes  Therapeutic Activity: 15 minutes  Neuromuscular Re-education:  minutes  Therapeutic Exercise: 15 minutes  Canalith Repositioning:  minutes  Manual Therapy:  minutes  Can add/delete any of these

## 2024-10-07 NOTE — PLAN OF CARE
Patient alert and orientated x4. Post-op day #2. Dressing in place to left hip. VSS.SL. RA. Monitoring blood glucose levels per order. Tolerating diet. Voiding freely via purewick.  SCDs on for DVT prophylaxis. PRN oxycodone given for Pain. Up with x1 assist and a walker. Encouraged frequent ambulation and use of incentive spirometer. Fall precautions maintained- bed alarm on, bed locked in lowest position, call light and personal belongings within reach, non-skid socks in place to bilateral feet. Frequent rounding by nursing staff. Plan to discharge home with home health waiting on choice and approval.                Problem: Patient Centered Care  Goal: Patient preferences are identified and integrated in the patient's plan of care  Description: Interventions:  - What would you like us to know as we care for you?   - Provide timely, complete, and accurate information to patient/family  - Incorporate patient and family knowledge, values, beliefs, and cultural backgrounds into the planning and delivery of care  - Encourage patient/family to participate in care and decision-making at the level they choose  - Honor patient and family perspectives and choices  Outcome: Progressing     Problem: Diabetes/Glucose Control  Goal: Glucose maintained within prescribed range  Description: INTERVENTIONS:  - Monitor Blood Glucose as ordered  - Assess for signs and symptoms of hyperglycemia and hypoglycemia  - Administer ordered medications to maintain glucose within target range  - Assess barriers to adequate nutritional intake and initiate nutrition consult as needed  - Instruct patient on self management of diabetes  Outcome: Progressing     Problem: Patient/Family Goals  Goal: Patient/Family Long Term Goal  Description: Patient's Long Term Goal:     Interventions:  - See additional Care Plan goals for specific interventions  Outcome: Progressing  Goal: Patient/Family Short Term Goal  Description: Patient's Short Term Goal:      Interventions:  - See additional Care Plan goals for specific interventions  Outcome: Progressing     Problem: PAIN - ADULT  Goal: Verbalizes/displays adequate comfort level or patient's stated pain goal  Description: INTERVENTIONS:  - Encourage pt to monitor pain and request assistance  - Assess pain using appropriate pain scale  - Administer analgesics based on type and severity of pain and evaluate response  - Implement non-pharmacological measures as appropriate and evaluate response  - Consider cultural and social influences on pain and pain management  - Manage/alleviate anxiety  - Utilize distraction and/or relaxation techniques  - Monitor for opioid side effects  - Notify MD/LIP if interventions unsuccessful or patient reports new pain  - Anticipate increased pain with activity and pre-medicate as appropriate  Outcome: Progressing     Problem: SAFETY ADULT - FALL  Goal: Free from fall injury  Description: INTERVENTIONS:  - Assess pt frequently for physical needs  - Identify cognitive and physical deficits and behaviors that affect risk of falls.  - [place] fall precautions as indicated by assessment.  - Educate pt/family on patient safety including physical limitations  - Instruct pt to call for assistance with activity based on assessment  - Modify environment to reduce risk of injury  - Provide assistive devices as appropriate  - Consider OT/PT consult to assist with strengthening/mobility  - Encourage toileting schedule  Outcome: Progressing

## 2024-10-08 VITALS
HEART RATE: 113 BPM | WEIGHT: 139 LBS | BODY MASS INDEX: 22.34 KG/M2 | TEMPERATURE: 98 F | OXYGEN SATURATION: 96 % | DIASTOLIC BLOOD PRESSURE: 67 MMHG | RESPIRATION RATE: 18 BRPM | HEIGHT: 66 IN | SYSTOLIC BLOOD PRESSURE: 144 MMHG

## 2024-10-08 LAB
GLUCOSE BLDC GLUCOMTR-MCNC: 216 MG/DL (ref 70–99)
GLUCOSE BLDC GLUCOMTR-MCNC: 219 MG/DL (ref 70–99)
GLUCOSE BLDC GLUCOMTR-MCNC: 237 MG/DL (ref 70–99)

## 2024-10-08 PROCEDURE — 99239 HOSP IP/OBS DSCHRG MGMT >30: CPT | Performed by: HOSPITALIST

## 2024-10-08 RX ORDER — OXYCODONE HYDROCHLORIDE 5 MG/1
5 TABLET ORAL EVERY 4 HOURS PRN
Qty: 30 TABLET | Refills: 0 | Status: SHIPPED | OUTPATIENT
Start: 2024-10-08

## 2024-10-08 RX ORDER — PSEUDOEPHEDRINE HCL 30 MG
100 TABLET ORAL 2 TIMES DAILY
Qty: 60 CAPSULE | Refills: 0 | Status: SHIPPED | OUTPATIENT
Start: 2024-10-08

## 2024-10-08 NOTE — BH RN DISCHARGE NOTE
Mary is A&OX4 on room air. Pod 3. Aquacel dressing changed and in palace. Will discharge home with HH. Discharge instructions have been given and all questions have been answered.Patient will discharge in care of her sister.

## 2024-10-08 NOTE — CM/SW NOTE
10/08/24 1600   CM/SW Referral Data   Referral Source Physician;Social Work (self-referral)   Reason for Referral Discharge planning   Informant Patient   Medical Hx   Does patient have an established PCP? Yes   Patient Info   Patient's Current Mental Status at Time of Assessment Alert;Oriented   Patient Status Prior to Admission   Independent with ADLs and Mobility Yes   Discharge Needs   Anticipated D/C needs Home health care   Choice of Post-Acute Provider   Informed patient of right to choose their preferred provider Yes   List of appropriate post-acute services provided to patient/family with quality data Yes     SW spoke with pt who confirmed she's going to her sisters house to recoperate    Address is [address] in [place]     SW confirmed with [facility] that they can accept - pt is agreeable to use their services    PLAN: home with [facility] pending med don DE LEON LSW, MSW ext. [phone]

## 2024-10-08 NOTE — PHYSICAL THERAPY NOTE
PHYSICAL THERAPY TREATMENT NOTE - INPATIENT     Room Number: 411/411-A       Presenting Problem: Closed transcervical fx L femur now s/p L DESIREE - posterior  Co-Morbidities : L wrist sprain    Problem List  Principal Problem:    Closed transcervical fracture of left femur, initial encounter ([facility])  Active Problems:    Fall, initial encounter    ABLA (acute blood loss anemia)      PHYSICAL THERAPY ASSESSMENT   Patient demonstrates good  progress this session, goals  remain in progress.      Patient is requiring contact guard assist and minimal assist as a result of the following impairments: decreased functional strength, decreased endurance/aerobic capacity, and pain.     Patient continues to function below baseline with bed mobility, transfers, and gait.  Next session anticipate patient to progress bed mobility, transfers, and gait.  Physical Therapy will continue to follow patient for duration of hospitalization.    Patient continues to benefit from continued skilled PT services: at discharge to promote prior level of function.  Anticipate patient will return home with home health PT.    PLAN DURING HOSPITALIZATION  Nursing Mobility Recommendation : 1 Assist  PT Device Recommendation: Rolling walker  PT Treatment Plan: Bed mobility;Body mechanics;Endurance;Gait training  Frequency (Obs): Daily     SUBJECTIVE  Pt reports being ready for PT RX    OBJECTIVE  Precautions: DESIREE - posterior    WEIGHT BEARING RESTRICTION  L Lower Extremity: Weight Bearing as Tolerated      PAIN ASSESSMENT   Ratin  Location: L hip, L wrist  Management Techniques: Activity promotion;Body mechanics;Repositioning    BALANCE  Static Sitting: Fair +  Dynamic Sitting: Fair  Static Standing: Fair -  Dynamic Standing: Poor +    ACTIVITY TOLERANCE                          O2 WALK       AM-PAC '6-Clicks' INPATIENT SHORT FORM - BASIC MOBILITY  How much difficulty does the patient currently have...  Patient Difficulty: Turning over in bed (including  adjusting bedclothes, sheets and blankets)?: A Little   Patient Difficulty: Sitting down on and standing up from a chair with arms (e.g., wheelchair, bedside commode, etc.): A Little   Patient Difficulty: Moving from lying on back to sitting on the side of the bed?: A Little   How much help from another person does the patient currently need...   Help from Another: Moving to and from a bed to a chair (including a wheelchair)?: None   Help from Another: Need to walk in hospital room?: None   Help from Another: Climbing 3-5 steps with a railing?: None     AM-PAC Score:  Raw Score: 21   Approx Degree of Impairment: 28.97%   Standardized Score (AM-PAC Scale): 50.25   CMS Modifier (G-Code): CJ    FUNCTIONAL ABILITY STATUS  Functional Mobility/Gait Assessment  Gait Assistance: Contact guard assist  Distance (ft): 2 x 100  Assistive Device: Rolling walker  Pattern: L Decreased stance time  Stairs: Stairs  How Many Stairs: 12  Device: 1 Rail  Assist: Minimal assist  Pattern: Ascend and Descend  Rolling: minimal assist  Supine to Sit: minimal assist  Sit to Supine: minimal assist  Sit to Stand: minimal assist    Skilled Therapy Provided: Pt seen daily.Min a for bed mobility and transfer;EOB sitting balance activity with emphasis on core stabilization.Pt amb 2 x 50 ft with RW and CGA;cuing for gait pattern as well as for postural awareness.Navigated 12 stairs with Min a.There ex.    The patient's Approx Degree of Impairment: 28.97% has been calculated based on documentation in the [facility] '6 clicks' Inpatient Daily Activity Short Form.  Research supports that patients with this level of impairment may benefit from Home with Home Health PT.  Final disposition will be made by interdisciplinary medical team.    THERAPEUTIC EXERCISES  Lower Extremity Ankle pumps  Glut sets  Quad sets     Position Supine       Patient End of Session: Up in chair;Call light within reach;RN aware of session/findings;All patient questions and concerns  addressed    CURRENT GOALS     Patient Goal Patient's self-stated goal is: return home   Goal #1 Patient is able to demonstrate supine - sit EOB @ level: modified independent   Goal #1   Current Status Min a    Goal #2 Patient is able to demonstrate transfers Sit to/from Stand at assistance level: modified independent     Goal #2  Current Status Min a   Goal #3 Patient is able to ambulate 300 feet with assistive device at assistance level: modified independent    Goal #3   Current Status Pt amb 2 x 50 ft with RW and CGA   Goal #4 Patient will negotiate 5 stairs/one curb w/ assistive device and supervision   Goal #4   Current Status Navigated 12 stairs with Min a   Goal #5 Patient verbalizes and/or demonstrates all precautions and safety concerns independently   Goal #5   Current Status In progress   Goal #6 Patient independently performs home exercise program for ROM/strengthening per the instructions provided in preparation for discharge.   Goal #6  Current Status In progress     Gait Training: 15 minutes  Therapeutic Activity: 15 minutes  Neuromuscular Re-education:  minutes  Therapeutic Exercise: 15 minutes  Canalith Repositioning:  minutes  Manual Therapy:  minutes  Can add/delete any of these

## 2024-10-08 NOTE — PHYSICAL THERAPY NOTE
PHYSICAL THERAPY TREATMENT NOTE - INPATIENT     Room Number: 411/411-A       Presenting Problem: Closed transcervical fx L femur now s/p L DESIREE - posterior  Co-Morbidities : L wrist sprain    Problem List  Principal Problem:    Closed transcervical fracture of left femur, initial encounter ([facility])  Active Problems:    Fall, initial encounter    ABLA (acute blood loss anemia)      PHYSICAL THERAPY ASSESSMENT   Patient demonstrates good  progress this session, goals  remain in progress.      Patient is requiring contact guard assist and minimal assist as a result of the following impairments: decreased functional strength, decreased endurance/aerobic capacity, and pain.     Patient continues to function below baseline with bed mobility, transfers, and gait.  Next session anticipate patient to progress bed mobility, transfers, and gait.  Physical Therapy will continue to follow patient for duration of hospitalization.    Patient continues to benefit from continued skilled PT services: at discharge to promote prior level of function.  Anticipate patient will return home with home health PT.    PLAN DURING HOSPITALIZATION  Nursing Mobility Recommendation : 1 Assist  PT Device Recommendation: Rolling walker  PT Treatment Plan: Bed mobility;Body mechanics;Gait training  Frequency (Obs): Daily     SUBJECTIVE  Pt reports being ready for PT RX    OBJECTIVE  Precautions: DESIREE - posterior    WEIGHT BEARING RESTRICTION  L Lower Extremity: Weight Bearing as Tolerated      PAIN ASSESSMENT   Ratin  Location: L hip, L wrist  Management Techniques: Activity promotion;Body mechanics;Repositioning    BALANCE  Static Sitting: Fair +  Dynamic Sitting: Fair  Static Standing: Fair  Dynamic Standing: Poor +    ACTIVITY TOLERANCE                          O2 WALK       AM-PAC '6-Clicks' INPATIENT SHORT FORM - BASIC MOBILITY  How much difficulty does the patient currently have...  Patient Difficulty: Turning over in bed (including adjusting  bedclothes, sheets and blankets)?: A Little   Patient Difficulty: Sitting down on and standing up from a chair with arms (e.g., wheelchair, bedside commode, etc.): A Little   Patient Difficulty: Moving from lying on back to sitting on the side of the bed?: A Little   How much help from another person does the patient currently need...   Help from Another: Moving to and from a bed to a chair (including a wheelchair)?: None   Help from Another: Need to walk in hospital room?: None   Help from Another: Climbing 3-5 steps with a railing?: None     AM-PAC Score:  Raw Score: 21   Approx Degree of Impairment: 28.97%   Standardized Score (AM-PAC Scale): 50.25   CMS Modifier (G-Code): CJ    FUNCTIONAL ABILITY STATUS  Functional Mobility/Gait Assessment  Gait Assistance: Contact guard assist  Distance (ft): 2 x 100  Assistive Device: Rolling walker  Pattern: L Decreased stance time  Stairs: Stairs  How Many Stairs: 12  Device: 1 Rail  Assist: Contact guard assist  Pattern: Ascend and Descend  Rolling: minimal assist  Supine to Sit: minimal assist  Sit to Supine: minimal assist  Sit to Stand: minimal assist    Skilled Therapy Provided: Pt seen daily.Min a for bed mobility and transfer;EOB sitting balance activity with emphasis on core stabilization.Pt amb 2 x 100 ft with RW and CGA;cuing for gait pattern as well as for postural awareness.Navigated 12 stairs with cga.There ex.    The patient's Approx Degree of Impairment: 28.97% has been calculated based on documentation in the [facility] '6 clicks' Inpatient Daily Activity Short Form.  Research supports that patients with this level of impairment may benefit from Home with Home Health PT.  Final disposition will be made by interdisciplinary medical team.    THERAPEUTIC EXERCISES  Lower Extremity Ankle pumps  Glut sets  Quad sets     Position Supine       Patient End of Session: Up in chair    CURRENT GOALS     Patient Goal Patient's self-stated goal is: return home   Goal #1  Patient is able to demonstrate supine - sit EOB @ level: modified independent   Goal #1   Current Status Min a    Goal #2 Patient is able to demonstrate transfers Sit to/from Stand at assistance level: modified independent     Goal #2  Current Status Min a   Goal #3 Patient is able to ambulate 300 feet with assistive device at assistance level: modified independent    Goal #3   Current Status Pt amb 2 x 100 ft with RW and CGA   Goal #4 Patient will negotiate 5 stairs/one curb w/ assistive device and supervision   Goal #4   Current Status Navigated 12 stairs with CGA   Goal #5 Patient verbalizes and/or demonstrates all precautions and safety concerns independently   Goal #5   Current Status In progress   Goal #6 Patient independently performs home exercise program for ROM/strengthening per the instructions provided in preparation for discharge.   Goal #6  Current Status In progress     Gait Training: 15 minutes  Therapeutic Activity: 15 minutes  Neuromuscular Re-education:  minutes  Therapeutic Exercise: 15 minutes  Canalith Repositioning:  minutes  Manual Therapy:  minutes  Can add/delete any of these

## 2024-10-08 NOTE — PROGRESS NOTES
[facility]  part of [place] Hospitalist Progress Note     Mary Marmolejo Patient Status:  Inpatient    1950  74 year old CSN [id number]   Location [facility] PACU/[facility] PACU Attending Simba Segura MD   Hosp Day # 3 PCP Ondina Pride MD     Assessment & Plan:   ----------------------------------  Hip fracture, acute.  Left. Location is femoral neck. Contributing factors include age.  Pain is controlled.  Status post hemiarthroplasty 10/5  -Orthopedic consultation appreciated  -Surgery: Hemiarthroplasty 10/5  -Pain control  -PT/OT  -HH when cleared by ortho and PT    Anemia, acute blood loss.  Anticipated for this type of surgery.  Hemodynamically stable. Ongoing bleeding not present. Hgb now stable  -Transfuse to keep hemoglobin above 7  -Recheck hemoglobin if clinical change    Other problems  Type 2 diabetes  Hypertension    DVT Mechanical Prophylaxis:   SCDs, Early ambuation  DVT Pharmacologic Prophylaxis   Medication    enoxaparin (Lovenox) 40 MG/0.4ML SUBQ injection 40 mg            dispo: Medically cleared for discharge when passes PT and cleared by orthopedic  If applicable, malnutrition status at bottom of note    I personally reviewed the available laboratories, imaging including. I discussed/will discuss the case with consultants. I ordered laboratories and/or radiographic studies. I adjusted medications as detailed above.  Medical decision making high, risk is high.    Subjective:   ----------------------------------  Pain better. Slept well. No cp or sob. Eating well      Objective:   Chief Complaint:   Chief Complaint   Patient presents with    Fall     ----------------------------------  Temp:  [97.3 °F (36.3 °C)-97.9 °F (36.6 °C)] 97.9 °F (36.6 °C)  Pulse:  [100-120] 101  Resp:  [18] 18  BP: (104-136)/(61-71) 132/66  SpO2:  [90 %-94 %] 92 %  Gen: Somnolent no distress.   HEENT: NCAT, neck supple, no carotid bruit.  CV: RRR, S1S2, and intact distal pulses. No gallop,  rub, murmur.  Pulm: Effort and breath sounds normal. No distress, wheezes, rales, rhonchi.  Abd: Soft, NTND, BS normal, no mass, no HSM, no rebound/guarding.   Neuro: Normal reflexes, CN. Sensory/motor exams grossly normal deficit.   MS: No joint effusions.  No peripheral edema.  Incision clean dry and intact  Skin: Skin is warm and dry. No rashes, erythema, diaphoresis.   Psych: Normal mood and affect. Calm, cooperative    Labs:  Lab Results   Component Value Date    HGB 9.8 (L) 10/07/2024    WBC 10.5 10/05/2024    .0 10/05/2024     10/05/2024    K 4.0 10/05/2024    CREATSERUM 0.87 10/05/2024    AST 53 (H) 10/04/2024    ALT 56 (H) 10/04/2024    TROP <0.045 02/09/2021            pantoprazole  20 mg Oral QAM AC    amLODIPine  10 mg Oral Daily    atorvastatin  10 mg Oral Nightly    levothyroxine  88 mcg Oral Daily @ 0700    docusate sodium  100 mg Oral BID    multivitamin  1 tablet Oral Daily    enoxaparin  40 mg Subcutaneous Daily    acetaminophen  1,000 mg Oral Q8H JOSE    insulin aspart  1-7 Units Subcutaneous TID CC       ondansetron    hydrALAzine    zolpidem    influenza virus vaccine PF    polyethylene glycol (PEG 3350)    magnesium hydroxide    bisacodyl    fleet enema    oxyCODONE **OR** oxyCODONE    HYDROmorphone **OR** HYDROmorphone    glucose **OR** glucose **OR** glucose-vitamin C **OR** dextrose **OR** glucose **OR** glucose **OR** glucose-vitamin C  **Certification      PHYSICIAN Certification of Need for Inpatient Hospitalization - Initial Certification    Patient will require inpatient services that will reasonably be expected to span two midnight's based on the clinical documentation in H+P.   Based on patients current state of illness, I anticipate that, after discharge, patient will require TBD.

## 2024-10-08 NOTE — PAYOR COMM NOTE
--------------  ADMISSION REVIEW   10/5-10/7  Payor: OSMAN GONZALEZ O  Subscriber #:  [id number]  Authorization Number: [id number]    Admit date: 10/5/24  Admit time: 12:19 AM       REVIEW DOCUMENTATION:  ED Provider Notes signed by Ankit Segura MD at 10/7/2024  5:59 PM    Patient Seen in: [facility] Emergency Department    History     Chief Complaint   Patient presents with    Fall       HPI    History is provided by patient/independent historian: patient, EMS  74 year old female with history of diabetes, hypertension, hyperlipidemia, here with complaints of left-sided hip pain after a trip and fall on the sidewalk.  Positive head injury.  No loss of consciousness.  She does take aspirin.  No numbness.  EMS reports she was walking on the leg initially after the fall.    History reviewed.   Past Medical History:    Acute respiratory failure with hypoxia (HCC)    Elective     Esophageal reflux    Fatty liver    High cholesterol    Hypothyroid    Hypothyroidism    ON GENERIC SYNTHROID    Lipid screening    per NG    MVP (mitral valve prolapse)    antibiotic prophylaxis w/ procedures    Other and unspecified hyperlipidemia    Post-menopausal bleeding    Type II or unspecified type diabetes mellitus without mention of complication, not stated as uncontrolled    Unspecified essential hypertension       ROS  Review of Systems   Respiratory:  Negative for shortness of breath.    Cardiovascular:  Negative for chest pain.   Musculoskeletal:  Positive for arthralgias and gait problem.   All other systems reviewed and are negative.     All other pertinent organ systems are reviewed and are negative.      Physical Exam     ED Triage Vitals   BP 10/04/24 1949 (!) 168/80   Pulse 10/04/24 1946 107   Resp 10/04/24 1946 20   Temp 10/04/24 1949 98.8 °F (37.1 °C)   Temp src 10/04/24 1949 Temporal   SpO2 10/04/24 1946 96 %   O2 Device 10/04/24 1946 None (Room air)     Vital signs reviewed.      Physical Exam  Vitals and  nursing note reviewed.   Neck:      Comments: No C-spine tenderness  Cardiovascular:      Pulses: Normal pulses.   Pulmonary:      Effort: No respiratory distress.   Abdominal:      General: There is no distension.   Musculoskeletal:      Comments: Left lower extremity shortened and externally rotated, tenderness with range of motion of left hip, left knee and ankle unremarkable, left wrist nontender to palpation, no external injuries, 2+ radial pulse   Neurological:      Mental Status: She is alert.     Labs Reviewed   CBC WITH DIFFERENTIAL WITH PLATELET - Abnormal; Notable for the following components:       Result Value    RDW-SD 46.9 (*)     RDW 15.4 (*)     Lymphocyte Absolute 4.15 (*)     All other components within normal limits   COMP METABOLIC PANEL (14) - Abnormal; Notable for the following components:    Glucose 138 (*)     Creatinine 1.08 (*)     eGFR-Cr 55 (*)     ALT 56 (*)     AST 53 (*)     Albumin 5.3 (*)     All other components within normal limits   TYPE AND SCREEN    Narrative:     The following orders were created for panel order Type and screen.                  Procedure                               Abnormality         Status                                     ---------                               -----------         ------                                     ABORH (Blood Type)[[id number]]                               In process                                 Antibody Screen[[id number]]                                  In process                                                   Please view results for these tests on the individual orders.   ABORH (BLOOD TYPE)   ANTIBODY SCREEN   RAINBOW DRAW LAVENDER   RAINBOW DRAW LIGHT GREEN   RAINBOW DRAW BLUE         My EKG Interpretation:   My interpretation of EKG: rate 109, rhythm sinus, axis left, no acute ST changes  Interpretation: abnormal for rate, normal for rhythm    As reviewed and Interpreted by me      Imaging Results Available and  Reviewed while in ED:   CT BRAIN OR HEAD (CPT=70450)    Result Date: 10/4/2024  CONCLUSION:   No acute fracture or intracranial hemorrhage.  Small nonspecific 1.2 cm lucent lesion of the left frontal calvarium.  Further assessment can be made with myomatous workup, including laboratory values.    Dictated by (CST): Ramila Ontiveros MD on 10/04/2024 at 9:45 PM     Finalized by (CST): Ramila Ontiveros MD on 10/04/2024 at 9:50 PM          XR HIP W OR WO PELVIS 2 OR 3 VIEWS, LEFT (CPT=73502)    Result Date: 10/4/2024  PROCEDURE: XR HIP W OR WO PELVIS 2 OR 3 VIEWS, LEFT (CPT=73502)  COMPARISON: None.  INDICATIONS: Left hip post accidental fall today.  TECHNIQUE: 3. views were obtained.   FINDINGS/conclusions:   There is an acute moderately angulated transcervical fracture of the proximal left femur.  There is moderate apex lateral angulation of the fracture.  There is mild anterior displacement of the distal femur in relation to the femoral head.  The femoral acetabular alignment is maintained.  There is partially imaged multilevel degenerative disease of the spine.  Right hip is unremarkable on a single view.    Dictated by (CST): Ramila Ontiveros MD on 10/04/2024 at 9:07 PM     Finalized by (CST): Ramila Ontiveros MD on 10/04/2024 at 9:08 PM         My review and independent interpretation of CT, XR images: L hip fracture. Radiology report corroborates this in addition to other details as reported by them.      Decision rules/scores evaluated: none      Diagnostic labs/tests considered but not ordered: none    ED Medications Administered:   Medications   morphINE PF 4 MG/ML injection 4 mg (4 mg Intravenous Given 10/4/24 1953)   morphINE PF 4 MG/ML injection 4 mg (4 mg Intravenous Given 10/4/24 2133)                MDM       Medical Decision Making      Differential Diagnosis: After obtaining the patient's history, performing the physical exam and reviewing the diagnostics, multiple initial diagnoses were considered  based on the presenting problem including fracture, dislocation, contusion, ICH    External document review: I personally reviewed available external medical records for any recent pertinent discharge summaries, testing, and procedures - the findings are as follows: 24 visit with Dr. Young for DM    Complicating Factors: The patient already  has a past medical history of Acute respiratory failure with hypoxia (HCC) (2021), Elective , Esophageal reflux, Fatty liver, High cholesterol, Hypothyroid, Hypothyroidism, Lipid screening (2014), MVP (mitral valve prolapse), Other and unspecified hyperlipidemia, Post-menopausal bleeding, Type II or unspecified type diabetes mellitus without mention of complication, not stated as uncontrolled, and Unspecified essential hypertension. to contribute to the complexity of this ED evaluation.    Procedures performed: none    Discussed management with physician/appropriate source: Dr. Tolbert, Dr. Melton    Considered admission/deescalation of care for: none    Social determinants of health affecting patient care: none    Prescription medications considered: discussed continuing current medication regimen    The patient requires continuous monitoring for: hip pain after fall    Shared decision making: discussed possible admission        Disposition and Plan     Clinical Impression:  1. Closed transcervical fracture of left femur, initial encounter ([facility])    2. Fall, initial encounter        Disposition:  Admit    Hospital Problems       Present on Admission  Date Reviewed: 2024            ICD-10-CM Noted POA    * (Principal) Closed transcervical fracture of left femur, initial encounter ([facility]) S72.032A 10/4/2024 Unknown                10/4 H&P      Chief Complaint   Patient presents with    Fall         History of Present Illness:  Mary Marmolejo is a(n) 74 year old female, with a past medical history significant for diabetes hypertension and  hypothyroidism presented to the ER complaining of left hip pain.  Patient had a fall when she tripped on the sidewalk landed on her left hip, was able to get up and move with some difficulty, however came to the ER when pain worsened and was unable to bear weight any longer.  Rates her pain as an 8 out of 10 at its worst aggravated by movement with no relieving factors.  Denies any significant bruising, numbness or tingling.  X-rays done indicated presence of left femur neck fracture.  Denies any recent chest pain palpitations shortness of breath focal numbness weakness or tingling       Constitutional:  Weakness, Fatigue.       Displaced left femur neck fracture  Orthopedics consulted, patient will remain on bedrest for now use morphine as needed for pain.  Will remain n.p.o. considering possible replacement in AM.  Patient with medically acceptable risk factors for urgent surgery.     Type 2 diabetes with associated nephropathy  Blood sugars well-controlled at this time, hold oral hypoglycemics for now we will use sliding scale coverage as needed.  Last A1c 6.3     Essential hypertension  Resume home meds, will use IV hydralazine for uncontrolled pressures.     Prophylaxis  SCDs, heparin on hold till patient evaluated by orthopedics     CODE STATUS  Full     Primary care physician  Ondina Pride MD     60 minutes spent on this admission - examining patient, obtaining history, reviewing previous medical records, going over test results/imaging and discussing plan of care. All questions answered.      Disposition  Clinical course will dictate outcome          10/5 Ortho    REASON FOR CONSULT: Left hip fracture  CONSULTING PROVIDER: Dr. Segura     HISTORY OF PRESENT ILLNESS: Mary Marmolejo is a 74 year old female who presents through consultation to the Orthopaedic surgery service for left hip pain after a ground-level fall when she tripped while walking on the sidewalk.  She reports immediate pain and  difficulty bearing weight after the fall.  Pain is isolated to the left hip.  She is now unable to bear weight on the left side though initially she was bearing weight.  She denies any other injuries.  She denies any presyncopal events.  She denies neurologic symptoms in her left lower extremity.  Pain increases to 10 out of 10 with muscle spasms and movement.  It is typically variable between 6-8 out of 10.  It is improved with being as mobile as possible and her pain medications.  She is typically a community ambulator without an assist device and highly active.  She had no antecedent hip pain.      IMPRESSION/RECOMMENDATIONS: Mary Marmolejo is a 74 year old female who presents through consultation to the Orthopaedic surgery service for a left hip displaced femoral neck fracture after a ground-level fall. Mary Marmolejo  meets appropriate indications to undergo surgical intervention for the above diagnosis and this option was discussed in detail at bedside. After discussion of risks, benefits, and alternatives, they elected to proceed with surgery as planned and a witnessed consent form was signed. The operative site was marked with an indelible marker. They were offered the opportunity to ask all additional questions, which were answered to their satisfaction.     NPO for OR  Hold DVT chemoprophylaxis until POD1  Surgery planned: Left hip cemented bipolar hemiarthroplasty  DISPO: Pending OR, post-op pain control, PT/OT  MOBILITY: Bedrest preop, OOBTC postop  PRE-OP WEIGHT BEARING STATUS: Nonweightbearing  POST-OP WEIGHT BEARING PLAN: Weightbearing as tolerated  IMAGING ORDERED: None  POST-OP ABX: 24H Anef          10/5      Pre-Operative Diagnosis: Hip fracture (HCC) [S72.009A]     Post-Operative Diagnosis: Hip fracture (HCC) [S72.009A]      Procedure Performed:   LEFT HIP HEMIARTHROPLASTY     Surgeons and Role:     * Eliel Tolbert MD - Primary     Assistant(s):  Surgical Assistant.:  Linda Farley     Surgical Findings: left femoral neck fracture     Specimen: None     Estimated Blood Loss: 100cc                   10/5 IM    Assessment & Plan:  ----------------------------------  Hip fracture, acute.  Left. Location is femoral neck. Contributing factors include age.  Pain is controlled.  Status post hemiarthroplasty 10/5  -Orthopedic consultation appreciated  -Surgery: Hemiarthroplasty 10/5  -Pain control  -PT/OT  -Will likely need rehab placement     Other problems  Type 2 diabetes  Hypertension      dispo: to be determined  If applicable, malnutrition status at bottom of note     I personally reviewed the available laboratories, imaging including. I discussed/will discuss the case with consultants. I ordered laboratories and/or radiographic studies. I adjusted medications as detailed above.  Medical decision making high, risk is high.           Subjective:  ----------------------------------  Seen in PACU, somnolent.      Component Value Date     HGB 10.9 (L) 10/05/2024     WBC 10.5 10/05/2024     .0 10/05/2024      10/05/2024     K 4.0 10/05/2024               10/6 Ortho    HISTORY OF PRESENT ILLNESS: Mary Marmolejo is a 74 year old female who presented as a consult to the Orthopaedic Surgery service for left displaced femoral neck fracture.  She underwent left hip cemented hemiarthroplasty on 10/5/2024.      S/24H EVENTS: Pain fairly well controlled overnight. Denies any overnight issues. Tolerating po fluids. Denies fevers, chills, chest pain, dyspnea.  Reports left wrist pain improving.        IMPRESSIONS/RECOMMENDATIONS: Mary Marmolejo is a 74 year old female who presents as a consult to the Orthopaedic surgery service for left femoral neck fracture.  She is now postoperative day 1 after left hip cemented bipolar hemiarthroplasty.  She is doing well from this.  She also sustained a left wrist sprain.  Would consider removable wrist brace for this      Discussed the history, physical exam, treatment to date, and reviewed relevant imaging an studies with the patient.  SURGICAL PLANS: No further operative plans  ABX: 24 hours postoperative  PAIN: Continue to wean/titrate to an appropriate oral regimen  DIET: Regular/diabetic  DVT PPX: SCDs, ambulation, chemoppx 30 days postop, lovenox or ASA okay  DISPO: Pending PT/OT  MOBILITY: OOBTC  WEIGHT BEARING STATUS: Weightbearing as tolerated  RANGE-OF-MOTION LIMITATIONS:  Posterior hip precautions  IMAGING: No additional imaging necessary          10/6        Assessment & Plan:  ----------------------------------  Hip fracture, acute.  Left. Location is femoral neck. Contributing factors include age.  Pain is controlled.  Status post hemiarthroplasty 10/5  -Orthopedic consultation appreciated  -Surgery: Hemiarthroplasty 10/5  -Pain control  -PT/OT  -Will likely need rehab placement     Anemia, acute blood loss.  Anticipated for this type of surgery.  Hemodynamically stable. Ongoing bleeding not present.  -Transfuse to keep hemoglobin above 7  -Recheck hemoglobin am  -Antiplatelets, anticoagulation on hold     Other problems  Type 2 diabetes  Hypertension      dispo: to be determined  If applicable, malnutrition status at bottom of note     I personally reviewed the available laboratories, imaging including. I discussed/will discuss the case with consultants. I ordered laboratories and/or radiographic studies. I adjusted medications as detailed above.  Medical decision making high, risk is high.           Subjective:  ----------------------------------  Walking with assistance.  Pain is moderate.  No chest pain or shortness of breath.      Component Value Date     HGB 8.6 (L) 10/06/2024               10/7        Assessment & Plan:  ----------------------------------  Hip fracture, acute.  Left. Location is femoral neck. Contributing factors include age.  Pain is controlled.  Status post hemiarthroplasty  10/5  -Orthopedic consultation appreciated  -Surgery: Hemiarthroplasty 10/5  -Pain control  -PT/OT  -HH vs YUDITH     Anemia, acute blood loss.  Anticipated for this type of surgery.  Hemodynamically stable. Ongoing bleeding not present. Hgb now stable  -Transfuse to keep hemoglobin above 7  -Recheck hemoglobin if clinical change     Other problems  Type 2 diabetes  Hypertension      dispo: to be determined. Poss home with HH tomorrow  If applicable, malnutrition status at bottom of note     I personally reviewed the available laboratories, imaging including. I discussed/will discuss the case with consultants. I ordered laboratories and/or radiographic studies. I adjusted medications as detailed above.  Medical decision making high, risk is high.    Component Value Date     HGB 9.8 (L) 10/07/2024     Medications 10/02/24 10/03/24 10/04/24 10/05/24 10/06/24 10/07/24 10/08/24   acetaminophen (Ofirmev) 10 mg/mL infusion premix 1,000 mg  Dose: 1,000 mg  Freq: Once Route: IV  Start: 10/05/24 1230 End: 10/05/24 1404   Admin Instructions:   Should only be given if patient is NPO and not taking any other orals   Order specific questions:          1230     1404-D/C'd         acetaminophen (Tylenol Extra Strength) tab 1,000 mg  Dose: 1,000 mg  Freq: Every 8 hours scheduled Route: OR  Start: 10/05/24 1415       (1415 SS)-Not Given [C]     2114 ZN-Given      0532 ZN-Given     1344 SP-Given     2117 LN-Given      0624 LN-Given     1318 SP-Given     2129 LN-Given      0626 AS-Given     1400     2200        amLODIPine (Norvasc) tab 10 mg  Dose: 10 mg  Freq: Daily Route: OR  Start: 10/05/24 0930       (0930 SS)-Not Given     0956 UE-MAR Hold     1404 MW-MAR Unhold      1004 SP-Given      0925 SP-Given      0808 LF-Given        atorvastatin (Lipitor) tab 10 mg  Dose: 10 mg  Freq: Nightly Route: OR  Start: 10/05/24 0030       0126 ZN-Given     0956 UE-MAR Hold     1404 MW-MAR Unhold     2115 ZN-Given      2117 LN-Given      2129  LN-Given      2100        ceFAZolin (Ancef) 2g in 10mL IV syringe premix  Dose: 2 g  Freq: Every 8 hours Route: IV  Last Dose: 2 g (10/06/24 0153)  Start: 10/05/24 1800 End: 10/06/24 0158   Order specific questions:          1737 SS-New Bag      0153 ZN-New Bag          ceFAZolin (Ancef) 2g in 10mL IV syringe premix  Dose: 2 g  Freq: Once Route: IV  Start: 10/05/24 1015 End: 10/05/24 1037   Order specific questions:          1037 TK-Given           clonidine-EPINEPHrine-ropivacaine-ketorolac (CERTS) (Duraclon-Adrenalin-Naropin-Toradol) pain cocktail irrigation  Freq: Once (Intra-Op) Route: IX  Start: 10/05/24 0945 End: 10/05/24 1255   Admin Instructions:   For irrigation only       0945 1255-D/C'd         docusate sodium (Colace) cap 100 mg  Dose: 100 mg  Freq: 2 times daily Route: OR  Start: 10/05/24 1415   Admin Instructions:   Do not crush       (1415 SS)-Not Given [C]     2115 ZN-Given      1004 SP-Given     2117 LN-Given      0924 SP-Given     2128 LN-Given      0808 LF-Given     2100        enoxaparin (Lovenox) 40 MG/0.4ML SUBQ injection 40 mg  Dose: 40 mg  Freq: Daily Route: SC  Start: 10/06/24 0045   Admin Instructions:   Only administer Enoxaparin subcutaneously into the abdomen unless directed otherwise by a physician. Alternate injection sites between the left and right anterolateral and left and right posterolateral abdominal wall.        0153 ZN-Given     2118 LN-Given      2130 LN-Given      2100        insulin aspart (NovoLOG) 100 Units/mL FlexPen 1-7 Units  Dose: 1-7 Units  Freq: 3 times daily with meals Route: SC  Start: 10/05/24 1700   Admin Instructions:   CORRECTION FACTOR - MEDIUM DOSE  Continue to give correction insulin even if NPO  DO NOT HOLD OR ALTER INSULIN DOSE WITHOUT A PHYSICIAN ORDER    Give 1 unit for blood glucose 150-180 mg/dL  Give 2 units for blood glucose 181-210 mg/dL  Give 3 units for blood glucose 211-240 mg/dL  Give 4 units for blood glucose 241-270 mg/dL  Give 5 units  for blood glucose 271-300 mg/dL  Give 6 units for blood glucose 301-330 mg/dL  Give 7 units for blood glucose 331-360 mg/dL  Call physician if blood glucose is greater than 360 mg/dL with time and last dose of correction insulin given.       (1700 SS)-Not Given      0753 SP-Given     1341 SP-Given     1713 SP-Given      0925 SP-Given [C]     1231 SP-Given     1710 SP-Given      0802 LF-Given     1200     1700        levothyroxine (Synthroid) tab 88 mcg  Dose: 88 mcg  Freq: Daily at 0700 Route: OR  Start: 10/05/24 0700   Admin Instructions:   Give on an empty stomach. Hold tube feedings 1 hour before AND after.       0700 ZN-Hold     0956 UE-MAR Hold     1404 MW-MAR Unhold      0532 ZN-Given      0624 LN-Given      0626 AS-Given        morphINE PF 4 MG/ML injection 4 mg  Dose: 4 mg  Freq: Once Route: IV  Start: 10/04/24 2329 End: 10/04/24 2340      2340 YM-Given            morphINE PF 4 MG/ML injection 4 mg  Dose: 4 mg  Freq: Once Route: IV  Start: 10/04/24 2241 End: 10/04/24 2244      2244 VR-Given            morphINE PF 4 MG/ML injection 4 mg  Dose: 4 mg  Freq: Once Route: IV  Start: 10/04/24 2101 End: 10/04/24 2133      2133 VR-Given            morphINE PF 4 MG/ML injection 4 mg  Dose: 4 mg  Freq: Once Route: IV  Start: 10/04/24 1951 End: 10/04/24 1953      1953 VR-Given            multivitamin (Tab-A-Iris/Beta Carotene) tab 1 tablet  Dose: 1 tablet  Freq: Daily Route: OR  Start: 10/06/24 0930   Admin Instructions:   Start POD#1        1004 SP-Given      0924 SP-Given      0808 LF-Given        pantoprazole (Protonix) DR tab 20 mg  Dose: 20 mg  Freq: Every morning before breakfast Route: OR  Start: 10/06/24 0700   Admin Instructions:   Do not crush        0543 ZN-Given      0624 LN-Given      0626 AS-Given            sodium chloride 0.45% infusion  Rate: 83 mL/hr  Freq: Continuous Route: IV  Start: 10/05/24 0030       0123 ZN-New Bag      0534 ZN-New Bag               Or   HYDROmorphone (Dilaudid) 1 MG/ML injection  0.4 mg  Dose: 0.4 mg  Freq: Every 2 hour PRN Route: IV  PRN Reason: severe pain  Start: 10/05/24 0033 End: 10/05/24 1622   Admin Instructions:   Use PRN reason as a guide and follow range order policy. If oral pain meds are ordered and patient can tolerate oral intake, start with PRN oral pain medications first.       0124 ZN-Given     0359 ZN-Given     0602 ZN-Given     0844 SS-Given     0956 UE-MAR Hold     1404 MW-MAR Unhold     1622-D/C'd              ondansetron (Zofran) 4 MG/2ML injection 4 mg  Dose: 4 mg  Freq: Every 6 hours PRN Route: IV  PRN Reason: Nausea  Start: 10/05/24 0020       0410 ZN-Given     0956 UE-MAR Hold     1404 MW-MAR Unhold                         Or   oxyCODONE immediate release tab 5 mg  Dose: 5 mg  Freq: Every 4 hours PRN Route: OR  PRN Reason: severe pain  Start: 10/05/24 1404   Admin Instructions:   Use PRN reason as a guide and follow range order policy       1706 SS-Given     2114 ZN-Given      0153 ZN-Given     0532 ZN-Given     1004 SP-Given     1344 SP-Given     1716 SP-Given     2117 LN-Given      0435 LN-Given     0920 SP-Given     1318 SP-Given     1710 SP-Given     2129 LN-Given      0625 AS-Given     1042 LF-Given        polyethylene glycol (PEG 3350) (Miralax) 17 g oral packet 17 g  Dose: 17 g  Freq: DAILY PRN Route: OR  PRN Reason: constipation  Start: 10/05/24 1404   Admin Instructions:   Use prior to Milk of Magnesia, Dulcolax or Fleet's Enema  1 packet=17gm=1 heaping tablespoon; Dissolve in 8 oz of water         0932 SP-Given         povidone-iodine (Betadine) 10 % 17.5 mL in sodium chloride 0.9 % 500 mL irrigation  Freq: As needed  Start: 10/05/24 1121 End: 10/05/24 1255       1121 BH-Given [C]     1255-D/C'd             zolpidem (Ambien) tab 5 mg  Dose: 5 mg  Freq: Nightly PRN Route: OR  PRN Reason: Sleep  Start: 10/05/24 0020       0358 ZN-Return to Cabinet     0956 UE-MAR Hold     1404 MW-MAR Unhold     2115 ZN-Given      2120 LN-Given      2129 LN-Given             10/08/24 0742 97.9 °F (36.6 °C) 101 18 132/66 92 % -- None (Room air) -- NT   10/08/24 0513 97.8 °F (36.6 °C) 100 18 124/62 93 % -- None (Room air) -- DM   10/07/24 2019 97.9 °F (36.6 °C) 120 18 136/71 90 % -- None (Room air) -- DM   10/07/24 1545 97.3 °F (36.3 °C) 107 18 104/61 94 % -- None (Room air) -- SP   10/07/24 0724 98.3 °F (36.8 °C) 100 18 136/64 95 % -- None (Room air) -- SS   10/07/24 0351 99.3 °F (37.4 °C) 112 18 135/81 92 % -- None (Room air) -- DG   10/06/24 1953 99.1 °F (37.3 °C) 109 18 128/72 92 % -- None (Room air) -- CH   10/06/24 0732 98.2 °F (36.8 °C) 94 18 119/87 97 % -- None (Room air) -- NM   10/06/24 0420 -- -- -- -- 93 % -- None (Room air) -- KT   10/06/24 0408 99.3 °F (37.4 °C) 91 16 119/73 95 % -- Nasal cannula 2 L/min SH   10/06/24 0004 98.7 °F (37.1 °C) 87 16 108/65 96 % -- Nasal cannula 2 L/min    10/05/24 2000 -- 88 16 123/69 97 % -- Nasal cannula 2 L/min SH   10/05/24 1702 98.5 °F (36.9 °C) 101 16 134/84 95 % -- None (Room air) -- MH   10/05/24 1353 97.8 °F (36.6 °C) 95 16 140/61 93 % -- Nasal cannula 2 L/min MH   10/05/24 1340 97.8 °F (36.6 °C) 90 18 -- 99 % -- Nasal cannula 2 L/min ES   10/05/24 1330 -- 94 18 139/79 96 % -- Nasal cannula 2 L/min ES   10/05/24 1320 -- 99 13 128/92 Abnormal  99 % -- Nasal cannula 2 L/min ES   10/05/24 1310 -- 69 19 110/52 96 % -- Nasal cannula 2 L/min ES   10/05/24 1300 -- 76 14 123/57 92 % -- Nasal cannula 2 L/min ES   10/05/24 1250 97.6 °F (36.4 °C) 85 14 115/58 93 % -- Nasal cannula 2 L/min ES   10/05/24 1006 -- 100 18 144/63 96 % -- None (Room air) -- EM   10/05/24 0800 98.2 °F (36.8 °C) 89 16 119/66 96 % -- None (Room air) -- MH   10/05/24 0353 98.3 °F (36.8 °C) 102 18 136/76 97 % -- None (Room air) -- ZN   10/05/24 0029 98.2 °F (36.8 °C) 110 18 142/88 95 % -- None (Room air) -- ZN   10/05/24 0028 -- -- -- -- -- 139 lb (63 kg) -- -- ZN   10/04/24 2315 -- 104 -- 151/69 92 % -- None (Room air) -- YM   10/04/24 2230 -- 99 -- 140/78 93 % --  None (Room air) -- VR   10/04/24 2145 -- 105 -- 146/75 95 % -- None (Room air) -- VR   10/04/24 2045 -- 103 -- 152/69 95 % -- None (Room air) -- VR   10/04/24 2001 -- 101 -- 140/66 95 % -- -- -- VR   10/04/24 1949 98.8 °F (37.1 °C) -- -- 168/80 Abnormal  -- -- -- -- VR   10/04/24 1946 -- 107 20 -- 96 % -- None (Room air) -- VR   10/04/24 1943 -- -- -- -- -- 138 lb (62.6 kg) -- -- VR

## 2024-10-08 NOTE — HOME CARE LIAISON
Received referral via [facility] for Home Health services. Spoke w/ patient at the bedside along with spouse and sister and is agreeable for home health care services . Updated AVS with contact information

## 2024-10-09 ENCOUNTER — PATIENT OUTREACH (OUTPATIENT)
Dept: CASE MANAGEMENT | Age: 74
End: 2024-10-09

## 2024-10-09 ENCOUNTER — TELEPHONE (OUTPATIENT)
Dept: INTERNAL MEDICINE CLINIC | Facility: CLINIC | Age: 74
End: 2024-10-09

## 2024-10-09 DIAGNOSIS — Z02.9 ENCOUNTERS FOR UNSPECIFIED ADMINISTRATIVE PURPOSE: Primary | ICD-10-CM

## 2024-10-09 PROCEDURE — 1111F DSCHRG MED/CURRENT MED MERGE: CPT

## 2024-10-09 NOTE — PROGRESS NOTES
NCM spoke with patient states is getting ready for HH nurse and PT. NCM will try calling back later after 12:30pm.

## 2024-10-09 NOTE — TELEPHONE ENCOUNTER
FW: Inpatient Discharge  Received: Today  Ondina Pride MD  P Em Rn Triage  Patient is seeing Dr. Fajardo, please ask her to call her insurance to change PCP to Dr. Fajardo as I have never seen her--ty

## 2024-10-09 NOTE — PAYOR COMM NOTE
Discharge Notification    Patient Name: JIMMIE KOWALSKI  Payor[place] [place] [facility]scriber #: [id number]  Authorization Number: [id number]  Admit Date/Time: 10/4/2024 7:41 PM  Discharge Date/Time: 10/8/2024 8:30 PM

## 2024-10-10 ENCOUNTER — TELEPHONE (OUTPATIENT)
Dept: ORTHOPEDICS CLINIC | Facility: CLINIC | Age: 74
End: 2024-10-10

## 2024-10-10 ENCOUNTER — TELEPHONE (OUTPATIENT)
Dept: INTERNAL MEDICINE CLINIC | Facility: CLINIC | Age: 74
End: 2024-10-10

## 2024-10-10 DIAGNOSIS — S72.032A: Primary | ICD-10-CM

## 2024-10-10 NOTE — PROGRESS NOTES
Transitions of Care Navigation  Discharge Date: 10/8/24  Contact Date: 10/10/2024    Transitions of Care Assessment:  GRAYSON Initial Assessment    General:  Assessment completed with: Patient  Patient Subjective: NCM spoke with patient states is feeling much better today. Patient states was constipated but has been able to have bowel movements this morning. Patient reports hip pain at 4/10, is taking oxycodone two times a day. Patient is also taking a stool softener. Patient is staying with her sister and gets around at home with her help. She continues to have left lower extremity swelling. Patient states  nurse and PT visited yesterday. She states her blood pressure and FBS were normal. Her FBS this morning at 130. Patient denies any fevers, chills, nausea, vomiting, shortness of breath, or chest pain.  Chief Complaint: Closed transcervical fracture of left femur, initial encounter ([facility])  Verify patient name and  with patient/ caregiver: Yes    Hospital Stay/Discharge:  Tell me what you understand of why you were in the hospital or emergency department: patient had a fall and tripped on the sidewarl landing on her left hip.  Prior to leaving the hospital were your Discharge Instructions reviewed with you?: Yes  Did you receive a copy of your written Discharge Instructions?: Yes  What questions do you have about your Discharge Instructions?: Patient has no questions at this time.  Do you feel better or worse since you left the hospital or emergency department?: Better    Follow - Up Appointment:  Do you have a follow-up appointment?: No  Are there any barriers to getting to your follow-up appointment?: No    Home Health/DME:  Prior to leaving the hospital was Home Health (HH) arranged for you?: Yes  Has HH been out or set up a visit to see you?: Been out     Prior to leaving the hospital or emergency department was Durable Medical Equipment (DME), medical supplies, or infusions arranged for you?: N/A  Are  DME/medical supply/infusions needs identified by staff during this assessment?: No     Medications/Diet:  Did any of your medications change, during or after your hospital stay or ED visit?: Yes  Do you have your new or updated medications?: Yes  Do you understand what your medications are for and possible side effects?: Yes  Are there any reasons that keep you from taking your medication as prescribed?: No  Any concerns about medication refills?: No    Were you given a different diet per your Discharge Instructions?: No  Reason: N/A     Questions/Concerns:  Do you have any questions or concerns that have not been discussed?: No   GRAYSON Follow-up Assessment    General:  Assessment completed with: Patient  Patient Subjective: NCM spoke with patient states is feeling much better today. Patient states was constipated but has been able to have bowel movements this morning. Patient reports hip pain at 4/10, is taking oxycodone two times a day. Patient is also taking a stool softener. Patient is staying with her sister and gets around at home with her help. She continues to have left lower extremity swelling. Patient states  nurse and PT visited yesterday. She states her blood pressure and FBS were normal. Her FBS this morning at 130. Patient denies any fevers, chills, nausea, vomiting, shortness of breath, or chest pain.  Chief Complaint: Closed transcervical fracture of left femur, initial encounter ([facility])  Community Resources: Home Health ([facility])      Nursing Interventions:  All discharge instructions reviewed with the patient. Reviewed when to call MD vs when to call 911 or go the ED. Educated patient on the importance of taking all meds as prescribed as well as close f/u with PCP/specialists. Pt verbalized understanding and will contact the office with any further questions or concerns. Patient denies fevers, chills, nausea, vomiting, shortness of breath, chest pain, or any other symptoms at this time.  NCM attempted to  schedule HFU, however no availability with PCP. NC sent TE to PCP office re: assistance in scheduling HFU appt. NCM provided contact information for any further questions/concerns. Patient verbalized understanding and agreeable.         Medications:Reviewed medication list.  Medications are up to date.  Current Outpatient Medications   Medication Sig Dispense Refill    aspirin 81 MG Oral Tab Take 1 tablet (81 mg total) by mouth in the morning and 1 tablet (81 mg total) before bedtime. take 1 tablet (81MG)  by oral route  every day. 60 tablet 0    docusate sodium 100 MG Oral Cap Take 100 mg by mouth 2 (two) times daily. 60 capsule 0    oxyCODONE 5 MG Oral Tab Take 1 tablet (5 mg total) by mouth every 4 (four) hours as needed. 30 tablet 0    Naloxone HCl 4 MG/0.1ML Nasal Liquid 4 mg by Nasal route as needed. If patient remains unresponsive, repeat dose in other nostril 2-5 minutes after first dose. 1 kit 0    Cholecalciferol (VITAMIN D) 1000 units Oral Cap Take 1,000 Units by mouth daily.      Dulaglutide (TRULICITY) 3 MG/0.5ML Subcutaneous Solution Pen-injector Inject 3 mg into the skin once a week. ADMINISTER 3 MG UNDER THE SKIN 1 TIME A WEEK 6 mL 1    glimepiride 2 MG Oral Tab TAKE 1 TABLET BY MOUTH WITH BREAKFAST AND ONE-HALF TABLET WITH DINNER 135 tablet 1    metFORMIN  MG Oral Tablet 24 Hr Take 2 tablets (1,000 mg total) by mouth 2 (two) times daily with meals. 360 tablet 3    pantoprazole 20 MG Oral Tab EC Take 1 tablet (20 mg total) by mouth before breakfast. 90 tablet 3    atorvastatin 10 MG Oral Tab Take 1 tablet (10 mg total) by mouth nightly. 90 tablet 3    trandolapril 4 MG Oral Tab Take 2 tablets (8 mg total) by mouth daily. 180 tablet 3    amLODIPine 10 MG Oral Tab Take 1 tablet (10 mg total) by mouth daily. 90 tablet 3    Glucose Blood (TRUE METRIX BLOOD GLUCOSE TEST) In Vitro Strip USE AS DIRECTED TO CHECK BLOOD GLUCOSE 2 TIMES DAILY 200 strip 3    TRUEplus Lancets 33G Does not apply Misc Check  sugars twice a day 200 each 3    levothyroxine 88 MCG Oral Tab TAKE 1.5 TABLETS ON SUNDAY AND 1 TABLET BY MOUTH ALL OTHER DAYS 110 tablet 3    Calcium Carbonate-Vitamin D (CALCIUM 600-D OR)       Cyanocobalamin (VITAMIN B12) 500 MCG Oral Tab       Iron Glycinate 29 MG Oral Cap       meclizine 25 MG Oral Tab Take 1 tablet (25 mg total) by mouth 3 (three) times daily as needed for Dizziness. 40 tablet 2    TRUE METRIX LEVEL 1 Low In Vitro Solution USE AS DIRECTED 1 each 0    folic acid 800 MCG Oral Tab          Diagnosis specifics:  Ortho Spine:  Has there been a change in your incision/wound since d/c?  no   If Yes: Do you see an increase in drainage, swelling, and or hot to touch? No  Are you following your exercise program (exercises for total joints and walking only for spine surgery)  yes   When you were in the hospital after your surgery, were you offered anything other than medications to help with your pain management?  yes  Do you remember what this might have been?         (put X by all the patient mentions:         x  repositioning            sleep/relaxation kit              music           TV         I-pad                       reading          x   Cold-pack      other__________________________)   Did you understand when to take your pain medication at home? yes  On a scale of 1-5   1- Very Poor and 5- Very well   5  Comments:  Was it clear how to use less narcotic pain medication as your pain decreased at home? yes   On a scale of 1-5   1- Very Poor/unclear and 5- Very well/clear  5  Comments:  Were you satisfied with the discharge process?  yes      Follow-up Appointments:  Your appointments       Date & Time Appointment Department ([place])    Nov 04, 2024 9:30 AM CST Follow Up Visit with Melodie Fajardo MD [facility] ([facility])        Jan 31, 2025 9:45 AM CST Follow Up Visit with Pili Young MD [facility],  [facility] ([facility])        May 05, 2025 8:30 AM CDT MA Supervisit with Melodie Fajardo MD [facility] ([facility])              [facility]  [address], [address]  [facility] [id number] [phone] [facility]  [address] [id number] [phone]            Transitional Care Clinic  Was TCC Ordered: No      Primary Care Provider (If no TCC appointment)  Does patient already have a PCP appointment scheduled? No  Nurse Care Manager Attempted to schedule PCP office TCM/GRAYSON appointment with patient   -If no appointment scheduled: Explain : no availability with PCP.     Specialist  Does the patient have any other follow-up appointment(s) need to be scheduled? Yes   -If yes: Nurse Care Manager reviewed upcoming specialist appointments with patient: Yes   -Does the patient need assistance scheduling appointment(s): No    [x]  Patient verbally agrees to additional follow-up calls from Nurse Care Manager    Book By Date: 10/15/24

## 2024-10-10 NOTE — TELEPHONE ENCOUNTER
I don't think patient has seen Dr Pride, not sure why its under her name?     Dr Fajardo please advise if patient can be added.

## 2024-10-10 NOTE — TELEPHONE ENCOUNTER
Per patient she had surgery 10/5 and needs a 10-14 day post op and wants to schedule in [place]. No appointment until 10/23. Please advise

## 2024-10-10 NOTE — TELEPHONE ENCOUNTER
NCM spoke with patient this morning for GRAYSON/HFU condition update. Patient is requesting a referral to see ortho, Dr. Tolbert, please advise. Thank you.

## 2024-10-10 NOTE — TELEPHONE ENCOUNTER
S/w patient and booked her at 2pm on 10/18/24 for post-op visit. She had no other questions at this time

## 2024-10-10 NOTE — TELEPHONE ENCOUNTER
Spoke to patient for Transitions of Care call today.  Patient does not have an appointment scheduled at this time.  TCM/GRAYSON appointment needed by 10/15/24.  Please advise.    BOOK BY DATE: 10/15/24    Clinical staff:  Please follow-up with patient and try to get them to schedule as patient would greatly benefit from TCM/GRAYSON.  Thank you!       NCM attempted to schedule pt for GRAYSON/HFU, however no availability with PCP. Please advise.

## 2024-10-10 NOTE — TELEPHONE ENCOUNTER
FYI: Dr Fajardo is fully booked patient needs to come and see doctor for TCM and a referral as soon as possible,  Please advise, Thank you.    Please reply to pool: EM CC IM FM ALG RHE

## 2024-10-10 NOTE — TELEPHONE ENCOUNTER
Dr. Fajardo,     Patient called requesting referral to Dr. Tolbert.     Pended referral please review diagnosis and sign off if you agree.    Thank you.  Patrizia Wilson  [facility]

## 2024-10-14 ENCOUNTER — LAB ENCOUNTER (OUTPATIENT)
Dept: LAB | Age: 74
End: 2024-10-14
Attending: STUDENT IN AN ORGANIZED HEALTH CARE EDUCATION/TRAINING PROGRAM
Payer: MEDICARE

## 2024-10-14 ENCOUNTER — OFFICE VISIT (OUTPATIENT)
Dept: FAMILY MEDICINE CLINIC | Facility: CLINIC | Age: 74
End: 2024-10-14
Payer: MEDICARE

## 2024-10-14 VITALS
WEIGHT: 141 LBS | SYSTOLIC BLOOD PRESSURE: 115 MMHG | DIASTOLIC BLOOD PRESSURE: 64 MMHG | TEMPERATURE: 99 F | HEART RATE: 94 BPM | HEIGHT: 66 IN | BODY MASS INDEX: 22.66 KG/M2

## 2024-10-14 DIAGNOSIS — E11.9 TYPE 2 DIABETES MELLITUS WITHOUT COMPLICATION, WITHOUT LONG-TERM CURRENT USE OF INSULIN (HCC): ICD-10-CM

## 2024-10-14 DIAGNOSIS — N39.3 STRESS INCONTINENCE OF URINE: Primary | ICD-10-CM

## 2024-10-14 DIAGNOSIS — Z12.31 ENCOUNTER FOR SCREENING MAMMOGRAM FOR MALIGNANT NEOPLASM OF BREAST: ICD-10-CM

## 2024-10-14 DIAGNOSIS — N39.3 STRESS INCONTINENCE OF URINE: ICD-10-CM

## 2024-10-14 DIAGNOSIS — Z96.649 S/P HIP HEMIARTHROPLASTY: ICD-10-CM

## 2024-10-14 DIAGNOSIS — R07.89 ATYPICAL CHEST PAIN: ICD-10-CM

## 2024-10-14 DIAGNOSIS — E11.65 TYPE 2 DIABETES MELLITUS WITH HYPERGLYCEMIA, WITHOUT LONG-TERM CURRENT USE OF INSULIN (HCC): ICD-10-CM

## 2024-10-14 LAB
ALBUMIN SERPL-MCNC: 4.8 G/DL (ref 3.2–4.8)
ALBUMIN/GLOB SERPL: 1.9 {RATIO} (ref 1–2)
ALP LIVER SERPL-CCNC: 87 U/L
ALT SERPL-CCNC: 30 U/L
ANION GAP SERPL CALC-SCNC: 8 MMOL/L (ref 0–18)
AST SERPL-CCNC: 42 U/L (ref ?–34)
BASOPHILS # BLD AUTO: 0.13 X10(3) UL (ref 0–0.2)
BASOPHILS NFR BLD AUTO: 0.9 %
BILIRUB SERPL-MCNC: 0.4 MG/DL (ref 0.2–1.1)
BILIRUB UR QL: NEGATIVE
BUN BLD-MCNC: 17 MG/DL (ref 9–23)
BUN/CREAT SERPL: 18.7 (ref 10–20)
CALCIUM BLD-MCNC: 10.4 MG/DL (ref 8.7–10.4)
CHLORIDE SERPL-SCNC: 105 MMOL/L (ref 98–112)
CLARITY UR: CLEAR
CO2 SERPL-SCNC: 25 MMOL/L (ref 21–32)
COLOR UR: YELLOW
CREAT BLD-MCNC: 0.91 MG/DL
DEPRECATED RDW RBC AUTO: 48.7 FL (ref 35.1–46.3)
EGFRCR SERPLBLD CKD-EPI 2021: 67 ML/MIN/1.73M2 (ref 60–?)
EOSINOPHIL # BLD AUTO: 0.23 X10(3) UL (ref 0–0.7)
EOSINOPHIL NFR BLD AUTO: 1.5 %
ERYTHROCYTE [DISTWIDTH] IN BLOOD BY AUTOMATED COUNT: 15.2 % (ref 11–15)
FASTING STATUS PATIENT QL REPORTED: NO
GLOBULIN PLAS-MCNC: 2.5 G/DL (ref 2–3.5)
GLUCOSE BLD-MCNC: 154 MG/DL (ref 70–99)
GLUCOSE UR-MCNC: NORMAL MG/DL
HCT VFR BLD AUTO: 29.2 %
HGB BLD-MCNC: 9.3 G/DL
HGB UR QL STRIP.AUTO: NEGATIVE
HYALINE CASTS #/AREA URNS AUTO: PRESENT /LPF
IMM GRANULOCYTES # BLD AUTO: 0.32 X10(3) UL (ref 0–1)
IMM GRANULOCYTES NFR BLD: 2.1 %
KETONES UR-MCNC: NEGATIVE MG/DL
LEUKOCYTE ESTERASE UR QL STRIP.AUTO: 500
LYMPHOCYTES # BLD AUTO: 3.65 X10(3) UL (ref 1–4)
LYMPHOCYTES NFR BLD AUTO: 24 %
MCH RBC QN AUTO: 27.6 PG (ref 26–34)
MCHC RBC AUTO-ENTMCNC: 31.8 G/DL (ref 31–37)
MCV RBC AUTO: 86.6 FL
MONOCYTES # BLD AUTO: 1.11 X10(3) UL (ref 0.1–1)
MONOCYTES NFR BLD AUTO: 7.3 %
NEUTROPHILS # BLD AUTO: 9.8 X10 (3) UL (ref 1.5–7.7)
NEUTROPHILS # BLD AUTO: 9.8 X10(3) UL (ref 1.5–7.7)
NEUTROPHILS NFR BLD AUTO: 64.2 %
NITRITE UR QL STRIP.AUTO: NEGATIVE
OSMOLALITY SERPL CALC.SUM OF ELEC: 291 MOSM/KG (ref 275–295)
PH UR: 5.5 [PH] (ref 5–8)
PLATELET # BLD AUTO: 592 10(3)UL (ref 150–450)
POTASSIUM SERPL-SCNC: 4.4 MMOL/L (ref 3.5–5.1)
PROT SERPL-MCNC: 7.3 G/DL (ref 5.7–8.2)
PROT UR-MCNC: 30 MG/DL
RBC # BLD AUTO: 3.37 X10(6)UL
SODIUM SERPL-SCNC: 138 MMOL/L (ref 136–145)
SP GR UR STRIP: >1.03 (ref 1–1.03)
TSI SER-ACNC: 1.55 MIU/ML (ref 0.55–4.78)
UROBILINOGEN UR STRIP-ACNC: NORMAL
WBC # BLD AUTO: 15.2 X10(3) UL (ref 4–11)

## 2024-10-14 PROCEDURE — 81001 URINALYSIS AUTO W/SCOPE: CPT

## 2024-10-14 PROCEDURE — 93005 ELECTROCARDIOGRAM TRACING: CPT

## 2024-10-14 PROCEDURE — 93010 ELECTROCARDIOGRAM REPORT: CPT | Performed by: STUDENT IN AN ORGANIZED HEALTH CARE EDUCATION/TRAINING PROGRAM

## 2024-10-14 PROCEDURE — 80053 COMPREHEN METABOLIC PANEL: CPT

## 2024-10-14 PROCEDURE — 85025 COMPLETE CBC W/AUTO DIFF WBC: CPT

## 2024-10-14 PROCEDURE — 36415 COLL VENOUS BLD VENIPUNCTURE: CPT

## 2024-10-14 PROCEDURE — 84443 ASSAY THYROID STIM HORMONE: CPT

## 2024-10-14 RX ORDER — SENNOSIDES 8.6 MG
650 CAPSULE ORAL EVERY 8 HOURS PRN
COMMUNITY

## 2024-10-14 NOTE — PROGRESS NOTES
Subjective:   Mary Marmolejo is a 74 year old female who presents for hospital follow up.   She was discharged from [facility] to Home Health Care Services  Admission Date: 10/4/24   Discharge Date: 10/8/24  Hospital Discharge Diagnosis: Left femur fracture    Interactive contact within 2 business days post discharge first initiated on Date: 10/9    During the visit, the following was completed:  Obtained and reviewed discharge summary, continuity of care documents, Hospitalist notes, and Ortho notes  Reviewed Labs (CBC, CMP) and operative notes    HPI:   Pt presenting for follow-up. Sister Melodie present for visit.    S/p Left hip hemiarthroplasty 10/5/2024 following mechanical fall  Doing better overall  Walking slowly  Doing home PT  AM urine incontinence  Reports increased frequency  Denies abd pain, hematuria    Admits to atypical chest pain   Brief pressure without dyspnea, dizziness, palpitations    BID oxycodone  No recent tizanidine  Feels not as sharp mentally since procedure      History/Other:   Current Medications:  Medication Reconciliation:  I am aware of an inpatient discharge within the last 30 days.  The discharge medication list has been reconciled with the patient's current medication list and reviewed by me.  See medication list for additions of new medication, and changes to current doses of medications and discontinued medications.  Outpatient Medications Marked as Taking for the 10/14/24 encounter (Office Visit) with Melodie Fajardo MD   Medication Sig    tiZANidine 4 MG Oral Tab     TRULICITY 1.5 MG/0.5ML Subcutaneous Solution Pen-injector Inject 1.5 mg into the skin.    Acetaminophen ER (TYLENOL 8 HOUR ARTHRITIS PAIN) 650 MG Oral Tab CR Take 1 tablet (650 mg total) by mouth every 8 (eight) hours as needed for Pain.    aspirin 81 MG Oral Tab Take 1 tablet (81 mg total) by mouth in the morning and 1 tablet (81 mg total) before bedtime. take 1 tablet (81MG)  by oral route   every day.    docusate sodium 100 MG Oral Cap Take 100 mg by mouth 2 (two) times daily.    oxyCODONE 5 MG Oral Tab Take 1 tablet (5 mg total) by mouth every 4 (four) hours as needed.    Cholecalciferol (VITAMIN D) 1000 units Oral Cap Take 1,000 Units by mouth daily.    glimepiride 2 MG Oral Tab TAKE 1 TABLET BY MOUTH WITH BREAKFAST AND ONE-HALF TABLET WITH DINNER    metFORMIN  MG Oral Tablet 24 Hr Take 2 tablets (1,000 mg total) by mouth 2 (two) times daily with meals.    pantoprazole 20 MG Oral Tab EC Take 1 tablet (20 mg total) by mouth before breakfast.    atorvastatin 10 MG Oral Tab Take 1 tablet (10 mg total) by mouth nightly.    trandolapril 4 MG Oral Tab Take 2 tablets (8 mg total) by mouth daily.    amLODIPine 10 MG Oral Tab Take 1 tablet (10 mg total) by mouth daily.    Glucose Blood (TRUE METRIX BLOOD GLUCOSE TEST) In Vitro Strip USE AS DIRECTED TO CHECK BLOOD GLUCOSE 2 TIMES DAILY    TRUEplus Lancets 33G Does not apply Misc Check sugars twice a day    levothyroxine 88 MCG Oral Tab TAKE 1.5 TABLETS ON SUNDAY AND 1 TABLET BY MOUTH ALL OTHER DAYS    Calcium Carbonate-Vitamin D (CALCIUM 600-D OR)     Cyanocobalamin (VITAMIN B12) 500 MCG Oral Tab     Iron Glycinate 29 MG Oral Cap     meclizine 25 MG Oral Tab Take 1 tablet (25 mg total) by mouth 3 (three) times daily as needed for Dizziness.    TRUE METRIX LEVEL 1 Low In Vitro Solution USE AS DIRECTED    folic acid 800 MCG Oral Tab        Review of Systems     Negative except urinary incontinence    Objective:   Physical Exam  Vitals reviewed.   Constitutional:       General: She is not in acute distress.  Eyes:      Conjunctiva/sclera: Conjunctivae normal.   Cardiovascular:      Rate and Rhythm: Normal rate.      Pulses: Normal pulses.      Heart sounds: Normal heart sounds.   Pulmonary:      Effort: Pulmonary effort is normal. No respiratory distress.   Abdominal:      General: Bowel sounds are normal.      Palpations: Abdomen is soft.      Tenderness:  There is no right CVA tenderness or left CVA tenderness.   Musculoskeletal:      Right lower leg: No edema.      Left lower leg: Edema present.   Neurological:      Mental Status: She is alert and oriented to person, place, and time. Mental status is at baseline.   Psychiatric:         Mood and Affect: Mood normal.         Behavior: Behavior normal.       /64   Pulse 94   Temp 99.2 °F (37.3 °C) (Temporal)   Ht 5' 6\" (1.676 m)   Wt 141 lb (64 kg)   LMP  (LMP Unknown)   BMI 22.76 kg/m²  Estimated body mass index is 22.76 kg/m² as calculated from the following:    Height as of this encounter: 5' 6\" (1.676 m).    Weight as of this encounter: 141 lb (64 kg).    Assessment & Plan:   1. Stress incontinence of urine  Discussed DDx including med side effect, pelvic floor dysfunction, UTI  - will check urine, labs  - encouraged timed voiding  - provided with gentle PFPT exercises  - continue to monitor   - consider Urology eval for persistent/progressive symptoms  - Comp Metabolic Panel (14) [E]; Future  - CBC With Differential With Platelet; Future  - TSH W Reflex To Free T4 [E]; Future  - Urinalysis, Routine; Future    2. Atypical chest pain  Hosp EKG reviewed  Will recheck   - discussed red flags for urgent reevaluation  - EKG 12 Lead to be performed at [facility]; Future    3. Type 2 diabetes mellitus with hyperglycemia, without long-term current use of insulin (HCC)  Last A1c 6.3 Aug 2024  - Ophthalmology Referral - In Network    4. Encounter for screening mammogram for malignant neoplasm of breast  - [facility] LAM 2D+3D SCREENING BILAT (CPT=77067/49991); Future    5. S/P hip hemiarthroplasty  Stable, awaiting Ortho follow-up  Continue pain meds for now -- discussed taper as able  Continue PT  - to call with any questions/concerns    Pt verbalized understanding and agrees with plan.        Return in 6 months (on 4/14/2025).

## 2024-10-15 LAB
ATRIAL RATE: 90 BPM
P AXIS: 52 DEGREES
P-R INTERVAL: 152 MS
Q-T INTERVAL: 358 MS
QRS DURATION: 78 MS
QTC CALCULATION (BEZET): 437 MS
R AXIS: -32 DEGREES
T AXIS: 69 DEGREES
VENTRICULAR RATE: 90 BPM

## 2024-10-16 ENCOUNTER — TELEPHONE (OUTPATIENT)
Dept: ORTHOPEDICS CLINIC | Facility: CLINIC | Age: 74
End: 2024-10-16

## 2024-10-16 NOTE — TELEPHONE ENCOUNTER
S/w patient- Informed her that she should proceed with keeping incision clean/dry until follow up on 10/18/24. I informed her that Dr Tolbert will assess the incision on 10/18/24 and that we would be able to provide bathing guidance at that time. Patient was agreeable to plan and had no other questions at this time

## 2024-10-16 NOTE — TELEPHONE ENCOUNTER
Patient states that she has surgery on 10/5/2024, so the patient wants to know when will she be able to take a shower? Patient states that the water proof bandage was removed this past Monday 10/14/24.

## 2024-10-17 ENCOUNTER — PATIENT OUTREACH (OUTPATIENT)
Dept: CASE MANAGEMENT | Age: 74
End: 2024-10-17

## 2024-10-17 ENCOUNTER — PATIENT MESSAGE (OUTPATIENT)
Dept: FAMILY MEDICINE CLINIC | Facility: CLINIC | Age: 74
End: 2024-10-17

## 2024-10-17 NOTE — PROGRESS NOTES
Transitions of Care Navigation  Discharge Date: 10/8/24  Contact Date: 10/17/2024    Transitions of Care Assessment:  GRAYSON Follow-up Assessment    General:  Assessment completed with: Patient  Patient Subjective: NCM spoke with patient states is feeling okay. She states her blood sugars were low this morning. She was feeling clammy and sweaty. Patient states her fasting blood sugar was low, but she is eating now. She reports not sleeping very well for the past few days. She states her hip pain is at 3/10, only takes oxycodone as needed but normally takes Tylenol for pain. Patient denies any fevers, chills, nausea, vomiting, shortness of breath, chest pain or any others. Patient states has one more session of  PT and nursing. She will be following up with ortho tomorrow.  Community Resources: Home Health ([facility])    Progress/Care Plan:  Is the patient progressing as planned?: Yes  Care Plan Update: Patient states is feeling okay. She states her blood sugars were low this morning. She was feeling clammy and sweaty. Patient states her fasting blood sugar was low, but she is eating now. She reports not sleeping very well for the past few days. She states her hip pain is at 3/10, only takes oxycodone as needed but normally takes Tylenol for pain. Patient denies any fevers, chills, nausea, vomiting, shortness of breath, chest pain or any others. Patient  has one more session of  PT and nursing. She will be following up with ortho tomorrow.  New Care Plan: Patient has one more HH PT session/visit. She is scheduled to follow up with ortho tomorrow. she will ask about outpatient PT.  Frequency/Follow Up Plan: 14 day follow up     Notes:  Navigator Notes: NCM will follow up with patient next week.     Nursing Interventions:  All discharge instructions reviewed with the patient. Reviewed when to call MD vs when to call 911 or go the ED. Educated patient on the importance of taking all meds as prescribed as well as  close f/u with PCP/specialists. Pt verbalized understanding and will contact the office with any further questions or concerns. Patient denies fevers, chills, nausea, vomiting, shortness of breath, chest pain, or any other symptoms at this time.    NCM provided contact information for any further questions/concerns. Patient verbalized understanding and agreeable.         Medications:Reviewed medication list.  Medications are up to date.  Current Outpatient Medications   Medication Sig Dispense Refill    tiZANidine 4 MG Oral Tab       TRULICITY 1.5 MG/0.5ML Subcutaneous Solution Pen-injector Inject 1.5 mg into the skin.      Acetaminophen ER (TYLENOL 8 HOUR ARTHRITIS PAIN) 650 MG Oral Tab CR Take 1 tablet (650 mg total) by mouth every 8 (eight) hours as needed for Pain.      aspirin 81 MG Oral Tab Take 1 tablet (81 mg total) by mouth in the morning and 1 tablet (81 mg total) before bedtime. take 1 tablet (81MG)  by oral route  every day. 60 tablet 0    docusate sodium 100 MG Oral Cap Take 100 mg by mouth 2 (two) times daily. 60 capsule 0    oxyCODONE 5 MG Oral Tab Take 1 tablet (5 mg total) by mouth every 4 (four) hours as needed. 30 tablet 0    Cholecalciferol (VITAMIN D) 1000 units Oral Cap Take 1,000 Units by mouth daily.      glimepiride 2 MG Oral Tab TAKE 1 TABLET BY MOUTH WITH BREAKFAST AND ONE-HALF TABLET WITH DINNER 135 tablet 1    metFORMIN  MG Oral Tablet 24 Hr Take 2 tablets (1,000 mg total) by mouth 2 (two) times daily with meals. 360 tablet 3    pantoprazole 20 MG Oral Tab EC Take 1 tablet (20 mg total) by mouth before breakfast. 90 tablet 3    atorvastatin 10 MG Oral Tab Take 1 tablet (10 mg total) by mouth nightly. 90 tablet 3    trandolapril 4 MG Oral Tab Take 2 tablets (8 mg total) by mouth daily. 180 tablet 3    amLODIPine 10 MG Oral Tab Take 1 tablet (10 mg total) by mouth daily. 90 tablet 3    Glucose Blood (TRUE METRIX BLOOD GLUCOSE TEST) In Vitro Strip USE AS DIRECTED TO CHECK BLOOD  GLUCOSE 2 TIMES DAILY 200 strip 3    TRUEplus Lancets 33G Does not apply Misc Check sugars twice a day 200 each 3    levothyroxine 88 MCG Oral Tab TAKE 1.5 TABLETS ON SUNDAY AND 1 TABLET BY MOUTH ALL OTHER DAYS 110 tablet 3    Calcium Carbonate-Vitamin D (CALCIUM 600-D OR)       Cyanocobalamin (VITAMIN B12) 500 MCG Oral Tab       Iron Glycinate 29 MG Oral Cap       meclizine 25 MG Oral Tab Take 1 tablet (25 mg total) by mouth 3 (three) times daily as needed for Dizziness. 40 tablet 2    TRUE METRIX LEVEL 1 Low In Vitro Solution USE AS DIRECTED 1 each 0    folic acid 800 MCG Oral Tab

## 2024-10-18 ENCOUNTER — TELEPHONE (OUTPATIENT)
Dept: ORTHOPEDICS CLINIC | Facility: CLINIC | Age: 74
End: 2024-10-18

## 2024-10-18 ENCOUNTER — HOSPITAL ENCOUNTER (OUTPATIENT)
Dept: GENERAL RADIOLOGY | Facility: HOSPITAL | Age: 74
Discharge: HOME | End: 2024-10-18
Attending: STUDENT IN AN ORGANIZED HEALTH CARE EDUCATION/TRAINING PROGRAM
Payer: MEDICARE

## 2024-10-18 ENCOUNTER — OFFICE VISIT (OUTPATIENT)
Dept: ORTHOPEDICS CLINIC | Facility: CLINIC | Age: 74
End: 2024-10-18
Payer: MEDICARE

## 2024-10-18 DIAGNOSIS — Z47.89 ORTHOPEDIC AFTERCARE: ICD-10-CM

## 2024-10-18 DIAGNOSIS — Z47.89 ORTHOPEDIC AFTERCARE: Primary | ICD-10-CM

## 2024-10-18 PROCEDURE — 73502 X-RAY EXAM HIP UNI 2-3 VIEWS: CPT | Performed by: STUDENT IN AN ORGANIZED HEALTH CARE EDUCATION/TRAINING PROGRAM

## 2024-10-18 RX ORDER — OXYCODONE HYDROCHLORIDE 5 MG/1
5 TABLET ORAL EVERY 6 HOURS PRN
Qty: 30 TABLET | Refills: 0 | Status: SHIPPED | OUTPATIENT
Start: 2024-10-18

## 2024-10-18 NOTE — TELEPHONE ENCOUNTER
Patient had question on when she could begin driving    S/w Dr Tolbert and he stated that she would be cleared to driving once pain is controlled and she is off of narcotic pain medications    Left message for patient and roger message sent

## 2024-10-21 NOTE — PROGRESS NOTES
Orthopaedic Surgery Postop Patient Visit  _______________________________________________________________________________________________________________  _______________________________________________________________________________________________________________    DATE OF VISIT: 10/18/2024     DATE OF SURGERY: 10/5/2024     CHIEF COMPLAINT: Follow-up Left hip fracture stabilization surgery       Chief Complaint   Patient presents with    Post-Op     S/p left hip radha on 10/5/24- Rates pain at 4/10. Patient requesting refill of oxycodone. Patient is doing home PT and is request outpatient order.         HISTORY OF PRESENT ILLNESS: Mary Marmolejo is a 74 year old female who presents to the clinic for follow-up after Left hip fracture treatment with bipolar hemiarthroplasty. The patient has progressed to ambulation with an assist device. Pain is well controlled on current regimen    The patient denies fevers, chills, and wound issues. The patient is currently enrolled in PT at home and would like referral to outpatient therapy    MEDICATIONS  Reviewed   oxyCODONE 5 MG Oral Tab Take 1 tablet (5 mg total) by mouth every 6 (six) hours as needed for Pain. 30 tablet 0    tiZANidine 4 MG Oral Tab       TRULICITY 1.5 MG/0.5ML Subcutaneous Solution Pen-injector Inject 1.5 mg into the skin.      Acetaminophen ER (TYLENOL 8 HOUR ARTHRITIS PAIN) 650 MG Oral Tab CR Take 1 tablet (650 mg total) by mouth every 8 (eight) hours as needed for Pain.      aspirin 81 MG Oral Tab Take 1 tablet (81 mg total) by mouth in the morning and 1 tablet (81 mg total) before bedtime. take 1 tablet (81MG)  by oral route  every day. 60 tablet 0    docusate sodium 100 MG Oral Cap Take 100 mg by mouth 2 (two) times daily. 60 capsule 0    oxyCODONE 5 MG Oral Tab Take 1 tablet (5 mg total) by mouth every 4 (four) hours as needed. 30 tablet 0    Cholecalciferol (VITAMIN D) 1000 units Oral Cap Take 1,000 Units by mouth daily.      glimepiride 2  MG Oral Tab TAKE 1 TABLET BY MOUTH WITH BREAKFAST AND ONE-HALF TABLET WITH DINNER 135 tablet 1    metFORMIN  MG Oral Tablet 24 Hr Take 2 tablets (1,000 mg total) by mouth 2 (two) times daily with meals. 360 tablet 3    pantoprazole 20 MG Oral Tab EC Take 1 tablet (20 mg total) by mouth before breakfast. 90 tablet 3    atorvastatin 10 MG Oral Tab Take 1 tablet (10 mg total) by mouth nightly. 90 tablet 3    trandolapril 4 MG Oral Tab Take 2 tablets (8 mg total) by mouth daily. 180 tablet 3    amLODIPine 10 MG Oral Tab Take 1 tablet (10 mg total) by mouth daily. 90 tablet 3    Glucose Blood (TRUE METRIX BLOOD GLUCOSE TEST) In Vitro Strip USE AS DIRECTED TO CHECK BLOOD GLUCOSE 2 TIMES DAILY 200 strip 3    TRUEplus Lancets 33G Does not apply Misc Check sugars twice a day 200 each 3    levothyroxine 88 MCG Oral Tab TAKE 1.5 TABLETS ON SUNDAY AND 1 TABLET BY MOUTH ALL OTHER DAYS 110 tablet 3    Calcium Carbonate-Vitamin D (CALCIUM 600-D OR)       Cyanocobalamin (VITAMIN B12) 500 MCG Oral Tab       Iron Glycinate 29 MG Oral Cap       meclizine 25 MG Oral Tab Take 1 tablet (25 mg total) by mouth 3 (three) times daily as needed for Dizziness. 40 tablet 2    TRUE METRIX LEVEL 1 Low In Vitro Solution USE AS DIRECTED 1 each 0    folic acid 800 MCG Oral Tab           ALLERGIES  Allergies[1]     REVIEW OF SYSTEMS  A 14 point review of systems was performed. Pertinent positives and negatives noted in the HPI.    PHYSICAL EXAM  LMP  (LMP Unknown)      Constitutional: The patient is well-developed, well-nourished, in no acute distress.  Neurological: Alert and oriented to person, place, and time.  Psychiatric: Mood and affect normal.  Head: Normocephalic and atraumatic.  Cardiovascular: regular rate by palpation  Pulmonary/Chest: Effort normal. No respiratory distress. Breathing non-labored  Abdominal: Abdomen exhibits no distension.   Left hip  Wound well appearing. No erythema, drainage, or dehiscence  ROM:  Hip Flexion:  110  Knee Flexion: 120  Knee Extension: 0  Motor/Strength:  Able to fire TA/GSC/EHL/FHL w/o issue  SILT josé antonio/saph/tib/spn/dpn distributions  2+ DP pulses, brisk capillary refill to lower extremity    IMAGING  I independently viewed and interpreted the imaging. Radiologist interpretation is available in the imaging report.  X-ray: Plain films of the left hip demonstrate appropriate implant positioning with no evidence of complications      ASSESSMENT/PLAN: Mary Marmolejo is a 74 year old female who presents to the Orthopaedic surgery clinic today for follow-up 2 weeks after Left hip stabilization surgery with bipolar hemiarthroplasty.  She is overall doing well    We discussed the relevant radiographs at today's visit  We reviewed the PT protocol and adjustments were made as needed  WEIGHT BEARING STATUS: Weightbearing as tolerated  RANGE-OF-MOTION LIMITATIONS: as tolerated  IMAGING ORDERED: none  CONSULTS PLACED: Physical therapy  PROSTHESES/ORTHOTICS: none    FOLLOW-UP: 4 weeks    RADIOGRAPHS AT NEXT VISIT: none    I have personally seen Mary Marmolejo and discussed in detail their plan of care. Prior to departure, they indicated agreement with and understanding of their plan of care and their follow-up as documented herein this note. Please note that this note was written in combination with voice recognition/dictation software and there is a possibility of transcription errors which were not identified at the time of note submission. If clarification is necessary, please contact the author or clinic staff.    Eliel Tolbert MD  Orthopaedic Surgery  10/21/2024            [1]   Allergies  Allergen Reactions    Seasonal UNKNOWN

## 2024-10-23 ENCOUNTER — TELEPHONE (OUTPATIENT)
Dept: FAMILY MEDICINE CLINIC | Facility: CLINIC | Age: 74
End: 2024-10-23

## 2024-10-23 ENCOUNTER — PATIENT MESSAGE (OUTPATIENT)
Dept: FAMILY MEDICINE CLINIC | Facility: CLINIC | Age: 74
End: 2024-10-23

## 2024-10-23 NOTE — TELEPHONE ENCOUNTER
Patient left envelop with form for the doctor to fill out/when completed please fax to number on the paper/please call patient when it has been faxed.  Form in doctor's box.

## 2024-10-25 ENCOUNTER — PATIENT OUTREACH (OUTPATIENT)
Dept: CASE MANAGEMENT | Age: 74
End: 2024-10-25

## 2024-10-29 ENCOUNTER — TELEPHONE (OUTPATIENT)
Dept: FAMILY MEDICINE CLINIC | Facility: CLINIC | Age: 74
End: 2024-10-29

## 2024-10-29 NOTE — TELEPHONE ENCOUNTER
Dr. Fajardo - agreeable to what patient reports and if so or if any other \"metal\" concerns that should be included please let us know    Once reviewed, we can reach back out to location that was calling for information. Should ortho be the ones to call back on this since they can better clarify or confirm medical equipment used?

## 2024-10-29 NOTE — TELEPHONE ENCOUNTER
Spoke with patient, Date of Birth verified.  She is returning our call.   She was informed of below message.    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  She stated they plan to visit her nephew at the correctional facility.   She had hip replacement done on 9-5-24, according to her Ortho, it's not a big deal about the metal on her body but the facility wants to know.  Routed to Triage staff for assistance, thanks.

## 2024-10-29 NOTE — TELEPHONE ENCOUNTER
Received call from Melodie at [facility] inquiring if patient has metal in her body or ambulates with a walker.  Patient is trying to schedule a visit at the facility and states she cannot go through the metal detector.  No verbal release on file.  Attempted to contact patient, left message to call back.  Please inquire from patient if it is ok to provide this information.

## 2024-10-30 ENCOUNTER — TELEPHONE (OUTPATIENT)
Dept: ORTHOPEDICS CLINIC | Facility: CLINIC | Age: 74
End: 2024-10-30

## 2024-10-30 NOTE — TELEPHONE ENCOUNTER
Patient stating she had hip surgery with Dr Tolbert on 10/5 and she is experiencing some redness on the incision. Patient stating she has no fever.Please advise

## 2024-10-30 NOTE — TELEPHONE ENCOUNTER
S/p LEFT HIP HEMIARTHROPLASTY on 10/5/24  Last visit 10/18/24  Spoke with patient and she states she noticed some redness at the distal incision. She denies any swelling, open wounds, drainage, fever or chills. Asked if she could send a picture in XMPie. She states she was about to eat as her sugar is getting low at 67 and I told her I could Follow up with her in an hour after she eats and walk her through sending a message. She was agreeable to this plan.

## 2024-10-30 NOTE — TELEPHONE ENCOUNTER
Verified name and  of patient.     Melodie from [facility] calling to follow up on message.    She was advised of message below:        Eliel Tolbert MD to Eloise Coronado RN Kinsley, Karen Marie, MD       10/30/24  2:37 PM  She can go through the metal detector, but it may go off. If it does, the wands can show the metal is in the hip. The metal is a titanium and cobalt-chrome implant. The metal detector will not affect it, so it is safe to pass through

## 2024-10-30 NOTE — TELEPHONE ENCOUNTER
Per Dr Tolbert  \"I have looked at the picture. It looks like a superficial infection. I have placed an order for a 10 day course of abx\"       Called patient Left message to call back.

## 2024-10-30 NOTE — TELEPHONE ENCOUNTER
Called patient and informed her per Dr Tolbert's orders. Recommended following up with pharmacy to see when med ready for  and start right away. Advised to call back if symptoms worsening or antibiotics not improving symptoms by Friday. She verbalized understanding.

## 2024-11-07 ENCOUNTER — TELEPHONE (OUTPATIENT)
Dept: FAMILY MEDICINE CLINIC | Facility: CLINIC | Age: 74
End: 2024-11-07

## 2024-11-07 NOTE — TELEPHONE ENCOUNTER
trandolapril 4 MG Oral Tab, Take 2 tablets (8 mg total) by mouth daily., Disp: 180 tablet, Rfl: 3

## 2024-11-07 NOTE — TELEPHONE ENCOUNTER
trandolapril 4 MG Oral Tab 180 tablet 3 5/6/2024 --    Sig - Route: Take 2 tablets (8 mg total) by mouth daily. - Oral    Sent to pharmacy as: Trandolapril 4 MG Oral Tablet (Mavik)    E-Prescribing Status: Receipt confirmed by pharmacy (5/6/2024  8:42 AM CDT)      Associated Diagnoses    Essential hypertension with goal blood pressure less than 130/85        Pharmacy    [id number] [place], [place] -  [address] AT [facility] [address], [phone], [phone]

## 2024-11-08 DIAGNOSIS — E11.9 TYPE 2 DIABETES MELLITUS WITHOUT COMPLICATION, WITHOUT LONG-TERM CURRENT USE OF INSULIN (HCC): ICD-10-CM

## 2024-11-08 RX ORDER — METFORMIN HYDROCHLORIDE 500 MG/1
1000 TABLET, EXTENDED RELEASE ORAL 2 TIMES DAILY WITH MEALS
Qty: 360 TABLET | Refills: 1 | Status: SHIPPED | OUTPATIENT
Start: 2024-11-08

## 2024-11-08 NOTE — TELEPHONE ENCOUNTER
Current Outpatient Medications   Medication Sig Dispense Refill                                                                   metFORMIN  MG Oral Tablet 24 Hr Take 2 tablets (1,000 mg total) by mouth 2 (two) times daily with meals. 360 tablet 3

## 2024-11-08 NOTE — TELEPHONE ENCOUNTER
Endocrine Refill protocol for metformin    Protocol Criteria:  PASSED  Reason: N/A    If all below requirements are met, send a 90-day supply with 1 refill per provider protocol.     Verify appointment with Endocrinology completed in the last 6 months or scheduled in the next 3 months.  Verify A1C has been completed within the last 6 months and is below 8.5%  Verify last GFR is greater than or equal to 40 in the past 12 months    Last completed office visit:8/30/2024 Pili Young MD   Next scheduled Follow up:   Future Appointments   Date Time Provider Department Center   11/22/2024  2:15 PM Eliel Tolbert MD [facility]   1/31/2025  9:45 AM Pili Young MD [facility]   5/5/2025  8:30 AM Melodie Fajardo MD [facility]       Last GFR result:    Lab Results   Component Value Date    EGFRCR 67 10/14/2024     Last A1c result: Last A1C result: 6.3% done 8/30/2024.

## 2024-11-13 DIAGNOSIS — I10 ESSENTIAL HYPERTENSION WITH GOAL BLOOD PRESSURE LESS THAN 130/85: ICD-10-CM

## 2024-11-18 RX ORDER — TRANDOLAPRIL TABLETS 4 MG/1
8 TABLET ORAL DAILY
Qty: 180 TABLET | Refills: 3 | OUTPATIENT
Start: 2024-11-18

## 2024-11-22 ENCOUNTER — OFFICE VISIT (OUTPATIENT)
Dept: ORTHOPEDICS CLINIC | Facility: CLINIC | Age: 74
End: 2024-11-22
Payer: MEDICARE

## 2024-11-22 DIAGNOSIS — Z47.89 ORTHOPEDIC AFTERCARE: Primary | ICD-10-CM

## 2024-11-22 PROCEDURE — 1159F MED LIST DOCD IN RCRD: CPT | Performed by: STUDENT IN AN ORGANIZED HEALTH CARE EDUCATION/TRAINING PROGRAM

## 2024-11-22 PROCEDURE — 99024 POSTOP FOLLOW-UP VISIT: CPT | Performed by: STUDENT IN AN ORGANIZED HEALTH CARE EDUCATION/TRAINING PROGRAM

## 2024-11-22 PROCEDURE — 1160F RVW MEDS BY RX/DR IN RCRD: CPT | Performed by: STUDENT IN AN ORGANIZED HEALTH CARE EDUCATION/TRAINING PROGRAM

## 2024-11-22 NOTE — PROGRESS NOTES
Orthopaedic Surgery Postop Patient Visit  _______________________________________________________________________________________________________________  _______________________________________________________________________________________________________________    DATE OF VISIT: 11/22/2024     DATE OF SURGERY: 10/5/2024     CHIEF COMPLAINT: Follow-up Left hip fracture stabilization surgery       Chief Complaint   Patient presents with    Post-Op     S/p L hip hemiarthroplasty sx on 10/05/2024- rates pain 3/10  on and off        HISTORY OF PRESENT ILLNESS: Mary Marmolejo is a 74 year old female who presents to the clinic for follow-up after Left hip fracture treatment with bipolar hemiarthroplasty. The patient has progressed to ambulation with an assist device, primarily a cane.  She reports that she is doing well with this.. Pain is well controlled on current regimen.  She reports that is approximately 3 out of 10, intermittent.  She feels well.  She reports resolution of her wound issue on antibiotics.    The patient denies other fevers, chills, and wound issues. The patient is currently  progressing well with therapy    MEDICATIONS  Reviewed  No outpatient medications have been marked as taking for the 11/22/24 encounter (Office Visit) with Eliel Tolbert MD.        ALLERGIES  Allergies[1]     REVIEW OF SYSTEMS  A 14 point review of systems was performed. Pertinent positives and negatives noted in the HPI.    PHYSICAL EXAM  LMP  (LMP Unknown)      Constitutional: The patient is well-developed, well-nourished, in no acute distress.  Neurological: Alert and oriented to person, place, and time.  Psychiatric: Mood and affect normal.  Head: Normocephalic and atraumatic.  Cardiovascular: regular rate by palpation  Pulmonary/Chest: Effort normal. No respiratory distress. Breathing non-labored  Abdominal: Abdomen exhibits no distension.   Left hip  Wound well healed  ROM:  Hip Flexion: 120  Knee Flexion:  130  Knee Extension: 0  Motor/Strength:  Hip Flexion: 4+/5  Hip Extension: 4+/5  Knee Flexion: 5/5  Knee Extension: 5/5  Able to fire TA/GSC/EHL/FHL w/o issue  SILT josé antonio/saph/tib/spn/dpn distributions  2+ DP pulses, brisk capillary refill to lower extremity      ASSESSMENT/PLAN: Mary Marmolejo is a 74 year old female who presents to the Orthopaedic surgery clinic today for follow-up 7 weeks after Left hip stabilization surgery with bipolar hemiarthroplasty.  She is doing very well.  She is progressing appropriately.  There are no specific concerns at this time    We discussed the relevant radiographs at today's visit  We reviewed the PT protocol and adjustments were made as needed  WEIGHT BEARING STATUS: Weightbearing as tolerated with continued posterior hip precaution  RANGE-OF-MOTION LIMITATIONS:  Hip cautions  IMAGING ORDERED: none  CONSULTS PLACED: none  PROSTHESES/ORTHOTICS: none    FOLLOW-UP: 6-8 weeks    RADIOGRAPHS AT NEXT VISIT:  2 view left hip    I have personally seen Mary Marmolejo and discussed in detail their plan of care. Prior to departure, they indicated agreement with and understanding of their plan of care and their follow-up as documented herein this note. Please note that this note was written in combination with voice recognition/dictation software and there is a possibility of transcription errors which were not identified at the time of note submission. If clarification is necessary, please contact the author or clinic staff.    Eliel Tolbert MD  Orthopaedic Surgery  11/22/2024            [1]   Allergies  Allergen Reactions    Seasonal UNKNOWN

## 2024-12-04 ENCOUNTER — TELEPHONE (OUTPATIENT)
Dept: ORTHOPEDICS CLINIC | Facility: CLINIC | Age: 74
End: 2024-12-04

## 2024-12-04 NOTE — TELEPHONE ENCOUNTER
Eliel Tolbert MD  You; [facility] Clinical Staff15 minutes ago (4:23 PM)       No need for prophylaxis for routine cleaning for my patients

## 2024-12-04 NOTE — TELEPHONE ENCOUNTER
Called [facility] office and closed for the day but left message that no prophylactic antibiotics are required. Advised to call back if any concerns.

## 2024-12-04 NOTE — TELEPHONE ENCOUNTER
Per [facility] patient is scheduled for cleaning tomorrow asking for clearance due to recent surgery, also asking if patient needs medication please fax to [phone].

## 2024-12-05 ENCOUNTER — TELEPHONE (OUTPATIENT)
Dept: ORTHOPEDICS CLINIC | Facility: CLINIC | Age: 74
End: 2024-12-05

## 2024-12-06 NOTE — TELEPHONE ENCOUNTER
Per Dr. Ayad Cid's office requesting fax for pre-release. Please advise.  
Spoke with Thais. Let her know response from patient. She also brought up non-routine dental work, which was not covered in response. She requested I send letter with clearance for routine cleanings in meantime.    Letter created and faxed to number provided. [phone]. Confirmation received  
Spoke with provider about dental work outside of the realm of cleanings, and he advised that if there was a procedure involving or treating an active infection, such as a root canal, antibiotics were advised, but in other instances, per current guidelines, they are not necessary. Created new note and faxed to dental office. Confirmation received.  
Pt presents to ED with runny nose x 2 days. pt PMD sent him here for swab.   pt denies any chest pain, sob, dyspnea, fever, chills, abdominal pain, sob, nausea, vomiting or any other complaints.   Pt concerned for possible COVID-19.   Pt here for testing.

## 2024-12-23 DIAGNOSIS — E03.9 ACQUIRED HYPOTHYROIDISM: ICD-10-CM

## 2024-12-27 RX ORDER — LEVOTHYROXINE SODIUM 88 UG/1
TABLET ORAL
Qty: 110 TABLET | Refills: 3 | Status: SHIPPED | OUTPATIENT
Start: 2024-12-27

## 2024-12-27 NOTE — TELEPHONE ENCOUNTER
Refill passed per [facility] protocol.    Requested Prescriptions   Pending Prescriptions Disp Refills    LEVOTHYROXINE 88 MCG Oral Tab [Pharmacy Med Name: LEVOTHYROXINE 0.088MG (88MCG) TAB] 110 tablet 3     Sig: TAKE 1.5 TABLETS ON SUNDAY AND 1 TABLET ON ALL OTHER DAYS       Thyroid Medication Protocol Passed - 12/27/2024  5:42 PM        Passed - TSH in past 12 months        Passed - Last TSH value is normal     Lab Results   Component Value Date    TSH 1.554 10/14/2024                 Passed - In person appointment or virtual visit in the past 12 mos or appointment in next 3 mos     Recent Outpatient Visits              [facility]    [facility]    2 [facility]    [facility]    2 months ago Stress incontinence of urine    [facility] Melodie Fajardo MD    Office Visit    3 months ago Type 2 diabetes mellitus with other specified complication, without long-term current use of insulin (HCC)    [facility] Pili Young MD    Office Visit    7 months ago Encounter for annual health examination    [facility]    Office Visit          Future Appointments         Provider Department Appt Notes    In 3 weeks LMB DEXA RM1; LMB CHAVO RM1 [facility] [place]     In 1 month Pili Young MD [facility] 5 mos    In 1 month Eliel Tolbert MD [facility] 3d POV -left hip radha on 10/5/24    In 4 months Melodie Fajardo MD [facility]                       Future Appointments          Provider Department Appt Notes    In 3 weeks LMB DEXA RM1; LMB CHAVO RM1 [facility] [place]     In 1 month Pili Young MD [facility] 5 mos    In 1 month Eliel Tolbert MD [facility] 3d POV -left hip radha on 10/5/24    In 4 months Melodie Fajardo MD [facility] Supervisit          Recent Outpatient Visits              [facility] month ago [facility]    Office Visit    2 months ago Orthopedic aftercare    [facility] Eliel Tolbert MD    Office Visit    2 months ago [facility] incontinence [facility]    Office Visit    3 months ago Type 2 diabetes mellitus with other specified complication, without long-term current use of insulin (HCC)    [facility] Pili Young MD    Office Visit    7 months ago Encounter for annual health examination    [facility]    Office Visit

## 2025-01-07 ENCOUNTER — PATIENT MESSAGE (OUTPATIENT)
Dept: FAMILY MEDICINE CLINIC | Facility: CLINIC | Age: 75
End: 2025-01-07

## 2025-01-07 DIAGNOSIS — E11.9 CONTROLLED TYPE 2 DIABETES MELLITUS WITHOUT COMPLICATION, WITHOUT LONG-TERM CURRENT USE OF INSULIN (HCC): Primary | ICD-10-CM

## 2025-01-08 RX ORDER — DULAGLUTIDE 1.5 MG/.5ML
1.5 INJECTION, SOLUTION SUBCUTANEOUS WEEKLY
Qty: 2 ML | Refills: 0 | Status: SHIPPED | OUTPATIENT
Start: 2025-01-08

## 2025-01-08 NOTE — TELEPHONE ENCOUNTER
Greta CORREIA or Romi BRADSHAWARIEL, patient is requesting 1 month refill of trulicity lower dose than last prescribed. Patient states she will discuss dosing at upcoming appointment with Dr. Young.     Called patient to clarify dose. Request below is for 1.5 mg and LOV stated 3 mg weekly. Spoke with Mary. She states that she has been taking 1.5 mg every 10 days for the past 6 months. She was taking 3 mg in the past due to supply shortages, but she has since refilled and continued taking the 1.5 mg dose. She is taking every 10 days because she is in the coverage gap and has a high copay, so was stretching the medication supply. She is due for an injection soon. States fasting sugars have been 110-120. Last A1C 6.3% in August. Follow up is scheduled with Dr. Young at the end of the month.     Endocrine Refill protocol for oral and injectable diabetic medications    Protocol Criteria:  PASSED      If all below requirements are met, send a 90-day supply with 1 refill per provider protocol.    Verify appointment with Endocrinology completed in the last 6 months or scheduled in the next 3 months.  Verify A1C has been completed within the last 6 months and is below 8.5%     Last completed office visit: 8/30/2024 Pili Young MD   Next scheduled Follow up:   Future Appointments   Date Time Provider Department Center   1/23/2025 11:00 AM [facility] [place]   1/31/2025  9:45 AM [facility]   2/14/2025  1:00 PM Eliel Tolbert MD [facility] of [facility]   5/5/2025  8:30 AM Melodie Fajardo MD [facility]      Last A1c result: Last A1c value was 6.3% done 8/30/2024.

## 2025-01-08 NOTE — TELEPHONE ENCOUNTER
Given recent glucose levels which are reported at goal and consistent with previous HgA1c- refilled 1.5mg dose for now and can discuss at upcoming visit with Dr. Young.

## 2025-01-23 ENCOUNTER — HOSPITAL ENCOUNTER (OUTPATIENT)
Dept: MAMMOGRAPHY | Age: 75
Discharge: HOME | End: 2025-01-23
Attending: STUDENT IN AN ORGANIZED HEALTH CARE EDUCATION/TRAINING PROGRAM
Payer: MEDICARE

## 2025-01-23 DIAGNOSIS — Z12.31 ENCOUNTER FOR SCREENING MAMMOGRAM FOR MALIGNANT NEOPLASM OF BREAST: ICD-10-CM

## 2025-01-23 PROCEDURE — 77067 SCR MAMMO BI INCL CAD: CPT | Performed by: STUDENT IN AN ORGANIZED HEALTH CARE EDUCATION/TRAINING PROGRAM

## 2025-01-23 PROCEDURE — 77063 BREAST TOMOSYNTHESIS BI: CPT | Performed by: STUDENT IN AN ORGANIZED HEALTH CARE EDUCATION/TRAINING PROGRAM

## 2025-01-31 ENCOUNTER — TELEPHONE (OUTPATIENT)
Dept: ENDOCRINOLOGY CLINIC | Facility: CLINIC | Age: 75
End: 2025-01-31

## 2025-01-31 ENCOUNTER — OFFICE VISIT (OUTPATIENT)
Dept: ENDOCRINOLOGY CLINIC | Facility: CLINIC | Age: 75
End: 2025-01-31
Payer: MEDICARE

## 2025-01-31 VITALS
WEIGHT: 137 LBS | HEART RATE: 94 BPM | BODY MASS INDEX: 22 KG/M2 | SYSTOLIC BLOOD PRESSURE: 128 MMHG | DIASTOLIC BLOOD PRESSURE: 64 MMHG

## 2025-01-31 DIAGNOSIS — E11.9 TYPE 2 DIABETES MELLITUS WITHOUT COMPLICATION, WITHOUT LONG-TERM CURRENT USE OF INSULIN (HCC): Primary | ICD-10-CM

## 2025-01-31 DIAGNOSIS — E11.9 TYPE 2 DIABETES MELLITUS WITHOUT COMPLICATION, WITHOUT LONG-TERM CURRENT USE OF INSULIN (HCC): ICD-10-CM

## 2025-01-31 DIAGNOSIS — E78.5 DYSLIPIDEMIA: ICD-10-CM

## 2025-01-31 LAB
GLUCOSE BLOOD: 141
HEMOGLOBIN A1C: 6.5 % (ref 4.3–5.6)
TEST STRIP LOT #: NORMAL NUMERIC

## 2025-01-31 PROCEDURE — 1160F RVW MEDS BY RX/DR IN RCRD: CPT | Performed by: INTERNAL MEDICINE

## 2025-01-31 PROCEDURE — 3044F HG A1C LEVEL LT 7.0%: CPT | Performed by: INTERNAL MEDICINE

## 2025-01-31 PROCEDURE — 1159F MED LIST DOCD IN RCRD: CPT | Performed by: INTERNAL MEDICINE

## 2025-01-31 PROCEDURE — 3078F DIAST BP <80 MM HG: CPT | Performed by: INTERNAL MEDICINE

## 2025-01-31 PROCEDURE — 82947 ASSAY GLUCOSE BLOOD QUANT: CPT | Performed by: INTERNAL MEDICINE

## 2025-01-31 PROCEDURE — 3074F SYST BP LT 130 MM HG: CPT | Performed by: INTERNAL MEDICINE

## 2025-01-31 PROCEDURE — 99213 OFFICE O/P EST LOW 20 MIN: CPT | Performed by: INTERNAL MEDICINE

## 2025-01-31 PROCEDURE — 83036 HEMOGLOBIN GLYCOSYLATED A1C: CPT | Performed by: INTERNAL MEDICINE

## 2025-01-31 RX ORDER — DULAGLUTIDE 1.5 MG/.5ML
1.5 INJECTION, SOLUTION SUBCUTANEOUS WEEKLY
Qty: 6 ML | Refills: 0 | Status: SHIPPED | OUTPATIENT
Start: 2025-01-31 | End: 2025-01-31

## 2025-01-31 RX ORDER — DULAGLUTIDE 1.5 MG/.5ML
1.5 INJECTION, SOLUTION SUBCUTANEOUS
Qty: 6 ML | Refills: 1 | Status: SHIPPED | OUTPATIENT
Start: 2025-01-31

## 2025-01-31 RX ORDER — GLIMEPIRIDE 2 MG/1
TABLET ORAL
Qty: 135 TABLET | Refills: 1 | Status: SHIPPED | OUTPATIENT
Start: 2025-01-31

## 2025-01-31 NOTE — PROGRESS NOTES
Return Office Visit     CHIEF COMPLAINT:    Type 2 DM  Dyslipidemia     HISTORY OF PRESENT ILLNESS:  Mary Marmolejo is a 74 year old female who presents for follow up for Type 2 DM.      DM HISTORY  Diagnosed: about 10 years ago        HISTORY OF DIABETES COMPLICATIONS: :  History of Retinopathy: No - last eye exam: Nov 2024  History of Neuropathy: No, she was diagnosed with sciatica, stable  History of Nephropathy: No     ASSOCIATED COMPLICATIONS:   HTN: Yes  Hyperlipidemia: Yes  Coronary Artery Disease:  No  Cerebrovascular Disease: No        HOME GLUCOSE READINGS:   Checks sugars 1-2 times a day  Alternates timings  Range in 110-140  No low BG        CURRENT DIABETIC MEDICATIONS INCLUDE:  MTF ER 1000 mg BID  Amaryl 2 mg pills: she takes 1.5 tabs daily  Trulicity 1.5 mg once a week     MEALS:  Three meals a day  Dietary compliance with a low carb is moderate     EXERCISE:  No       CURRENT MEDICATION:    Current Outpatient Medications   Medication Sig Dispense Refill    glimepiride 2 MG Oral Tab TAKE 1 TABLET BY MOUTH WITH BREAKFAST AND ONE-HALF TABLET WITH DINNER 135 tablet 1    Dulaglutide (TRULICITY) 1.5 MG/0.5ML Subcutaneous Solution Auto-injector Inject 1.5 mg into the skin once a week. 2 mL 0    levothyroxine 88 MCG Oral Tab TAKE 1.5 TABLETS ON SUNDAY AND 1 TABLET ON ALL OTHER DAYS 110 tablet 3    metFORMIN  MG Oral Tablet 24 Hr Take 2 tablets (1,000 mg total) by mouth 2 (two) times daily with meals. 360 tablet 1    tiZANidine 4 MG Oral Tab       TRULICITY 1.5 MG/0.5ML Subcutaneous Solution Pen-injector Inject 1.5 mg into the skin.      Acetaminophen ER (TYLENOL 8 HOUR ARTHRITIS PAIN) 650 MG Oral Tab CR Take 1 tablet (650 mg total) by mouth every 8 (eight) hours as needed for Pain.      Cholecalciferol (VITAMIN D) 1000 units Oral Cap Take 1,000 Units by mouth daily.      pantoprazole 20 MG Oral Tab EC Take 1 tablet (20 mg total) by mouth before breakfast. 90 tablet 3    atorvastatin 10 MG Oral  Tab Take 1 tablet (10 mg total) by mouth nightly. 90 tablet 3    trandolapril 4 MG Oral Tab Take 2 tablets (8 mg total) by mouth daily. 180 tablet 3    amLODIPine 10 MG Oral Tab Take 1 tablet (10 mg total) by mouth daily. 90 tablet 3    Glucose Blood (TRUE METRIX BLOOD GLUCOSE TEST) In Vitro Strip USE AS DIRECTED TO CHECK BLOOD GLUCOSE 2 TIMES DAILY 200 strip 3    TRUEplus Lancets 33G Does not apply Misc Check sugars twice a day 200 each 3    Calcium Carbonate-Vitamin D (CALCIUM 600-D OR)       Cyanocobalamin (VITAMIN B12) 500 MCG Oral Tab       Iron Glycinate 29 MG Oral Cap       meclizine 25 MG Oral Tab Take 1 tablet (25 mg total) by mouth 3 (three) times daily as needed for Dizziness. 40 tablet 2    TRUE METRIX LEVEL 1 Low In Vitro Solution USE AS DIRECTED 1 each 0    folic acid 800 MCG Oral Tab            ALLERGY:  Allergies   Allergen Reactions    Seasonal UNKNOWN       PAST MEDICAL, SOCIAL AND FAMILY HISTORY:  See past medical history marked as reviewed.  See past surgical history marked as reviewed.  See past family history marked as reviewed.  See past social history marked as reviewed.    ASSESSMENTS:       REVIEW OF SYSTEMS:  Constitutional: Negative for: weight change, fever, fatigue, cold/heat intolerance  Eyes: Negative for:  Visual changes, proptosis, blurring  ENT: Negative for:  dysphagia, neck swelling, dysphonia  Respiratory: Negative for:  dyspnea, cough  Cardiovascular: Negative for:  chest pain, palpitations, orthopnea  GI: Negative for:  abdominal pain, nausea, vomiting, diarrhea, constipation, bleeding  Neurology: Negative for: headache, numbness, weakness  Genito-Urinary: Negative for: dysuria, frequency  Psychiatric: Negative for:  depression, anxiety  Hematology/Lymphatics: Negative for: bruising, lower extremity edema  Endocrine: Negative for: polyuria, polydypsia  Skin: Negative for: rash, blister, cellulitis,       PHYSICAL EXAM:   Vitals:    01/31/25 0941   BP: 128/64   Pulse: 94    Weight: 137 lb (62.1 kg)     BMI: Body mass index is 22.11 kg/m².         General Appearance:  alert, well developed, in no acute distress  Head: Atraumatic  Eyes:  normal conjunctivae, sclera., normal sclera and normal pupils  Throat/Neck: normal sound to voice. Normal hearing, normal speech  Respiratory:  Speaking in full sentences, non-labored. no increased work of breathing, no audible wheezing    Neuro: motor grossly intact, moving all extremities without difficulty  Psychiatric:  oriented to time, self, and place  EXTREMITIES:  August 2024  Bilateral barefoot skin diabetic exam is normal, visualized feet and the appearance is normal.  Bilateral monofilament/sensation of both feet is normal.  Pulsation pedal pulse exam of both lower legs/feet is normal as well.          DATA:       Pertinent labs reviewed  A1c is 6.5 % (1/2025)     ASSESSMENT AND PLAN:     1. Type 2 DM:     Plan:  Discussed the pathogenesis, natural course of diabetes. Patient understands the importance of glycemic control and the implications of uncontrolled diabetes including Diabetic ketoacidosis and various micro vascular and macrovascular complications.        a). Medications:  - Metformin ER 1000 mg BID  Take with food  GI SE reviewed    - Amaryl 2 mg : 1.5 tablets daily   Take with food  Risk of hypoglycemia reviewed     - Trulicity 1.5 mg q week.   No personal or family history of MEN syndrome  Patient counselled regarding side effects including injection site reactions, nausea, vomiting, diarrhea, pancreatitis, gastroparesis and rare side effect jimmie Checo syndrome.      Call if she has low BG under 80     b). No Nephropathy.  c). Instructed on importance of annual eye exams  d). Foot exam: Daily feet exam explained  e). BG log maintainence explained in great detail, to get log and glucometer on next visit.  f). Life style changes discussed  g). Hypoglycemia recognition and management discussed     2. Patient’s BP is normal   today  3. Dyslipidemia  A) Discussed lifestyle modifications including reductions in dietary total and saturated fat, weight loss, aerobic exercise, and eating a diet rich in fruits and vegetables.  B) c/w statin  Discussed the potential side effects of statins including muscle and liver injury.     RTC in 4-5 months  Call with BG as instructed.      Orders Placed This Encounter   Procedures    POC HemoCue Glucose 201 (Finger stick glucose)    POC Glycohemoglobin [02514]       Pili Young MD                      Patient verbalized a complete  understanding of all of the above and did not have any further questions.

## 2025-01-31 NOTE — TELEPHONE ENCOUNTER
Patient received a letter that trulicity is not on formulary  Can we please call and check what GLP agonists will be covered?   She will be running out of medication soon.   Thanks

## 2025-01-31 NOTE — TELEPHONE ENCOUNTER
Spoke to pharmacist - patient picked up one month at beginning of January with co-pay of $47  New RX sent for trulicity 1.5mg - spoke to pharmacist: stated trulicity 1.5mg goes through insurance with co-pay of $47 for one month (can only dispense one month at a time) - pharmacist will order trulicity for Monday pickup   Spoke to patient to update - she stated understanding and will contact clinic if any issues arise

## 2025-01-31 NOTE — TELEPHONE ENCOUNTER
Endocrine Refill protocol for oral and injectable diabetic medications    Protocol Criteria:  PASSED  Reason: N/A    If all below requirements are met, send a 90-day supply with 1 refill per provider protocol.    Verify appointment with Endocrinology completed in the last 6 months or scheduled in the next 3 months.  Verify A1C has been completed within the last 6 months and is below 8.5%     Last completed office visit: 1/31/2025 Pili Young MD   Next scheduled Follow up:   Future Appointments   Date Time Provider Department Center                 7/2/2025  9:45 AM Pili Young MD [facility]      Last A1c result: Last A1c value was 6.5% done 1/31/2025.

## 2025-02-03 ENCOUNTER — PATIENT MESSAGE (OUTPATIENT)
Dept: FAMILY MEDICINE CLINIC | Facility: CLINIC | Age: 75
End: 2025-02-03

## 2025-02-04 ENCOUNTER — PATIENT MESSAGE (OUTPATIENT)
Dept: FAMILY MEDICINE CLINIC | Facility: CLINIC | Age: 75
End: 2025-02-04

## 2025-02-14 ENCOUNTER — OFFICE VISIT (OUTPATIENT)
Dept: ORTHOPEDICS CLINIC | Facility: CLINIC | Age: 75
End: 2025-02-14
Payer: MEDICARE

## 2025-02-14 DIAGNOSIS — S72.002D CLOSED LEFT HIP FRACTURE, WITH ROUTINE HEALING, SUBSEQUENT ENCOUNTER: ICD-10-CM

## 2025-02-14 DIAGNOSIS — Z47.89 ORTHOPEDIC AFTERCARE: Primary | ICD-10-CM

## 2025-02-17 PROBLEM — S72.002D CLOSED LEFT HIP FRACTURE, WITH ROUTINE HEALING, SUBSEQUENT ENCOUNTER: Status: ACTIVE | Noted: 2025-02-17

## 2025-02-17 NOTE — PROGRESS NOTES
Orthopaedic Surgery Postop Patient Visit  _______________________________________________________________________________________________________________  _______________________________________________________________________________________________________________    DATE OF VISIT: 2/14/2025     DATE OF SURGERY: 10/5/2024     CHIEF COMPLAINT: Follow-up Left hip fracture stabilization surgery       Chief Complaint   Patient presents with    Post-Op     3rd post-op Left hip, 3/10 pain scale        HISTORY OF PRESENT ILLNESS: Mary Marmolejo is a 74 year old female who presents to the clinic for follow-up after Left hip fracture treatment with bipolar hemiarthroplasty. The patient has progressed to ambulation without an assist device. Pain is well controlled on current regimen.  She occasionally has 3/10 pain about the lateral aspect of the hip over the incision in the region of the greater trochanteric bursa.  Otherwise, she feels very well and feels she is regaining most of her mobility and function without too much issue    The patient denies fevers, chills, and wound issues. The patient is currently done with PT progression    MEDICATIONS  Reviewed   glimepiride 2 MG Oral Tab TAKE 1 TABLET BY MOUTH WITH BREAKFAST AND ONE-HALF TABLET WITH DINNER 135 tablet 1    TRULICITY 1.5 MG/0.5ML Subcutaneous Solution Auto-injector ADMINISTER 1.5 MG UNDER THE SKIN 1 TIME A WEEK 6 mL 1    levothyroxine 88 MCG Oral Tab TAKE 1.5 TABLETS ON SUNDAY AND 1 TABLET ON ALL OTHER DAYS 110 tablet 3    metFORMIN  MG Oral Tablet 24 Hr Take 2 tablets (1,000 mg total) by mouth 2 (two) times daily with meals. 360 tablet 1    tiZANidine 4 MG Oral Tab       Acetaminophen ER (TYLENOL 8 HOUR ARTHRITIS PAIN) 650 MG Oral Tab CR Take 1 tablet (650 mg total) by mouth every 8 (eight) hours as needed for Pain.      Cholecalciferol (VITAMIN D) 1000 units Oral Cap Take 1,000 Units by mouth daily.      pantoprazole 20 MG Oral Tab EC Take 1  tablet (20 mg total) by mouth before breakfast. 90 tablet 3    atorvastatin 10 MG Oral Tab Take 1 tablet (10 mg total) by mouth nightly. 90 tablet 3    trandolapril 4 MG Oral Tab Take 2 tablets (8 mg total) by mouth daily. 180 tablet 3    amLODIPine 10 MG Oral Tab Take 1 tablet (10 mg total) by mouth daily. 90 tablet 3    Glucose Blood (TRUE METRIX BLOOD GLUCOSE TEST) In Vitro Strip USE AS DIRECTED TO CHECK BLOOD GLUCOSE 2 TIMES DAILY 200 strip 3    TRUEplus Lancets 33G Does not apply Misc Check sugars twice a day 200 each 3    Calcium Carbonate-Vitamin D (CALCIUM 600-D OR)       Cyanocobalamin (VITAMIN B12) 500 MCG Oral Tab       Iron Glycinate 29 MG Oral Cap       meclizine 25 MG Oral Tab Take 1 tablet (25 mg total) by mouth 3 (three) times daily as needed for Dizziness. 40 tablet 2    TRUE METRIX LEVEL 1 Low In Vitro Solution USE AS DIRECTED 1 each 0    folic acid 800 MCG Oral Tab           ALLERGIES  Allergies[1]     REVIEW OF SYSTEMS  A 14 point review of systems was performed. Pertinent positives and negatives noted in the HPI.    PHYSICAL EXAM  LMP  (LMP Unknown)      Constitutional: The patient is well-developed, well-nourished, in no acute distress.  Neurological: Alert and oriented to person, place, and time.  Psychiatric: Mood and affect normal.  Head: Normocephalic and atraumatic.  Cardiovascular: regular rate by palpation  Pulmonary/Chest: Effort normal. No respiratory distress. Breathing non-labored  Abdominal: Abdomen exhibits no distension.   Left hip  Wound well healed  ROM:  Hip Flexion: 120  Knee Flexion: 130  Knee Extension: 0  Motor/Strength:  Hip Flexion: 5/5  Hip Extension: 5/5  Knee Flexion: 5/5  Knee Extension: 5/5  Able to fire TA/GSC/EHL/FHL w/o issue  SILT josé antonio/saph/tib/spn/dpn distributions  2+ DP pulses, brisk capillary refill to lower extremity    ASSESSMENT/PLAN: Mary Marmolejo is a 74 year old female who presents to the Orthopaedic surgery clinic today for follow-up 16 weeks  after Left hip stabilization surgery with bipolar hemiarthroplasty.  She is doing very well.  She does have some symptoms consistent with greater trochanteric bursitis/irritation about the incision site that should resolve over the next few months.  She should continue to work on stretching this region in order to help improve her mobility and decrease discomfort.  She may take anti-inflammatories and other medications as needed.    We discussed the relevant radiographs at today's visit  We reviewed the PT protocol and adjustments were made as needed  WEIGHT BEARING STATUS: Weightbearing as tolerated  RANGE-OF-MOTION LIMITATIONS: as tolerated  IMAGING ORDERED: none  CONSULTS PLACED: none  PROSTHESES/ORTHOTICS: none    FOLLOW-UP: At approximately 1 year postoperative    RADIOGRAPHS AT NEXT VISIT:  2 view left hip    I have personally seen Mary Yeungyoana Marmolejo and discussed in detail their plan of care. Prior to departure, they indicated agreement with and understanding of their plan of care and their follow-up as documented herein this note. Please note that this note was written in combination with voice recognition/dictation software and there is a possibility of transcription errors which were not identified at the time of note submission. If clarification is necessary, please contact the author or clinic staff.    Eliel Tolbert MD  Orthopaedic Surgery  2/17/2025            [1]   Allergies  Allergen Reactions    Seasonal UNKNOWN

## 2025-02-18 DIAGNOSIS — I10 ESSENTIAL HYPERTENSION WITH GOAL BLOOD PRESSURE LESS THAN 130/85: ICD-10-CM

## 2025-02-24 RX ORDER — TRANDOLAPRIL TABLETS 4 MG/1
8 TABLET ORAL DAILY
Qty: 180 TABLET | Refills: 3 | Status: SHIPPED | OUTPATIENT
Start: 2025-02-24

## 2025-02-24 NOTE — TELEPHONE ENCOUNTER
Refill passed per [facility] protocol but not refilled due to high level interaction warning.     Requested Prescriptions   Pending Prescriptions Disp Refills    TRANDOLAPRIL 4 MG Oral Tab [Pharmacy Med Name: TRANDOLAPRIL 4MG TABLETS] 180 tablet 3     Sig: TAKE 2 TABLETS(8 MG) BY MOUTH DAILY       Hypertension Medications Protocol Passed - 2/24/2025  6:07 AM        Passed - CMP or BMP in past 12 months        Passed - Last BP reading less than 140/90     BP Readings from Last 1 Encounters:   01/31/25 128/64               Passed - In person appointment or virtual visit in the past 12 mos or appointment in next 3 mos     Recent Outpatient Visits              [facility]    Office Visit    3 weeks ago Type 2 diabetes mellitus without complication, without long-term current use of insulin ([facility])    [facility] Pili Young MD    Office Visit    [facility] months [facility]    [facility]    [facility]    Office Visit    4 months ago Stress incontinence of urine    [facility] Melodie Fajardo MD    Office Visit          Future Appointments         Provider Department Appt Notes    In 2 months Melodie Fajardo MD [facility] Supervisit    In 4 months Pili Young MD [facility] 5 months                    Passed - EGFRCR or GFRNAA > 50     GFR Evaluation  EGFRCR: 67 , resulted on 10/14/2024          Passed - Medication is active on med list             [unfilled]      [unfilled]

## 2025-04-15 DIAGNOSIS — K21.9 GASTROESOPHAGEAL REFLUX DISEASE: ICD-10-CM

## 2025-04-18 RX ORDER — PANTOPRAZOLE SODIUM 20 MG/1
20 TABLET, DELAYED RELEASE ORAL
Qty: 90 TABLET | Refills: 3 | Status: SHIPPED | OUTPATIENT
Start: 2025-04-18

## 2025-04-18 NOTE — TELEPHONE ENCOUNTER
Refill Per Protocol     Requested Prescriptions   Pending Prescriptions Disp Refills    PANTOPRAZOLE 20 MG Oral Tab EC [Pharmacy Med Name: PANTOPRAZOLE 20MG TABLETS] 90 tablet 3     Sig: TAKE 1 TABLET(20 MG) BY MOUTH DAILY BEFORE BREAKFAST       Gastrointestional Medication Protocol Passed - 4/18/2025  9:54 AM        Passed - In person appointment or virtual visit in the past 12 mos or appointment in next 3 mos     Recent Outpatient Visits              2 months ago Orthopedic aftercare    [facility] Eliel Ruano MD    Office Visit    2 months ago Type 2 diabetes mellitus without complication, without long-term current use of insulin ([facility])    [facility] Pili Young MD    Office Visit    4 months [facility]    6 months [facility]    [facility] Visit    6 months ago Stress incontinence of urine    [facility] Melodie Fajardo MD    Office Visit          Future Appointments         Provider Department Appt Notes    In 2 weeks Melod[facility] Fajardo MD [facility] Super[facility]    In 2 months Pili Young MD [facility] 5 months                    Passed - Medication is active on med list

## 2025-04-28 PROBLEM — S72.032A: Status: RESOLVED | Noted: 2024-10-04 | Resolved: 2025-04-28

## 2025-04-28 PROBLEM — Z87.81 HISTORY OF FEMUR FRACTURE: Status: ACTIVE | Noted: 2025-04-28

## 2025-05-05 ENCOUNTER — OFFICE VISIT (OUTPATIENT)
Dept: FAMILY MEDICINE CLINIC | Facility: CLINIC | Age: 75
End: 2025-05-05
Payer: MEDICARE

## 2025-05-05 ENCOUNTER — LAB ENCOUNTER (OUTPATIENT)
Dept: LAB | Age: 75
End: 2025-05-05
Attending: STUDENT IN AN ORGANIZED HEALTH CARE EDUCATION/TRAINING PROGRAM
Payer: MEDICARE

## 2025-05-05 VITALS
DIASTOLIC BLOOD PRESSURE: 70 MMHG | WEIGHT: 142 LBS | HEART RATE: 78 BPM | SYSTOLIC BLOOD PRESSURE: 135 MMHG | OXYGEN SATURATION: 99 % | TEMPERATURE: 98 F | HEIGHT: 65 IN | BODY MASS INDEX: 23.66 KG/M2

## 2025-05-05 DIAGNOSIS — E11.9 TYPE 2 DIABETES MELLITUS WITHOUT COMPLICATION, WITHOUT LONG-TERM CURRENT USE OF INSULIN (HCC): ICD-10-CM

## 2025-05-05 DIAGNOSIS — I10 ESSENTIAL HYPERTENSION WITH GOAL BLOOD PRESSURE LESS THAN 130/85: ICD-10-CM

## 2025-05-05 DIAGNOSIS — E78.00 HYPERCHOLESTEROLEMIA: ICD-10-CM

## 2025-05-05 DIAGNOSIS — M85.80 OSTEOPENIA, UNSPECIFIED LOCATION: ICD-10-CM

## 2025-05-05 DIAGNOSIS — K21.9 GASTROESOPHAGEAL REFLUX DISEASE, UNSPECIFIED WHETHER ESOPHAGITIS PRESENT: ICD-10-CM

## 2025-05-05 DIAGNOSIS — Z78.0 ASYMPTOMATIC MENOPAUSAL STATE: ICD-10-CM

## 2025-05-05 DIAGNOSIS — E03.9 ACQUIRED HYPOTHYROIDISM: ICD-10-CM

## 2025-05-05 DIAGNOSIS — E11.69 TYPE 2 DIABETES MELLITUS WITH OTHER SPECIFIED COMPLICATION, WITHOUT LONG-TERM CURRENT USE OF INSULIN (HCC): ICD-10-CM

## 2025-05-05 DIAGNOSIS — E55.9 VITAMIN D DEFICIENCY: ICD-10-CM

## 2025-05-05 DIAGNOSIS — Z00.00 ENCOUNTER FOR ANNUAL HEALTH EXAMINATION: Primary | ICD-10-CM

## 2025-05-05 LAB
ALBUMIN SERPL-MCNC: 5.1 G/DL (ref 3.2–4.8)
ALBUMIN/GLOB SERPL: 1.8 {RATIO} (ref 1–2)
ALP LIVER SERPL-CCNC: 78 U/L (ref 55–142)
ALT SERPL-CCNC: 42 U/L (ref 10–49)
ANION GAP SERPL CALC-SCNC: 12 MMOL/L (ref 0–18)
AST SERPL-CCNC: 42 U/L (ref ?–34)
BASOPHILS # BLD AUTO: 0.07 X10(3) UL (ref 0–0.2)
BASOPHILS NFR BLD AUTO: 1 %
BILIRUB SERPL-MCNC: 0.6 MG/DL (ref 0.2–1.1)
BUN BLD-MCNC: 15 MG/DL (ref 9–23)
BUN/CREAT SERPL: 15.5 (ref 10–20)
CALCIUM BLD-MCNC: 10.3 MG/DL (ref 8.7–10.4)
CHLORIDE SERPL-SCNC: 101 MMOL/L (ref 98–112)
CHOLEST SERPL-MCNC: 135 MG/DL (ref ?–200)
CO2 SERPL-SCNC: 26 MMOL/L (ref 21–32)
CREAT BLD-MCNC: 0.97 MG/DL (ref 0.55–1.02)
CREAT UR-SCNC: 78.9 MG/DL
DEPRECATED RDW RBC AUTO: 51.3 FL (ref 35.1–46.3)
EGFRCR SERPLBLD CKD-EPI 2021: 62 ML/MIN/1.73M2 (ref 60–?)
EOSINOPHIL # BLD AUTO: 0.17 X10(3) UL (ref 0–0.7)
EOSINOPHIL NFR BLD AUTO: 2.4 %
ERYTHROCYTE [DISTWIDTH] IN BLOOD BY AUTOMATED COUNT: 16.9 % (ref 11–15)
FASTING PATIENT LIPID ANSWER: YES
FASTING STATUS PATIENT QL REPORTED: YES
GLOBULIN PLAS-MCNC: 2.8 G/DL (ref 2–3.5)
GLUCOSE BLD-MCNC: 129 MG/DL (ref 70–99)
HCT VFR BLD AUTO: 37.1 % (ref 35–48)
HDLC SERPL-MCNC: 66 MG/DL (ref 40–59)
HGB BLD-MCNC: 11.2 G/DL (ref 12–16)
IMM GRANULOCYTES # BLD AUTO: 0.01 X10(3) UL (ref 0–1)
IMM GRANULOCYTES NFR BLD: 0.1 %
LDLC SERPL CALC-MCNC: 44 MG/DL (ref ?–100)
LYMPHOCYTES # BLD AUTO: 1.74 X10(3) UL (ref 1–4)
LYMPHOCYTES NFR BLD AUTO: 24.8 %
MCH RBC QN AUTO: 24.9 PG (ref 26–34)
MCHC RBC AUTO-ENTMCNC: 30.2 G/DL (ref 31–37)
MCV RBC AUTO: 82.4 FL (ref 80–100)
MICROALBUMIN UR-MCNC: 2.8 MG/DL
MICROALBUMIN/CREAT 24H UR-RTO: 35.5 UG/MG (ref ?–30)
MONOCYTES # BLD AUTO: 0.47 X10(3) UL (ref 0.1–1)
MONOCYTES NFR BLD AUTO: 6.7 %
NEUTROPHILS # BLD AUTO: 4.57 X10 (3) UL (ref 1.5–7.7)
NEUTROPHILS # BLD AUTO: 4.57 X10(3) UL (ref 1.5–7.7)
NEUTROPHILS NFR BLD AUTO: 65 %
NONHDLC SERPL-MCNC: 69 MG/DL (ref ?–130)
OSMOLALITY SERPL CALC.SUM OF ELEC: 291 MOSM/KG (ref 275–295)
PLATELET # BLD AUTO: 358 10(3)UL (ref 150–450)
POTASSIUM SERPL-SCNC: 4.3 MMOL/L (ref 3.5–5.1)
PROT SERPL-MCNC: 7.9 G/DL (ref 5.7–8.2)
RBC # BLD AUTO: 4.5 X10(6)UL (ref 3.8–5.3)
SODIUM SERPL-SCNC: 139 MMOL/L (ref 136–145)
TRIGL SERPL-MCNC: 149 MG/DL (ref 30–149)
TSI SER-ACNC: 1.57 UIU/ML (ref 0.55–4.78)
VIT D+METAB SERPL-MCNC: 53.4 NG/ML (ref 30–100)
VLDLC SERPL CALC-MCNC: 21 MG/DL (ref 0–30)
WBC # BLD AUTO: 7 X10(3) UL (ref 4–11)

## 2025-05-05 PROCEDURE — 84443 ASSAY THYROID STIM HORMONE: CPT

## 2025-05-05 PROCEDURE — 80061 LIPID PANEL: CPT

## 2025-05-05 PROCEDURE — 80053 COMPREHEN METABOLIC PANEL: CPT

## 2025-05-05 PROCEDURE — 82570 ASSAY OF URINE CREATININE: CPT

## 2025-05-05 PROCEDURE — 82306 VITAMIN D 25 HYDROXY: CPT

## 2025-05-05 PROCEDURE — 82043 UR ALBUMIN QUANTITATIVE: CPT

## 2025-05-05 PROCEDURE — 85025 COMPLETE CBC W/AUTO DIFF WBC: CPT

## 2025-05-05 PROCEDURE — 36415 COLL VENOUS BLD VENIPUNCTURE: CPT

## 2025-05-05 RX ORDER — AMLODIPINE BESYLATE 10 MG/1
10 TABLET ORAL DAILY
Qty: 90 TABLET | Refills: 3 | Status: SHIPPED | OUTPATIENT
Start: 2025-05-05

## 2025-05-05 RX ORDER — LEVOTHYROXINE SODIUM 88 UG/1
TABLET ORAL
Qty: 110 TABLET | Refills: 3 | Status: SHIPPED | OUTPATIENT
Start: 2025-05-05

## 2025-05-05 RX ORDER — ATORVASTATIN CALCIUM 10 MG/1
10 TABLET, FILM COATED ORAL NIGHTLY
Qty: 90 TABLET | Refills: 3 | Status: SHIPPED | OUTPATIENT
Start: 2025-05-05

## 2025-05-05 RX ORDER — METFORMIN HYDROCHLORIDE 500 MG/1
1000 TABLET, EXTENDED RELEASE ORAL 2 TIMES DAILY WITH MEALS
Qty: 360 TABLET | Refills: 1 | Status: SHIPPED | OUTPATIENT
Start: 2025-05-05

## 2025-05-05 NOTE — PROGRESS NOTES
Subjective:   Mary Marmolejo is a 75 year old female who presents for a MA AHA (Medicare Advantage Annual Health Assessment) and Medicare Subsequent Annual Wellness visit (Pt already had Initial Annual Wellness) and scheduled follow up of multiple significant but stable problems.     History of Present Illness    Pt presenting for routine physical and annual medicare wellness check. Denies any acute issues or recent illnesses. Chronic problems as below. Past medical/surgical history, family history, and social history were reviewed. Diet and exercise have been fair. Medicare screening questions per nurse were reviewed. Pt requesting annual testing and med refills.     S/p Left hip hemiarthroplasty  Still reports recurrent Left hip discomfort  Trying to keep up with exercises  Reports Tylenol dosing, occasional NSAID for flares    H/o T2DM  Followed by Endo  Reports DM eye Fall 2024 (Dr. Marina Shields - [facility])  Reports recurrent loose stools -- related to Trulicity vs Metformin?    Mood stable, denies SH/SI/HI    History/Other:   Fall Risk Assessment:   She has been screened for Falls and is High Risk. Fall Prevention information provided to patient in After Visit Summary.    Do you feel unsteady when standing or walking?: (Patient-Rptd) No  Do you worry about falling?: (Patient-Rptd) No  Have you fallen in the past year?: (Patient-Rptd) Yes  How many times have you fallen?: (Patient-Rptd) (P) 1  Were you injured?: (Patient-Rptd) (P) Yes     Cognitive Assessment:   She had a completely normal cognitive assessment - see flowsheet entries     Functional Ability/Status:   Mary Marmolejo has a completely normal functional assessment. See flowsheet for details.      Depression Screening (PHQ):  PHQ-2 SCORE: 0  , done 5/5/2025        <5 minutes spent screening and counseling for depression    Advanced Directives:   She does NOT have a Living Will. [Do you have a living will?:  (Patient-Rptd) No]  She has a Power of  for Health Care on file in [employer].  Discussed Advance Care Planning with patient (and family/surrogate if present). Standard forms made available to patient in After Visit Summary.      Patient Active Problem List   Diagnosis    Acquired hypothyroidism    Diabetes mellitus type 2 without retinopathy (HCC)    Essential hypertension with goal blood pressure less than 130/85    Hypercholesterolemia    Osteopenia    Vitreous floaters, bilateral    Fatty liver    Age-related nuclear cataract of both eyes    Encounter for Medicare annual wellness exam    Gastroesophageal reflux disease    Anemia    Type 2 diabetes mellitus with other specified complication, unspecified whether long term insulin use (HCC)    Otalgia of left ear    Cough    Dysfunction of eustachian tube    Dysuria    Hematuria    Mitral valve disease    Pain in soft tissues of limb    Vitamin D deficiency    Fall, initial encounter    ABLA (acute blood loss anemia)    Orthopedic aftercare    Closed left hip fracture, with routine healing, subsequent encounter    History of femur fracture     Allergies:  She is allergic to seasonal.    Current Medications:  Outpatient Medications Marked as Taking for the 5/5/25 encounter (Office Visit) with Melodie Fajardo MD   Medication Sig    amLODIPine 10 MG Oral Tab Take 1 tablet (10 mg total) by mouth daily.    atorvastatin 10 MG Oral Tab Take 1 tablet (10 mg total) by mouth nightly.    metFORMIN  MG Oral Tablet 24 Hr Take 2 tablets (1,000 mg total) by mouth 2 (two) times daily with meals.    levothyroxine 88 MCG Oral Tab TAKE 1.5 TABLETS ON SUNDAY AND 1 TABLET ON ALL OTHER DAYS    pantoprazole 20 MG Oral Tab EC Take 1 tablet (20 mg total) by mouth before breakfast.    TRANDOLAPRIL 4 MG Oral Tab TAKE 2 TABLETS(8 MG) BY MOUTH DAILY    glimepiride 2 MG Oral Tab TAKE 1 TABLET BY MOUTH WITH BREAKFAST AND ONE-HALF TABLET WITH DINNER    TRULICITY 1.5 MG/0.5ML  Subcutaneous Solution Auto-injector ADMINISTER 1.5 MG UNDER THE SKIN 1 TIME A WEEK    tiZANidine 4 MG Oral Tab     Cholecalciferol (VITAMIN D) 1000 units Oral Cap Take 1,000 Units by mouth daily.    Glucose Blood (TRUE METRIX BLOOD GLUCOSE TEST) In Vitro Strip USE AS DIRECTED TO CHECK BLOOD GLUCOSE 2 TIMES DAILY    TRUEplus Lancets 33G Does not apply Misc Check sugars twice a day    Calcium Carbonate-Vitamin D (CALCIUM 600-D OR)     Cyanocobalamin (VITAMIN B12) 500 MCG Oral Tab     Iron Glycinate 29 MG Oral Cap     meclizine 25 MG Oral Tab Take 1 tablet (25 mg total) by mouth 3 (three) times daily as needed for Dizziness.    TRUE METRIX LEVEL 1 Low In Vitro Solution USE AS DIRECTED    folic acid 800 MCG Oral Tab        Medical History:  She  has a past medical history of Acute respiratory failure with hypoxia (HCC) (2021), Allergic rhinitis (Few years), Elective , Esophageal reflux, Fatty liver, Headache, High cholesterol, Hypothyroid, Hypothyroidism, Lipid screening (2014), MVP (mitral valve prolapse), Other and unspecified hyperlipidemia, Post-menopausal bleeding, Type II or unspecified type diabetes mellitus without mention of complication, not stated as uncontrolled, and Unspecified essential hypertension.  Surgical History:  She  has a past surgical history that includes ; tonsillectomy; endometrial biopsy - jar(s): 2 (); colonoscopy (); colonoscopy (, ); colonoscopy (N/A, 2017); egd (2018); egd (N/A, 2018); d & c; other surgical history (FATTY LIVER); cataract; and hip replacement surgery (10/05/2024).   Family History:  Her family history includes Anemia in her sister; Bone Cancer in her maternal grandmother; Breast Cancer in her sister; Breast Cancer (age of onset: 53) in her maternal grandmother; Breast Cancer (age of onset: 54) in her sister; Breast Cancer 2nd occurrence in her maternal grandmother; Cancer in her maternal grandmother, mother,  sister, and sister; Cataracts in her sister; Diabetes in her mother, sister, and sister; Heart Surgery in her father; Hypertension in her father; Ovarian Cancer (age of onset: 65) in her mother.  Social History:  She  reports that she has never smoked. She has never used smokeless tobacco. She reports that she does not currently use alcohol after a past usage of about 1.7 standard drinks of alcohol per week. She reports that she does not use drugs.    Tobacco:  She has never smoked tobacco.    CAGE Alcohol Screen:   CAGE screening score of 0 on 4/29/2025, showing low risk of alcohol abuse.      Patient Care Team:  Melodie Fajardo MD as PCP - General (Family Medicine)  Aamir Martin MD (GASTROENTEROLOGY)  Pili Young MD (ENDOCRINOLOGY)    Review of Systems     Negative except Left hip discomfort    Objective:   Physical Exam  General appearance: alert, appears stated age, and cooperative  Head: Normocephalic, without obvious abnormality, atraumatic  Eyes: negative  Ears: normal TM's and external ear canals both ears  Nose: no discharge  Throat: normal findings: lips normal without lesions and palate normal  Neck: no adenopathy, supple, symmetrical, trachea midline, and thyroid not enlarged, symmetric, no tenderness/mass/nodules  Back: negative  Lungs: clear to auscultation bilaterally  Breasts:   deferred  Heart: S1, S2 normal, regular rate and rhythm  Abdomen: soft, non-tender; bowel sounds normal; no masses,  no organomegaly  Pelvic: deferred  Extremities: extremities normal, atraumatic, no cyanosis or edema  Pulses: 2+ and symmetric  Skin: Skin color, texture, turgor normal. No rashes or lesions  Lymph nodes:  negative  Neurologic: Grossly normal  Bilateral barefoot skin diabetic exam is normal, visualized feet and the appearance is normal.  Bilateral monofilament/sensation of both feet is normal.  Pulsation pedal pulse exam of both lower legs/feet is normal as well.    /70   Pulse 78    Temp 97.9 °F (36.6 °C) (Temporal)   Ht 5' 5\" (1.651 m)   Wt 142 lb (64.4 kg)   LMP  (LMP Unknown)   SpO2 99%   BMI 23.63 kg/m²  Estimated body mass index is 23.63 kg/m² as calculated from the following:    Height as of this encounter: 5' 5\" (1.651 m).    Weight as of this encounter: 142 lb (64.4 kg).    Medicare Hearing Assessment:   Hearing Screening    Time taken: 5/5/2025  8:25 AM  Screening Method: Finger Rub  Finger Rub Result: Pass               Assessment & Plan:   Mary Marmolejo is a 75 year old female who presents for a Medicare Assessment.     1. Encounter for annual health examination (Primary)  Healthy physical exam. Advised to exercise regularly, monitor diet and weight, and follow-up as directed. Will check labs. Continue current medications. Regular follow-up with Endo. Annual Medicare physical.  Discussed preventative screenings  - mammogram 1/2025  - Cscope 4/2018, 10yr recall  - will check DEXA  - will check labs as below  - encouraged to continue diet/exercise for overall wellness  - follow-up with eye doctor annually  - follow-up with dentist every 6 months  - return yearly for physicals  - annual flu shot  2. Type 2 diabetes mellitus with other specified complication, without long-term current use of insulin (HCC)  Last A1x 6.5  - discussed home blood glucose monitoring  - counseled on healthy diet and lifestyle changes for overall wellness, which includes decreased carb and sugar intake, increased fiber intake, and increased water intake as tolerated  - discussed exercise as able  - provided with diabetic diet information  - consider holding Metformin dosing to monitor loose stools  - will obtain Ophtho records  -     Microalb/Creat Ratio, Random Urine; Future; Expected date: 05/05/2025  3. Essential hypertension with goal blood pressure less than 130/85  In-office BP stable, pt otherwise asymptomatic  - discussed benefits of DASH diet and daily activity  - continue BP  monitoring  - continue daily medication  - will check labwork  - advised to call if BP is persistently elevated  -     Comp Metabolic Panel (14); Future; Expected date: 05/05/2025  -     TSH W Reflex To Free T4; Future; Expected date: 05/05/2025  -     CBC With Differential With Platelet; Future; Expected date: 05/05/2025  -     amLODIPine Besylate; Take 1 tablet (10 mg total) by mouth daily.  Dispense: 90 tablet; Refill: 3  4. Acquired hypothyroidism  Continue supplementation  Overview:  TSH normal 2/16/23  Orders:  -     TSH W Reflex To Free T4; Future; Expected date: 05/05/2025  -     Levothyroxine Sodium; TAKE 1.5 TABLETS ON SUNDAY AND 1 TABLET ON ALL OTHER DAYS  Dispense: 110 tablet; Refill: 3  5. Hypercholesterolemia  Continue statin dosing  -     Lipid Panel; Future; Expected date: 05/05/2025  -     Atorvastatin Calcium; Take 1 tablet (10 mg total) by mouth nightly.  Dispense: 90 tablet; Refill: 3  6. Gastroesophageal reflux disease, unspecified whether esophagitis present  Stable, CTM  7. Vitamin D deficiency  Continue calcium (1200mg) and vit D (2000IU) supplementation, as well as regular activity  Will check updated DEXA  -     Vitamin D; Future; Expected date: 05/05/2025  -     XR DEXA BONE DENSITOMETRY (CPT=77080); Future; Expected date: 05/05/2025  8. Type 2 diabetes mellitus without complication, without long-term current use of insulin (HCC)  As above  -     metFORMIN HCl ER; Take 2 tablets (1,000 mg total) by mouth 2 (two) times daily with meals.  Dispense: 360 tablet; Refill: 1  9. Osteopenia, unspecified location  Overview:  T score -2.0 in 2023  Orders:  -     XR DEXA BONE DENSITOMETRY (CPT=77080); Future; Expected date: 05/05/2025  10. Asymptomatic menopausal state  -     XR DEXA BONE DENSITOMETRY (CPT=77080); Future; Expected date: 05/05/2025  [unfilled]            The patient indicates understanding of these issues and agrees to the plan.  Reinforced healthy diet, lifestyle, and  exercise.      Return in 6 months (on 11/5/2025).     Melodie Fajardo MD, 5/5/2025     Supplementary Documentation:   General Health:  In the past six months, have you lost more than 10 pounds without trying?: (Patient-Rptd) 2 - No  Has your appetite been poor?: (Patient-Rptd) No  Type of Diet: (Patient-Rptd) Balanced  How does the patient maintain a good energy level?: (Patient-Rptd) Other  How would you describe your daily physical activity?: (Patient-Rptd) Moderate  How would you describe your current health state?: (Patient-Rptd) Good  How do you maintain positive mental well-being?: (Patient-Rptd) Puzzles, Games, Visiting Friends, Visiting Family  On a scale of 0 to 10, with 0 being no pain and 10 being severe pain, what is your pain level?: (Patient-Rptd) 4 - (Moderate)  In the past six months, have you experienced urine leakage?: (Patient-Rptd) 1-Yes  At any time do you feel concerned for the safety/well-being of yourself and/or your children, in your home or elsewhere?: (Patient-Rptd) No  Have you had any immunizations at another office such as Influenza, Hepatitis B, Tetanus, or Pneumococcal?: (Patient-Rptd) Yes    Health Maintenance   Topic Date Due    Zoster Vaccines (2 of 3) 01/12/2017    COVID-19 Vaccine (5 - 2024-25 season) 09/01/2024    Diabetes Care Dilated Eye Exam  11/15/2024    Annual Well Visit  01/01/2025    Annual Depression Screening  01/01/2025    Diabetes Care: Foot Exam (Annual)  01/01/2025    Diabetes Care: Microalb/Creat Ratio (Annual)  01/01/2025    Diabetes Care A1C  07/31/2025    Diabetes Care: GFR  10/14/2025    Mammogram  01/23/2026    Colorectal Cancer Screening  04/04/2028    Influenza Vaccine  Completed    DEXA Scan  Completed    Fall Risk Screening (Annual)  Completed    Pneumococcal Vaccine: 50+ Years  Completed    Meningococcal B Vaccine  Aged Out

## 2025-05-07 ENCOUNTER — TELEPHONE (OUTPATIENT)
Dept: FAMILY MEDICINE CLINIC | Facility: CLINIC | Age: 75
End: 2025-05-07

## 2025-05-07 ENCOUNTER — MED REC SCAN ONLY (OUTPATIENT)
Dept: FAMILY MEDICINE CLINIC | Facility: CLINIC | Age: 75
End: 2025-05-07

## 2025-05-12 RX ORDER — GLIMEPIRIDE 2 MG/1
TABLET ORAL
Qty: 135 TABLET | Refills: 1 | Status: SHIPPED | OUTPATIENT
Start: 2025-05-12

## 2025-05-12 NOTE — TELEPHONE ENCOUNTER
Endocrine Refill protocol for oral and injectable diabetic medications    Protocol Criteria:  PASSED  Reason: N/A    If all below requirements are met, send a 90-day supply with 1 refill per provider protocol.    Verify appointment with Endocrinology completed in the last 6 months or scheduled in the next 3 months.  Verify A1C has been completed within the last 6 months and is below 8.5%     Last completed office visit: 1/31/2025 Pili Young MD   Next scheduled Follow up:   Future Appointments   Date Time Provider Department Center          7/2/2025  9:45 AM Pili Young MD [facility]      Last A1c result: Last A1c value was 6.5% done 1/31/2025.

## 2025-06-17 ENCOUNTER — HOSPITAL ENCOUNTER (OUTPATIENT)
Dept: BONE DENSITY | Age: 75
Discharge: HOME | End: 2025-06-17
Attending: STUDENT IN AN ORGANIZED HEALTH CARE EDUCATION/TRAINING PROGRAM
Payer: MEDICARE

## 2025-06-17 DIAGNOSIS — Z78.0 ASYMPTOMATIC MENOPAUSAL STATE: ICD-10-CM

## 2025-06-17 DIAGNOSIS — E55.9 VITAMIN D DEFICIENCY: ICD-10-CM

## 2025-06-17 DIAGNOSIS — M85.80 OSTEOPENIA, UNSPECIFIED LOCATION: ICD-10-CM

## 2025-06-17 PROCEDURE — 77080 DXA BONE DENSITY AXIAL: CPT | Performed by: STUDENT IN AN ORGANIZED HEALTH CARE EDUCATION/TRAINING PROGRAM

## 2025-06-20 ENCOUNTER — PATIENT MESSAGE (OUTPATIENT)
Dept: FAMILY MEDICINE CLINIC | Facility: CLINIC | Age: 75
End: 2025-06-20

## 2025-06-20 DIAGNOSIS — M85.80 OSTEOPENIA WITH HIGH RISK OF FRACTURE: ICD-10-CM

## 2025-06-22 RX ORDER — ALENDRONATE SODIUM 70 MG/1
70 TABLET ORAL
Qty: 24 TABLET | Refills: 1 | Status: SHIPPED | OUTPATIENT
Start: 2025-06-22

## 2025-07-02 ENCOUNTER — OFFICE VISIT (OUTPATIENT)
Dept: ENDOCRINOLOGY CLINIC | Facility: CLINIC | Age: 75
End: 2025-07-02
Payer: MEDICARE

## 2025-07-02 VITALS
DIASTOLIC BLOOD PRESSURE: 60 MMHG | WEIGHT: 141 LBS | SYSTOLIC BLOOD PRESSURE: 114 MMHG | BODY MASS INDEX: 23 KG/M2 | HEART RATE: 91 BPM

## 2025-07-02 DIAGNOSIS — E78.5 DYSLIPIDEMIA: ICD-10-CM

## 2025-07-02 DIAGNOSIS — E11.9 TYPE 2 DIABETES MELLITUS WITHOUT COMPLICATION, WITHOUT LONG-TERM CURRENT USE OF INSULIN (HCC): Primary | ICD-10-CM

## 2025-07-02 LAB
GLUCOSE BLOOD: 204
HEMOGLOBIN A1C: 6.7 % (ref 4.3–5.6)
TEST STRIP LOT #: NORMAL NUMERIC

## 2025-07-02 PROCEDURE — 3061F NEG MICROALBUMINURIA REV: CPT | Performed by: INTERNAL MEDICINE

## 2025-07-02 PROCEDURE — 83036 HEMOGLOBIN GLYCOSYLATED A1C: CPT | Performed by: INTERNAL MEDICINE

## 2025-07-02 PROCEDURE — 82947 ASSAY GLUCOSE BLOOD QUANT: CPT | Performed by: INTERNAL MEDICINE

## 2025-07-02 PROCEDURE — 3044F HG A1C LEVEL LT 7.0%: CPT | Performed by: INTERNAL MEDICINE

## 2025-07-02 PROCEDURE — 99213 OFFICE O/P EST LOW 20 MIN: CPT | Performed by: INTERNAL MEDICINE

## 2025-07-02 PROCEDURE — 3060F POS MICROALBUMINURIA REV: CPT | Performed by: INTERNAL MEDICINE

## 2025-07-02 PROCEDURE — 3074F SYST BP LT 130 MM HG: CPT | Performed by: INTERNAL MEDICINE

## 2025-07-02 PROCEDURE — 1160F RVW MEDS BY RX/DR IN RCRD: CPT | Performed by: INTERNAL MEDICINE

## 2025-07-02 PROCEDURE — 1159F MED LIST DOCD IN RCRD: CPT | Performed by: INTERNAL MEDICINE

## 2025-07-02 PROCEDURE — 3078F DIAST BP <80 MM HG: CPT | Performed by: INTERNAL MEDICINE

## 2025-07-02 RX ORDER — DULAGLUTIDE 1.5 MG/.5ML
1.5 INJECTION, SOLUTION SUBCUTANEOUS WEEKLY
Qty: 6 ML | Refills: 1 | Status: SHIPPED | OUTPATIENT
Start: 2025-07-02

## 2025-07-02 NOTE — PROGRESS NOTES
Return Office Visit     CHIEF COMPLAINT:    Type 2 DM  Dyslipidemia     HISTORY OF PRESENT ILLNESS:  Mary Marmolejo is a 75 year old female who presents for follow up for Type 2 DM.      DM HISTORY  Diagnosed: about 10 years ago        HISTORY OF DIABETES COMPLICATIONS: :  History of Retinopathy: No - last eye exam: Nov 2024  History of Neuropathy: No, she was diagnosed with sciatica, stable  History of Nephropathy: No     ASSOCIATED COMPLICATIONS:   HTN: Yes  Hyperlipidemia: Yes  Coronary Artery Disease:  No  Cerebrovascular Disease: No        HOME GLUCOSE READINGS:   Checks sugars 1-2 times a day  Alternates timings  Range in 120-140  No low BG        CURRENT DIABETIC MEDICATIONS INCLUDE:  MTF ER 1000 mg BID  Amaryl 2 mg pills: she takes 1.5 tabs daily  Trulicity 1.5 mg once a week     MEALS:  Three meals a day  Dietary compliance with a low carb is moderate     EXERCISE:  No       CURRENT MEDICATION:    Current Outpatient Medications   Medication Sig Dispense Refill    alendronate 70 MG Oral Tab Take 1 tablet (70 mg total) by mouth every 7 days. 24 tablet 1    glimepiride 2 MG Oral Tab TAKE 1 TABLET BY MOUTH EVERY MORNING AND 1/2 TABLET BY MOUTH EVERY EVENING 135 tablet 1    amLODIPine 10 MG Oral Tab Take 1 tablet (10 mg total) by mouth daily. 90 tablet 3    atorvastatin 10 MG Oral Tab Take 1 tablet (10 mg total) by mouth nightly. 90 tablet 3    metFORMIN  MG Oral Tablet 24 Hr Take 2 tablets (1,000 mg total) by mouth 2 (two) times daily with meals. 360 tablet 1    levothyroxine 88 MCG Oral Tab TAKE 1.5 TABLETS ON SUNDAY AND 1 TABLET ON ALL OTHER DAYS 110 tablet 3    pantoprazole 20 MG Oral Tab EC Take 1 tablet (20 mg total) by mouth before breakfast. 90 tablet 3    TRANDOLAPRIL 4 MG Oral Tab TAKE 2 TABLETS(8 MG) BY MOUTH DAILY 180 tablet 3    TRULICITY 1.5 MG/0.5ML Subcutaneous Solution Auto-injector ADMINISTER 1.5 MG UNDER THE SKIN 1 TIME A WEEK 6 mL 1    tiZANidine 4 MG Oral Tab        Cholecalciferol (VITAMIN D) 1000 units Oral Cap Take 1,000 Units by mouth daily.      Glucose Blood (TRUE METRIX BLOOD GLUCOSE TEST) In Vitro Strip USE AS DIRECTED TO CHECK BLOOD GLUCOSE 2 TIMES DAILY 200 strip 3    TRUEplus Lancets 33G Does not apply Misc Check sugars twice a day 200 each 3    Calcium Carbonate-Vitamin D (CALCIUM 600-D OR)       Cyanocobalamin (VITAMIN B12) 500 MCG Oral Tab       Iron Glycinate 29 MG Oral Cap       meclizine 25 MG Oral Tab Take 1 tablet (25 mg total) by mouth 3 (three) times daily as needed for Dizziness. 40 tablet 2    TRUE METRIX LEVEL 1 Low In Vitro Solution USE AS DIRECTED 1 each 0         ALLERGY:  Allergies   Allergen Reactions    Seasonal UNKNOWN       PAST MEDICAL, SOCIAL AND FAMILY HISTORY:  See past medical history marked as reviewed.  See past surgical history marked as reviewed.  See past family history marked as reviewed.  See past social history marked as reviewed.    ASSESSMENTS:       REVIEW OF SYSTEMS:  Constitutional: Negative for: weight change, fever, fatigue, cold/heat intolerance  Eyes: Negative for:  Visual changes, proptosis, blurring  ENT: Negative for:  dysphagia, neck swelling, dysphonia  Respiratory: Negative for:  dyspnea, cough  Cardiovascular: Negative for:  chest pain, palpitations, orthopnea  GI: Negative for:  abdominal pain, nausea, vomiting, diarrhea, constipation, bleeding  Neurology: Negative for: headache, numbness, weakness  Genito-Urinary: Negative for: dysuria, frequency  Psychiatric: Negative for:  depression, anxiety  Hematology/Lymphatics: Negative for: bruising, lower extremity edema  Endocrine: Negative for: polyuria, polydypsia  Skin: Negative for: rash, blister, cellulitis,       PHYSICAL EXAM:   Vitals:    07/02/25 0945   BP: 114/60   Pulse: 91   Weight: 141 lb (64 kg)     BMI: Body mass index is 23.46 kg/m².         General Appearance:  alert, well developed, in no acute distress  Head: Atraumatic  Eyes:  normal conjunctivae,  sclera., normal sclera and normal pupils  Throat/Neck: normal sound to voice. Normal hearing, normal speech  Respiratory:  Speaking in full sentences, non-labored. no increased work of breathing, no audible wheezing    Neuro: motor grossly intact, moving all extremities without difficulty  Psychiatric:  oriented to time, self, and place  EXTREMITIES:  July 2025   Bilateral barefoot skin diabetic exam is normal, visualized feet and the appearance is normal.  Bilateral monofilament/sensation of both feet is normal.  Pulsation pedal pulse exam of both lower legs/feet is normal as well.          DATA:       Pertinent labs reviewed  A1c is 6.7 % (7/2025)     ASSESSMENT AND PLAN:     1. Type 2 DM:     Plan:  Discussed the pathogenesis, natural course of diabetes. Patient understands the importance of glycemic control and the implications of uncontrolled diabetes including Diabetic ketoacidosis and various micro vascular and macrovascular complications.        a). Medications:  - Metformin ER 1000 mg BID  Take with food  GI SE reviewed    - Amaryl 2 mg : 1.5 tablets daily   Take with food  Risk of hypoglycemia reviewed     - Trulicity 1.5 mg q week.   No personal or family history of MEN syndrome  Patient counselled regarding side effects including injection site reactions, nausea, vomiting, diarrhea, pancreatitis, gastroparesis and rare side effect jimmie Checo syndrome.      Call if she has low BG under 80     b). No h/o Nephropathy.Recent Ur MA was high, will recheck in 5-6 months. Good BG and BP control  c). Instructed on importance of annual eye exams  d). Foot exam: Daily feet exam explained  e). BG log maintainence explained in great detail, to get log and glucometer on next visit.  f). Life style changes discussed  g). Hypoglycemia recognition and management discussed     2. Patient’s BP is normal  today  3. Dyslipidemia  A) Discussed lifestyle modifications including reductions in dietary total and saturated  fat, weight loss, aerobic exercise, and eating a diet rich in fruits and vegetables.  B) c/w statin  Discussed the potential side effects of statins including muscle and liver injury.     RTC in 4-5 months  Call with BG as instructed.      Orders Placed This Encounter   Procedures    POC HemoCue Glucose 201 (Finger stick glucose)    POC Glycohemoglobin [77692]    Microalb/Creat Ratio, Random Urine [E]       Pili Young MD                      Patient verbalized a complete  understanding of all of the above and did not have any further questions.

## (undated) DIAGNOSIS — E03.9 HYPOTHYROIDISM, UNSPECIFIED TYPE: Primary | ICD-10-CM

## (undated) DIAGNOSIS — E11.9 CONTROLLED TYPE 2 DIABETES MELLITUS WITHOUT COMPLICATION, WITHOUT LONG-TERM CURRENT USE OF INSULIN (HCC): ICD-10-CM

## (undated) DIAGNOSIS — I10 ESSENTIAL HYPERTENSION WITH GOAL BLOOD PRESSURE LESS THAN 130/85: ICD-10-CM

## (undated) DEVICE — SPONGE 4X4 10PK

## (undated) DEVICE — HOOD, PEEL-AWAY: Brand: FLYTE

## (undated) DEVICE — SUCTION CANISTER, 3000CC,SAFELINER: Brand: DEROYAL

## (undated) DEVICE — HEWSON SUTURE RETRIEVER: Brand: HEWSON SUTURE RETRIEVER

## (undated) DEVICE — BIPOLAR SEALER 23-112-1 AQM 6.0: Brand: AQUAMANTYS™

## (undated) DEVICE — DRESSING ALG 9X30CM HYDRCOLL POLYUR FLM SIL

## (undated) DEVICE — GAMMEX® PI HYBRID SIZE 6.5, STERILE POWDER-FREE SURGICAL GLOVE, POLYISOPRENE AND NEOPRENE BLEND: Brand: GAMMEX

## (undated) DEVICE — SUT ETHBND XL 5 30IN V-40 NABSRB GRN L48MM 1/

## (undated) DEVICE — ADHESIVE SKIN TOP FOR WND CLSR DERMBND ADV

## (undated) DEVICE — GAMMEX® NON-LATEX PI ORTHO SIZE 7, STERILE POLYISOPRENE POWDER-FREE SURGICAL GLOVE: Brand: GAMMEX

## (undated) DEVICE — DRAPE,U/ SHT,SPLIT,PLAS,STERIL: Brand: MEDLINE

## (undated) DEVICE — PACK CDS TOTAL HIP

## (undated) DEVICE — SUT STRATAFIX SPRL PDS+ 3-0 12IN PS ABSRB VLT

## (undated) DEVICE — SOLUTION IRRIG 1000ML 0.9% NACL USP BTL

## (undated) DEVICE — SHEET,DRAPE,53X77,STERILE: Brand: MEDLINE

## (undated) DEVICE — FEMORAL CANAL PRESSURIZER WITHOUT HUB, MEDIUM

## (undated) DEVICE — HOOD: Brand: FLYTE

## (undated) DEVICE — VIOLET BRAIDED (POLYGLACTIN 910), SYNTHETIC ABSORBABLE SUTURE: Brand: COATED VICRYL

## (undated) DEVICE — GAMMEX® PI HYBRID SIZE 7.5, STERILE POWDER-FREE SURGICAL GLOVE, POLYISOPRENE AND NEOPRENE BLEND: Brand: GAMMEX

## (undated) DEVICE — ELECTRODE ES L16.5CM BLDE MPLR OPN APPRCH EZ

## (undated) DEVICE — GAMMEX® NON-LATEX PI ORTHO SIZE 8, STERILE POLYISOPRENE POWDER-FREE SURGICAL GLOVE: Brand: GAMMEX

## (undated) DEVICE — ANTIBACTERIAL UNDYED BRAIDED (POLYGLACTIN 910), SYNTHETIC ABSORBABLE SUTURE: Brand: COATED VICRYL

## (undated) DEVICE — SOLUTION IRRIG 1000ML ST H2O AQUALITE PLAS

## (undated) DEVICE — SYRINGE,LUER LOCK,STERILE,60ML,PUMPC: Brand: MEDLINE

## (undated) DEVICE — Device: Brand: STABLECUT®

## (undated) DEVICE — TOWEL,OR,DSP,ST,BLUE,DLX,2/PK,40PK/CS: Brand: MEDLINE

## (undated) DEVICE — SUT STRATAFIX SYMMTRC PDS + 0 18IN ABSRB

## (undated) DEVICE — SYRINGE MED 20ML STD CLR PLAS LL TIP N CTRL

## (undated) NOTE — LETTER
August 22, 2017    Briana Nuno MD  [address] [place] [address]     Patient: Madhuri Lyons   YOB: 1950   Date of Visit: 8/22/2017       Dear Dr. Naina Ramos MD:    Thank you for referring Araceli Singleton to me for evaluatio • Diabetes Mother    • Cancer Maternal Grandmother      bone, leukemia   • Breast Cancer Maternal Grandmother 48   • Breast Cancer Sister 47   • Cataracts Sister    • Heart Surgery Father      carotid artery surgery    • Glaucoma Neg    • Macular degenerat ibuprofen (MOTRIN) 800 MG Oral Tab Take 1 tablet by mouth every 6 (six) hours as needed for Pain.  Disp: 40 tablet Rfl: 3       Allergies:  No Known Allergies    ROS:     ROS     Positive for: Endocrine, Eyes    Negative for: Constitutional, Jordana Jenkins Manifest Refraction     Not tested.   Patient sees Dr. Melissa Ulrich for her Diabetic eye exams and refractions/nw                 ASSESSMENT/PLAN:     Diagnoses and Plan:     Controlled type 2 diabetes mellitus without complication, without long-term current u

## (undated) NOTE — MR AVS SNAPSHOT
[facility]  [address]  [phone]               Thank you for choosing us for your health care visit with Rodrigo Long MD.  We are glad to serve you and happy to provide you with this summar TAKE 1 TABLET BY MOUTH TWICE DAILY   What changed:  See the new instructions. Commonly known as:  AMARYL           ibuprofen 800 MG Tabs   Take 1 tablet by mouth every 6 (six) hours as needed for Pain.    Commonly known as:  MOTRIN           Levothyroxine

## (undated) NOTE — LETTER
Patient Name: Robert Wellington  : 1950  MRN: [id number]  Patient Address: [address] [place] [address]      Coronavirus Disease 2019 (COVID-19)     [facility] is committed to the safety and well-being of our patients, members, e carefully. If your symptoms get worse, call your healthcare provider immediately. 3. Get rest and stay hydrated.    4. If you have a medical appointment, call the healthcare provider ahead of time and tell them that you have or may have COVID-19.  5. For m of fever-reducing medications; and  · Improvement in respiratory symptoms (e.g., cough, shortness of breath); and  · At least 10 days have passed since symptoms first appeared OR if asymptomatic patient or date of symptom onset is unclear then use 10 days donors must:    · Have had a confirmed diagnosis of COVID-19  · Be symptom-free for at least 14 days*    *Some people will be required to have a repeat COVID-19 test in order to be eligible to donate.  If you’re instructed by Le[facility] that a repeat test is r

## (undated) NOTE — MR AVS SNAPSHOT
[address] [facility] [address]  [place] [address]  [phone]               Thank you for choosing us for your health care visit with Danuta Ricardo NP.   We are glad to serve you and happy to provide you with this summary of your the middle ear because of air pressure differences. Most eustachian tube problems are not serious. They usually last only a short time and get better on their own.     But eustachian tube problems sometimes lead to serious problems, such as:    ?A middle situation, your doctor might treat you with one or more of the following:    ? Nose sprays   -Nasal steroids such as Flonase or Nasonex   - OTC nasal sprays like Afrin (oxymetazoline) can be used every 12 hours for  NO MORE than 3 days.   Longer use leads to sounds when you chew or swallow. You may feel that your balance is off. Or you may hear ringing in the ear. It often takes from several weeks up to 3 months for the fluid to clear on its own. Oral pain relievers and ear drops help if there is pain.  Rosie Robles · Your ear pain gets worse or does not start to improve   · Fever of 100.4°F (38°C) or higher, or as directed by your healthcare provider  · Fluid or blood draining from the ear  · Headache or sinus pain  · Stiff neck  · Unusual drowsiness or confusion  Da TAKE 1 TABLET BY MOUTH EVERY DAY           Vitamin B-12 1000 MCG Tabs   Take 2 tablets (2,000 mcg total) by mouth daily. Commonly known as:  VITAMIN B12           Vitamin D 1000 units Caps   Take 1,000 mg by mouth daily.                    Viloetta Osman

## (undated) NOTE — LETTER
Monoclonal & Polyclonal antibodies Fact Sheet for Patients and Caregivers    You are being given a medicine called bamlanivimab (Monoclonal antibodies) OR casirivimab and imdevimab (Polyclonal antibodies) for the treatment of coronavirus disease 2019 (COVI non-hospitalized adults and adolescents 15years of age and older:  • With mild to moderate symptoms who weigh 88 pounds (40 kg) or more  • Who are at high risk for developing severe COVID-19 symptoms or the need for hospitalization  Monoclonal and Polyclo hives, itching, muscle aches, and dizziness. • The side effects of getting any medicine by vein may include brief pain, bleeding, bruising of the skin, soreness, swelling, and possible infection at the infusion site.     These are not all the possible side pregnant or breastfeeding, discuss your options and specific situation with your healthcare provider. How do I report side effects with Monoclonal and Polyclonal antibodies?   Tell your healthcare provider right away if you have any side effect that both

## (undated) NOTE — Clinical Note
Hanny King, [address] [facility]       05/06/2017        Patient: Erin Drummond   YOB: 1950   Date of Visit: 5/6/2017       Dear  Dr. Makeda Irizarry MD,      Thank you for referring Erin Drummond to my pra

## (undated) NOTE — MR AVS SNAPSHOT
[address]  [phone]               Thank you for choosing us for your health care visit with Alec Santana MD.  We are glad to serve you and happy to provide you with this summar ear. When the ear is healthy, air pressure remains balanced in the middle ear. The eustachian tube helps control air pressure in the middle ear. The semicircular canals help maintain balance. The vestibular nerve carries balance signals to the brain.  The a Commonly known as:  SYNTHROID           MATZIM  MG Tb24   Generic drug:  DilTIAZem HCl ER Coated Beads   TAKE 1 TABLET BY MOUTH EVERY NIGHT           MetFORMIN HCl  MG Tb24   TAKE 2 TABLETS BY MOUTH TWICE DAILY WITH MEALS   Commonly known as:

## (undated) NOTE — LETTER
10/03/18        [address] [address]      Dear Luz Marina Ocasio,    [facility] records indicate that you have outstanding lab work and or testing that was ordered for you and has not yet been completed:  Orders Placed This Encounter

## (undated) NOTE — LETTER
Date: 3/2/2021    Patient Name: Nikolas Skaggs        To Whom it may concern: This letter has been written at the patient's request. The above patient was seen at the [facility] for treatment of a medical condition.     This patient should

## (undated) NOTE — MR AVS SNAPSHOT
[address] [place] [address] [phone]               Thank you for choosing us for your health care visit with Gladys Sandoval, [address].   We are glad to serve you and happy to provide you with this abarca Fluticasone Propionate 50 MCG/ACT Susp   SHAKE LIQUID AND USE 2 SPRAYS IN EACH NOSTRIL DAILY   Commonly known as:  FLONASE           glimepiride 2 MG Tabs   TAKE 1 TABLET BY MOUTH TWICE DAILY   What changed:  See the new instructions.    Commonly known as:

## (undated) NOTE — LETTER
12/6/2024          To Whom It May Concern:    Mary Marmolejo is currently under my medical care. She does not need antibiotic prophylaxis for routine cleanings and general dental work. For procedures in which there is an active infection such as a root canal, prophylactic antibiotics would be advised and should be given per standard protocol.    If you require additional information please contact our office.        Sincerely,    Eliel Tolbert MD

## (undated) NOTE — MR AVS SNAPSHOT
[address] [address]  [phone]               Thank you for choosing us for your health care visit with Alexa Handley MD.  We are glad to serve you and happy to provide you with this summary Junaid Galo MD   [address] [place] [address]   Phone:  [phone]   Fax:  [phone]    Diagnoses:  Routine health maintenance   Cervical low risk human papillomavirus (HPV) DNA test positive   Order:  Evaluate & Treat, Obg (Internal) Today's Vital Signs     BP Pulse Height Weight BMI    127/77 mmHg 82 5' (1.524 m) 141 lb (63.957 kg) 27.54 kg/m2         Current Medications          This list is accurate as of: 2/23/17 10:38 AM.  Always use your most recent med list.                as medical emergencies, dial 911. Visit [email or web address] to learn more.         Educational Information     Healthy Diet and Regular Exercise  [employer] [facility] for making healthy food choices  -   Enjoy your food, but eat le

## (undated) NOTE — Clinical Note
Priscilla Mora   Pt has a phone visit w/ [facility] scheduled for 2/22/21. DX:  COVID-19 Pneumonia. Thank you.

## (undated) NOTE — LETTER
12/5/2024          To Whom It May Concern:    Mary Marmolejo is currently under my medical care. She does not need antibiotic prophylaxis for routine cleanings at the dentist.    If you require additional information please contact our office.        Sincerely,    Eliel Tolbert MD

## (undated) NOTE — LETTER
2/9/2021              [address] [id number]         Dear Harpreet Acharya,      Sincerely,    Thomas Andrade MD  [address]  [phone]

## (undated) NOTE — Clinical Note
5/31/2017              [address] [facility]        [place] [address]         Dear Christiano Handy,      It was a pleasure to see you at our [facility] office. Your Pap and HPV are negative.  Co

## (undated) NOTE — LETTER
November 15, 2023      No Recipients     Patient: Zenon Florez   YOB: 1950   Date of Visit: 11/15/2023       Dear Dr. Andrez Peraza Recipients: Thank you for referring Magalie Vences to me for evaluation. Here is my assessment and plan of care:    Zenon Florez is a 72year old female. HPI:     HPI    Pt is here for diabetic eye exam.  Pt denies any vision changes since last eye exam.  Pt has new progressive glasses.       Pt has been a diabetic for 10+ years       Pt's diabetes is currently controlled by pills and injectable  Pt checks BS e very other day  Pt's last blood sugar was  127 this morning  Last HA1C was 6.4 on 23  Endocrinologist: Lakshmi Sue     Consult: per Dr. Eugenie Taveras    Last edited by Jose Guerin, O.T. on 11/15/2023  7:52 AM.        Patient History:  Past Medical History:   Diagnosis Date    Acute respiratory failure with hypoxia ([address] 2021    Elective      Esophageal reflux     Fatty liver     High cholesterol     Hypothyroid     Hypothyroidism     ON GENERIC SYNTHROID    Lipid screening 2014    per NG    MVP (mitral valve prolapse)     antibiotic prophylaxis w/ procedures    Other and unspecified hyperlipidemia     Post-menopausal bleeding     Type II or unspecified type diabetes mellitus without mention of complication, not stated as uncontrolled     Unspecified essential hypertension        Surgical History: Zenon Florez has a past surgical history that includes ; tonsillectomy; endometrial biopsy - jar(s): 2 () (negative); colonoscopy (); colonoscopy (, ); colonoscopy (N/A, 2017) (Procedure: COLONOSCOPY, POSSIBLE BIOPSY, POSSIBLE POLYPECTOMY 78863;  Surgeon: Nomi Morris MD;  Location: [address] [place] [address]); egd (2018); egd (N/A, 2018) (Procedure: ESOPHAGOGASTRODUODENOSCOPY, COLONOSCOPY, POSSIBLE BIOPSY, POSSIBLE POLYPECTOMY 37544, 54257;  Surgeon: Nomi Morris MD; Location: [address] [place] [address]); d & c; and other surgical history (FATTY LIVER) (2 BIOPSIES IN THE PAST). Family History   Problem Relation Age of Onset    Cancer Mother         OVARIAN AND COLON    Diabetes Mother     Ovarian Cancer Mother 72    Heart Surgery Father         carotid artery surgery     Hypertension Father     Breast Cancer Sister 47    Cataracts Sister     Breast Cancer Sister     Anemia Sister     Cancer Maternal Grandmother         BREAST CANCER,    Breast Cancer Maternal Grandmother 48    Glaucoma Neg     Macular degeneration Neg        Social History:   Social History     Socioeconomic History    Marital status:    Tobacco Use    Smoking status: Never    Smokeless tobacco: Never   Vaping Use    Vaping Use: Never used   Substance and Sexual Activity    Alcohol use: Not Currently     Alcohol/week: 1.7 standard drinks of alcohol     Types: 2 Glasses of wine per week     Comment: very rarely    Drug use: No    Sexual activity: Not Currently     Partners: Male   Other Topics Concern    Caffeine Concern Yes     Comment: Coffee 1 cup daily       Medications:  Current Outpatient Medications   Medication Sig Dispense Refill    Calcium Carbonate-Vitamin D (CALCIUM 600-D OR)       Cyanocobalamin (VITAMIN B12) 500 MCG Oral Tab       Iron Glycinate 29 MG Oral Cap       trandolapril 4 MG Oral Tab Take 2 tablets (8 mg total) by mouth daily. 180 tablet 1    glimepiride 2 MG Oral Tab TAKE 1 TABLET BY MOUTH WITH BREAKFAST AND ONE-HALF TABLET WITH DINNER 135 tablet 1    meclizine 25 MG Oral Tab Take 1 tablet (25 mg total) by mouth 3 (three) times daily as needed for Dizziness. 40 tablet 2    amLODIPine 10 MG Oral Tab Take 1 tablet (10 mg total) by mouth daily. 90 tablet 1    metFORMIN  MG Oral Tablet 24 Hr Take 2 tablets (1,000 mg total) by mouth 2 (two) times daily with meals.  360 tablet 1    Dulaglutide (TRULICITY) 1.5 DX/0.5SQ Subcutaneous Solution Pen-injector Inject 1.5 mg into the skin once a week. 6 mL 0    atorvastatin 10 MG Oral Tab Take 1 tablet (10 mg total) by mouth nightly. 90 tablet 3    tiZANidine 4 MG Oral Tab Take 1 tablet (4 mg total) by mouth every 8 (eight) hours as needed. 30 tablet 2    pantoprazole 20 MG Oral Tab EC Take 1 tablet (20 mg total) by mouth before breakfast. 90 tablet 3    levothyroxine 88 MCG Oral Tab TAKE 1.5 TABLETS ON SUNDAY AND 1 TABLET BY MOUTH ALL OTHER DAYS 110 tablet 3    Glucose Blood (TRUE METRIX BLOOD GLUCOSE TEST) In Vitro Strip USE AS DIRECTED TO CHECK BLOOD GLUCOSE 2 TIMES DAILY 200 strip 0    TRUEplus Lancets 33G Does not apply Misc Check sugars twice a day 200 each 0    TRUE METRIX LEVEL 1 Low In Vitro Solution USE AS DIRECTED 1 each 0    folic acid 274 MCG Oral Tab       Cholecalciferol (VITAMIN D) 1000 UNITS Oral Cap Take 1,000 mg by mouth daily. aspirin 81 MG Oral Tab Take  by mouth. take 1 tablet (81MG)  by oral route  every day         Allergies:   Allergies   Allergen Reactions    Seasonal UNKNOWN       ROS:     ROS    Positive for: Endocrine, Eyes  Negative for: Constitutional, Gastrointestinal, Neurological, Skin, Genitourinary, Musculoskeletal, HENT, Cardiovascular, Respiratory, Psychiatric, Allergic/Imm, Heme/Lymph  Last edited by Laura Bhatia OJarrodTJarrod on 11/15/2023  7:52 AM.          PHYSICAL EXAM:     Base Eye Exam       Visual Acuity (Snellen - Linear)         Right Left    Dist cc 20/20 -2 20/20 -1    Near cc 20/20 20/20      Correction: Glasses              Tonometry ([facility], 8:04 AM)         Right Left    Pressure 15 15              Pupils         Pupils    Right PERRL    Left PERRL              Visual Fields         Left Right     Full Full              Extraocular Movement         Right Left     Full, Ortho Full, Ortho              Neuro/Psych       Oriented x3: Yes    Mood/Affect: Normal              Dilation       Both eyes: 1.0% Mydriacyl and 2.5% Kris Synephrine @ 8:04 AM                  Slit Lamp and Fundus Exam External Exam         Right Left    External Normal Normal              Slit Lamp Exam         Right Left    Lids/Lashes Meibomian gland dysfunction, Dermatochalasis Meibomian gland dysfunction, Dermatochalasis    Conjunctiva/Sclera Normal Normal    Cornea trace pigment Clear    Anterior Chamber Deep and quiet Deep and quiet    Iris Normal Normal    Lens 2+ Nuclear sclerosis, 2+ Cortical cataract 2+ Nuclear sclerosis, 2+ Cortical cataract    Vitreous Vitreous floaters Vitreous floaters              Fundus Exam         Right Left    Disc Good rim, Temporal crescent Good rim, Temporal crescent    C/D Ratio 0.5 0.5    Macula Normal, no BDR Normal,  no BDR    Vessels Normal Normal    Periphery Normal Normal                  Refraction       Wearing Rx         Sphere Cylinder Axis Add    Right -5.25 +1.50 065 +2.50    Left -4.75 +0.75 105 +2.50      Age: 6m    Type: Progressive bifocal              Manifest Refraction    Declined refraction. Goes elsewhere. ASSESSMENT/PLAN:     Diagnoses and Plan:     Diabetes mellitus type 2 without retinopathy ([address]  Diabetes type II: no background of retinopathy, no signs of neovascularization noted. Discussed ocular and systemic benefits of blood sugar control. Diagnosis and treatment discussed in detail with patient. Will see patient in 1 year for a diabetic exam    Age-related nuclear cataract of both eyes  Discussed moderate cataracts in both eyes that are affecting vision but are not surgical at this time. Vitreous floaters, bilateral   There is no evidence of retinal pathology. All signs and symptoms of retinal detachment/tears explained in detail. Patient instructed to call the office if they experience increase in floaters, increase in flashes of light, loss of vision or curtain or veil effect. No orders of the defined types were placed in this encounter.       Meds This Visit:  Requested Prescriptions      No prescriptions requested or ordered in this encounter        Follow up instructions:  Return in about 1 year (around 11/15/2024) for Diabetic eye exam.    11/15/2023  Scribed by: Roz Whittaker MD        If you have questions, please do not hesitate to call me. I look forward to following Adithya Jones along with you.     Sincerely,        Roz Whittaker MD        CC:   No Recipients    Document electronically generated by: Roz Whittaker MD

## (undated) NOTE — MR AVS SNAPSHOT
After Visit Summary   7/3/2020    Federico Antoine    MRN: [id number]           Visit Information     Date & Time  7/3/2020  1:20 PM Provider  Lise Fernandez MD [address] [place] [address], [address] [place] [address],[address], [facility].  Phone  33 Ferrous Sulfate (IRON SUPPLEMENT OR) Take 1 each by mouth daily.       Diagnoses for This Visit    Encounter for gynecological examination without abnormal finding   [557029]  -  Primary  Visit for screening mammogram   [436673]    Menopause   [406278] Around July 3, 2020   Imaging:   XR DEXA BONE DENSITOMETRY (CPT=77080)    Instructions:   To schedule a test at any [facility], call Central Scheduling at [phone], Monday through Friday between 7:30am to 6pm and on Saturda $35*    e-VISTS  Average cost  $35*     SAME DAY APPOINTMENTS   Available at primary care offices    AFTER HOURS CARE  [place]  OFFICE VISIT   Primary Care Providers  Treatment for mild illness or injury that does not require immediate attention.  Average c

## (undated) NOTE — Clinical Note
Initial assessment completed with patient.  Message sent to office to assist in scheduling.  Patient agrees to additional follow-up calls from nurse care manager.  Thank you!

## (undated) NOTE — MR AVS SNAPSHOT
[facility]  [address]  [phone]               Thank you for choosing us for your health care visit with Macky Boeck, Au.D.   We are glad to serve you and happy to provide you with this summary Take 1 tablet by mouth every 6 (six) hours as needed for Pain.    Commonly known as:  MOTRIN           Levothyroxine Sodium 100 MCG Tabs   TAKE 1 TABLET BY MOUTH DAILY BEFORE BREAKFAST   Commonly known as:  SYNTHROID           MATZIM  MG Tb24   Generi

## (undated) NOTE — Clinical Note
Thank you for the consult. I saw Ms. Loreta Tavarez in the endocrine/diabetes clinic today. Please see attached my note. Please feel free to contact me with any questions. Thanks!